# Patient Record
Sex: FEMALE | Race: BLACK OR AFRICAN AMERICAN | NOT HISPANIC OR LATINO | Employment: OTHER | URBAN - METROPOLITAN AREA
[De-identification: names, ages, dates, MRNs, and addresses within clinical notes are randomized per-mention and may not be internally consistent; named-entity substitution may affect disease eponyms.]

---

## 2017-01-09 ENCOUNTER — TELEPHONE (OUTPATIENT)
Dept: PAIN MEDICINE | Facility: CLINIC | Age: 57
End: 2017-01-09

## 2017-01-09 ENCOUNTER — PATIENT MESSAGE (OUTPATIENT)
Dept: PAIN MEDICINE | Facility: CLINIC | Age: 57
End: 2017-01-09

## 2017-01-09 NOTE — TELEPHONE ENCOUNTER
Spoke with patient regarding procedure on 1/12/17. Patient stated that the new insurance company needs codes and more information regarding procedure that is scheduled on 1/12/17. Patient was informed that the staff will contact someone regarding this matter. Patient verbalized understanding

## 2017-01-09 NOTE — TELEPHONE ENCOUNTER
Spoke with patient regarding procedure being approved by insurance. Patient was informed that insurance has approved her procedure and they are just waiting on an estimate  To be ran. Patient verbalized understanding.

## 2017-01-09 NOTE — TELEPHONE ENCOUNTER
----- Message from Leydi Matos sent at 1/9/2017  9:43 AM CST -----  Contact: self, [phone]  Patient requests to speak with you regarding her 1/12 procedure. Please advise.

## 2017-01-10 ENCOUNTER — TELEPHONE (OUTPATIENT)
Dept: PAIN MEDICINE | Facility: CLINIC | Age: 57
End: 2017-01-10

## 2017-01-10 NOTE — TELEPHONE ENCOUNTER
Spoke with patient regarding time of arrival for procedure that is scheduled on 1/12/17. Patient verbalized instructions and confirmed procedure date and time of arrival on 1/12/17 at 8 AM.

## 2017-01-11 ENCOUNTER — DOCUMENTATION ONLY (OUTPATIENT)
Dept: REHABILITATION | Facility: HOSPITAL | Age: 57
End: 2017-01-11

## 2017-01-11 NOTE — PROGRESS NOTES
PHYSICAL THERAPY DISCHARGE:    This patient was originally evaluated at our facility on: 11/7/16         Pt attended PT for a total of 2 Visits receiving: Manual Therapy, Therex, Postural Education, Body Mechanics Training, Home Exercise Instruction     This patient's last visit occurred on: 11/14/16      Short term goals were achieved: no     Long term goals were achieved: no    Pt was DC'd from our care secondary to: pt did not return after 2nd visit       Therapist: Sharon Mitchell, PT  1/11/2017

## 2017-01-12 ENCOUNTER — OFFICE VISIT (OUTPATIENT)
Dept: ORTHOPEDICS | Facility: CLINIC | Age: 57
End: 2017-01-12
Payer: COMMERCIAL

## 2017-01-12 ENCOUNTER — SURGERY (OUTPATIENT)
Age: 57
End: 2017-01-12

## 2017-01-12 ENCOUNTER — TELEPHONE (OUTPATIENT)
Dept: PAIN MEDICINE | Facility: HOSPITAL | Age: 57
End: 2017-01-12

## 2017-01-12 VITALS
WEIGHT: 290 LBS | SYSTOLIC BLOOD PRESSURE: 180 MMHG | DIASTOLIC BLOOD PRESSURE: 93 MMHG | BODY MASS INDEX: 45.52 KG/M2 | HEART RATE: 81 BPM | HEIGHT: 67 IN

## 2017-01-12 DIAGNOSIS — M65.341 TRIGGER FINGER, RIGHT RING FINGER: Primary | ICD-10-CM

## 2017-01-12 DIAGNOSIS — Z98.890 POST-OPERATIVE STATE: ICD-10-CM

## 2017-01-12 PROCEDURE — 99999 PR PBB SHADOW E&M-EST. PATIENT-LVL III: CPT | Mod: PBBFAC,,, | Performed by: NURSE PRACTITIONER

## 2017-01-12 PROCEDURE — 99024 POSTOP FOLLOW-UP VISIT: CPT | Mod: S$GLB,,, | Performed by: NURSE PRACTITIONER

## 2017-01-12 RX ADMIN — FENTANYL CITRATE 50 MCG: 50 INJECTION, SOLUTION INTRAMUSCULAR; INTRAVENOUS at 09:01

## 2017-01-12 RX ADMIN — LIDOCAINE HYDROCHLORIDE 5 ML: 5 INJECTION, SOLUTION INFILTRATION; PERINEURAL at 09:01

## 2017-01-12 RX ADMIN — MIDAZOLAM 2 MG: 1 INJECTION INTRAMUSCULAR; INTRAVENOUS at 09:01

## 2017-01-12 RX ADMIN — LIDOCAINE HYDROCHLORIDE 5 ML: 10 INJECTION, SOLUTION INFILTRATION; PERINEURAL at 09:01

## 2017-01-12 RX ADMIN — TRIAMCINOLONE ACETONIDE 40 MG: 40 INJECTION, SUSPENSION INTRA-ARTICULAR; INTRAMUSCULAR at 09:01

## 2017-01-13 NOTE — PATIENT INSTRUCTIONS
POST OPERATIVE VISIT INSTRUCTIONS    - no soaking the incision for at least 2 days, may get it wet in the shower and use regular soap    To avoid a hard, painful scar, we recommend you use Mederma and/or Silicone Scar patches. You may also use Emi Butter or Vitamin E oil.    You may start this 1 week after stitches are removed.   - Mederma scar cream : scar massage over incision 1-2 times per day. May use topical lotion or Vitamin E oil for massage an additional few times a day to help the scar become soft and less sensitive  - Silicone Scar Patches: cut and place over the incision, then massage over the patch  to help with scarring and sensitivity. Can be reused up to 4 days if you rinse it clean, let it dry out, then reapply    -Brands:  Mepiform Silicone Scar Sheets (Recommended), Scar Away,    Target Brand or Generic Pharmacy Brand ([facility], [facility], [facility])    - May take Tylenol 500 mg and/or Ibuprofen 400mg every 4-6 hours with food for pain as tolerated as long as you do not have an allergy or medical condition that prevents you from taking them    - Therapy recommended: As needed    - Immobilization: None    - Lifting Restrictions: Activity as tolerated     - Follow up: 4 weeks as needed    -contact us immediately at [phone] if you have any concerns about infection. Please let them know that you are a post operative patient.

## 2017-01-13 NOTE — PROGRESS NOTES
"Ms. Schmidt is here today for a post-operative visit.she is 13 days status post right ring finger TFR by Dr. Antonio on 12/30/16. she reports that she is doing very well.  Pain is well controlled, she is not taking pain medication. she denies fever, chills, and sweats since the time of the surgery.     Physical exam:    Vitals:    01/12/17 1416   BP: (!) 180/93   Pulse: 81   Weight: 131.5 kg (290 lb)   Height: 5' 7" (1.702 m)   PainSc:   6   PainLoc: Hand     Post op dressing taken down.  Incision is clean, dry and intact.  There is no erythema or exudate.  There is no sign of any infection. she is NVI. Sutures removed without difficulty. Patient tolerated well.     Assessment:  13 days status post right ring finger TFR by Dr. Antonio on 12/30/16    Plan:  Diagnoses and all orders for this visit:    Trigger finger, right ring finger    Post-operative state    -Pt doing well post operatively and healing as expected  -PO instruction reviewed and provided to patient  -RTC 4 week PRN        "

## 2017-02-20 ENCOUNTER — LAB VISIT (OUTPATIENT)
Dept: LAB | Facility: HOSPITAL | Age: 57
End: 2017-02-20
Attending: INTERNAL MEDICINE
Payer: COMMERCIAL

## 2017-02-20 ENCOUNTER — OFFICE VISIT (OUTPATIENT)
Dept: INTERNAL MEDICINE | Facility: CLINIC | Age: 57
End: 2017-02-20
Payer: COMMERCIAL

## 2017-02-20 DIAGNOSIS — E55.9 VITAMIN D DEFICIENCY: ICD-10-CM

## 2017-02-20 DIAGNOSIS — I10 ESSENTIAL HYPERTENSION: Primary | ICD-10-CM

## 2017-02-20 DIAGNOSIS — Z12.31 ENCOUNTER FOR SCREENING MAMMOGRAM FOR BREAST CANCER: ICD-10-CM

## 2017-02-20 DIAGNOSIS — I10 ESSENTIAL HYPERTENSION: ICD-10-CM

## 2017-02-20 LAB
25(OH)D3+25(OH)D2 SERPL-MCNC: 22 NG/ML
ANION GAP SERPL CALC-SCNC: 7 MMOL/L
BUN SERPL-MCNC: 16 MG/DL
CALCIUM SERPL-MCNC: 9.4 MG/DL
CHLORIDE SERPL-SCNC: 110 MMOL/L
CO2 SERPL-SCNC: 27 MMOL/L
CREAT SERPL-MCNC: 0.9 MG/DL
EST. GFR  (AFRICAN AMERICAN): >60 ML/MIN/1.73 M^2
EST. GFR  (NON AFRICAN AMERICAN): >60 ML/MIN/1.73 M^2
GLUCOSE SERPL-MCNC: 88 MG/DL
POTASSIUM SERPL-SCNC: 4.5 MMOL/L
SODIUM SERPL-SCNC: 144 MMOL/L

## 2017-02-20 PROCEDURE — 1160F RVW MEDS BY RX/DR IN RCRD: CPT | Mod: S$GLB,,, | Performed by: INTERNAL MEDICINE

## 2017-02-20 PROCEDURE — 99214 OFFICE O/P EST MOD 30 MIN: CPT | Mod: S$GLB,,, | Performed by: INTERNAL MEDICINE

## 2017-02-20 PROCEDURE — 3075F SYST BP GE 130 - 139MM HG: CPT | Mod: S$GLB,,, | Performed by: INTERNAL MEDICINE

## 2017-02-20 PROCEDURE — 3079F DIAST BP 80-89 MM HG: CPT | Mod: S$GLB,,, | Performed by: INTERNAL MEDICINE

## 2017-02-20 PROCEDURE — 99999 PR PBB SHADOW E&M-EST. PATIENT-LVL III: CPT | Mod: PBBFAC,,, | Performed by: INTERNAL MEDICINE

## 2017-02-20 PROCEDURE — 80048 BASIC METABOLIC PNL TOTAL CA: CPT

## 2017-02-20 PROCEDURE — 82306 VITAMIN D 25 HYDROXY: CPT

## 2017-02-20 PROCEDURE — 36415 COLL VENOUS BLD VENIPUNCTURE: CPT

## 2017-02-20 RX ORDER — TRIAMTERENE AND HYDROCHLOROTHIAZIDE 37.5; 25 MG/1; MG/1
1 CAPSULE ORAL EVERY MORNING
Qty: 30 CAPSULE | Refills: 3 | Status: SHIPPED | OUTPATIENT
Start: 2017-02-20 | End: 2019-12-26 | Stop reason: SDUPTHER

## 2017-02-20 RX ORDER — LISINOPRIL 40 MG/1
40 TABLET ORAL DAILY
Qty: 30 TABLET | Refills: 3 | Status: SHIPPED | OUTPATIENT
Start: 2017-02-20 | End: 2017-10-05 | Stop reason: SDUPTHER

## 2017-02-20 NOTE — MR AVS SNAPSHOT
[facility] - Internal Medicine  [address]  [place] [address]  Phone: [phone]  Fax: [phone]                  Dejah Schmidt   2017 10:45 AM   Office Visit    Description:  Female : 1960   Provider:  Dasha Bergeron MD   Department:  [facility] - Internal Medicine           Reason for Visit     Follow-up           Diagnoses this Visit        Comments    Essential hypertension    -  Primary     Vitamin D deficiency         Encounter for screening mammogram for breast cancer                To Do List           Future Appointments        Provider Department Dept Phone    2017 1:15 PM WILL Roy - Pain Management [phone]    2017 10:30 AM Dasha Bergeron MD [facility] Internal Medicine [phone]      Goals (5 Years of Data)     None      Follow-Up and Disposition     Return for EP 4 months.       These Medications        Disp Refills Start End    lisinopril (PRINIVIL,ZESTRIL) 40 MG tablet 30 tablet 3 2017     Take 1 tablet (40 mg total) by mouth once daily. - Oral    Pharmacy: [facility] [address] [place], [place] [address] [address] Ph #: [phone]       triamterene-hydrochlorothiazide 37.5-25 mg (DYAZIDE) 37.5-25 mg per capsule 30 capsule 3 2017    Take 1 capsule by mouth every morning. - Oral    Pharmacy: [facility] [address] [place], [place] [address] [address] Ph #: [phone]         [facility] On Call     [facility] On Call Nurse Care Line -  Assistance  Registered nurses in the [facility] provide clinical advisement, health education, appointment booking, and other advisory services.  Call for this free service at [phone].             Medications           Message regarding Medications     Verify the changes and/or additions to your medication regime listed below are the same as discussed with your clinician today.  If any of  "these changes or additions are incorrect, please notify your healthcare provider.             Verify that the below list of medications is an accurate representation of the medications you are currently taking.  If none reported, the list may be blank. If incorrect, please contact your healthcare provider. Carry this list with you in case of emergency.           Current Medications     docusate sodium (COLACE) 100 MG capsule Take 1 capsule (100 mg total) by mouth 2 (two) times daily as needed.    ibuprofen (ADVIL,MOTRIN) 200 MG tablet Take 200 mg by mouth every 6 (six) hours as needed for Pain.    lisinopril (PRINIVIL,ZESTRIL) 40 MG tablet Take 1 tablet (40 mg total) by mouth once daily.    ondansetron (ZOFRAN) 4 MG tablet Take 2 tablets (8 mg total) by mouth every 8 (eight) hours as needed.    oxycodone-acetaminophen (PERCOCET) 5-325 mg per tablet Take 1 tablet by mouth every 4 (four) hours as needed.    tramadol (ULTRAM) 50 mg tablet Take 1 to 2 tablets every 6 hours as needed for pain    triamterene-hydrochlorothiazide 37.5-25 mg (DYAZIDE) 37.5-25 mg per capsule Take 1 capsule by mouth every morning.           Clinical Reference Information           Your Vitals Were     BP Pulse Height Weight SpO2 BMI    132/82 82 5' 7" (1.702 m) 134.9 kg (297 lb 6.4 oz) 98% 46.58 kg/m2      Blood Pressure          Most Recent Value    BP  132/82      Allergies as of 2/20/2017     No Known Allergies      Immunizations Administered on Date of Encounter - 2/20/2017     None      Orders Placed During Today's Visit     Future Labs/Procedures Expected by Expires    Basic metabolic panel  2/20/2017 4/21/2018    Mammo Digital Screening Bilateral With CAD  2/20/2017 4/20/2018    Vitamin D  2/20/2017 4/21/2018      Language Assistance Services     ATTENTION: Language assistance services are available, free of charge. Please call [phone].      ATENCIÓN: Si habla español, tiene a ryan disposición servicios gratuitos de asistencia " lingüística. Seamus al [phone].     PIERRE Ý: N?u b?n nói Ti?ng Vi?t, có các d?ch v? h? tr? ngôn ng? mi?n phí dành cho b?n. G?i s? [phone].         [facility] complies with applicable Federal civil rights laws and does not discriminate on the basis of race, color, national origin, age, disability, or sex.

## 2017-02-21 ENCOUNTER — OFFICE VISIT (OUTPATIENT)
Dept: PAIN MEDICINE | Facility: CLINIC | Age: 57
End: 2017-02-21
Payer: COMMERCIAL

## 2017-02-21 VITALS
SYSTOLIC BLOOD PRESSURE: 145 MMHG | HEART RATE: 61 BPM | DIASTOLIC BLOOD PRESSURE: 82 MMHG | WEIGHT: 293 LBS | BODY MASS INDEX: 46.89 KG/M2

## 2017-02-21 DIAGNOSIS — M47.816 FACET ARTHROPATHY, LUMBAR: ICD-10-CM

## 2017-02-21 DIAGNOSIS — E66.01 MORBID OBESITY, UNSPECIFIED OBESITY TYPE: ICD-10-CM

## 2017-02-21 DIAGNOSIS — G89.29 CHRONIC BILATERAL LOW BACK PAIN WITH BILATERAL SCIATICA: ICD-10-CM

## 2017-02-21 DIAGNOSIS — M54.41 CHRONIC BILATERAL LOW BACK PAIN WITH BILATERAL SCIATICA: ICD-10-CM

## 2017-02-21 DIAGNOSIS — M54.42 CHRONIC BILATERAL LOW BACK PAIN WITH BILATERAL SCIATICA: ICD-10-CM

## 2017-02-21 DIAGNOSIS — M54.16 LUMBAR RADICULOPATHY, CHRONIC: Primary | ICD-10-CM

## 2017-02-21 PROCEDURE — 1160F RVW MEDS BY RX/DR IN RCRD: CPT | Mod: S$GLB,,, | Performed by: NURSE PRACTITIONER

## 2017-02-21 PROCEDURE — 3079F DIAST BP 80-89 MM HG: CPT | Mod: S$GLB,,, | Performed by: NURSE PRACTITIONER

## 2017-02-21 PROCEDURE — 99213 OFFICE O/P EST LOW 20 MIN: CPT | Mod: S$GLB,,, | Performed by: NURSE PRACTITIONER

## 2017-02-21 PROCEDURE — 99999 PR PBB SHADOW E&M-EST. PATIENT-LVL III: CPT | Mod: PBBFAC,,, | Performed by: NURSE PRACTITIONER

## 2017-02-21 PROCEDURE — 3077F SYST BP >= 140 MM HG: CPT | Mod: S$GLB,,, | Performed by: NURSE PRACTITIONER

## 2017-02-21 NOTE — PROGRESS NOTES
Chronic patient Established Note (Follow up visit)      SUBJECTIVE:    Dejah Schmidt presents to the clinic for a 5 wk follow-up appointment for low back pain. She is S/P CAUDAL DB with Racz Catheter on 1/12/17 with 60-70% relief for 3 weeks, however patient states pain has returned. Patient is S/P multiple injections with minimal relief, previous imaging was reviewed and discussed with the patient today, discussed that I will refer to Neurosurgery main [place] to consider some other options.   Since the last visit, Dejah Schmidt states the pain has been worsening. Current pain intensity is 7/10.    Pain Disability Index Review:  Last 3 PDI Scores 2/21/2017 12/22/2016 11/16/2016   Pain Disability Index (PDI) 52 47 53       Pain Medications:      - Opioids: Tylenol #3  - Adjuvant Medications: None  - Anti-Coagulants: None  - Others: see medications list    Opioid Contract: no     report:  Reviewed and consistent with medication use as prescribed.    Pain Procedures: 1/12/17 CAUDAL DB with Racz Catheter  12/7/16 BILATERAL L2,3,4,5 LUMBAR FACET MEDIAL BRANCH NERVE BLOCK; 10/19/16 Right L2 and L4 TRANSFORAMINAL EPIDURAL STEROID INJECTION (L1 level was attempted, but Was aborted due to + aspiration of blood before injection, so decision was make to go to L2 level and have Injectate spread to L1, consent updated to reflect changes and initialed by pt and myself)    Physical Therapy/Home Exercise: no    Imaging: MRI Lumbar Spine Without Contrast 9/4/16  Narrative   MRI OF THE LUMBAR SPINE WITHOUT CONTRAST.     Technique:  Multiplanar multisequence MR images of the lumbar spine obtained without contrast.     Comparison: Radiograph 8/29/16     Findings:    Lumbar spine alignment is within normal limits. The vertebral body heights are well maintained, with no fracture.  There are T1/T2 hyperintense lesions identified in the L2 and L3 vertebral bodies most compatible with hemangiomas. No evidence of an acute fracture.       The conus is normal in appearance, and terminates at the L1-L2 level.  The adjacent soft tissue structures show no significant abnormalities.      L1-L2: Moderate bilateral facet arthrosis, ligamentum flavum hypertrophy, diffuse circumferential disc bulge, and a superimposed central disc extrusion extending caudally, resulting in severe spinal canal stenosis. There is mild bilateral neuroforaminal narrowing.    L2-L3: Moderate right and severe left facet arthrosis, ligamentum flavum hypertrophy, and a superimposed central disc extrusion extending caudally resulting in severe spinal canal stenosis. There is severe left neuroforaminal narrowing.     L3-L4:  Moderate right and severe left facet arthrosis and a diffuse circumferential disc bulge results in mild spinal canal stenosis and mild bilateral neuroforaminal narrowing.     L4-L5:  Severe bilateral facet arthrosis and mild diffuse circumferential disc bulge resulting in mild spinal canal stenosis and mild bilateral neuroforaminal narrowing.     L5-S1:  Moderate bilateral facet arthrosis, diffuse disc bulge, and a small annular tear, resulting in mild bilateral neuroforaminal narrowing and no significant spinal canal stenosis.   Impression        Multilevel lumbar spondylosis, as detailed above, most severe at L1-2 and L2-3 with diffuse disc bulges and superimposed central disc extrusions resulting in severe spinal canal stenosis and variable neuroforaminal narrowing.       ______________________________________     Electronically signed by resident: FAMILIA LEBRON MD  Date: 09/04/16  Time: 16:12     ______________________________________     Electronically signed by: ADRIANO WILSON MD  Date: 09/04/16  Time: 16:24        X-Ray Lumbar Spine Complete 5 View 8/29/16  Narrative   Time of Procedure: 08/29/16 13:55:18  Accession # [id number]    Comparison: 7/2014.    Number of views: 5.    Findings:  There are 5 non-rib bearing lumbar type vertebral bodies with  vertebral body heights maintained.  There is dextroscoliosis between L1 and L4 that I calculated at it least 14 degrees.  Vacuum phenomenon is noted at the L3-4 disc space.  These findings are similar to the earlier study.    I do not detect spondylolysis or spondylolisthesis and no translational instability on flexion and extension views..    Intervertebral disc spaces are maintained.    Sacroiliac joints appear patent in their imaged extent.   Impression     Please see above.  ______________________________________     Electronically signed by: Veronica Solis MD  Date: 08/29/16  Time: 14:35     Encounter   View Encounter      Reviewed By   Dasha Bergeron MD on 9/3/2016  1:25 PM         Allergies: Review of patient's allergies indicates:  No Known Allergies    Current Medications:   Current Outpatient Prescriptions   Medication Sig Dispense Refill    docusate sodium (COLACE) 100 MG capsule Take 1 capsule (100 mg total) by mouth 2 (two) times daily as needed. 60 capsule 0    ibuprofen (ADVIL,MOTRIN) 200 MG tablet Take 200 mg by mouth every 6 (six) hours as needed for Pain.      lisinopril (PRINIVIL,ZESTRIL) 40 MG tablet Take 1 tablet (40 mg total) by mouth once daily. 30 tablet 3    ondansetron (ZOFRAN) 4 MG tablet Take 2 tablets (8 mg total) by mouth every 8 (eight) hours as needed. 20 tablet 0    oxycodone-acetaminophen (PERCOCET) 5-325 mg per tablet Take 1 tablet by mouth every 4 (four) hours as needed. 60 tablet 0    tramadol (ULTRAM) 50 mg tablet Take 1 to 2 tablets every 6 hours as needed for pain 30 tablet 1    triamterene-hydrochlorothiazide 37.5-25 mg (DYAZIDE) 37.5-25 mg per capsule Take 1 capsule by mouth every morning. 30 capsule 3     No current facility-administered medications for this visit.        REVIEW OF SYSTEMS:    GENERAL: No weight loss, malaise or fevers.  Morbid obesity  HEENT:  + headaches.  NECK: + neck pain   RESPIRATORY: + asthma, Negative for cough, wheezing or shortness of  breath.  CARDIOVASCULAR: Negative for chest pain, leg swelling or palpitations.  GI: Negative for abdominal discomfort, blood in stools or black stools or change in bowel habits.  MUSCULOSKELETAL: See HPI.  SKIN: Negative for lesions, rash, and itching.  PSYCH: Negative for sleep disturbance, mood disorder and recent psychosocial stressors.  HEMATOLOGY/LYMPHOLOGY: Negative for prolonged bleeding, bruising easily or swollen nodes.  NEURO: No history of headaches, syncope, paralysis, seizures or tremors.  All other reviewed and negative other than HPI.    Past Medical History:  Past Medical History   Diagnosis Date    Asthma     Hypercholesteremia     Hypertension     Multiple thyroid nodules        Past Surgical History:  Past Surgical History   Procedure Laterality Date    Mass excision       at neck    Carpal tunnel release      Rotator cuff repair       left shoulder    Hysterectomy      Foot surgery         Family History:  Family History   Problem Relation Age of Onset    Cancer Mother     Coronary artery disease Mother     Heart disease Mother     Hypertension Mother     Hyperlipidemia Mother     Hypertension Father     Cancer Father     Heart disease Maternal Grandmother     Hypertension Maternal Grandmother        Social History:  Social History     Social History    Marital status:      Spouse name: N/A    Number of children: N/A    Years of education: N/A     Social History Main Topics    Smoking status: Never Smoker    Smokeless tobacco: Never Used    Alcohol use 0.0 oz/week     0 Standard drinks or equivalent per week      Comment: occasionaly    Drug use: No    Sexual activity: Yes     Other Topics Concern    None     Social History Narrative    [employer] receiving [employer]. No physical activities       OBJECTIVE:    Visit Vitals    BP (!) 145/82    Pulse 61    Wt 135.8 kg (299 lb 6.2 oz)    BMI 46.89 kg/m2       PHYSICAL EXAMINATION:    General appearance: Well  appearing, in no acute distress, alert and oriented x3.Morbidly obese  Psych: Mood and affect appropriate.  Skin: Skin color, texture, turgor normal, no rashes or lesions, in both upper and lower body.  Head/face: Normocephalic, atraumatic. No palpable lymph nodes.  Neck: No pain to palpation over the cervical paraspinous muscles. Spurling Negative. No pain with neck flexion, extension, or lateral flexion.   Back: Straight leg raising in the sitting and supine positions is negative to radicular pain. + pain to palpation over the spine or costovertebral angles. Positive FABERE and Yeoman's test on the right side.  Limited range of motion with pain reproduction.  Extremities: Peripheral joint ROM is full and pain free without obvious instability or laxity in all four extremities. No deformities, edema, or skin discoloration. Good capillary refill.  Musculoskeletal: Shoulder, hip, sacroiliac and knee provocative maneuvers are negative. Bilateral upper and lower extremity strength is normal and symmetric. No atrophy or tone abnormalities are noted.  Neuro: Bilateral upper and lower extremity coordination and muscle stretch reflexes are physiologic and symmetric. Plantar response are downgoing. No loss of sensation is noted.  Gait: Antalgic    ASSESSMENT: 56 y.o. year old female with low back pain with radicular symptoms consistent with      Diagnosis:    1. Lumbar radiculopathy, chronic  Ambulatory Referral to Neurosurgery   2. Chronic bilateral low back pain with bilateral sciatica  Ambulatory Referral to Neurosurgery   3. Facet arthropathy, lumbar  Ambulatory Referral to Neurosurgery   4. Morbid obesity, unspecified obesity type  Ambulatory Referral to Neurosurgery         PLAN:     - I have stressed the importance of physical activity and a home exercise plan to help with pain and improve health.  - Patient can continue with medications for now since they are providing benefits, using them appropriately, and without  side effects.  - Counseled patient regarding the importance of activity modification, constant sleeping habits and physical therapy.  -Referral to Neurosurgery  -RTC as needed for returning or new pain  -The above plan and management options were discussed at length with patient. Patient is in agreement with the above and verbalized understanding. The physician  was consulted on this patient  and agrees with this plan.    RENÉE Nevarez    02/21/2017

## 2017-02-25 ENCOUNTER — TELEPHONE (OUTPATIENT)
Dept: INTERNAL MEDICINE | Facility: CLINIC | Age: 57
End: 2017-02-25

## 2017-02-25 VITALS
BODY MASS INDEX: 45.99 KG/M2 | SYSTOLIC BLOOD PRESSURE: 132 MMHG | HEART RATE: 82 BPM | HEIGHT: 67 IN | OXYGEN SATURATION: 98 % | WEIGHT: 293 LBS | DIASTOLIC BLOOD PRESSURE: 82 MMHG

## 2017-02-25 NOTE — TELEPHONE ENCOUNTER
Please contact patient and let her know all of her labwork is acceptable except her vitamin D level is still low. Really need for her to take a weekly vitamin D supplement. Please ask her what pharmacy she would like that sent to

## 2017-02-26 NOTE — PROGRESS NOTES
Subjective:       Patient ID: Dejah Schmidt is a 56 y.o. female.    Chief Complaint: Hypertension    HPI: She returns for management of hypertension.  She has had hypertension for over a year.  Current treatment has included medications outlined in medication list.  She denies chest pain or shortness of breath.  No palpitations.  Denies left arm or neck pain.    Past medical history: Hypertension, hyperlipidemia, asthma, lumbar spinal stenosis, vitamin D deficiency, thyroid nodule, status post partial thyroidectomy, status post hysterectomy. She had a colonoscopy September 2011       Medications: Lisinopril 40 mg daily, Dyazide 1 by mouth daily      NO KNOWN DRUG ALLERGIES        Review of Systems   Constitutional: Negative for chills, fatigue, fever and unexpected weight change.   Respiratory: Negative for chest tightness and shortness of breath.    Cardiovascular: Negative for chest pain and palpitations.   Gastrointestinal: Negative for abdominal pain and blood in stool.   Neurological: Negative for dizziness, syncope, numbness and headaches.       Objective:      Physical Exam   HENT:   Right Ear: External ear normal.   Left Ear: External ear normal.   Nose: Nose normal.   Mouth/Throat: Oropharynx is clear and moist.   Eyes: Pupils are equal, round, and reactive to light.   Neck: Normal range of motion.   Cardiovascular: Normal rate and regular rhythm.    No murmur heard.  Pulmonary/Chest: Breath sounds normal.   Abdominal: She exhibits no distension. There is no hepatosplenomegaly. There is no tenderness.   Lymphadenopathy:     She has no cervical adenopathy.     She has no axillary adenopathy.   Neurological: She has normal strength and normal reflexes. No cranial nerve deficit or sensory deficit.     breast exam: No mass palpated, no nipple discharge expressed  Assessment:     assessment and plan: Hypertension: Check BMP and vitamin D.  Schedule mammogram.  Discussed Pap smear, pelvic exam.  She refused  all      Plan:       As above

## 2017-02-27 RX ORDER — CHOLECALCIFEROL (VITAMIN D3) 1250 MCG
1 TABLET ORAL WEEKLY
Qty: 12 TABLET | Refills: 3 | Status: SHIPPED | OUTPATIENT
Start: 2017-02-27 | End: 2019-12-26 | Stop reason: SDUPTHER

## 2017-02-27 NOTE — TELEPHONE ENCOUNTER
Pt has been advised about results and stressed great understanding . Pt would like for medication to be sent into [facility] on file         ADorinda Vazquez    Thanks Dr. Bergeron

## 2017-03-09 ENCOUNTER — OFFICE VISIT (OUTPATIENT)
Dept: ORTHOPEDICS | Facility: CLINIC | Age: 57
End: 2017-03-09
Payer: COMMERCIAL

## 2017-03-09 VITALS
RESPIRATION RATE: 16 BRPM | HEART RATE: 75 BPM | BODY MASS INDEX: 45.99 KG/M2 | DIASTOLIC BLOOD PRESSURE: 84 MMHG | SYSTOLIC BLOOD PRESSURE: 176 MMHG | WEIGHT: 293 LBS | HEIGHT: 67 IN

## 2017-03-09 DIAGNOSIS — M17.0 PRIMARY OSTEOARTHRITIS OF BOTH KNEES: Primary | ICD-10-CM

## 2017-03-09 PROCEDURE — 99213 OFFICE O/P EST LOW 20 MIN: CPT | Mod: S$GLB,,, | Performed by: PHYSICIAN ASSISTANT

## 2017-03-09 PROCEDURE — 3077F SYST BP >= 140 MM HG: CPT | Mod: S$GLB,,, | Performed by: PHYSICIAN ASSISTANT

## 2017-03-09 PROCEDURE — 1160F RVW MEDS BY RX/DR IN RCRD: CPT | Mod: S$GLB,,, | Performed by: PHYSICIAN ASSISTANT

## 2017-03-09 PROCEDURE — 3079F DIAST BP 80-89 MM HG: CPT | Mod: S$GLB,,, | Performed by: PHYSICIAN ASSISTANT

## 2017-03-09 PROCEDURE — 99999 PR PBB SHADOW E&M-EST. PATIENT-LVL III: CPT | Mod: PBBFAC,,, | Performed by: PHYSICIAN ASSISTANT

## 2017-03-09 RX ORDER — IBUPROFEN 800 MG/1
800 TABLET ORAL
Qty: 90 TABLET | Refills: 1 | Status: SHIPPED | OUTPATIENT
Start: 2017-03-09 | End: 2017-03-19

## 2017-03-09 RX ORDER — HYALURONATE SODIUM 20 MG/2 ML
40 SYRINGE (ML) INTRAARTICULAR WEEKLY
Status: SHIPPED | OUTPATIENT
Start: 2017-03-09 | End: 2017-03-30

## 2017-03-09 NOTE — PROGRESS NOTES
SUBJECTIVE:     Chief Complaint & History of Present Illness:  Dejah Schmidt is a Established patient 56 y.o. female who is seen here today with a complaint of    Chief Complaint   Patient presents with    Right Knee - Pain    Left Knee - Pain    .  She is patient well known to me was last seen and treated in the clinic for this condition 12/19/2016.  At which time she undergone bilateral insulin injections.  She was ports she received approximately 2 months of good relief from the injections but is having return of the symptoms and she continues to work a job which has been is about time standing and ambulating.  On a scale of 1-10, with 10 being worst pain imaginable, he rates this pain as 5 on good days and 8 on bad days.  she describes the pain as sore and aching.    Review of patient's allergies indicates:  No Known Allergies      Current Outpatient Prescriptions   Medication Sig Dispense Refill    cholecalciferol, vitamin D3, 50,000 unit Tab Take 1 tablet by mouth once a week. 12 tablet 3    docusate sodium (COLACE) 100 MG capsule Take 1 capsule (100 mg total) by mouth 2 (two) times daily as needed. 60 capsule 0    lisinopril (PRINIVIL,ZESTRIL) 40 MG tablet Take 1 tablet (40 mg total) by mouth once daily. 30 tablet 3    ondansetron (ZOFRAN) 4 MG tablet Take 2 tablets (8 mg total) by mouth every 8 (eight) hours as needed. 20 tablet 0    oxycodone-acetaminophen (PERCOCET) 5-325 mg per tablet Take 1 tablet by mouth every 4 (four) hours as needed. 60 tablet 0    tramadol (ULTRAM) 50 mg tablet Take 1 to 2 tablets every 6 hours as needed for pain 30 tablet 1    triamterene-hydrochlorothiazide 37.5-25 mg (DYAZIDE) 37.5-25 mg per capsule Take 1 capsule by mouth every morning. 30 capsule 3    ibuprofen (ADVIL,MOTRIN) 800 MG tablet Take 1 tablet (800 mg total) by mouth 3 (three) times daily with meals. 90 tablet 1     Current Facility-Administered Medications   Medication Dose Route Frequency Provider Last  "Rate Last Dose    EUFLEXXA injection Syrg 40 mg  40 mg Intra-articular Weekly David Johnston PA-C           Past Medical History:   Diagnosis Date    Asthma     Hypercholesteremia     Hypertension     Multiple thyroid nodules        Past Surgical History:   Procedure Laterality Date    CARPAL TUNNEL RELEASE      FOOT SURGERY      HYSTERECTOMY      MASS EXCISION      at neck    ROTATOR CUFF REPAIR      left shoulder       Vital Signs (Most Recent)  Vitals:    03/09/17 1501   BP: (!) 176/84   Pulse: 75   Resp: 16           Review of Systems:  ROS:  Constitutional: no fever or chills  Eyes: no visual changes  ENT: no nasal congestion or sore throat  Respiratory: no cough or shortness of breath, asthma  Cardiovascular: no chest pain or palpitations  Gastrointestinal: no nausea or vomiting, tolerating diet  Genitourinary: no hematuria or dysuria  Integument/Breast: no rash or pruritis  Hematologic/Lymphatic: no easy bruising or lymphadenopathy  Musculoskeletal: positive for arthralgias, back pain, neck pain and stiff joints, negative for bone pain and muscle weakness  Neurological: no seizures or tremors  Behavioral/Psych: no auditory or visual hallucinations  Endocrine: no heat or cold intolerance              OBJECTIVE:     PHYSICAL EXAM:  Height: 5' 7" (170.2 cm) Weight: 135.8 kg (299 lb 6.2 oz), General Appearance: Well nourished, well developed, in no acute distress.  Neurological: Mood & affect are normal.  bilateral Knee Exam:  Knee Range of Motion:0-115 degrees flexion   Effusion:not significant  Condition of skin:intact  Location of tenderness: Lateral joint line on the right, medial joint line on the left   Strength:limited by pain and 5 of 5  Stability:  stable to testing  Varus /Valgus stress:  Windswept knees mild  Archana:   negative/negative  Hip Examination:  normal        ASSESSMENT/PLAN:     Plan: We discussed with the patient at length all the different treatment options available for " arthrosis of the knee including anti-inflammatories, acetaminophen, rest, ice, knee strengthening exercise, occasional cortisone injections for temporary relief, Viscosupplimentation injections, arthroscopic debridement osteotomy, and finally knee arthroplasty.   We'll refill ibuprofen 800 mg 3 times a day  Order Euflexxa bilateral knees begin next week

## 2017-03-13 ENCOUNTER — HOSPITAL ENCOUNTER (OUTPATIENT)
Dept: RADIOLOGY | Facility: HOSPITAL | Age: 57
Discharge: HOME | End: 2017-03-13
Attending: INTERNAL MEDICINE
Payer: COMMERCIAL

## 2017-03-13 DIAGNOSIS — Z12.31 ENCOUNTER FOR SCREENING MAMMOGRAM FOR BREAST CANCER: ICD-10-CM

## 2017-03-13 PROCEDURE — 77067 SCR MAMMO BI INCL CAD: CPT | Mod: TC

## 2017-03-13 PROCEDURE — 77067 SCR MAMMO BI INCL CAD: CPT | Mod: 26,,, | Performed by: RADIOLOGY

## 2017-03-21 ENCOUNTER — OFFICE VISIT (OUTPATIENT)
Dept: ORTHOPEDICS | Facility: CLINIC | Age: 57
End: 2017-03-21
Payer: COMMERCIAL

## 2017-03-21 VITALS
SYSTOLIC BLOOD PRESSURE: 162 MMHG | DIASTOLIC BLOOD PRESSURE: 88 MMHG | WEIGHT: 293 LBS | HEART RATE: 70 BPM | BODY MASS INDEX: 45.99 KG/M2 | HEIGHT: 67 IN | RESPIRATION RATE: 16 BRPM

## 2017-03-21 DIAGNOSIS — M17.0 PRIMARY OSTEOARTHRITIS OF BOTH KNEES: Primary | ICD-10-CM

## 2017-03-21 PROCEDURE — 20610 DRAIN/INJ JOINT/BURSA W/O US: CPT | Mod: 50,S$GLB,, | Performed by: PHYSICIAN ASSISTANT

## 2017-03-21 PROCEDURE — 99999 PR PBB SHADOW E&M-EST. PATIENT-LVL III: CPT | Mod: PBBFAC,,, | Performed by: PHYSICIAN ASSISTANT

## 2017-03-21 PROCEDURE — 99499 UNLISTED E&M SERVICE: CPT | Mod: S$GLB,,, | Performed by: PHYSICIAN ASSISTANT

## 2017-03-21 RX ADMIN — Medication 40 MG: at 03:03

## 2017-03-21 NOTE — LETTER
March 21, 2017      Yousif Anderson MD  [address]  [place] [address]           [facility]  [address]  [place] [address]  Phone: [phone]          Patient: Dejah Schmidt   MR Number: [id number]   YOB: 1960   Date of Visit: 3/21/2017       Dear Dr. Yousif Anderson:    Thank you for referring Dejah Schmidt to me for evaluation. Attached you will find relevant portions of my assessment and plan of care.    If you have questions, please do not hesitate to call me. I look forward to following Dejah Schmidt along with you.    Sincerely,    David Johnston PA-C    Enclosure  CC:  No Recipients    If you would like to receive this communication electronically, please contact [email or web address] or [phone] to request more information on [employer] Link access.    For providers and/or their staff who would like to refer a patient to [facility], please contact us through our one-stop-shop provider referral line, Crystal Pineda, at [phone].    If you feel you have received this communication in error or would no longer like to receive these types of communications, please e-mail [email or web address]

## 2017-03-27 ENCOUNTER — TELEPHONE (OUTPATIENT)
Dept: ORTHOPEDICS | Facility: CLINIC | Age: 57
End: 2017-03-27

## 2017-03-28 ENCOUNTER — OFFICE VISIT (OUTPATIENT)
Dept: ORTHOPEDICS | Facility: CLINIC | Age: 57
End: 2017-03-28
Payer: COMMERCIAL

## 2017-03-28 VITALS — HEIGHT: 67 IN | WEIGHT: 293 LBS | BODY MASS INDEX: 45.99 KG/M2

## 2017-03-28 DIAGNOSIS — M17.0 PRIMARY OSTEOARTHRITIS OF BOTH KNEES: Primary | ICD-10-CM

## 2017-03-28 PROCEDURE — 20610 DRAIN/INJ JOINT/BURSA W/O US: CPT | Mod: 50,S$GLB,, | Performed by: NURSE PRACTITIONER

## 2017-03-28 PROCEDURE — 99499 UNLISTED E&M SERVICE: CPT | Mod: S$GLB,,, | Performed by: NURSE PRACTITIONER

## 2017-03-28 PROCEDURE — 99999 PR PBB SHADOW E&M-EST. PATIENT-LVL III: CPT | Mod: PBBFAC,,, | Performed by: NURSE PRACTITIONER

## 2017-03-28 RX ORDER — HYDROCODONE BITARTRATE AND ACETAMINOPHEN 5; 325 MG/1; MG/1
1 TABLET ORAL EVERY 6 HOURS PRN
Qty: 30 TABLET | Refills: 0 | Status: SHIPPED | OUTPATIENT
Start: 2017-03-28 | End: 2017-04-10 | Stop reason: SDUPTHER

## 2017-03-28 RX ADMIN — Medication 40 MG: at 01:03

## 2017-03-28 NOTE — LETTER
March 28, 2017      Yousif Anderson MD  [address]  [place] [address]           [facility]  [address]  [place] [address]  Phone: [phone]          Patient: Dejah Schmidt   MR Number: [id number]   YOB: 1960   Date of Visit: 3/28/2017       Dear Dr. Yousif Anderson:    Thank you for referring Dejah Schmidt to me for evaluation. Attached you will find relevant portions of my assessment and plan of care.    If you have questions, please do not hesitate to call me. I look forward to following Dejah Schmidt along with you.    Sincerely,    Maritza Alexandre, NP    Enclosure  CC:  No Recipients    If you would like to receive this communication electronically, please contact [email or web address] or [phone] to request more information on [employer] Link access.    For providers and/or their staff who would like to refer a patient to [facility], please contact us through our one-stop-shop provider referral line, Crystal Pineda, at [phone].    If you feel you have received this communication in error or would no longer like to receive these types of communications, please e-mail [email or web address]

## 2017-03-28 NOTE — PROGRESS NOTES
Dejah Schmidt presents to clinic today for the second bilateral knee Euflexxa injection.    Exam demonstrates the no effusion in the  bilateral knee, and the skin is intact.    Diagnosis: osteoarthritis knee    After time out was performed and patient ID, side, and site were verified, the  bilateral  knee was sterilly prepped in the standard fashion.  A 22-gauge needle was introduced into bilateral knee joint from an fox-lateral site without complication and knee was then injected with 2 ml of Euflexxa.  Sterile dressing was applied.  The patient was instructed to resume activities as tolerated and to call with any problems.     We will see Dejah Schmidt back next week for the third injection.

## 2017-04-01 ENCOUNTER — PATIENT MESSAGE (OUTPATIENT)
Dept: ORTHOPEDICS | Facility: CLINIC | Age: 57
End: 2017-04-01

## 2017-04-03 ENCOUNTER — TELEPHONE (OUTPATIENT)
Dept: ORTHOPEDICS | Facility: CLINIC | Age: 57
End: 2017-04-03

## 2017-04-03 NOTE — TELEPHONE ENCOUNTER
"Called patient to inform her that her last injection should be covered under her previous insurance and has already been "paid for" by previous insurance. Received no answer.  "

## 2017-04-04 ENCOUNTER — OFFICE VISIT (OUTPATIENT)
Dept: ORTHOPEDICS | Facility: CLINIC | Age: 57
End: 2017-04-04
Payer: COMMERCIAL

## 2017-04-04 ENCOUNTER — HOSPITAL ENCOUNTER (OUTPATIENT)
Dept: RADIOLOGY | Facility: HOSPITAL | Age: 57
Discharge: HOME | End: 2017-04-04
Attending: ORTHOPAEDIC SURGERY
Payer: COMMERCIAL

## 2017-04-04 DIAGNOSIS — M17.0 PRIMARY OSTEOARTHRITIS OF BOTH KNEES: Primary | ICD-10-CM

## 2017-04-04 DIAGNOSIS — M25.512 ACUTE PAIN OF LEFT SHOULDER: ICD-10-CM

## 2017-04-04 PROCEDURE — 99499 UNLISTED E&M SERVICE: CPT | Mod: S$GLB,,, | Performed by: PHYSICIAN ASSISTANT

## 2017-04-04 PROCEDURE — 99999 PR PBB SHADOW E&M-EST. PATIENT-LVL II: CPT | Mod: PBBFAC,,, | Performed by: PHYSICIAN ASSISTANT

## 2017-04-04 PROCEDURE — 20610 DRAIN/INJ JOINT/BURSA W/O US: CPT | Mod: 50,S$GLB,, | Performed by: PHYSICIAN ASSISTANT

## 2017-04-04 RX ORDER — HYALURONATE SODIUM 20 MG/2 ML
40 SYRINGE (ML) INTRAARTICULAR
Status: COMPLETED | OUTPATIENT
Start: 2017-04-04 | End: 2017-04-04

## 2017-04-04 RX ADMIN — Medication 40 MG: at 02:04

## 2017-04-04 NOTE — LETTER
April 4, 2017      Yousif Anderson MD  [address]  [place] [address]           [facility]  [address]  [place] [address]  Phone: [phone]          Patient: Dejah Schmidt   MR Number: [id number]   YOB: 1960   Date of Visit: 4/4/2017       Dear Dr. Yousif Anderson:    Thank you for referring Dejah Schmidt to me for evaluation. Attached you will find relevant portions of my assessment and plan of care.    If you have questions, please do not hesitate to call me. I look forward to following Dejah Schmidt along with you.    Sincerely,    David Johnston PA-C    Enclosure  CC:  No Recipients    If you would like to receive this communication electronically, please contact [email or web address] or [phone] to request more information on [employer] Link access.    For providers and/or their staff who would like to refer a patient to [facility], please contact us through our one-stop-shop provider referral line, Crystal Pineda, at [phone].    If you feel you have received this communication in error or would no longer like to receive these types of communications, please e-mail [email or web address]

## 2017-04-04 NOTE — PROGRESS NOTES
Dejah Schmidt is a 56 y.o. year old her here today for her 3rd Euflexxa injection for degenerative changes of her bilateral knee . she was last seen and treated in the clinic on 3/21/2017. There has been no significant change in her medical status since her last visit. No Fever, chills, malaise, or unexplained weight change.      Allergies, Medications, past medical and surgical history were reviewed .    Examination of the knee demonstrates  No evidence of edema, erythema , echymosis strength and range of motion are unchanged from previous visit.    The injection site was identified and the skin was prepared with a betadine solution. The joint was injected with 2 ml of Euflexxa solution under sterile technique. Dejah Schmidt tolerated the procedure well, she was advised to rest the knee today, ice and elevation. I may take 3 -6 weeks following the last injection to get the full benefit of the medication.  I will see her back in 6 months. Sooner if he has any problems or concerns.           .

## 2017-04-05 ENCOUNTER — NURSE TRIAGE (OUTPATIENT)
Dept: ADMINISTRATIVE | Facility: CLINIC | Age: 57
End: 2017-04-05

## 2017-04-05 ENCOUNTER — HOSPITAL ENCOUNTER (EMERGENCY)
Facility: HOSPITAL | Age: 57
Discharge: HOME | End: 2017-04-05
Attending: EMERGENCY MEDICINE
Payer: COMMERCIAL

## 2017-04-05 VITALS
WEIGHT: 290 LBS | SYSTOLIC BLOOD PRESSURE: 187 MMHG | TEMPERATURE: 98 F | OXYGEN SATURATION: 96 % | BODY MASS INDEX: 45.52 KG/M2 | HEIGHT: 67 IN | HEART RATE: 82 BPM | DIASTOLIC BLOOD PRESSURE: 76 MMHG | RESPIRATION RATE: 18 BRPM

## 2017-04-05 DIAGNOSIS — I10 HYPERTENSION: ICD-10-CM

## 2017-04-05 DIAGNOSIS — I10 HTN (HYPERTENSION): ICD-10-CM

## 2017-04-05 DIAGNOSIS — M54.12 CERVICAL RADICULOPATHY: Primary | ICD-10-CM

## 2017-04-05 LAB
BUN SERPL-MCNC: 16 MG/DL (ref 6–30)
CHLORIDE SERPL-SCNC: 103 MMOL/L (ref 95–110)
CREAT SERPL-MCNC: 0.9 MG/DL (ref 0.5–1.4)
GLUCOSE SERPL-MCNC: 107 MG/DL (ref 70–110)
HCT VFR BLD CALC: 42 %PCV (ref 36–54)
POC IONIZED CALCIUM: 1.08 MMOL/L (ref 1.06–1.42)
POC TCO2 (MEASURED): 27 MMOL/L (ref 23–29)
POTASSIUM BLD-SCNC: 4.2 MMOL/L (ref 3.5–5.1)
SAMPLE: NORMAL
SODIUM BLD-SCNC: 142 MMOL/L (ref 136–145)

## 2017-04-05 PROCEDURE — 25000003 PHARM REV CODE 250: Performed by: EMERGENCY MEDICINE

## 2017-04-05 PROCEDURE — 99285 EMERGENCY DEPT VISIT HI MDM: CPT | Mod: ,,, | Performed by: EMERGENCY MEDICINE

## 2017-04-05 PROCEDURE — 93005 ELECTROCARDIOGRAM TRACING: CPT

## 2017-04-05 PROCEDURE — 63600175 PHARM REV CODE 636 W HCPCS: Performed by: EMERGENCY MEDICINE

## 2017-04-05 PROCEDURE — 99284 EMERGENCY DEPT VISIT MOD MDM: CPT | Mod: 25

## 2017-04-05 PROCEDURE — 96361 HYDRATE IV INFUSION ADD-ON: CPT

## 2017-04-05 PROCEDURE — 93010 ELECTROCARDIOGRAM REPORT: CPT | Mod: ,,, | Performed by: INTERNAL MEDICINE

## 2017-04-05 PROCEDURE — 96375 TX/PRO/DX INJ NEW DRUG ADDON: CPT

## 2017-04-05 PROCEDURE — 96374 THER/PROPH/DIAG INJ IV PUSH: CPT

## 2017-04-05 RX ORDER — CLONIDINE HYDROCHLORIDE 0.1 MG/1
0.2 TABLET ORAL
Status: COMPLETED | OUTPATIENT
Start: 2017-04-05 | End: 2017-04-05

## 2017-04-05 RX ORDER — AMLODIPINE BESYLATE 10 MG/1
10 TABLET ORAL DAILY
Qty: 30 TABLET | Refills: 0 | Status: SHIPPED | OUTPATIENT
Start: 2017-04-05 | End: 2017-06-15 | Stop reason: SDUPTHER

## 2017-04-05 RX ORDER — CLONIDINE HYDROCHLORIDE 0.1 MG/1
0.1 TABLET ORAL
Status: DISCONTINUED | OUTPATIENT
Start: 2017-04-05 | End: 2017-04-05

## 2017-04-05 RX ORDER — AMITRIPTYLINE HYDROCHLORIDE 25 MG/1
25 TABLET, FILM COATED ORAL
Status: COMPLETED | OUTPATIENT
Start: 2017-04-05 | End: 2017-04-05

## 2017-04-05 RX ORDER — DEXAMETHASONE SODIUM PHOSPHATE 4 MG/ML
8 INJECTION, SOLUTION INTRA-ARTICULAR; INTRALESIONAL; INTRAMUSCULAR; INTRAVENOUS; SOFT TISSUE
Status: COMPLETED | OUTPATIENT
Start: 2017-04-05 | End: 2017-04-05

## 2017-04-05 RX ORDER — METHOCARBAMOL 500 MG/1
1000 TABLET, FILM COATED ORAL
Status: COMPLETED | OUTPATIENT
Start: 2017-04-05 | End: 2017-04-05

## 2017-04-05 RX ORDER — AMITRIPTYLINE HYDROCHLORIDE 25 MG/1
25 TABLET, FILM COATED ORAL NIGHTLY PRN
Qty: 10 TABLET | Refills: 0 | Status: SHIPPED | OUTPATIENT
Start: 2017-04-05 | End: 2017-04-20 | Stop reason: ALTCHOICE

## 2017-04-05 RX ORDER — KETOROLAC TROMETHAMINE 30 MG/ML
10 INJECTION, SOLUTION INTRAMUSCULAR; INTRAVENOUS
Status: COMPLETED | OUTPATIENT
Start: 2017-04-05 | End: 2017-04-05

## 2017-04-05 RX ORDER — MORPHINE SULFATE 2 MG/ML
4 INJECTION, SOLUTION INTRAMUSCULAR; INTRAVENOUS
Status: COMPLETED | OUTPATIENT
Start: 2017-04-05 | End: 2017-04-05

## 2017-04-05 RX ORDER — AMLODIPINE BESYLATE 10 MG/1
10 TABLET ORAL
Status: COMPLETED | OUTPATIENT
Start: 2017-04-05 | End: 2017-04-05

## 2017-04-05 RX ADMIN — AMLODIPINE BESYLATE 10 MG: 10 TABLET ORAL at 04:04

## 2017-04-05 RX ADMIN — MORPHINE SULFATE 4 MG: 2 INJECTION, SOLUTION INTRAMUSCULAR; INTRAVENOUS at 02:04

## 2017-04-05 RX ADMIN — AMITRIPTYLINE HYDROCHLORIDE 25 MG: 25 TABLET, FILM COATED ORAL at 06:04

## 2017-04-05 RX ADMIN — KETOROLAC TROMETHAMINE 10 MG: 30 INJECTION, SOLUTION INTRAMUSCULAR at 04:04

## 2017-04-05 RX ADMIN — DEXAMETHASONE SODIUM PHOSPHATE 8 MG: 4 INJECTION, SOLUTION INTRAMUSCULAR; INTRAVENOUS at 03:04

## 2017-04-05 RX ADMIN — CLONIDINE HYDROCHLORIDE 0.2 MG: 0.1 TABLET ORAL at 05:04

## 2017-04-05 RX ADMIN — SODIUM CHLORIDE 500 ML: 0.9 INJECTION, SOLUTION INTRAVENOUS at 02:04

## 2017-04-05 RX ADMIN — METHOCARBAMOL 1000 MG: 500 TABLET ORAL at 03:04

## 2017-04-05 NOTE — TELEPHONE ENCOUNTER
Reason for Disposition   BP > 160 / 100 and any cardiac or neurologic symptoms (e.g., chest pain, difficulty breathing, unsteady gait, blurred vision)    Protocols used:  HIGH BLOOD PRESSURE-A-OH    Patient reporting she has a blood pressure reading of 200/104 this afternoon. She states it was 190/80 yesterday at appt. She states she also has been having numbness/tingling to her hand since last Tuesday 3/282/17 with pain to her left shoulder radiating down her arm. She denies chest pain, sob, heart palpitations, weakness. She is still going about her activities as usual except the pain is keeping her from doing as much. Advised to be seen in ED and have another adult to drive. She agrees to go. Please contact caller with any further care advice.

## 2017-04-05 NOTE — ED PROVIDER NOTES
Encounter Date: 4/5/2017    SCRIBE #1 NOTE: I, Petrona Nath, am scribing for, and in the presence of, Dr. Prather.       History     Chief Complaint   Patient presents with    Arm Pain     left arm pain since Friday, numbness to left arm since Friday, difficulty making a fist x 5 days/     Hypertension     Review of patient's allergies indicates:  No Known Allergies  HPI Comments: Time seen by provider: 2:11 PM    This is a 56 y.o. female with a PMHx of HTN (compliant with medication), asthma, hypercholesteremia and a PSHx of left shoulder rotator cuff repair and mass excision at neck who presents with complaint of worsening upper left arm pain x 5 days with associated numbness in left hand fingers. Patient denies gait difficulty. Patient's blood pressure on arrival to ED was 253/119. She endorses blood pressure has been running around 170/90's and states she is compliant with all her hypertensive medications.        The history is provided by the patient.     Past Medical History:   Diagnosis Date    Asthma     Hypercholesteremia     Hypertension     Multiple thyroid nodules      Past Surgical History:   Procedure Laterality Date    CARPAL TUNNEL RELEASE      FOOT SURGERY      HYSTERECTOMY      MASS EXCISION      at neck    ROTATOR CUFF REPAIR      left shoulder     Family History   Problem Relation Age of Onset    Cancer Mother     Coronary artery disease Mother     Heart disease Mother     Hypertension Mother     Hyperlipidemia Mother     Hypertension Father     Cancer Father     Heart disease Maternal Grandmother     Hypertension Maternal Grandmother      Social History   Substance Use Topics    Smoking status: Never Smoker    Smokeless tobacco: Never Used    Alcohol use 0.0 oz/week     0 Standard drinks or equivalent per week      Comment: occasionaly     Review of Systems   Constitutional: Negative for fever.   HENT: Negative for nosebleeds.    Eyes: Negative for pain.   Respiratory:  Negative for cough.    Cardiovascular: Negative for chest pain.        Positive for high blood pressure   Gastrointestinal: Negative for vomiting.   Genitourinary: Negative for dysuria.   Musculoskeletal: Negative for gait problem.        Positive for left arm pain   Skin: Negative for rash.   Neurological: Positive for numbness (left hand fingers).       Physical Exam   Initial Vitals   BP Pulse Resp Temp SpO2   04/05/17 1335 04/05/17 1335 04/05/17 1335 04/05/17 1335 04/05/17 1335   253/119 88 17 98 °F (36.7 °C) 99 %     Physical Exam    Nursing note and vitals reviewed.  Constitutional: She appears well-developed and well-nourished.   HENT:   Head: Normocephalic and atraumatic.   Mouth/Throat: Oropharynx is clear and moist.   Eyes: EOM are normal. Pupils are equal, round, and reactive to light.   Cardiovascular: Normal rate, regular rhythm and normal heart sounds.   No murmur heard.  Pulmonary/Chest: Breath sounds normal. She has no wheezes. She has no rhonchi. She has no rales.   Abdominal: Soft. There is no tenderness.   Musculoskeletal: Normal range of motion.   Neurological: She is alert and oriented to person, place, and time. She has normal strength.   Neurologically intact except subjective altered sensation in fingertips. No hand weakness or arm weakness.   Skin: Skin is warm and dry.         ED Course   Procedures  Labs Reviewed   ISTAT PROCEDURE     EKG Readings: (Independently Interpreted)   Normal sinus rhythm. Righward axis. No acute ischemia       X-Rays:   Independently Interpreted Readings:   Chest X-Ray: Lungs are clear     Medical Decision Making:   History:   Old Medical Records: I decided to obtain old medical records.  Initial Assessment:   56 year old female presents with left shoulder and arm pain for several days with tingling and numbness distal to MCP knuckles, mostly on back side. It does not follow a specific dermatome. Her pressure was very high. She states she is compliant with  "medicine. I will give medicine for pain, fluids, check EKG and chemistry for any sign of end organ damage and reassess.   Independently Interpreted Test(s):   I have ordered and independently interpreted X-rays - see prior notes.  I have ordered and independently interpreted EKG Reading(s) - see prior notes  Clinical Tests:   Lab Tests: Ordered and Reviewed  Radiological Study: Ordered and Reviewed  Medical Tests: Ordered and Reviewed    BP has improved with treatment; no evidence of target organ dysfunction.  I asked the patient about the nurses note stating difficulty making a fist -- patient denies this to me, states her fingers are numb and "feel weird", but no  issues or strength problems.  Given the finger tingling with no other paresthesias, no weakness, and upper arm pain without lower arm pain, I strongly suspect cervical radiculopathy as the etiology of her symptoms.  I doubt stroke.  I will add Elavil for nerve pain to her current regimen and follow her closely with her PCP.  As for her BP, I will add Norvasc to her current regimen and follow her with her PCP.            Scribe Attestation:   Scribe #1: I performed the above scribed service and the documentation accurately describes the services I performed. I attest to the accuracy of the note.    Attending Attestation:           Physician Attestation for Scribe:  Physician Attestation Statement for Scribe #1: I, Dr. Prather, reviewed documentation, as scribed by Petrona Nath in my presence, and it is both accurate and complete.                 ED Course     Clinical Impression:   The primary encounter diagnosis was Cervical radiculopathy. Diagnoses of HTN (hypertension) and Hypertension were also pertinent to this visit.          Bro Prather MD  04/06/17 0948    "

## 2017-04-05 NOTE — ED NOTES
Patient identifiers have been checked and are correct.  Patient assisted to ED stretcher and placed in a hospital gown.     LOC: The patient is awake, alert, and aware of environment. The patient is oriented x 3 and speaking appropriately.   APPEARANCE: No acute distress noted. Overweight   PSYCHOSOCIAL: Patient is calm and cooperative.   SKIN: The skin is warm, dry.  RESPIRATORY: Airway is open and patent. Bilateral chest rise and fall. Respirations are spontaneous, even and unlabored. Normal effort and rate noted. No accessory muscle use noted.   CARDIAC: Patient has a normal rate and rhythm..   NEUROLOGIC: Eyes open spontaneously. Speech clear. Tolerating saliva secretions well. Able to follow commands, demonstrating ability to actively and appropriately communicate within context of current conversation. Symmetrical facial muscles. Moving all extremities well. C/o left hand numbness/tingling.   MUSCULOSKELETAL: No obvious deformities noted. C/o left upper arm pain.     Side rails up x2. Call light within reach. Will continue to monitor.

## 2017-04-05 NOTE — ED TRIAGE NOTES
C/o elevated Bp with left upper arm pain with left hand numbness/tingling that started Friday. Denies weakness in arm. Hx of HTN, taking medications as prescribed.

## 2017-04-05 NOTE — ED AVS SNAPSHOT
[facility]  [address]  [place] [address]               Dejah Schmidt   2017  2:05 PM   ED    Description:  Female : 1960   Department:  [facility]           Your Care was Coordinated By:     Provider Role From To    Javier Prather MD Attending Provider [id number] --      Reason for Visit     Arm Pain     Hypertension           Diagnoses this Visit        Comments    Cervical radiculopathy    -  Primary     HTN (hypertension)         Hypertension           ED Disposition     None           To Do List           Follow-up Information     Follow up with Dasha Bergeron MD In 2 days.    Specialty:  Internal Medicine    Contact information:    [address]  [place] [address]  [phone]         These Medications        Disp Refills Start End    amlodipine (NORVASC) 10 MG tablet 30 tablet 0 2017    Take 1 tablet (10 mg total) by mouth once daily. - Oral    Pharmacy: [email or web address] [address] [place], [place] [address] [address] Ph #: [phone]       amitriptyline (ELAVIL) 25 MG tablet 10 tablet 0 2017    Take 1 tablet (25 mg total) by mouth nightly as needed for Insomnia. - Oral    Pharmacy: [email or web address] [address] [place], [place] [address] [address] Ph #: [phone]         [facility]     [facility] Care [facility] -  Assistance  Unless otherwise directed by your provider, please contact [facility], our nurse care line that is available for  assistance.     Registered nurses in the [facility] provide: appointment scheduling, clinical advisement, health education, and other advisory services.  Call: [phone] (toll free)               Medications           Message regarding Medications     Verify the changes and/or additions to your medication regime listed below are the same as discussed with  your clinician today.  If any of these changes or additions are incorrect, please notify your healthcare provider.        START taking these NEW medications        Refills    amlodipine (NORVASC) 10 MG tablet 0    Sig: Take 1 tablet (10 mg total) by mouth once daily.    Class: Print    Route: Oral    amitriptyline (ELAVIL) 25 MG tablet 0    Sig: Take 1 tablet (25 mg total) by mouth nightly as needed for Insomnia.    Class: Print    Route: Oral      These medications were administered today        Dose Freq    morphine injection 4 mg 4 mg ED 1 Time    Sig: Inject 2 mLs (4 mg total) into the vein ED 1 Time.    Class: Normal    Route: Intravenous    sodium chloride 0.9% bolus 500 mL 500 mL ED 1 Time    Sig: Inject 500 mLs into the vein ED 1 Time.    Class: Normal    Route: Intravenous    dexamethasone injection 8 mg 8 mg ED 1 Time    Sig: Inject 2 mLs (8 mg total) into the vein ED 1 Time.    Class: Normal    Route: Intravenous    methocarbamol tablet 1,000 mg 1,000 mg ED 1 Time    Sig: Take 2 tablets (1,000 mg total) by mouth ED 1 Time.    Class: Normal    Route: Oral    ketorolac injection 10 mg 10 mg ED 1 Time    Sig: Inject 10 mg into the vein ED 1 Time.    Class: Normal    Route: Intravenous    amlodipine tablet 10 mg 10 mg ED 1 Time    Sig: Take 1 tablet (10 mg total) by mouth ED 1 Time.    Class: Normal    Route: Oral    cloNIDine tablet 0.2 mg 0.2 mg ED 1 Time    Sig: Take 2 tablets (0.2 mg total) by mouth ED 1 Time.    Class: Normal    Route: Oral    amitriptyline tablet 25 mg 25 mg ED 1 Time    Sig: Take 1 tablet (25 mg total) by mouth ED 1 Time.    Class: Normal    Route: Oral           Verify that the below list of medications is an accurate representation of the medications you are currently taking.  If none reported, the list may be blank. If incorrect, please contact your healthcare provider. Carry this list with you in case of emergency.           Current Medications     amitriptyline (ELAVIL) 25 MG  "tablet Take 1 tablet (25 mg total) by mouth nightly as needed for Insomnia.    amitriptyline tablet 25 mg Take 1 tablet (25 mg total) by mouth ED 1 Time.    amlodipine (NORVASC) 10 MG tablet Take 1 tablet (10 mg total) by mouth once daily.    amlodipine tablet 10 mg Take 1 tablet (10 mg total) by mouth ED 1 Time.    cholecalciferol, vitamin D3, 50,000 unit Tab Take 1 tablet by mouth once a week.    docusate sodium (COLACE) 100 MG capsule Take 1 capsule (100 mg total) by mouth 2 (two) times daily as needed.    hydrocodone-acetaminophen 5-325mg (NORCO) 5-325 mg per tablet Take 1 tablet by mouth every 6 (six) hours as needed.    lisinopril (PRINIVIL,ZESTRIL) 40 MG tablet Take 1 tablet (40 mg total) by mouth once daily.    ondansetron (ZOFRAN) 4 MG tablet Take 2 tablets (8 mg total) by mouth every 8 (eight) hours as needed.    oxycodone-acetaminophen (PERCOCET) 5-325 mg per tablet Take 1 tablet by mouth every 4 (four) hours as needed.    tramadol (ULTRAM) 50 mg tablet Take 1 to 2 tablets every 6 hours as needed for pain    triamterene-hydrochlorothiazide 37.5-25 mg (DYAZIDE) 37.5-25 mg per capsule Take 1 capsule by mouth every morning.           Clinical Reference Information           Your Vitals Were     BP Pulse Temp Resp Height Weight    233/105 73 98 °F (36.7 °C) (Oral) 18 5' 7" (1.702 m) 131.5 kg (290 lb)    SpO2 BMI             98% 45.42 kg/m2         Allergies as of 4/5/2017     No Known Allergies      Immunizations Administered on Date of Encounter - 4/5/2017     None      ED Micro, Lab, POCT     Start Ordered       Status Ordering Provider    04/05/17 1441 04/05/17 1441  ISTAT PROCEDURE  Once      Final result     04/05/17 1417 04/05/17 1416  ISTAT CHEM8  Once      Acknowledged       ED Imaging Orders     Start Ordered       Status Ordering Provider    04/05/17 1417 04/05/17 1416  X-Ray Chest PA And Lateral  1 time imaging      Final result         Discharge Instructions         Out of Control High Blood " Pressure (Established)    Your blood pressure was unusually high today. This can occur if youve missed doses of your blood pressure medicine. Or it can happen if you are taking other medicines. These include some asthma inhalers, decongestants, diet pills, and street drugs like cocaine and amphetamine.  Other causes include:  · Weight gain  · More salt in your diet  · Smoking  · Caffeine  Your blood pressure can also rise if you are emotionally upset or in intense pain. It may go back to normal after a period of rest.  A blood pressure reading is made up of 2 numbers. There is a top number over a bottom number. The top number is the systolic pressure. The bottom number is the diastolic pressure. A normal blood pressure is less than 120 over less than 80. High blood pressure (hypertension) is when the top number is 140 or higher. Or it is when the bottom number is 90 or higher. To be high blood pressure, the numbers must be higher when tested over a period of time. The blood pressures between normal and hypertension are called prehypertension.  Home care  Its important to take steps to lower your blood pressure. If you are taking blood pressure medicine, the guidelines below may help you need less or no medicines in the future.  · Begin a weight-loss program if you are overweight.  · Cut back on the amount of salt in your diet:  ¨ Avoid high-salt foods like olives, pickles, smoked meats, and salted potato chips.  ¨ Dont add salt to your food at the table.  ¨ Use only small amounts of salt when cooking.  · Begin an exercise program. Talk with your health care provider about what exercise program is best for you. It doesnt have to be difficult. Even brisk walking for 20 minutes 3 times a week is a good form of exercise.  · Avoid medicines that have heart stimulants in them. This includes many cold and sinus decongestant pills and sprays, as well as diet pills. Check the warnings about hypertension on the label.  Stimulants such as amphetamine or cocaine could be lethal for someone with hypertension. Never take these.  · Limit how much caffeine you drink. Or switch to noncaffeinated beverages.  · Stop smoking. If you are a long-time smoker, this can be hard. Enroll in a stop-smoking program to make it more likely that you will succeed. Talk with your provider about ways to quit.  · Learn how to handle stress better. This is an important part of any program to lower blood pressure. Learn ways to relax. These include meditation, yoga, and biofeedback.  · If medicines were prescribed, take them exactly as directed. Missing doses may cause your blood pressure to get out of control.  · Consider buying an automatic blood pressure machine. These are available at many drugstores. Use this to monitor your blood pressure. Report the results to your provider.  Follow-up care  Regular visits to your own health care provider for blood pressure and medicine checks are an important part of your care. Make a follow-up appointment as directed.  When to seek medical advice  Call your health care provider right away if any of these occur:  · Chest, arm, shoulder, neck, or upper back pain  · Shortness of breath  · Severe headache  · Throbbing or rushing sound in the ears  · Nosebleed  · Extreme drowsiness, confusion, or fainting  · Dizziness or dizziness with spinning sensation (vertigo)  · Weakness in an arm or leg or on one side of the face  · Difficulty speaking or seeing   Date Last Reviewed: 11/25/2014  © 9355-2273 [employer]. [address] [place] [address]. All rights reserved. This information is not intended as a substitute for professional medical care. Always follow your healthcare professional's instructions.          Discharge References/Attachments     CERVICAL RADICULOPATHY, UNDERSTANDING (ENGLISH)      Your Scheduled Appointments     Apr 25, 2017  1:00 PM CDT   CONSULT - SPINE VISIT with Adonis Aranda MD    [facility]th [facility] ([facility] )    [address]  [facility] [address]   [phone]            Jun 20, 2017 10:30 AM CDT   Established Patient Visit with MD [facility] Internal Medicine ([facility])    [address]  [facility] [id number] [phone]               [facility] complies with applicable Federal civil rights laws and does not discriminate on the basis of race, color, national origin, age, disability, or sex.        Language Assistance Services     ATTENTION: Language assistance services are available, free of charge. Please call [phone].      ATENCIÓN: Si habla debbiañol, tiene a ryan disposición servicios gratuitos de asistencia lingüística. Llame al [phone].     CHÚ Ý: N?u b?n nói Ti?ng Vi?t, có các d?ch v? h? tr? ngôn ng? mi?n phí dành cho b?n. G?i s? [phone].

## 2017-04-05 NOTE — DISCHARGE INSTRUCTIONS
Out of Control High Blood Pressure (Established)    Your blood pressure was unusually high today. This can occur if youve missed doses of your blood pressure medicine. Or it can happen if you are taking other medicines. These include some asthma inhalers, decongestants, diet pills, and street drugs like cocaine and amphetamine.  Other causes include:  · Weight gain  · More salt in your diet  · Smoking  · Caffeine  Your blood pressure can also rise if you are emotionally upset or in intense pain. It may go back to normal after a period of rest.  A blood pressure reading is made up of 2 numbers. There is a top number over a bottom number. The top number is the systolic pressure. The bottom number is the diastolic pressure. A normal blood pressure is less than 120 over less than 80. High blood pressure (hypertension) is when the top number is 140 or higher. Or it is when the bottom number is 90 or higher. To be high blood pressure, the numbers must be higher when tested over a period of time. The blood pressures between normal and hypertension are called prehypertension.  Home care  Its important to take steps to lower your blood pressure. If you are taking blood pressure medicine, the guidelines below may help you need less or no medicines in the future.  · Begin a weight-loss program if you are overweight.  · Cut back on the amount of salt in your diet:  ¨ Avoid high-salt foods like olives, pickles, smoked meats, and salted potato chips.  ¨ Dont add salt to your food at the table.  ¨ Use only small amounts of salt when cooking.  · Begin an exercise program. Talk with your health care provider about what exercise program is best for you. It doesnt have to be difficult. Even brisk walking for 20 minutes 3 times a week is a good form of exercise.  · Avoid medicines that have heart stimulants in them. This includes many cold and sinus decongestant pills and sprays, as well as diet pills. Check the warnings about  hypertension on the label. Stimulants such as amphetamine or cocaine could be lethal for someone with hypertension. Never take these.  · Limit how much caffeine you drink. Or switch to noncaffeinated beverages.  · Stop smoking. If you are a long-time smoker, this can be hard. Enroll in a stop-smoking program to make it more likely that you will succeed. Talk with your provider about ways to quit.  · Learn how to handle stress better. This is an important part of any program to lower blood pressure. Learn ways to relax. These include meditation, yoga, and biofeedback.  · If medicines were prescribed, take them exactly as directed. Missing doses may cause your blood pressure to get out of control.  · Consider buying an automatic blood pressure machine. These are available at many drugstores. Use this to monitor your blood pressure. Report the results to your provider.  Follow-up care  Regular visits to your own health care provider for blood pressure and medicine checks are an important part of your care. Make a follow-up appointment as directed.  When to seek medical advice  Call your health care provider right away if any of these occur:  · Chest, arm, shoulder, neck, or upper back pain  · Shortness of breath  · Severe headache  · Throbbing or rushing sound in the ears  · Nosebleed  · Extreme drowsiness, confusion, or fainting  · Dizziness or dizziness with spinning sensation (vertigo)  · Weakness in an arm or leg or on one side of the face  · Difficulty speaking or seeing   Date Last Reviewed: 11/25/2014  © 7773-0635 [employer]. [address] [place] [address], [place] [address]. All rights reserved. This information is not intended as a substitute for professional medical care. Always follow your healthcare professional's instructions.

## 2017-04-10 ENCOUNTER — OFFICE VISIT (OUTPATIENT)
Dept: INTERNAL MEDICINE | Facility: CLINIC | Age: 57
End: 2017-04-10
Payer: COMMERCIAL

## 2017-04-10 DIAGNOSIS — M54.2 NECK PAIN: Primary | ICD-10-CM

## 2017-04-10 DIAGNOSIS — M17.0 PRIMARY OSTEOARTHRITIS OF BOTH KNEES: ICD-10-CM

## 2017-04-10 PROCEDURE — 99213 OFFICE O/P EST LOW 20 MIN: CPT | Mod: S$GLB,,, | Performed by: INTERNAL MEDICINE

## 2017-04-10 PROCEDURE — 1160F RVW MEDS BY RX/DR IN RCRD: CPT | Mod: S$GLB,,, | Performed by: INTERNAL MEDICINE

## 2017-04-10 PROCEDURE — 3079F DIAST BP 80-89 MM HG: CPT | Mod: S$GLB,,, | Performed by: INTERNAL MEDICINE

## 2017-04-10 PROCEDURE — 3074F SYST BP LT 130 MM HG: CPT | Mod: S$GLB,,, | Performed by: INTERNAL MEDICINE

## 2017-04-10 PROCEDURE — 99999 PR PBB SHADOW E&M-EST. PATIENT-LVL IV: CPT | Mod: PBBFAC,,, | Performed by: INTERNAL MEDICINE

## 2017-04-10 RX ORDER — HYDROCODONE BITARTRATE AND ACETAMINOPHEN 5; 325 MG/1; MG/1
1 TABLET ORAL 3 TIMES DAILY PRN
Qty: 30 TABLET | Refills: 0 | Status: ON HOLD | OUTPATIENT
Start: 2017-04-10 | End: 2017-06-20 | Stop reason: HOSPADM

## 2017-04-10 NOTE — MR AVS SNAPSHOT
[facility]  [address]  [facility] [address]  Phone: [phone]  Fax: [phone]                  Dejah GUZMAN Schmidt   4/10/2017 11:30 AM   Office Visit    Description:  Female : 1960   Provider:  Dasha Bergeron MD   Department:  [facility]           Reason for Visit     Hospital Follow Up           Diagnoses this Visit        Comments    Neck pain    -  Primary     Primary osteoarthritis of both knees                To Do List           Future Appointments        Provider Department Dept Phone    2017 1:00 PM MD [facility] Mendoza [facility] - Neurosurgery 7th Fl [phone]    2017 10:30 AM Dasha Bergeron MD [facility] - Internal Medicine [phone]      Goals (5 Years of Data)     None       These Medications        Disp Refills Start End    hydrocodone-acetaminophen 5-325mg (NORCO) 5-325 mg per tablet 30 tablet 0 4/10/2017     Take 1 tablet by mouth 3 (three) times daily as needed. - Oral    Pharmacy: [facility] [address] [place], [place] [address] [address] [address] AT [facility] [address] Ph #: [phone]         [facility]     [facility] Care [facility] -  Assistance  Unless otherwise directed by your provider, please contact [facility], our nurse care line that is available for  assistance.     Registered nurses in the [facility] provide: appointment scheduling, clinical advisement, health education, and other advisory services.  Call: [phone] (toll free)               Medications           Message regarding Medications     Verify the changes and/or additions to your medication regime listed below are the same as discussed with your clinician today.  If any of these changes or additions are incorrect, please notify your healthcare provider.        CHANGE how you are taking these medications     Start Taking Instead of    hydrocodone-acetaminophen 5-325mg (NORCO) 5-325 mg per tablet  "hydrocodone-acetaminophen 5-325mg (NORCO) 5-325 mg per tablet    Dosage:  Take 1 tablet by mouth 3 (three) times daily as needed. Dosage:  Take 1 tablet by mouth every 6 (six) hours as needed.    Reason for Change:  Reorder       STOP taking these medications     oxycodone-acetaminophen (PERCOCET) 5-325 mg per tablet Take 1 tablet by mouth every 4 (four) hours as needed.    tramadol (ULTRAM) 50 mg tablet Take 1 to 2 tablets every 6 hours as needed for pain           Verify that the below list of medications is an accurate representation of the medications you are currently taking.  If none reported, the list may be blank. If incorrect, please contact your healthcare provider. Carry this list with you in case of emergency.           Current Medications     amitriptyline (ELAVIL) 25 MG tablet Take 1 tablet (25 mg total) by mouth nightly as needed for Insomnia.    amlodipine (NORVASC) 10 MG tablet Take 1 tablet (10 mg total) by mouth once daily.    cholecalciferol, vitamin D3, 50,000 unit Tab Take 1 tablet by mouth once a week.    hydrocodone-acetaminophen 5-325mg (NORCO) 5-325 mg per tablet Take 1 tablet by mouth 3 (three) times daily as needed.    lisinopril (PRINIVIL,ZESTRIL) 40 MG tablet Take 1 tablet (40 mg total) by mouth once daily.    triamterene-hydrochlorothiazide 37.5-25 mg (DYAZIDE) 37.5-25 mg per capsule Take 1 capsule by mouth every morning.    docusate sodium (COLACE) 100 MG capsule Take 1 capsule (100 mg total) by mouth 2 (two) times daily as needed.    ondansetron (ZOFRAN) 4 MG tablet Take 2 tablets (8 mg total) by mouth every 8 (eight) hours as needed.           Clinical Reference Information           Your Vitals Were     BP Pulse Temp Height Weight SpO2    126/84 (BP Location: Right arm, Patient Position: Sitting, BP Method: Manual) 108 98.4 °F (36.9 °C) (Oral) 5' 7" (1.702 m) 132 kg (291 lb) 95%    BMI                45.58 kg/m2          Blood Pressure          Most Recent Value    BP  126/84    "   Allergies as of 4/10/2017     No Known Allergies      Immunizations Administered on Date of Encounter - 4/10/2017     None      Orders Placed During Today's Visit      Normal Orders This Visit    Ambulatory consult to Pain Clinic     Future Labs/Procedures Expected by Expires    MRI Cervical Spine Without Contrast  4/10/2017 4/10/2018      Language Assistance Services     ATTENTION: Language assistance services are available, free of charge. Please call [phone].      ATENCIÓN: Si habla debbiañol, tiene a ryan disposición servicios gratuitos de asistencia lingüística. Llame al [phone].     CHÚ Ý: N?u b?n nói Ti?ng Vi?t, có các d?ch v? h? tr? ngôn ng? mi?n phí dành cho b?n. G?i s? [phone].         [facility] complies with applicable Federal civil rights laws and does not discriminate on the basis of race, color, national origin, age, disability, or sex.

## 2017-04-15 ENCOUNTER — HOSPITAL ENCOUNTER (OUTPATIENT)
Dept: RADIOLOGY | Facility: HOSPITAL | Age: 57
Discharge: HOME | End: 2017-04-15
Attending: INTERNAL MEDICINE
Payer: COMMERCIAL

## 2017-04-15 VITALS
TEMPERATURE: 98 F | WEIGHT: 291 LBS | HEART RATE: 92 BPM | HEIGHT: 67 IN | DIASTOLIC BLOOD PRESSURE: 84 MMHG | OXYGEN SATURATION: 95 % | BODY MASS INDEX: 45.67 KG/M2 | SYSTOLIC BLOOD PRESSURE: 126 MMHG

## 2017-04-15 DIAGNOSIS — M54.2 NECK PAIN: ICD-10-CM

## 2017-04-15 PROCEDURE — 72141 MRI NECK SPINE W/O DYE: CPT | Mod: TC

## 2017-04-15 PROCEDURE — 72141 MRI NECK SPINE W/O DYE: CPT | Mod: 26,,, | Performed by: RADIOLOGY

## 2017-04-15 NOTE — PROGRESS NOTES
Subjective:       Patient ID: Dejah Schmidt is a 56 y.o. female.    Chief Complaint: Hospital Follow Up    HPI: She complains of neck pain radiating to her left arm.  Denies any acute trauma.  No numbness, no weakness.  No fever, chills, night sweats  Review of Systems   Constitutional: Negative for chills, fatigue, fever and unexpected weight change.   Respiratory: Negative for chest tightness and shortness of breath.    Cardiovascular: Negative for chest pain and palpitations.   Gastrointestinal: Negative for abdominal pain and blood in stool.   Neurological: Negative for dizziness, syncope, numbness and headaches.       Objective:      Physical Exam   HENT:   Right Ear: External ear normal.   Left Ear: External ear normal.   Nose: Nose normal.   Mouth/Throat: Oropharynx is clear and moist.   Eyes: Pupils are equal, round, and reactive to light.   Neck: Normal range of motion.   Cardiovascular: Normal rate and regular rhythm.    No murmur heard.  Pulmonary/Chest: Breath sounds normal.   Abdominal: She exhibits no distension. There is no hepatosplenomegaly. There is no tenderness.   Lymphadenopathy:     She has no cervical adenopathy.     She has no axillary adenopathy.   Neurological: She has normal strength and normal reflexes. No cranial nerve deficit or sensory deficit.       Assessment:         assessment and plan: Neck pain: Check cervical spine MRI.  Schedule pain management appointment.  She was here for an urgent care only appointment.  She will return to clinic for a physical  Plan:       As above

## 2017-04-20 ENCOUNTER — OFFICE VISIT (OUTPATIENT)
Dept: PAIN MEDICINE | Facility: CLINIC | Age: 57
End: 2017-04-20
Payer: COMMERCIAL

## 2017-04-20 VITALS
DIASTOLIC BLOOD PRESSURE: 98 MMHG | HEART RATE: 78 BPM | BODY MASS INDEX: 46.2 KG/M2 | SYSTOLIC BLOOD PRESSURE: 114 MMHG | WEIGHT: 293 LBS

## 2017-04-20 DIAGNOSIS — M54.12 CERVICAL RADICULOPATHY: Primary | ICD-10-CM

## 2017-04-20 DIAGNOSIS — M54.41 CHRONIC BILATERAL LOW BACK PAIN WITH BILATERAL SCIATICA: ICD-10-CM

## 2017-04-20 DIAGNOSIS — M54.42 CHRONIC BILATERAL LOW BACK PAIN WITH BILATERAL SCIATICA: ICD-10-CM

## 2017-04-20 DIAGNOSIS — G89.29 CHRONIC BILATERAL LOW BACK PAIN WITH BILATERAL SCIATICA: ICD-10-CM

## 2017-04-20 DIAGNOSIS — M47.816 FACET ARTHROPATHY, LUMBAR: ICD-10-CM

## 2017-04-20 DIAGNOSIS — M54.16 LUMBAR RADICULOPATHY: ICD-10-CM

## 2017-04-20 PROCEDURE — 3074F SYST BP LT 130 MM HG: CPT | Mod: S$GLB,,, | Performed by: NURSE PRACTITIONER

## 2017-04-20 PROCEDURE — 1160F RVW MEDS BY RX/DR IN RCRD: CPT | Mod: S$GLB,,, | Performed by: NURSE PRACTITIONER

## 2017-04-20 PROCEDURE — 99213 OFFICE O/P EST LOW 20 MIN: CPT | Mod: S$GLB,,, | Performed by: NURSE PRACTITIONER

## 2017-04-20 PROCEDURE — 3080F DIAST BP >= 90 MM HG: CPT | Mod: S$GLB,,, | Performed by: NURSE PRACTITIONER

## 2017-04-20 PROCEDURE — 99999 PR PBB SHADOW E&M-EST. PATIENT-LVL III: CPT | Mod: PBBFAC,,, | Performed by: NURSE PRACTITIONER

## 2017-04-20 RX ORDER — GABAPENTIN 300 MG
CAPSULE ORAL
Qty: 90 CAPSULE | Refills: 2 | Status: SHIPPED | OUTPATIENT
Start: 2017-04-20 | End: 2017-04-20 | Stop reason: CLARIF

## 2017-04-20 RX ORDER — GABAPENTIN 300 MG
CAPSULE ORAL
Qty: 90 CAPSULE | Refills: 2 | Status: ON HOLD | OUTPATIENT
Start: 2017-04-20 | End: 2017-06-21

## 2017-04-20 NOTE — LETTER
April 20, 2017      Dasha Bergeron MD  [address]  [place] [address]           [facility]  [address]  [facility] [address]  Phone: [phone]          Patient: Dejah Schmidt   MR Number: [id number]   YOB: 1960   Date of Visit: 4/20/2017       Dear Dr. Dasha Bergeron:    Thank you for referring Dejah Schmidt to me for evaluation. Attached you will find relevant portions of my assessment and plan of care.    If you have questions, please do not hesitate to call me. I look forward to following Dejah Schmidt along with you.    Sincerely,    Ronn Winters, [facility]    Enclosure  CC:  No Recipients    If you would like to receive this communication electronically, please contact [email or web address] or [phone] to request more information on [employer] Link access.    For providers and/or their staff who would like to refer a patient to [facility], please contact us through our one-stop-shop provider referral line, Crystal Pineda, at [phone].    If you feel you have received this communication in error or would no longer like to receive these types of communications, please e-mail [email or web address]

## 2017-04-20 NOTE — MR AVS SNAPSHOT
[facility]  [address]  [place] [address]  Phone: [phone]                  Dejah Schmidt   2017 1:15 PM   Office Visit    Description:  Female : 1960   Provider:  WILL Roy   Department:  [facility]           Reason for Visit     Cervical Spine Pain (C-spine)     Arm Pain     Hand Pain           Diagnoses this Visit        Comments    Lumbar radiculopathy         Chronic bilateral low back pain with bilateral sciatica         Facet arthropathy, lumbar                To Do List           Future Appointments        Provider Department Dept Phone    2017 1:00 PM MD Joaquim Mendoza [facility] - Neurosurgery 7th Fl [phone]    2017 1:30 PM Gibran Hoffman MD [place] [phone]    2017 10:30 AM Dasha Bergeron MD [facility] - Internal Medicine [phone]      Goals (5 Years of Data)     None       These Medications        Disp Refills Start End    NEURONTIN 300 mg capsule 90 capsule 2 2017     Take one caps at bedtime x 3 days, than increase to 2 caps at bedtime x 3 days, than 1 caps in am and 2 caps at bedtime.    Pharmacy: [facility] [address] [place] [address] JAVIER MOULTON AT [facility] [address] [place] Ph #: [phone]         [facility]     [facility] Care Line - 24/ Assistance  Unless otherwise directed by your provider, please contact [facility], our nurse care line that is available for  assistance.     Registered nurses in the [facility] provide: appointment scheduling, clinical advisement, health education, and other advisory services.  Call: [phone] (toll free)               Medications           Message regarding Medications     Verify the changes and/or additions to your medication regime listed below are the same as discussed with your clinician today.  If any of these changes or additions are incorrect, please notify your  healthcare provider.        START taking these NEW medications        Refills    NEURONTIN 300 mg capsule 2    Sig: Take one caps at bedtime x 3 days, than increase to 2 caps at bedtime x 3 days, than 1 caps in am and 2 caps at bedtime.    Class: Normal      STOP taking these medications     amitriptyline (ELAVIL) 25 MG tablet Take 1 tablet (25 mg total) by mouth nightly as needed for Insomnia.    gabapentin (NEURONTIN) 600 MG tablet Take 1 tablet (600 mg total) by mouth 4 (four) times daily.           Verify that the below list of medications is an accurate representation of the medications you are currently taking.  If none reported, the list may be blank. If incorrect, please contact your healthcare provider. Carry this list with you in case of emergency.           Current Medications     amlodipine (NORVASC) 10 MG tablet Take 1 tablet (10 mg total) by mouth once daily.    cholecalciferol, vitamin D3, 50,000 unit Tab Take 1 tablet by mouth once a week.    docusate sodium (COLACE) 100 MG capsule Take 1 capsule (100 mg total) by mouth 2 (two) times daily as needed.    hydrocodone-acetaminophen 5-325mg (NORCO) 5-325 mg per tablet Take 1 tablet by mouth 3 (three) times daily as needed.    lisinopril (PRINIVIL,ZESTRIL) 40 MG tablet Take 1 tablet (40 mg total) by mouth once daily.    ondansetron (ZOFRAN) 4 MG tablet Take 2 tablets (8 mg total) by mouth every 8 (eight) hours as needed.    triamterene-hydrochlorothiazide 37.5-25 mg (DYAZIDE) 37.5-25 mg per capsule Take 1 capsule by mouth every morning.    NEURONTIN 300 mg capsule Take one caps at bedtime x 3 days, than increase to 2 caps at bedtime x 3 days, than 1 caps in am and 2 caps at bedtime.           Clinical Reference Information           Your Vitals Were     BP Pulse Weight BMI       114/98 78 133.8 kg (295 lb) 46.2 kg/m2       Blood Pressure          Most Recent Value    BP  (!)  114/98      Allergies as of 4/20/2017     No Known Allergies      Immunizations  Administered on Date of Encounter - 4/20/2017     None      Language Assistance Services     ATTENTION: Language assistance services are available, free of charge. Please call [phone].      ATENCIÓN: Si eduardo mckeon, tiene a ryan disposición servicios gratuitos de asistencia lingüística. Llame al [phone].     CHÚ Ý: N?u b?n nói Ti?ng Vi?t, có các d?ch v? h? tr? ngôn ng? mi?n phí dành cho b?n. G?i s? [phone].         [facility] complies with applicable Federal civil rights laws and does not discriminate on the basis of race, color, national origin, age, disability, or sex.

## 2017-04-20 NOTE — PROGRESS NOTES
Chronic patient Established Note (Follow up visit)      SUBJECTIVE:    Dejah Schmidt presents to the clinic for a follow-up appointment for cervical pain with left hand numbness x 3 weeks. Since the last visit, Dejah Schmidt states the pain has been worsening. Current pain intensity is 6/10.    Patient present today with neck and left arm pain. Onset was 3 weeks ago, location is neck with radicular symptoms in arm and hand. She states she has numbness and tingling in left hand only. Recent MRI shows at  C4-C5 a broad-based central disc protrusion effacing the ventral thecal sac and minimally effacing the ventral cord.  The central canal at this level measures just under 8 mm.  There is moderate narrowing of either neural foraminal canal right greater than the left secondary to disc material and uncovertebral joint hypertrophy. C6-C 7 shows   diffuse disc bulge resulting in effacement of the ventral thecal sac and minimal effacement of the ventral cord.  The central canal at this level measures approximately 7.5 mm in AP dimension.  The bilateral neural foraminal canals are severely narrowed secondary to disc material and to lesser degree uncovertebral joint hypertrophy. Discussed that we would s/f a C7-T1 DB to help with pain.     Pain Disability Index Review:  Last 3 PDI Scores 4/20/2017 2/21/2017 12/22/2016   Pain Disability Index (PDI) 51 52 47       Pain Medications:  - Opioids: Norco  - Adjuvant Medications: Ibuprofen  - Anti-Coagulants: None  - Others: see medications list     Opioid Contract: no     report:  Reviewed and consistent with medication use as prescribed.    Pain Procedures:   1/12/17 CAUDAL DB with Racz Catheter  12/7/16 BILATERAL L2,3,4,5 LUMBAR FACET MEDIAL BRANCH NERVE BLOCK; 10/19/16 Right L2 and L4 TRANSFORAMINAL EPIDURAL STEROID INJECTION (L1 level was attempted, but Was aborted due to + aspiration of blood before injection, so decision was make to go to L2 level and have Injectate  spread to L1, consent updated to reflect changes and initialed by pt and myself)       Physical Therapy/Home Exercise: no    Imaging: MRI Cervical Spine Without Contrast 4/15/17  Narrative   Technique: Multiplanar multisequence MR imaging of the cervical spine obtained. No IV contrast.     Comparison: None     Results:    There straightening of the normal lordotic curvature. No fracture. No marrow signal abnormality suspicious for an infiltrative process.  There is disc desiccation noted throughout the cervical spine with mild to moderate disc space narrowing noted at the C4-C5 and C6-C7 levels.    The cervical cord is normal in caliber and signal characteristics.  The craniocervical junction and visualized intracranial contents are unremarkable. The thyroid gland is heterogeneous in appearance and demonstrates enlargement of the right lobe and multiple T2 hyperintense foci within either lobe of the thyroid gland.  Please see thyroid ultrasound which was performed on 03/15/2016.      C2-C3:  No significant central canal or neural foraminal narrowing.    C3-C4:  Mild diffuse disc bulge slightly more prominent in the right paracentral region which results and no significant central canal or neural foraminal canal narrowing.    C4-C5:  Broad-based central disc protrusion effacing the ventral thecal sac and minimally effacing the ventral cord.  The central canal at this level measures just under 8 mm.  There is moderate narrowing of either neural foraminal canal right greater than the left secondary to disc material and uncovertebral joint hypertrophy.    C5-C6:  No significant central canal or neural foraminal narrowing.    C6-C7:  Diffuse disc bulge resulting in effacement of the ventral thecal sac and minimal effacement of the ventral cord.  The central canal at this level measures approximately 7.5 mm in AP dimension.  The bilateral neural foraminal canals are severely narrowed secondary to disc material and to  lesser degree uncovertebral joint hypertrophy.    C7-T1:   No significant central canal or neural foraminal narrowing.   Impression        1.  Degenerative changes of the cervical spine as detailed above and most prominent at the C4-C5 and C6-C7 levels.    2.  Heterogeneous appearance of the thyroid as described above.  Please see thyroid ultrasound from 2016.            MRI Lumbar Spine Without Contrast 9/4/16  Narrative   MRI OF THE LUMBAR SPINE WITHOUT CONTRAST.     Technique:  Multiplanar multisequence MR images of the lumbar spine obtained without contrast.     Comparison: Radiograph 8/29/16     Findings:    Lumbar spine alignment is within normal limits. The vertebral body heights are well maintained, with no fracture.  There are T1/T2 hyperintense lesions identified in the L2 and L3 vertebral bodies most compatible with hemangiomas. No evidence of an acute fracture.      The conus is normal in appearance, and terminates at the L1-L2 level.  The adjacent soft tissue structures show no significant abnormalities.      L1-L2: Moderate bilateral facet arthrosis, ligamentum flavum hypertrophy, diffuse circumferential disc bulge, and a superimposed central disc extrusion extending caudally, resulting in severe spinal canal stenosis. There is mild bilateral neuroforaminal narrowing.    L2-L3: Moderate right and severe left facet arthrosis, ligamentum flavum hypertrophy, and a superimposed central disc extrusion extending caudally resulting in severe spinal canal stenosis. There is severe left neuroforaminal narrowing.     L3-L4:  Moderate right and severe left facet arthrosis and a diffuse circumferential disc bulge results in mild spinal canal stenosis and mild bilateral neuroforaminal narrowing.     L4-L5:  Severe bilateral facet arthrosis and mild diffuse circumferential disc bulge resulting in mild spinal canal stenosis and mild bilateral neuroforaminal narrowing.     L5-S1:  Moderate bilateral facet arthrosis,  diffuse disc bulge, and a small annular tear, resulting in mild bilateral neuroforaminal narrowing and no significant spinal canal stenosis.   Impression        Multilevel lumbar spondylosis, as detailed above, most severe at L1-2 and L2-3 with diffuse disc bulges and superimposed central disc extrusions resulting in severe spinal canal stenosis and variable neuroforaminal narrowing.       ______________________________________     Electronically signed by resident: FAMILIA LEBRON MD  Date: 09/04/16  Time: 16:12     ______________________________________     Electronically signed by: ADRIANO WILSON MD  Date: 09/04/16  Time: 16:24        X-Ray Lumbar Spine Complete 5 View 8/29/16  Narrative   Time of Procedure: 08/29/16 13:55:18  Accession # [id number]    Comparison: 7/2014.    Number of views: 5.    Findings:  There are 5 non-rib bearing lumbar type vertebral bodies with vertebral body heights maintained.  There is dextroscoliosis between L1 and L4 that I calculated at it least 14 degrees.  Vacuum phenomenon is noted at the L3-4 disc space.  These findings are similar to the earlier study.    I do not detect spondylolysis or spondylolisthesis and no translational instability on flexion and extension views..    Intervertebral disc spaces are maintained.    Sacroiliac joints appear patent in their imaged extent.   Impression     Please see above.  ______________________________________             Allergies: Review of patient's allergies indicates:  No Known Allergies    Current Medications:   Current Outpatient Prescriptions   Medication Sig Dispense Refill    amlodipine (NORVASC) 10 MG tablet Take 1 tablet (10 mg total) by mouth once daily. 30 tablet 0    cholecalciferol, vitamin D3, 50,000 unit Tab Take 1 tablet by mouth once a week. 12 tablet 3    docusate sodium (COLACE) 100 MG capsule Take 1 capsule (100 mg total) by mouth 2 (two) times daily as needed. 60 capsule 0    hydrocodone-acetaminophen 5-325mg (NORCO)  5-325 mg per tablet Take 1 tablet by mouth 3 (three) times daily as needed. 30 tablet 0    lisinopril (PRINIVIL,ZESTRIL) 40 MG tablet Take 1 tablet (40 mg total) by mouth once daily. 30 tablet 3    ondansetron (ZOFRAN) 4 MG tablet Take 2 tablets (8 mg total) by mouth every 8 (eight) hours as needed. 20 tablet 0    triamterene-hydrochlorothiazide 37.5-25 mg (DYAZIDE) 37.5-25 mg per capsule Take 1 capsule by mouth every morning. 30 capsule 3    NEURONTIN 300 mg capsule Take one caps at bedtime x 3 days, than increase to 2 caps at bedtime x 3 days, than 1 caps in am and 2 caps at bedtime. 90 capsule 2     No current facility-administered medications for this visit.        REVIEW OF SYSTEMS:    GENERAL: No weight loss, malaise or fevers. Morbid obesity  HEENT:  + headaches.  NECK: + neck pain   RESPIRATORY: + asthma, Negative for cough, wheezing or shortness of breath.  CARDIOVASCULAR: Negative for chest pain, leg swelling or palpitations.  GI: Negative for abdominal discomfort, blood in stools or black stools or change in bowel habits.  MUSCULOSKELETAL: See HPI.  SKIN: Negative for lesions, rash, and itching.  PSYCH: + for sleep disturbance, mood disorder and recent psychosocial stressors.  HEMATOLOGY/LYMPHOLOGY: Negative for prolonged bleeding, bruising easily or swollen nodes.  NEURO: No history of headaches, syncope, paralysis, seizures or tremors. Numbness and tingling in left fingers and arm  All other reviewed and negative other than HPI.      Past Medical History:  Past Medical History:   Diagnosis Date    Asthma     Hypercholesteremia     Hypertension     Multiple thyroid nodules        Past Surgical History:  Past Surgical History:   Procedure Laterality Date    CARPAL TUNNEL RELEASE      FOOT SURGERY      HYSTERECTOMY      MASS EXCISION      at neck    ROTATOR CUFF REPAIR      left shoulder       Family History:  Family History   Problem Relation Age of Onset    Cancer Mother     Coronary  artery disease Mother     Heart disease Mother     Hypertension Mother     Hyperlipidemia Mother     Hypertension Father     Cancer Father     Heart disease Maternal Grandmother     Hypertension Maternal Grandmother        Social History:  Social History     Social History    Marital status:      Spouse name: N/A    Number of children: N/A    Years of education: N/A     Social History Main Topics    Smoking status: Never Smoker    Smokeless tobacco: Never Used    Alcohol use 0.0 oz/week     0 Standard drinks or equivalent per week      Comment: occasionaly    Drug use: No    Sexual activity: Yes     Other Topics Concern    None     Social History Narrative    [employer]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       OBJECTIVE:    BP (!) 114/98  Pulse 78  Wt 133.8 kg (295 lb)  BMI 46.2 kg/m2    PHYSICAL EXAMINATION:    General appearance: Well appearing, in no acute distress, alert and oriented x3.  Psych:  Mood and affect appropriate.  Skin: Skin color, texture, turgor normal, no rashes or lesions, in both upper and lower body.  Head/face:  Atraumatic, normocephalic. No palpable lymph nodes  Neck: mild pain to palpation over the cervical paraspinous muscles. Spurling Negative. Mild to moderate  pain with neck flexion, extension, or lateral flexion.   Cor: RRR  Pulm: CTA  GI: Abdomen soft and non-tender.  Back: Straight leg raising in the sitting and supine positions is negative to radicular pain. No pain to palpation over the spine or costovertebral angles. Normal range of motion without pain reproduction.  Extremities: Peripheral joint ROM is full and pain free without obvious instability or laxity in all four extremities. No deformities, edema, or skin discoloration. Good capillary refill.  Musculoskeletal: Shoulder, hip, sacroiliac and knee provocative maneuvers are negative. Bilateral upper and lower extremity strength is normal and symmetric.  No atrophy or tone abnormalities are  noted.  Neuro: Bilateral upper and lower extremity coordination and muscle stretch reflexes are physiologic and symmetric.  Plantar response are downgoing. No loss of sensation is noted.  Gait: Normal.    ASSESSMENT: 56 y.o. year old female with neck pain with radicular symptoms in the left arm,consistent with      1. Cervical radiculopathy  NEURONTIN 300 mg capsule   2. Lumbar radiculopathy  NEURONTIN 300 mg capsule    DISCONTINUED: NEURONTIN 300 mg capsule   3. Chronic bilateral low back pain with bilateral sciatica  DISCONTINUED: NEURONTIN 300 mg capsule   4. Facet arthropathy, lumbar  DISCONTINUED: NEURONTIN 300 mg capsule         PLAN:     - I have stressed the importance of physical activity and a home exercise plan to help with pain and improve health.  - Patient can continue with medications for now since they are providing benefits, using them appropriately, and without side effects.  - Counseled patient regarding the importance of activity modification, constant sleeping habits and physical therapy.  - Schedule for a Cervical Epidural Injection with catheter to C7-T1 to help with pain and progress with a Home exercise program.  - I have explained the risks, benefits, and alternatives of the procedure in detail. The patient voices understanding and all questions have been answered. The patient agrees to proceed as planned. Written Consent obtained.   -RTC 2-3 weeks following procedure.  -The above plan and management options were discussed at length with patient. Patient is in agreement with the above and verbalized understanding. The physician  was consulted on this patient  and agrees with this plan.    RENÉE Nevarez      04/20/2017

## 2017-04-22 ENCOUNTER — TELEPHONE (OUTPATIENT)
Dept: INTERNAL MEDICINE | Facility: CLINIC | Age: 57
End: 2017-04-22

## 2017-04-22 DIAGNOSIS — E04.1 THYROID NODULE: Primary | ICD-10-CM

## 2017-04-22 NOTE — TELEPHONE ENCOUNTER
Please contact patient and let her know she needs follow up with endocrinology in regard to her thyroid. Order in,please schedule

## 2017-04-25 ENCOUNTER — OFFICE VISIT (OUTPATIENT)
Dept: NEUROSURGERY | Facility: CLINIC | Age: 57
End: 2017-04-25
Payer: COMMERCIAL

## 2017-04-25 VITALS
HEIGHT: 67 IN | BODY MASS INDEX: 45.99 KG/M2 | DIASTOLIC BLOOD PRESSURE: 88 MMHG | WEIGHT: 293 LBS | TEMPERATURE: 98 F | HEART RATE: 85 BPM | SYSTOLIC BLOOD PRESSURE: 167 MMHG

## 2017-04-25 DIAGNOSIS — M54.16 LUMBAR RADICULOPATHY: Primary | ICD-10-CM

## 2017-04-25 DIAGNOSIS — M51.26 LUMBAR DISC HERNIATION: ICD-10-CM

## 2017-04-25 PROCEDURE — 99999 PR PBB SHADOW E&M-EST. PATIENT-LVL III: CPT | Mod: PBBFAC,,, | Performed by: NEUROLOGICAL SURGERY

## 2017-04-25 PROCEDURE — 99244 OFF/OP CNSLTJ NEW/EST MOD 40: CPT | Mod: S$GLB,,, | Performed by: NEUROLOGICAL SURGERY

## 2017-04-25 NOTE — PROGRESS NOTES
Subjective:    I, Piyush Key, am scribing for, and in the presence of, Dr. Aranda.     Patient ID: Dejah Schmidt is a 56 y.o. female.    Chief Complaint: Consult and Lumbar Spine Pain (L-Spine)    HPI   Pt is a 56 y.o. female who presents per referral by Ronn Winters for evaluation of her lumbar spine. Has long history of back and leg pain has gotten worse over the last 6 months. She states her burning leg pain and numbness that radiates down to dorsum of her feet bilaterally. Pt also had an episode of significant left arm pain for which she went to the ER. Currently the arm pain is better, but she still has some left hand numbness. She states that physical therapy and injections have not been helpful in the past. She has had some relief with gabapentin.     Review of Systems   Constitutional: Negative for chills, fatigue and fever.   HENT: Negative for sinus pressure and trouble swallowing.    Eyes: Negative.  Negative for visual disturbance.   Respiratory: Negative.    Cardiovascular: Negative.    Gastrointestinal: Negative.  Negative for nausea and vomiting.   Endocrine: Negative.    Genitourinary: Negative.    Musculoskeletal: Positive for back pain (radiates to bilateral lower extremities).        Positive for bilateral lower extremity pain.    Neurological: Positive for numbness (left hand, bilateral lower extremities). Negative for dizziness, seizures, syncope, speech difficulty and weakness.       Objective:      Physical Exam:    Constitutional: She appears well-developed.     Eyes: Pupils are equal, round, and reactive to light. Conjunctivae and EOM are normal.     Cardiovascular: Normal rate, regular rhythm, normal pulses and intact distal pulses.     Abdominal: Soft.     Psych/Behavior: She is alert. She is oriented to person, place, and time. She has a normal mood and affect.     Musculoskeletal: Gait is normal.        Neck: Range of motion is full. There is no tenderness. Muscle strength is 5/5. Tone is  normal.        Back: Range of motion is full. There is no tenderness. Muscle strength is 5/5. Tone is normal.        Right Upper Extremities: Range of motion is full. There is no tenderness. Muscle strength is 5/5. Tone is normal.        Left Upper Extremities: Range of motion is full. There is no tenderness. Muscle strength is 5/5. Tone is normal.       Right Lower Extremities: Range of motion is full. There is no tenderness. Muscle strength is 5/5. Tone is normal.        Left Lower Extremities: Range of motion is full. There is no tenderness. Muscle strength is 5/5. Tone is normal.     Neurological:        Coordination: She has a normal Romberg Test, normal finger to nose coordination, normal heel to shin coordination and normal tandem walking coordination.        Sensory: There is no sensory deficit in the trunk. There is no sensory deficit in the extremities.        DTRs: DTRs are normal. Tricep reflexes are 2+ on the right side and 2+ on the left side. Bicep reflexes are 2+ on the right side and 2+ on the left side. Brachioradialis reflexes are 2+ on the right side and 2+ on the left side. Patellar reflexes are 2+ on the right side and 2+ on the left side. Achilles reflexes are 2+ on the right side and 2+ on the left side. She displays no Babinski's sign on the right side. She displays no Babinski's sign on the left side.        Cranial nerves: Cranial nerve(s) II, III, IV, V, VI, VII, VIII, IX, X, XI and XII are intact.       Pt is full strength.   She has a negative SLR.   No Nieves's, no clonus.   Good sensation.     Imaging:   MRI C-spine, dated 4/19/2017, reveals multiple level disc bulge at C4-5, C6-7, no significant cord compression. There is some foraminal compression     MRI L-spine, dated 9/4/2016, shows disc herniation at L1-L2 and L2-L3 causing moderate to severe central and foraminal narrowing.     Assessment/Plan:   Pt with both cervical and lumbar disc disease, I think the cervical disc disease  is not her primary issue, and I agree with her Pain Management doctor's plan for injections. I think she is much more symptomatic from her lumbar disc, but since she has gotten much more symptomatic and her last MRI was done over 6 months ago, I think it would be prudent to obtain an updated MRI scan before deciding on any intervention. We will get these things done and see her back in 2-3 weeks.     I, Dr. Aranda, personally performed the services described in this documentation as scribed by Piyush Key in my presence, and it is both accurate and complete.

## 2017-04-25 NOTE — LETTER
April 26, 2017      Ronn Winters, FNP  [address]  [address]  [facility] [address]           [facility] - Neurosurgery 7th Fl  [address]  [place] [address]  Phone: [phone]          Patient: Dejah Schmidt   MR Number: [id number]   YOB: 1960   Date of Visit: 4/25/2017       Dear Ronn Winters:    Thank you for referring Dejah Schmidt to me for evaluation. Attached you will find relevant portions of my assessment and plan of care.    If you have questions, please do not hesitate to call me. I look forward to following Dejah Schmidt along with you.    Sincerely,    Adonis Aranda MD    Enclosure  CC:  No Recipients    If you would like to receive this communication electronically, please contact [email or web address] or [phone] to request more information on [facility] access.    For providers and/or their staff who would like to refer a patient to [facility], please contact us through our one-stop-shop provider referral line, Crystal Pineda, at [phone].    If you feel you have received this communication in error or would no longer like to receive these types of communications, please e-mail [email or web address]

## 2017-04-27 NOTE — TELEPHONE ENCOUNTER
Spoke with pt notified her of her appt scheduled for may 11th in endocr. Pt stated verbalized understanding

## 2017-04-28 ENCOUNTER — TELEPHONE (OUTPATIENT)
Dept: PAIN MEDICINE | Facility: CLINIC | Age: 57
End: 2017-04-28

## 2017-04-28 NOTE — TELEPHONE ENCOUNTER
----- Message from Sarah Beth Sutton sent at 4/28/2017  2:06 PM CDT -----  Regarding: No approval yet for Op procedure Epidural Injection 5/3/2017   I wanting to inform you that as of today 4/28/2017 I still don't have an approval for upcoming this members Op procedure Epidural Injection scheduled for 5/3/2017. Per this members Insurance company [employer] the review processl can take up to 15 business days btwn the nurse and medical reviewer reviewing this case for approval. Please get with me on if this case can be rescheduled to a later date to allow time for approval.

## 2017-04-28 NOTE — TELEPHONE ENCOUNTER
Spoke with patient regarding rescheduling procedure to a later date due to insurance company not approving the procedure in time. Patient verbalized and confirmed procedure date on 5/10/17 at 8 AM.

## 2017-05-04 ENCOUNTER — TELEPHONE (OUTPATIENT)
Dept: PAIN MEDICINE | Facility: CLINIC | Age: 57
End: 2017-05-04

## 2017-05-04 NOTE — TELEPHONE ENCOUNTER
Spoke with patient regarding rescheduling procedure. Patient was informed that her procedure was under review by the the physicians at the insurance company and that this process may take up to 14 days and that she may need to reschedule. Patient was also informed that she will be contacted if she has to reschedule the procedure. Patient verbalized understanding.

## 2017-05-04 NOTE — TELEPHONE ENCOUNTER
----- Message from Sarah Beth Sutton sent at 5/3/2017 11:12 AM CDT -----  Regarding: RE: No approval yet for Op procedure Epidural Injection 5/10/2017   I know we rescheduled this member to 5/10/2017, but i was just notified that this case is now in Physician review and can take up to 14 business days on this review. i just wanted to let you and Dr Hoffman know that we may not have an approval by 5/10/2017 or this case may need a peer to peer. As this date approaches we may need to rescheduled again.  ----- Message -----     From: Christina Hankins LPN     Sent: 4/28/2017   2:54 PM       To: Sarah Beth Sutton  Subject: RE: No approval yet for Op procedure Epidura#    Hey I reschedule the patient to 5/10/17 if this helps     Please advise thank you   ----- Message -----     From: Sarah Beth Sutton     Sent: 4/28/2017   2:06 PM       To: Gibran Hoffman MD, Yasmin Leary Staff  Subject: No approval yet for Op procedure Epidural In#    I wanting to inform you that as of today 4/28/2017 I still don't have an approval for upcoming this members Op procedure Epidural Injection scheduled for 5/3/2017. Per this members Insurance company [employer] the review processl can take up to 15 business days btwn the nurse and medical reviewer reviewing this case for approval. Please get with me on if this case can be rescheduled to a later date to allow time for approval.

## 2017-05-08 ENCOUNTER — TELEPHONE (OUTPATIENT)
Dept: PAIN MEDICINE | Facility: CLINIC | Age: 57
End: 2017-05-08

## 2017-05-08 ENCOUNTER — PATIENT MESSAGE (OUTPATIENT)
Dept: INTERNAL MEDICINE | Facility: CLINIC | Age: 57
End: 2017-05-08

## 2017-05-08 NOTE — TELEPHONE ENCOUNTER
Spoke with patient regarding denial for procedure. Patient was informed that the insurance denied the procedure. Patient stated that she received a call this morning from someone regarding this matter. Patient verbalized understanding.

## 2017-05-08 NOTE — TELEPHONE ENCOUNTER
----- Message from Sarah Beth Sutton sent at 5/5/2017  2:48 PM CDT -----  Regarding: Denied procedure 5/10/2017   I'm sorry to inform that this case has been Denied. If the the Doctor wants to dispute this Denial, or will Cancel until this case can be appealed for approval please follow the Denial or Appeal process. They are available 24 jours a day, 7 days a week to accept precertification request via telephone or by fax for medical services. You may fax the request to [phone] or call [phone]  Please call the patient and let her know of this Denial.    Thanks for your Time ...  Sarah Beth Sutton  Pre Service   [phone]         ----- Message -----     From: Christina Hankins LPN     Sent: 5/4/2017  11:01 AM       To: Sarah Beth Sutton  Subject: RE: No approval yet for Op procedure Epidura#    I spoke with the patient to inform her of the update. Patient is aware that her procedure date will need to be moved if it is not approved by the procedure date.   ----- Message -----     From: Sarah Beth Sutton     Sent: 5/3/2017  11:12 AM       To: Christina Hankins LPN, Gibran Hoffman MD  Subject: RE: No approval yet for Op procedure Epidura#    I know we rescheduled this member to 5/10/2017, but i was just notified that this case is now in Physician review and can take up to 14 business days on this review. i just wanted to let you and Dr Hoffman know that we may not have an approval by 5/10/2017 or this case may need a peer to peer. As this date approaches we may need to rescheduled again.  ----- Message -----     From: Christina Hankins LPN     Sent: 4/28/2017   2:54 PM       To: Sarah Beth Sutton  Subject: RE: No approval yet for Op procedure Epidura#    Hey I reschedule the patient to 5/10/17 if this helps     Please advise thank you   ----- Message -----     From: Sarah Beth Sutton     Sent: 4/28/2017   2:06 PM       To: Gibran Hoffman MD, Yasmin Leary Staff  Subject: No approval yet for Op procedure Epidural  In#    I wanting to inform you that as of today 4/28/2017 I still don't have an approval for upcoming this members Op procedure Epidural Injection scheduled for 5/3/2017. Per this members Insurance company [employer] the review processl can take up to 15 business days btwn the nurse and medical reviewer reviewing this case for approval. Please get with me on if this case can be rescheduled to a later date to allow time for approval.

## 2017-05-09 ENCOUNTER — PATIENT MESSAGE (OUTPATIENT)
Dept: PAIN MEDICINE | Facility: CLINIC | Age: 57
End: 2017-05-09

## 2017-05-16 ENCOUNTER — PATIENT MESSAGE (OUTPATIENT)
Dept: INTERNAL MEDICINE | Facility: CLINIC | Age: 57
End: 2017-05-16

## 2017-05-17 ENCOUNTER — HOSPITAL ENCOUNTER (OUTPATIENT)
Dept: RADIOLOGY | Facility: OTHER | Age: 57
Discharge: HOME | End: 2017-05-17
Attending: NEUROLOGICAL SURGERY
Payer: MEDICAID

## 2017-05-17 ENCOUNTER — OFFICE VISIT (OUTPATIENT)
Dept: SPINE | Facility: CLINIC | Age: 57
End: 2017-05-17
Attending: NEUROLOGICAL SURGERY
Payer: MEDICAID

## 2017-05-17 VITALS
SYSTOLIC BLOOD PRESSURE: 178 MMHG | HEART RATE: 64 BPM | WEIGHT: 293 LBS | DIASTOLIC BLOOD PRESSURE: 88 MMHG | HEIGHT: 67 IN | BODY MASS INDEX: 45.99 KG/M2

## 2017-05-17 DIAGNOSIS — M47.816 FACET ARTHROPATHY, LUMBAR: ICD-10-CM

## 2017-05-17 DIAGNOSIS — M47.816 FACET HYPERTROPHY OF LUMBAR REGION: Primary | ICD-10-CM

## 2017-05-17 DIAGNOSIS — M54.16 LUMBAR RADICULOPATHY: ICD-10-CM

## 2017-05-17 DIAGNOSIS — M51.26 LUMBAR DISC HERNIATION: ICD-10-CM

## 2017-05-17 PROCEDURE — 99999 PR PBB SHADOW E&M-EST. PATIENT-LVL II: CPT | Mod: PBBFAC,,, | Performed by: NEUROLOGICAL SURGERY

## 2017-05-17 PROCEDURE — 99214 OFFICE O/P EST MOD 30 MIN: CPT | Mod: S$PBB,,, | Performed by: NEUROLOGICAL SURGERY

## 2017-05-17 PROCEDURE — 72148 MRI LUMBAR SPINE W/O DYE: CPT | Mod: 26,,, | Performed by: RADIOLOGY

## 2017-05-17 PROCEDURE — 99212 OFFICE O/P EST SF 10 MIN: CPT | Mod: PBBFAC,25 | Performed by: NEUROLOGICAL SURGERY

## 2017-05-17 NOTE — PROGRESS NOTES
Subjective:    I, Megan Alonzo, attest that this documentation has been prepared under the direction and in the presence of CHERYL Aranda MD.     Patient ID: Dejah Schmidt is a 56 y.o. female.    Chief Complaint: Follow-up    HPI     Pt is a 56 y.o. female who presents for follow up after last evaluation on 4/25/2017. Pt who has history of neck and back pain with both cervical and lumbar disc disease. Last time we saw her, she had improvement of her neck pain with the last round of injections but she had worsening lower back pain. We wanted to get updated MRI scans. She presents with lower back pain that radiates down her right leg posteriorly, down just past the right knee.  She also reports radiation down her left leg that is less severe. She has had several rounds of injections for her back, but states she had worse back pain after the last round. She denies using any blood thinners.     Review of Systems   Constitutional: Negative for chills, fatigue and fever.   HENT: Negative for sinus pressure and trouble swallowing.    Eyes: Negative.  Negative for visual disturbance.   Respiratory: Negative.    Cardiovascular: Negative.    Gastrointestinal: Negative.  Negative for nausea and vomiting.   Endocrine: Negative.    Genitourinary: Negative.    Musculoskeletal: Positive for back pain (lower back, radiates to BLE) and neck pain.        Positive for bilateral lower extremity pain, right worse than left.    Neurological: Negative for dizziness, seizures, syncope, speech difficulty, weakness and numbness.       Objective:      Physical Exam:  Nursing note and vitals reviewed.    Constitutional: She appears well-developed.     Eyes: Pupils are equal, round, and reactive to light. Conjunctivae and EOM are normal.     Cardiovascular: Normal rate, regular rhythm, normal pulses and intact distal pulses.     Abdominal: Soft.     Psych/Behavior: She is alert. She is oriented to person, place, and time. She has a normal mood and  affect.     Musculoskeletal: Gait is normal.        Neck: Range of motion is full. There is no tenderness. Muscle strength is 5/5. Tone is normal.        Back: Range of motion is full. There is no tenderness. Muscle strength is 5/5. Tone is normal.        Right Upper Extremities: Range of motion is full. There is no tenderness. Muscle strength is 5/5. Tone is normal.        Left Upper Extremities: Range of motion is full. There is no tenderness. Muscle strength is 5/5. Tone is normal.       Right Lower Extremities: Range of motion is full. There is no tenderness. Muscle strength is 5/5. Tone is normal.        Left Lower Extremities: Range of motion is full. There is no tenderness. Muscle strength is 5/5. Tone is normal.     Neurological:        Coordination: She has a normal Romberg Test, normal finger to nose coordination, normal heel to shin coordination and normal tandem walking coordination.        DTRs: DTRs are normal. Tricep reflexes are 2+ on the right side and 2+ on the left side. Bicep reflexes are 2+ on the right side and 2+ on the left side. Brachioradialis reflexes are 2+ on the right side and 2+ on the left side. Patellar reflexes are 2+ on the right side and 2+ on the left side. Achilles reflexes are 2+ on the right side and 2+ on the left side.        Cranial nerves: Cranial nerve(s) II, III, IV, V, VI, VII, VIII, IX, X, XI and XII are intact.       Pt has mainly right-sided radiculopathy that goes down the right leg.   Positive SLR that was negative before.   She remains full strength.   No Nieves's, no clonus.  Normal sensation.     Imaging:   MRI L-spine, date 05/17/2017, shows L2/L3, L1/L2 disc herniation with central foraminal stenosis also shows degenerative changes at L3/L4 that are not severe, likely not the main cause of her symptoms. When compared to her lumbar MRI scan in 2016, it looks relatively stable, but definitely not significantly improved.     Assessment/Plan:   Pt with  intractable back pain, with 2 level right sided disc herniations. Clinically it seems to fit a lower level radiculopathy, but given the degree of severity there may be some overlap. She has failed conservative management which has so fair included pt as well as multiple injections. I think patient would benefit from MIS decompression and discectomy but I would like to get an EMG beforehand just to make sure we are not missing anything unusual. I have discussed the risks/benefits, indications, and alternatives for the proposed procedure in detail. I have answered all of their questions and patient wishes to proceed with surgery. We will schedule patient.     I, CHERYL Aranda MD, personally performed the services described in this documentation. All medical record entries made by the scribe, Megan Alonzo, were at my direction and in my presence.  I have reviewed the chart and agree that the record reflects my personal performance and is accurate and complete.

## 2017-05-23 ENCOUNTER — TELEPHONE (OUTPATIENT)
Dept: INTERNAL MEDICINE | Facility: CLINIC | Age: 57
End: 2017-05-23

## 2017-05-23 DIAGNOSIS — E04.1 THYROID NODULE: Primary | ICD-10-CM

## 2017-05-23 DIAGNOSIS — M25.569 KNEE PAIN, UNSPECIFIED CHRONICITY, UNSPECIFIED LATERALITY: ICD-10-CM

## 2017-05-29 ENCOUNTER — TELEPHONE (OUTPATIENT)
Dept: NEUROLOGY | Facility: CLINIC | Age: 57
End: 2017-05-29

## 2017-05-29 NOTE — TELEPHONE ENCOUNTER
----- Message from Shwetha Esteban MA sent at 5/29/2017 11:02 AM CDT -----  Hi Dr. Aranda wanted to know if Dr. Duke would be able to do a EMG on this patient before her surgery 6/15/2017?    Thanks

## 2017-05-30 ENCOUNTER — TELEPHONE (OUTPATIENT)
Dept: NEUROSURGERY | Facility: CLINIC | Age: 57
End: 2017-05-30

## 2017-05-30 DIAGNOSIS — M54.16 LUMBAR RADICULOPATHY: ICD-10-CM

## 2017-05-30 DIAGNOSIS — M51.06 LUMBAR DISC HERNIATION WITH MYELOPATHY: Primary | ICD-10-CM

## 2017-06-12 ENCOUNTER — CLINICAL SUPPORT (OUTPATIENT)
Dept: NEUROSURGERY | Facility: CLINIC | Age: 57
End: 2017-06-12
Payer: MEDICAID

## 2017-06-12 ENCOUNTER — TELEPHONE (OUTPATIENT)
Dept: INTERNAL MEDICINE | Facility: CLINIC | Age: 57
End: 2017-06-12

## 2017-06-12 RX ORDER — IBUPROFEN 800 MG/1
800 TABLET ORAL 3 TIMES DAILY
Status: ON HOLD | COMMUNITY
End: 2017-06-20 | Stop reason: HOSPADM

## 2017-06-12 NOTE — PRE ADMISSION SCREENING
Anesthesia Assessment: Preoperative EQUATION    Planned Procedure: Procedure(s) (LRB):  MICRODISKECTOMY-MINIMALLY INVASIVE (Right)  Requested Anesthesia Type:General  Surgeon: Adonis Aranda MD  Service: Neurosurgery  Known or anticipated Date of Surgery:6/19/2017    Surgeon notes: reviewed    Electronic QUestionnaire Assessment completed via nurse interview with patient.        Dejah Schmidt [[id number]] - 56 y.o. Female     Providers Outside of [facility]     No data to display   Surgical Risk Level     Surgical Risk Level:  3       caRDScore (Clinical Anesthesia Rapid Decision Score)     Low   Total Score: 11    11 Sum of Clinical Scores   caRDScores (Grouped)     caRDScore - Ane:  4            caRDScore - CVD:  3            caRDScore - Pul:  2            caRDScore - Met:  1            caRDScore - Phy:  1       caRDScore Items     Flowsheet Row Pre-admit from 6/19/2017 in [facility]   Anesthesia   Has degenerative arthritis causing severely limited neck movement Yes   Has large thyroid (goiter) Yes   CVD   Activity similar to best ability for maximal activity or exercise METS 3   Diagnosed with high blood pressure Yes   Pulmonary   Has history of Asthma Yes   Metabolic   Has hyperlipoproteinemia Yes   Physiologic   Obesity Status Morbid Obesity (BMI 40-50)   Flags     Red Flag Score:  1            Yellow Flag Score:  7       Red Flags     Flowsheet Row Pre-admit from 6/19/2017 in [facility]   Obesity Status Morbid Obesity (BMI 40-50)   Has large thyroid (goiter) Yes   Yellow Flags     Flowsheet Row Pre-admit from 6/19/2017 in [facility]   Has had steroids in the last year Yes   Takes herbal medications or vitamin supplements Yes [vit D ]   Obesity Status Morbid Obesity (BMI 40-50)   Is or has been a Smoker No   NSAID Yes   Already stopped anti-platelet agent for surgery No   Has degenerative arthritis causing severely limited neck movement Yes   Has history  of Asthma Yes   Has pain Yes   PONV Risk Score (assumes periop narcotic use = +1, Max=4)     PONV Risk Score:  3       PONV Risk Factors   Total Score: 2            Sleep Apnea   Total Score: 0    CALIXTO STOP-Bang Risk Factors (Max=8)   Total Score: 3    1 Takes medication for high blood pressure   1 Obesity Status: Obesity (BMI >35)   1 Age >50   CALIXTO Risk Level - 1 (Low), 2 (Moderate), 3 (High)     CALIXTO Risk Level:  2       RCRI (Revised Cardiac Risk Indices of ACC/AHA guidelines, Max=6)   Total Score: 0    CAD Risk Factors   Total Score: 3                CHADS Score if applicable (history of atrial fib/flutter, Max=6)   Total Score: 1        Maximal Exercise Capacity     Flowsheet Row Pre-admit from 6/19/2017 in [facility]   Maximal Exercise Capacity METS 3   Summary of Dependence   Total Score: 1    1 Is totally independent of others for activities of daily living   Phone Fraility Score (Max = 17)   Total Score: 1    1 Describes health as Fair   Pain Factors     Flowsheet Row Pre-admit from 6/19/2017 in [facility]   Has pain Yes   Location and description of pain lower back    Associated pain symptoms bilat arm and leg numbness    Duration of pain Pain is chronic, has been present greater than 12 mo   Typical Pain Scores 7 to 10   Depends on strong Rx (opioids, adjunctive meds) Yes   On opioid medication for greater than 90 days No   Dosing frequency of opioid does not take daily    Takes other adjunctive medications: gabapentin    Risk Triggers (Evidence-Based Risk Triggers)     Pulmonary Risk Triggers   Total Score: 1        Renal Risk Triggers   Total Score: 1        Delirium Risk Triggers   Total Score: 0    Urologic Risk Triggers   Total Score: 0    Logistics     Pre-op Clinic Logistics   Total Score: 7                        [facility] Logistics   Total Score: 3    1 NSAID   2 Has large thyroid (goiter)   Discharge Logistics   Total Score: 2            Discharge Planning  "    Flowsheet Row Pre-admit from 6/19/2017 in [facility]   Discharge Planning   Discharge plans Home with assistance   Will assist patient 24/7, if needed daughter    Who will transport you to therapy, if need DAUGHTER    Anes & Int Med <For office use only>     Total Score: 13      Surgical Risk Level Assessment       Triage considerations:     The patient has no apparent active cardiac condition (No unstable coronary Syndrome such as severe unstable angina or recent [<1 month] myocardial infarction, decompensated CHF, severe valvular   disease or significant arrhythmia)    Previous anesthesia records:[facility]12/30/16 RIGHT Trigger finger release     Last PCP note: within 3 months , within [facility]   Subspecialty notes: tbd    Other important co-morbidities: non toxic multinodular goiter, HTN, HLD, asthma      Tests already available:  Results have been reviewed. EKG 4/5/17   Preop labs done 6/15/17 PT, PTT, CBC, CMP, TSH  By PCP at [facility]             Instructions given. (See in Nurse's note)    Optimization:  Anesthesia Preop Clinic Assessment not  Indicated    Medical Opinion Indicated done              Plan:    Testing:  T&S on arrival if needed    Pre-anesthesia  visit       Visit focus: n/a     Consultation:Patient's PCP for a statement of optimization  seen 6/12/17 by Dr Bergeron        Navigation: Tests Scheduled.              Consults scheduled. Dr Bergeron              Results will be tracked by Preop Clinic.            Dori Wilburn (Nel), MSN,BA,RN-BC   06/12/2017    "

## 2017-06-12 NOTE — TELEPHONE ENCOUNTER
"----- Message from Droi Wilburn RN sent at 6/12/2017  2:38 PM CDT -----  Anesthesia review is in progress for planned minimally invasive back surgery with Dr Aranda on 6/19/17. Will you be able to see patient before this date?    Dori Wilburn (Nel), MSN,BA,RN-BC   06/12/2017    "

## 2017-06-13 ENCOUNTER — PROCEDURE VISIT (OUTPATIENT)
Dept: NEUROLOGY | Facility: CLINIC | Age: 57
End: 2017-06-13
Attending: NEUROLOGICAL SURGERY
Payer: MEDICAID

## 2017-06-13 VITALS
DIASTOLIC BLOOD PRESSURE: 92 MMHG | BODY MASS INDEX: 45.52 KG/M2 | HEIGHT: 67 IN | SYSTOLIC BLOOD PRESSURE: 124 MMHG | WEIGHT: 290 LBS | HEART RATE: 72 BPM

## 2017-06-13 DIAGNOSIS — M47.816 FACET HYPERTROPHY OF LUMBAR REGION: ICD-10-CM

## 2017-06-13 DIAGNOSIS — M47.816 FACET ARTHROPATHY, LUMBAR: ICD-10-CM

## 2017-06-13 PROCEDURE — 95886 MUSC TEST DONE W/N TEST COMP: CPT | Mod: PBBFAC | Performed by: PSYCHIATRY & NEUROLOGY

## 2017-06-13 PROCEDURE — 95908 NRV CNDJ TST 3-4 STUDIES: CPT | Mod: 26,S$PBB,, | Performed by: PSYCHIATRY & NEUROLOGY

## 2017-06-13 PROCEDURE — 95908 NRV CNDJ TST 3-4 STUDIES: CPT | Mod: PBBFAC | Performed by: PSYCHIATRY & NEUROLOGY

## 2017-06-13 PROCEDURE — 95886 MUSC TEST DONE W/N TEST COMP: CPT | Mod: 26,S$PBB,, | Performed by: PSYCHIATRY & NEUROLOGY

## 2017-06-13 NOTE — PROGRESS NOTES
Patient arrived for spine education class. Pre op and post op instructions given, medication refill policy explained in detail, copy provided; 2 week and 6 week post op appointments given. Surgery guide and hibiclens given with instructions for use. Patient made aware someone will call the day before surgery with arrival time for surgery. Patient given folder with appts and surgical procedure information. [facility] information provided with instructions to obtain brace prior to surgical procedure. RN provided an at length discussion regarding infection control and handwashing, environmental safety and preparing home pre-operatively for post-op needs, pet hazards, pain management and therapeutic regimens, stool softener with narcotics and having available support post-op. Discussed discharge instructions with an emphasis on body mechanics, no heavy lifting greater than 10 pounds, incision care with bacitracin twice daily if applicable, no submerging in water, pat incision dry if gets wet, s/s infection that require clinic notification, no bending and twisting at area of incision. Verbalized understanding and agreed to comply. Encouraged to call the clinic with any further questions or concerns, number provided.

## 2017-06-13 NOTE — PROCEDURES
EMG w/ Ultrasound  Date/Time: 6/13/2017 11:07 AM  Performed by: PINEDA HASSAN III  Authorized by: JOCELYN WELLS   Sedation:  Patient sedated: no    Patient tolerance: Patient tolerated the procedure well with no immediate complications  Comments: Formal EMG/nerve conduction study will be scanned into the electronic medical record.  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  There is evidence for mild right L5 radiculopathy.  Clinical correlation is advised.

## 2017-06-14 ENCOUNTER — PATIENT MESSAGE (OUTPATIENT)
Dept: SURGERY | Facility: HOSPITAL | Age: 57
End: 2017-06-14

## 2017-06-15 ENCOUNTER — OFFICE VISIT (OUTPATIENT)
Dept: INTERNAL MEDICINE | Facility: CLINIC | Age: 57
End: 2017-06-15
Payer: MEDICAID

## 2017-06-15 ENCOUNTER — HOSPITAL ENCOUNTER (OUTPATIENT)
Dept: RADIOLOGY | Facility: HOSPITAL | Age: 57
Discharge: HOME | End: 2017-06-15
Attending: INTERNAL MEDICINE
Payer: MEDICAID

## 2017-06-15 ENCOUNTER — HOSPITAL ENCOUNTER (OUTPATIENT)
Dept: CARDIOLOGY | Facility: CLINIC | Age: 57
Discharge: HOME | End: 2017-06-15
Payer: MEDICAID

## 2017-06-15 DIAGNOSIS — I10 BENIGN ESSENTIAL HTN: ICD-10-CM

## 2017-06-15 DIAGNOSIS — E04.1 THYROID NODULE: Primary | ICD-10-CM

## 2017-06-15 PROCEDURE — 99214 OFFICE O/P EST MOD 30 MIN: CPT | Mod: S$PBB,,, | Performed by: INTERNAL MEDICINE

## 2017-06-15 PROCEDURE — 93005 ELECTROCARDIOGRAM TRACING: CPT | Mod: PBBFAC | Performed by: INTERNAL MEDICINE

## 2017-06-15 PROCEDURE — 93010 ELECTROCARDIOGRAM REPORT: CPT | Mod: S$PBB,,, | Performed by: INTERNAL MEDICINE

## 2017-06-15 PROCEDURE — 99999 PR PBB SHADOW E&M-EST. PATIENT-LVL IV: CPT | Mod: PBBFAC,,, | Performed by: INTERNAL MEDICINE

## 2017-06-15 PROCEDURE — 71020 XR CHEST PA AND LATERAL: CPT | Mod: 26,,, | Performed by: RADIOLOGY

## 2017-06-15 RX ORDER — AMLODIPINE BESYLATE 10 MG/1
10 TABLET ORAL DAILY
Qty: 30 TABLET | Refills: 3 | Status: SHIPPED | OUTPATIENT
Start: 2017-06-15 | End: 2019-12-26 | Stop reason: SDUPTHER

## 2017-06-16 ENCOUNTER — ANESTHESIA EVENT (OUTPATIENT)
Dept: SURGERY | Facility: HOSPITAL | Age: 57
End: 2017-06-16
Payer: MEDICAID

## 2017-06-16 NOTE — ANESTHESIA PREPROCEDURE EVALUATION
Anesthesia Assessment: Preoperative EQUATION    Planned Procedure: Procedure(s) (LRB):  MICRODISKECTOMY-MINIMALLY INVASIVE (Right)  Requested Anesthesia Type:General  Surgeon: Adonis Aranda MD  Service: Neurosurgery  Known or anticipated Date of Surgery:6/19/2017    Surgeon notes: reviewed    Electronic QUestionnaire Assessment completed via nurse interview with patient.        Dejah Schmidt [[id number]] - 56 y.o. Female     Providers Outside of [facility]     No data to display   Surgical Risk Level     Surgical Risk Level:  3       caRDScore (Clinical Anesthesia Rapid Decision Score)     Low   Total Score: 11    11 Sum of Clinical Scores   caRDScores (Grouped)     caRDScore - Ane:  4            caRDScore - CVD:  3            caRDScore - Pul:  2            caRDScore - Met:  1            caRDScore - Phy:  1       caRDScore Items     Flowsheet Row Pre-admit from 6/19/2017 in [facility]   Anesthesia   Has degenerative arthritis causing severely limited neck movement Yes   Has large thyroid (goiter) Yes   CVD   Activity similar to best ability for maximal activity or exercise METS 3   Diagnosed with high blood pressure Yes   Pulmonary   Has history of Asthma Yes   Metabolic   Has hyperlipoproteinemia Yes   Physiologic   Obesity Status Morbid Obesity (BMI 40-50)   Flags     Red Flag Score:  1            Yellow Flag Score:  7       Red Flags     Flowsheet Row Pre-admit from 6/19/2017 in [facility]   Obesity Status Morbid Obesity (BMI 40-50)   Has large thyroid (goiter) Yes   Yellow Flags     Flowsheet Row Pre-admit from 6/19/2017 in [facility]   Has had steroids in the last year Yes   Takes herbal medications or vitamin supplements Yes [vit D ]   Obesity Status Morbid Obesity (BMI 40-50)   Is or has been a Smoker No   NSAID Yes   Already stopped anti-platelet agent for surgery No   Has degenerative arthritis causing severely limited neck movement Yes   Has history  of Asthma Yes   Has pain Yes   PONV Risk Score (assumes periop narcotic use = +1, Max=4)     PONV Risk Score:  3       PONV Risk Factors   Total Score: 2            Sleep Apnea   Total Score: 0    CALIXTO STOP-Bang Risk Factors (Max=8)   Total Score: 3    1 Takes medication for high blood pressure   1 Obesity Status: Obesity (BMI >35)   1 Age >50   CALIXTO Risk Level - 1 (Low), 2 (Moderate), 3 (High)     CALIXTO Risk Level:  2       RCRI (Revised Cardiac Risk Indices of ACC/AHA guidelines, Max=6)   Total Score: 0    CAD Risk Factors   Total Score: 3    CHADS Score if applicable (history of atrial fib/flutter, Max=6)   Total Score: 1        Maximal Exercise Capacity     Flowsheet Row Pre-admit from 6/19/2017 in [facility]   Maximal Exercise Capacity METS 3   Summary of Dependence   Total Score: 1    1 Is totally independent of others for activities of daily living   Phone Fraility Score (Max = 17)   Total Score: 1    1 Describes health as Fair   Pain Factors     Flowsheet Row Pre-admit from 6/19/2017 in [facility]   Has pain Yes   Location and description of pain lower back    Associated pain symptoms bilat arm and leg numbness    Duration of pain Pain is chronic, has been present greater than 12 mo   Typical Pain Scores 7 to 10   Depends on strong Rx (opioids, adjunctive meds) Yes   On opioid medication for greater than 90 days No   Dosing frequency of opioid does not take daily    Takes other adjunctive medications: gabapentin    Risk Triggers (Evidence-Based Risk Triggers)     Pulmonary Risk Triggers   Total Score: 1        Renal Risk Triggers   Total Score: 1        Delirium Risk Triggers   Total Score: 0    Urologic Risk Triggers   Total Score: 0    Logistics     Pre-op Clinic Logistics   Total Score: 7    DOSC Logistics   Total Score: 3    1 NSAID   2 Has large thyroid (goiter)   Discharge Logistics   Total Score: 2            Discharge Planning     Flowsheet Row Pre-admit from  "6/19/2017 in [facility]   Discharge Planning   Discharge plans Home with assistance   Will assist patient 24/7, if needed daughter    Who will transport you to therapy, if need DAUGHTER    Anes & Int Med <For office use only>     Total Score: 13      Surgical Risk Level Assessment       Triage considerations:     The patient has no apparent active cardiac condition (No unstable coronary Syndrome such as severe unstable angina or recent [<1 month] myocardial infarction, decompensated CHF, severe valvular   disease or significant arrhythmia)    Previous anesthesia records:MAC12/30/16 RIGHT Trigger finger release     Last PCP note: within 3 months , within [facility]   Subspecialty notes: tbd    Other important co-morbidities: non toxic multinodular goiter, HTN, HLD, asthma      Tests already available:  Results have been reviewed. EKG 4/5/17   Preop labs done 6/15/17 PT, PTT, CBC, CMP, TSH  [place] [facility]             Instructions given. (See in Nurse's note)    Optimization:  Anesthesia Preop Clinic Assessment not  Indicated    Medical Opinion Indicated done              Plan:    Testing:  T&S on arrival if needed    Pre-anesthesia  visit       Visit focus: n/a     Consultation:Patient's PCP for a statement of optimization  seen 6/12/17 by Dr Bergeron        Navigation: Tests Scheduled.              Consults scheduled. Dr Bergeron              Results will be tracked by Preop Clinic.            Dori Wilburn (Nel), MSN,BA,RN-BC   06/12/2017 06/19/2017  Dejah Schmidt is a 56 y.o., female nonsmoker with morbid obesity, HTN, HLD, risk factors for CALIXTO, asthma, and chronic back pain with radiculopathy here for microdiskectomy with EMG/SSEPs.    Anesthesia Evaluation         Review of Systems  Anesthesia Hx:  No problems with previous Anesthesia History of prior surgery of interest to airway " management or planning: Denies Family Hx of Anesthesia complications.   Denies Personal Hx of Anesthesia complications.   Social:  Non-Smoker    Hematology/Oncology:         -- Denies Anemia:   EENT/Dental:   Throat Disease: Non toxic thyroid goiter   Cardiovascular:   Hypertension (well-controlled today, recently started additional BP med.), well controlled  Denies Angina. hyperlipidemia ECG has been reviewed.    Activity limited by back/leg pain.  Able to do ADLs, walk 1 city block.  Stress TTE 2015:  CONCLUSIONS     1 - Normal left ventricular systolic function (EF 60-65%).     2 - Normal left ventricular diastolic function.     3 - Normal right ventricular systolic function .     No evidence of stress induced myocardial ischemia.    Pulmonary:   Asthma mild and asymptomatic  Asthma:    Renal/:   Denies Chronic Renal Disease.     Hepatic/GI:   Denies GERD.    Musculoskeletal:   Arthritis     + radiculopathy, mostly on right. Spine Disorders: lumbar Chronic Pain    Neurological:   Denies Seizures.   Chronic Pain Syndrome   Endocrine:   Denies Diabetes.   Morbid Obesity BMI 46 Thyroid Disease (non toxic thyroid goiter )        Physical Exam  General:  Morbid Obesity    Airway/Jaw/Neck:  Airway Findings: Mouth Opening: Normal Tongue: Normal  General Airway Assessment: Average, Adult  Mallampati: II  Improves to I with phonation.  TM Distance: Normal, at least 6 cm  Jaw/Neck Findings:     Neck ROM: Normal ROM      Dental:  Dental Findings: (worn lower teeth) Upper Dentures   Chest/Lungs:  Chest/Lungs Findings: Clear to auscultation, Normal Respiratory Rate     Heart/Vascular:  Heart Findings: Rate: Normal  Rhythm: Regular Rhythm        Mental Status:  Mental Status Findings:  Cooperative, Alert and Oriented         Anesthesia Plan  Type of Anesthesia, risks & benefits discussed:  Anesthesia Type:  general  Patient's Preference:   Intra-op Monitoring Plan: standard ASA monitors  Intra-op Monitoring Plan Comments:    Post Op Pain Control Plan: multimodal analgesia  Post Op Pain Control Plan Comments:   Induction:   IV  Beta Blocker:  Patient is not currently on a Beta-Blocker (No further documentation required).       Informed Consent: Patient understands risks and agrees with Anesthesia plan.  Questions answered. Anesthesia consent signed with patient.  ASA Score: 2     Day of Surgery Review of History & Physical:    H&P update referred to the surgeon.     Anesthesia Plan Notes: Possible art line.  EMG & SSEPs.        Ready For Surgery From Anesthesia Perspective.

## 2017-06-18 ENCOUNTER — TELEPHONE (OUTPATIENT)
Dept: INTERNAL MEDICINE | Facility: CLINIC | Age: 57
End: 2017-06-18

## 2017-06-18 VITALS
WEIGHT: 293 LBS | HEART RATE: 68 BPM | SYSTOLIC BLOOD PRESSURE: 138 MMHG | BODY MASS INDEX: 45.99 KG/M2 | DIASTOLIC BLOOD PRESSURE: 84 MMHG | OXYGEN SATURATION: 97 % | TEMPERATURE: 98 F | HEIGHT: 67 IN

## 2017-06-19 ENCOUNTER — HOSPITAL ENCOUNTER (OUTPATIENT)
Facility: HOSPITAL | Age: 57
Discharge: HOME | End: 2017-06-21
Attending: NEUROLOGICAL SURGERY | Admitting: NEUROLOGICAL SURGERY
Payer: MEDICAID

## 2017-06-19 ENCOUNTER — ANESTHESIA (OUTPATIENT)
Dept: SURGERY | Facility: HOSPITAL | Age: 57
End: 2017-06-19
Payer: MEDICAID

## 2017-06-19 DIAGNOSIS — M54.12 CERVICAL RADICULOPATHY: ICD-10-CM

## 2017-06-19 DIAGNOSIS — M51.16 HERNIATION OF LUMBAR INTERVERTEBRAL DISC WITH RADICULOPATHY: Primary | ICD-10-CM

## 2017-06-19 DIAGNOSIS — M54.16 LUMBAR RADICULOPATHY: ICD-10-CM

## 2017-06-19 PROCEDURE — 25000003 PHARM REV CODE 250

## 2017-06-19 PROCEDURE — 27000221 HC OXYGEN, UP TO 24 HOURS

## 2017-06-19 PROCEDURE — 51798 US URINE CAPACITY MEASURE: CPT | Performed by: NEUROLOGICAL SURGERY

## 2017-06-19 PROCEDURE — 63600175 PHARM REV CODE 636 W HCPCS: Performed by: NEUROLOGICAL SURGERY

## 2017-06-19 PROCEDURE — 36000711: Performed by: NEUROLOGICAL SURGERY

## 2017-06-19 PROCEDURE — 37000009 HC ANESTHESIA EA ADD 15 MINS: Performed by: NEUROLOGICAL SURGERY

## 2017-06-19 PROCEDURE — 25000003 PHARM REV CODE 250: Performed by: ANESTHESIOLOGY

## 2017-06-19 PROCEDURE — 63600175 PHARM REV CODE 636 W HCPCS

## 2017-06-19 PROCEDURE — 63600175 PHARM REV CODE 636 W HCPCS: Performed by: ANESTHESIOLOGY

## 2017-06-19 PROCEDURE — D9220A PRA ANESTHESIA: Mod: CRNA,,, | Performed by: NURSE ANESTHETIST, CERTIFIED REGISTERED

## 2017-06-19 PROCEDURE — 71000039 HC RECOVERY, EACH ADD'L HOUR: Performed by: NEUROLOGICAL SURGERY

## 2017-06-19 PROCEDURE — 25000003 PHARM REV CODE 250: Performed by: PHYSICIAN ASSISTANT

## 2017-06-19 PROCEDURE — 25000003 PHARM REV CODE 250: Performed by: NEUROLOGICAL SURGERY

## 2017-06-19 PROCEDURE — 25000003 PHARM REV CODE 250: Performed by: NURSE ANESTHETIST, CERTIFIED REGISTERED

## 2017-06-19 PROCEDURE — 27201423 OPTIME MED/SURG SUP & DEVICES STERILE SUPPLY: Performed by: NEUROLOGICAL SURGERY

## 2017-06-19 PROCEDURE — 71000033 HC RECOVERY, INTIAL HOUR: Performed by: NEUROLOGICAL SURGERY

## 2017-06-19 PROCEDURE — 94760 N-INVAS EAR/PLS OXIMETRY 1: CPT

## 2017-06-19 PROCEDURE — 37000008 HC ANESTHESIA 1ST 15 MINUTES: Performed by: NEUROLOGICAL SURGERY

## 2017-06-19 PROCEDURE — D9220A PRA ANESTHESIA: Mod: ANES,,, | Performed by: ANESTHESIOLOGY

## 2017-06-19 PROCEDURE — 94799 UNLISTED PULMONARY SVC/PX: CPT

## 2017-06-19 PROCEDURE — 36000710: Performed by: NEUROLOGICAL SURGERY

## 2017-06-19 PROCEDURE — 20600001 HC STEP DOWN PRIVATE ROOM

## 2017-06-19 PROCEDURE — 63600175 PHARM REV CODE 636 W HCPCS: Performed by: NURSE ANESTHETIST, CERTIFIED REGISTERED

## 2017-06-19 PROCEDURE — 27201037 HC PRESSURE MONITORING SET UP

## 2017-06-19 RX ORDER — TRIAMTERENE AND HYDROCHLOROTHIAZIDE 37.5; 25 MG/1; MG/1
1 CAPSULE ORAL EVERY MORNING
Status: DISCONTINUED | OUTPATIENT
Start: 2017-06-20 | End: 2017-06-21 | Stop reason: HOSPADM

## 2017-06-19 RX ORDER — OXYCODONE HYDROCHLORIDE 5 MG/1
5 TABLET ORAL EVERY 6 HOURS PRN
Status: DISCONTINUED | OUTPATIENT
Start: 2017-06-19 | End: 2017-06-21

## 2017-06-19 RX ORDER — DIAZEPAM 10 MG/2ML
5 INJECTION INTRAMUSCULAR EVERY 6 HOURS PRN
Status: DISCONTINUED | OUTPATIENT
Start: 2017-06-19 | End: 2017-06-21 | Stop reason: HOSPADM

## 2017-06-19 RX ORDER — AMOXICILLIN 250 MG
2 CAPSULE ORAL NIGHTLY PRN
Status: DISCONTINUED | OUTPATIENT
Start: 2017-06-19 | End: 2017-06-21 | Stop reason: HOSPADM

## 2017-06-19 RX ORDER — OXYCODONE HYDROCHLORIDE 5 MG/1
TABLET ORAL
Status: COMPLETED
Start: 2017-06-19 | End: 2017-06-19

## 2017-06-19 RX ORDER — FENTANYL CITRATE 50 UG/ML
INJECTION, SOLUTION INTRAMUSCULAR; INTRAVENOUS
Status: DISCONTINUED | OUTPATIENT
Start: 2017-06-19 | End: 2017-06-19

## 2017-06-19 RX ORDER — LIDOCAINE HYDROCHLORIDE AND EPINEPHRINE 10; 10 MG/ML; UG/ML
INJECTION, SOLUTION INFILTRATION; PERINEURAL
Status: DISCONTINUED | OUTPATIENT
Start: 2017-06-19 | End: 2017-06-19 | Stop reason: HOSPADM

## 2017-06-19 RX ORDER — HYDROMORPHONE HYDROCHLORIDE 1 MG/ML
INJECTION, SOLUTION INTRAMUSCULAR; INTRAVENOUS; SUBCUTANEOUS
Status: DISCONTINUED
Start: 2017-06-19 | End: 2017-06-19 | Stop reason: WASHOUT

## 2017-06-19 RX ORDER — ONDANSETRON HYDROCHLORIDE 2 MG/ML
INJECTION, SOLUTION INTRAMUSCULAR; INTRAVENOUS
Status: DISCONTINUED | OUTPATIENT
Start: 2017-06-19 | End: 2017-06-19

## 2017-06-19 RX ORDER — SODIUM CHLORIDE AND POTASSIUM CHLORIDE 150; 900 MG/100ML; MG/100ML
INJECTION, SOLUTION INTRAVENOUS CONTINUOUS
Status: DISCONTINUED | OUTPATIENT
Start: 2017-06-19 | End: 2017-06-21 | Stop reason: HOSPADM

## 2017-06-19 RX ORDER — METHYLPREDNISOLONE ACETATE 40 MG/ML
INJECTION, SUSPENSION INTRA-ARTICULAR; INTRALESIONAL; INTRAMUSCULAR; SOFT TISSUE
Status: DISCONTINUED | OUTPATIENT
Start: 2017-06-19 | End: 2017-06-19 | Stop reason: HOSPADM

## 2017-06-19 RX ORDER — FENTANYL CITRATE 50 UG/ML
INJECTION, SOLUTION INTRAMUSCULAR; INTRAVENOUS
Status: COMPLETED
Start: 2017-06-19 | End: 2017-06-19

## 2017-06-19 RX ORDER — AMLODIPINE BESYLATE 10 MG/1
10 TABLET ORAL DAILY
Status: DISCONTINUED | OUTPATIENT
Start: 2017-06-20 | End: 2017-06-21 | Stop reason: HOSPADM

## 2017-06-19 RX ORDER — OXYCODONE HYDROCHLORIDE 5 MG/1
5 TABLET ORAL
Status: DISCONTINUED | OUTPATIENT
Start: 2017-06-19 | End: 2017-06-19 | Stop reason: HOSPADM

## 2017-06-19 RX ORDER — BACITRACIN 50000 [IU]/1
INJECTION, POWDER, FOR SOLUTION INTRAMUSCULAR
Status: DISCONTINUED | OUTPATIENT
Start: 2017-06-19 | End: 2017-06-19 | Stop reason: HOSPADM

## 2017-06-19 RX ORDER — ACETAMINOPHEN 10 MG/ML
INJECTION, SOLUTION INTRAVENOUS
Status: DISCONTINUED | OUTPATIENT
Start: 2017-06-19 | End: 2017-06-19

## 2017-06-19 RX ORDER — LIDOCAINE HCL/PF 100 MG/5ML
SYRINGE (ML) INTRAVENOUS
Status: DISCONTINUED | OUTPATIENT
Start: 2017-06-19 | End: 2017-06-19

## 2017-06-19 RX ORDER — SODIUM CHLORIDE AND POTASSIUM CHLORIDE 150; 900 MG/100ML; MG/100ML
INJECTION, SOLUTION INTRAVENOUS
Status: COMPLETED
Start: 2017-06-19 | End: 2017-06-19

## 2017-06-19 RX ORDER — MUPIROCIN 20 MG/G
1 OINTMENT TOPICAL
Status: COMPLETED | OUTPATIENT
Start: 2017-06-19 | End: 2017-06-19

## 2017-06-19 RX ORDER — KETAMINE HYDROCHLORIDE 100 MG/ML
INJECTION, SOLUTION INTRAMUSCULAR; INTRAVENOUS
Status: DISCONTINUED | OUTPATIENT
Start: 2017-06-19 | End: 2017-06-19

## 2017-06-19 RX ORDER — ONDANSETRON 8 MG/1
8 TABLET, ORALLY DISINTEGRATING ORAL EVERY 6 HOURS PRN
Status: DISCONTINUED | OUTPATIENT
Start: 2017-06-19 | End: 2017-06-21 | Stop reason: HOSPADM

## 2017-06-19 RX ORDER — ACETAMINOPHEN 325 MG/1
650 TABLET ORAL EVERY 4 HOURS PRN
Status: DISCONTINUED | OUTPATIENT
Start: 2017-06-19 | End: 2017-06-21 | Stop reason: HOSPADM

## 2017-06-19 RX ORDER — MIDAZOLAM HYDROCHLORIDE 5 MG/ML
INJECTION INTRAMUSCULAR; INTRAVENOUS
Status: DISCONTINUED | OUTPATIENT
Start: 2017-06-19 | End: 2017-06-19

## 2017-06-19 RX ORDER — ONDANSETRON 2 MG/ML
4 INJECTION INTRAMUSCULAR; INTRAVENOUS DAILY PRN
Status: DISCONTINUED | OUTPATIENT
Start: 2017-06-19 | End: 2017-06-19 | Stop reason: HOSPADM

## 2017-06-19 RX ORDER — MORPHINE SULFATE 2 MG/ML
4 INJECTION, SOLUTION INTRAMUSCULAR; INTRAVENOUS
Status: DISCONTINUED | OUTPATIENT
Start: 2017-06-19 | End: 2017-06-21

## 2017-06-19 RX ORDER — BACITRACIN ZINC 500 UNIT/G
OINTMENT (GRAM) TOPICAL
Status: DISCONTINUED | OUTPATIENT
Start: 2017-06-19 | End: 2017-06-19 | Stop reason: HOSPADM

## 2017-06-19 RX ORDER — HYDROCODONE BITARTRATE AND ACETAMINOPHEN 5; 325 MG/1; MG/1
1 TABLET ORAL EVERY 4 HOURS PRN
Status: DISCONTINUED | OUTPATIENT
Start: 2017-06-19 | End: 2017-06-21

## 2017-06-19 RX ORDER — LIDOCAINE HYDROCHLORIDE 10 MG/ML
1 INJECTION, SOLUTION EPIDURAL; INFILTRATION; INTRACAUDAL; PERINEURAL ONCE
Status: COMPLETED | OUTPATIENT
Start: 2017-06-19 | End: 2017-06-19

## 2017-06-19 RX ORDER — SODIUM CHLORIDE 0.9 % (FLUSH) 0.9 %
3 SYRINGE (ML) INJECTION
Status: DISCONTINUED | OUTPATIENT
Start: 2017-06-19 | End: 2017-06-19 | Stop reason: HOSPADM

## 2017-06-19 RX ORDER — LISINOPRIL 20 MG/1
40 TABLET ORAL DAILY
Status: DISCONTINUED | OUTPATIENT
Start: 2017-06-20 | End: 2017-06-21 | Stop reason: HOSPADM

## 2017-06-19 RX ORDER — SUCCINYLCHOLINE CHLORIDE 20 MG/ML
INJECTION INTRAMUSCULAR; INTRAVENOUS
Status: DISCONTINUED | OUTPATIENT
Start: 2017-06-19 | End: 2017-06-19

## 2017-06-19 RX ORDER — PROPOFOL 10 MG/ML
VIAL (ML) INTRAVENOUS
Status: DISCONTINUED | OUTPATIENT
Start: 2017-06-19 | End: 2017-06-19

## 2017-06-19 RX ORDER — GABAPENTIN 300 MG/1
300 CAPSULE ORAL 3 TIMES DAILY
Status: DISCONTINUED | OUTPATIENT
Start: 2017-06-19 | End: 2017-06-21 | Stop reason: HOSPADM

## 2017-06-19 RX ORDER — DEXAMETHASONE SODIUM PHOSPHATE 4 MG/ML
INJECTION, SOLUTION INTRA-ARTICULAR; INTRALESIONAL; INTRAMUSCULAR; INTRAVENOUS; SOFT TISSUE
Status: DISCONTINUED | OUTPATIENT
Start: 2017-06-19 | End: 2017-06-19

## 2017-06-19 RX ORDER — MAG HYDROX/ALUMINUM HYD/SIMETH 200-200-20
30 SUSPENSION, ORAL (FINAL DOSE FORM) ORAL EVERY 4 HOURS PRN
Status: DISCONTINUED | OUTPATIENT
Start: 2017-06-19 | End: 2017-06-21 | Stop reason: HOSPADM

## 2017-06-19 RX ORDER — SODIUM CHLORIDE 9 MG/ML
INJECTION, SOLUTION INTRAVENOUS CONTINUOUS
Status: DISCONTINUED | OUTPATIENT
Start: 2017-06-19 | End: 2017-06-19

## 2017-06-19 RX ORDER — CEFAZOLIN SODIUM 2 G/50ML
2 SOLUTION INTRAVENOUS
Status: COMPLETED | OUTPATIENT
Start: 2017-06-19 | End: 2017-06-20

## 2017-06-19 RX ORDER — NEOSTIGMINE METHYLSULFATE 1 MG/ML
INJECTION, SOLUTION INTRAVENOUS
Status: DISCONTINUED | OUTPATIENT
Start: 2017-06-19 | End: 2017-06-19

## 2017-06-19 RX ORDER — PHENYLEPHRINE HYDROCHLORIDE 10 MG/ML
INJECTION INTRAVENOUS
Status: DISCONTINUED | OUTPATIENT
Start: 2017-06-19 | End: 2017-06-19

## 2017-06-19 RX ORDER — GLYCOPYRROLATE 0.2 MG/ML
INJECTION INTRAMUSCULAR; INTRAVENOUS
Status: DISCONTINUED | OUTPATIENT
Start: 2017-06-19 | End: 2017-06-19

## 2017-06-19 RX ORDER — SODIUM CHLORIDE 0.9 % (FLUSH) 0.9 %
3 SYRINGE (ML) INJECTION EVERY 8 HOURS
Status: DISCONTINUED | OUTPATIENT
Start: 2017-06-19 | End: 2017-06-19 | Stop reason: HOSPADM

## 2017-06-19 RX ORDER — ROCURONIUM BROMIDE 10 MG/ML
INJECTION, SOLUTION INTRAVENOUS
Status: DISCONTINUED | OUTPATIENT
Start: 2017-06-19 | End: 2017-06-19

## 2017-06-19 RX ORDER — BISACODYL 10 MG
10 SUPPOSITORY, RECTAL RECTAL DAILY
Status: DISCONTINUED | OUTPATIENT
Start: 2017-06-19 | End: 2017-06-21 | Stop reason: HOSPADM

## 2017-06-19 RX ORDER — DEXAMETHASONE SODIUM PHOSPHATE 4 MG/ML
4 INJECTION, SOLUTION INTRA-ARTICULAR; INTRALESIONAL; INTRAMUSCULAR; INTRAVENOUS; SOFT TISSUE EVERY 8 HOURS
Status: DISPENSED | OUTPATIENT
Start: 2017-06-19 | End: 2017-06-20

## 2017-06-19 RX ORDER — FENTANYL CITRATE 50 UG/ML
25 INJECTION, SOLUTION INTRAMUSCULAR; INTRAVENOUS EVERY 5 MIN PRN
Status: COMPLETED | OUTPATIENT
Start: 2017-06-19 | End: 2017-06-19

## 2017-06-19 RX ADMIN — OXYCODONE HYDROCHLORIDE 5 MG: 5 TABLET ORAL at 02:06

## 2017-06-19 RX ADMIN — FENTANYL CITRATE 25 MCG: 50 INJECTION, SOLUTION INTRAMUSCULAR; INTRAVENOUS at 01:06

## 2017-06-19 RX ADMIN — PROPOFOL 200 MG: 10 INJECTION, EMULSION INTRAVENOUS at 10:06

## 2017-06-19 RX ADMIN — FENTANYL CITRATE 100 MCG: 50 INJECTION, SOLUTION INTRAMUSCULAR; INTRAVENOUS at 10:06

## 2017-06-19 RX ADMIN — SODIUM CHLORIDE, SODIUM GLUCONATE, SODIUM ACETATE, POTASSIUM CHLORIDE, MAGNESIUM CHLORIDE, SODIUM PHOSPHATE, DIBASIC, AND POTASSIUM PHOSPHATE: .53; .5; .37; .037; .03; .012; .00082 INJECTION, SOLUTION INTRAVENOUS at 10:06

## 2017-06-19 RX ADMIN — GABAPENTIN 300 MG: 300 CAPSULE ORAL at 09:06

## 2017-06-19 RX ADMIN — Medication 3 G: at 11:06

## 2017-06-19 RX ADMIN — SUCCINYLCHOLINE CHLORIDE 160 MG: 20 INJECTION, SOLUTION INTRAMUSCULAR; INTRAVENOUS at 10:06

## 2017-06-19 RX ADMIN — GLYCOPYRROLATE 0.6 MG: 0.2 INJECTION, SOLUTION INTRAMUSCULAR; INTRAVENOUS at 01:06

## 2017-06-19 RX ADMIN — OXYCODONE HYDROCHLORIDE 5 MG: 5 TABLET ORAL at 08:06

## 2017-06-19 RX ADMIN — LIDOCAINE HYDROCHLORIDE 0.1 MG: 10 INJECTION, SOLUTION EPIDURAL; INFILTRATION; INTRACAUDAL; PERINEURAL at 08:06

## 2017-06-19 RX ADMIN — LIDOCAINE HYDROCHLORIDE 100 MG: 20 INJECTION, SOLUTION INTRAVENOUS at 10:06

## 2017-06-19 RX ADMIN — SODIUM CHLORIDE AND POTASSIUM CHLORIDE: 9; 1.49 INJECTION, SOLUTION INTRAVENOUS at 01:06

## 2017-06-19 RX ADMIN — ACETAMINOPHEN 1000 MG: 10 INJECTION, SOLUTION INTRAVENOUS at 12:06

## 2017-06-19 RX ADMIN — FENTANYL CITRATE 25 MCG: 50 INJECTION, SOLUTION INTRAMUSCULAR; INTRAVENOUS at 02:06

## 2017-06-19 RX ADMIN — PHENYLEPHRINE HYDROCHLORIDE 100 MCG: 10 INJECTION INTRAVENOUS at 11:06

## 2017-06-19 RX ADMIN — FENTANYL CITRATE 50 MCG: 50 INJECTION, SOLUTION INTRAMUSCULAR; INTRAVENOUS at 11:06

## 2017-06-19 RX ADMIN — ROCURONIUM BROMIDE 25 MG: 10 INJECTION, SOLUTION INTRAVENOUS at 11:06

## 2017-06-19 RX ADMIN — ONDANSETRON 4 MG: 2 INJECTION, SOLUTION INTRAMUSCULAR; INTRAVENOUS at 12:06

## 2017-06-19 RX ADMIN — PROPOFOL 40 MG: 10 INJECTION, EMULSION INTRAVENOUS at 10:06

## 2017-06-19 RX ADMIN — SODIUM CHLORIDE: 0.9 INJECTION, SOLUTION INTRAVENOUS at 08:06

## 2017-06-19 RX ADMIN — FENTANYL CITRATE 25 MCG: 50 INJECTION, SOLUTION INTRAMUSCULAR; INTRAVENOUS at 05:06

## 2017-06-19 RX ADMIN — KETAMINE HYDROCHLORIDE 40 MG: 100 INJECTION, SOLUTION, CONCENTRATE INTRAMUSCULAR; INTRAVENOUS at 10:06

## 2017-06-19 RX ADMIN — DEXAMETHASONE SODIUM PHOSPHATE 4 MG: 4 INJECTION, SOLUTION INTRAMUSCULAR; INTRAVENOUS at 09:06

## 2017-06-19 RX ADMIN — SODIUM CHLORIDE, PRESERVATIVE FREE 3 ML: 5 INJECTION INTRAVENOUS at 02:06

## 2017-06-19 RX ADMIN — SODIUM CHLORIDE, SODIUM GLUCONATE, SODIUM ACETATE, POTASSIUM CHLORIDE, MAGNESIUM CHLORIDE, SODIUM PHOSPHATE, DIBASIC, AND POTASSIUM PHOSPHATE: .53; .5; .37; .037; .03; .012; .00082 INJECTION, SOLUTION INTRAVENOUS at 11:06

## 2017-06-19 RX ADMIN — MUPIROCIN 1 G: 20 OINTMENT TOPICAL at 08:06

## 2017-06-19 RX ADMIN — ROCURONIUM BROMIDE 5 MG: 10 INJECTION, SOLUTION INTRAVENOUS at 10:06

## 2017-06-19 RX ADMIN — PHENYLEPHRINE HYDROCHLORIDE 0.25 MCG/KG/MIN: 10 INJECTION INTRAVENOUS at 11:06

## 2017-06-19 RX ADMIN — DEXTROSE 2 G: 50 INJECTION, SOLUTION INTRAVENOUS at 10:06

## 2017-06-19 RX ADMIN — NEOSTIGMINE METHYLSULFATE 5 MG: 1 INJECTION INTRAVENOUS at 01:06

## 2017-06-19 RX ADMIN — MIDAZOLAM 2 MG: 5 INJECTION INTRAMUSCULAR; INTRAVENOUS at 10:06

## 2017-06-19 RX ADMIN — DEXAMETHASONE SODIUM PHOSPHATE 8 MG: 4 INJECTION, SOLUTION INTRAMUSCULAR; INTRAVENOUS at 10:06

## 2017-06-19 NOTE — PROGRESS NOTES
Patient still extremely uncomfortable with trying to void. Pt straining with pure wick in place, but unable to void. Straight cath pt per order. Urine output was 1200cc. Pt reports immediate relief. Called PRADEEP Byers and notified of patient's urinary retention, discomfort, and order for complete bedrest/flat HOB. She states okay to insert nevarez catheter and follow protocol for discontinuation of catheter..    Nevarez catheter inserted w/out complication. Pt tolerated well. No needs voiced. Pt to be transferred to room.

## 2017-06-19 NOTE — PLAN OF CARE
Pt's VSS. Pt c/o urinary retention throughout shift. Paige inserted after pt unable to void for several hours. MD aware. Pt's surgical pain tolerable with PRN meds. Dressing CDI with scant drainage. Pt denies any headaches. Pt moves all extremities without difficulty. Pt verbalizes understanding of needing to stay flat in bed. Family instructed of plan of care. They VU as well. Family visited every 2 hours in PACU. SCDS/TEDs on. Pt tolerated sips of water w/out difficulty. No nausea reported

## 2017-06-19 NOTE — NURSING TRANSFER
Nursing Transfer Note      6/19/2017     Transfer To: Room 749    Transfer via bed    Transfer with Pt's home back brace., nevarez to gravity, iv pump    Transported by pct    Medicines sent: NONE    Chart send with patient: Yes    Notified: family

## 2017-06-19 NOTE — TRANSFER OF CARE
"Anesthesia Transfer of Care Note    Patient: Dejah Schmidt    Procedure(s) Performed: Procedure(s) (LRB):  MICRODISKECTOMY-MINIMALLY INVASIVE L1-2, L2-3 (Right)    Patient location: PACU    Anesthesia Type: general    Transport from OR: Transported from OR on 6-10 L/min O2 by face mask with adequate spontaneous ventilation    Post pain: adequate analgesia    Post assessment: no apparent anesthetic complications and tolerated procedure well    Post vital signs: stable    Level of consciousness: awake and alert    Nausea/Vomiting: no nausea/vomiting    Complications: none    Transfer of care protocol was followed      Last vitals:   Visit Vitals  /80 (BP Location: Right arm, Patient Position: Lying, BP Method: Automatic)   Pulse 91   Temp 35.8 °C (96.4 °F) (Axillary)   Resp 20   Ht 5' 6.5" (1.689 m)   Wt 136.1 kg (300 lb)   SpO2 100%   Breastfeeding? No   BMI 47.70 kg/m²     "

## 2017-06-19 NOTE — BRIEF OP NOTE
[facility]  Neurosurgery  Operative Note    SUMMARY      Date of Procedure: 6/19/2017     Procedure: Procedure(s) (LRB):  MICRODISKECTOMY-MINIMALLY INVASIVE L1-2, L2-3 (Right)     Surgeon(s) and Role:     * Adonis Aranda MD - Primary    Assisting Surgeon: Yohannes Joaquin MD    Pre-Operative Diagnosis: Lumbar disc herniation with myelopathy [M51.06]    Post-Operative Diagnosis: Post-Op Diagnosis Codes:     * Lumbar disc herniation with myelopathy [M51.06]    Anesthesia: General    Technical Procedures Used: L1-2 R MIS hemilami    Description of the Findings of the Procedure: see full op note    Complications: No    Estimated Blood Loss (EBL): 25 mL           Specimens:   Specimen (12h ago through future)    None           Implants: * No implants in log *           Condition: Good    Disposition: PACU - hemodynamically stable.

## 2017-06-19 NOTE — PROGRESS NOTES
Patient able to void using Purewick. Pt reports having some relief of bladder pressure. Pt's family at bs visiting. Family updated on plan of care. They sourav.

## 2017-06-19 NOTE — ANESTHESIA POSTPROCEDURE EVALUATION
"Anesthesia Post Evaluation    Patient: Dejah Schmidt    Procedure(s) Performed: Procedure(s) (LRB):  MICRODISKECTOMY-MINIMALLY INVASIVE L1-2, L2-3 (Right)    Final Anesthesia Type: general  Patient location during evaluation: PACU  Patient participation: Yes- Able to Participate  Level of consciousness: awake and alert, awake and oriented  Post-procedure vital signs: reviewed and stable  Pain management: adequate  Airway patency: patent  PONV status at discharge: No PONV  Anesthetic complications: no      Cardiovascular status: blood pressure returned to baseline and hemodynamically stable  Respiratory status: unassisted, spontaneous ventilation and room air  Hydration status: euvolemic  Follow-up not needed.      Patient doing well.  Supine until cleared by surgery.    Visit Vitals  /66   Pulse (!) 57   Temp 35.8 °C (96.4 °F) (Axillary)   Resp 16   Ht 5' 6.5" (1.689 m)   Wt 136.1 kg (300 lb)   SpO2 96%   Breastfeeding? No   BMI 47.70 kg/m²       Pain/Dirk Score: Pain Assessment Performed: Yes (6/19/2017  2:20 PM)  Presence of Pain: complains of pain/discomfort (6/19/2017  2:20 PM)  Pain Rating Prior to Med Admin: 8 (6/19/2017  2:01 PM)  Dirk Score: 9 (6/19/2017  2:20 PM)      "

## 2017-06-19 NOTE — PROGRESS NOTES
Pt c/o of needing to void and a lot pain with palpation of bladder. Pure wick was applied. Pt was still unable to void. Pt's bladder scanner showed only 18 ml. Paged resident.

## 2017-06-19 NOTE — PROGRESS NOTES
Notified PA on call about pt's bladder scanner results and needing to void. She states to continue to monitor. No catheter at this time.

## 2017-06-19 NOTE — H&P (VIEW-ONLY)
Subjective:       Patient ID: Dejah Schmidt is a 56 y.o. female.    Chief Complaint: Hypertension    HPI:  She returns for management of hypertension.  She has had hypertension for over a year.  Current treatment has included medications outlined in medication list.  She denies chest pain or shortness of breath.  No palpitations.  Denies left arm or neck pain.  She is scheduled for surgery June 19, 2017, and is in need of a preop clearance       Past medical history: Hypertension, hyperlipidemia, asthma, lumbar spinal stenosis, vitamin D deficiency, thyroid nodule, status post partial thyroidectomy, status post hysterectomy. She had a colonoscopy September 2011      Medications: Lisinopril 40 mg daily, Dyazide 1 by mouth daily, Norvasc 10 mg daily     NO KNOWN DRUG ALLERGIES       Review of Systems   Constitutional: Negative for chills, fatigue, fever and unexpected weight change.   Respiratory: Negative for chest tightness and shortness of breath.    Cardiovascular: Negative for chest pain and palpitations.   Gastrointestinal: Negative for abdominal pain and blood in stool.   Neurological: Negative for dizziness, syncope, numbness and headaches.       Objective:      Physical Exam   HENT:   Right Ear: External ear normal.   Left Ear: External ear normal.   Nose: Nose normal.   Mouth/Throat: Oropharynx is clear and moist.   Eyes: Pupils are equal, round, and reactive to light.   Neck: Normal range of motion.   Cardiovascular: Normal rate and regular rhythm.    No murmur heard.  Pulmonary/Chest: Breath sounds normal.   Abdominal: She exhibits no distension. There is no hepatosplenomegaly. There is no tenderness.   Lymphadenopathy:     She has no cervical adenopathy.     She has no axillary adenopathy.   Neurological: She has normal strength and normal reflexes. No cranial nerve deficit or sensory deficit.       Assessment:         assessment and plan: 1.  Hypertension: Check CMP  2.  Preop clearance: Check CBC, PT,  PTT, EKG, chest x-ray  Plan:       As above

## 2017-06-19 NOTE — INTERVAL H&P NOTE
The patient has been examined and the H&P has been reviewed:    I concur with the findings and no changes have occurred since H&P was written.    Anesthesia/Surgery risks, benefits and alternative options discussed and understood by patient/family.          Active Hospital Problems    Diagnosis  POA    Herniation of lumbar intervertebral disc with radiculopathy [M51.16]  Yes      Resolved Hospital Problems    Diagnosis Date Resolved POA   No resolved problems to display.

## 2017-06-19 NOTE — PROGRESS NOTES
Pt still c/o being uncomfortable with needing to void.  Offerred to straight cath patient and explained risks of UTI to patient. Pt states she will wait and go to room instead.  Will transfer pt to room.

## 2017-06-19 NOTE — ANESTHESIA RELEASE NOTE
"Anesthesia Release from PACU Note    Patient: Dejah Schmidt    Procedure(s) Performed: Procedure(s) (LRB):  MICRODISKECTOMY-MINIMALLY INVASIVE L1-2, L2-3 (Right)    Anesthesia type: general    Post pain: Adequate analgesia    Post assessment: no apparent anesthetic complications, tolerated procedure well and no evidence of recall    Last Vitals:   Visit Vitals  /66   Pulse (!) 57   Temp 35.8 °C (96.4 °F) (Axillary)   Resp 16   Ht 5' 6.5" (1.689 m)   Wt 136.1 kg (300 lb)   SpO2 96%   Breastfeeding? No   BMI 47.70 kg/m²       Post vital signs: stable    Level of consciousness: awake, alert  and oriented    Nausea/Vomiting: no nausea/no vomiting    Complications: none    Airway Patency: patent    Respiratory: unassisted, spontaneous ventilation, room air    Cardiovascular: stable and blood pressure at baseline    Hydration: euvolemic  "

## 2017-06-20 PROCEDURE — 25000003 PHARM REV CODE 250: Performed by: NEUROLOGICAL SURGERY

## 2017-06-20 PROCEDURE — 63600175 PHARM REV CODE 636 W HCPCS: Performed by: NEUROLOGICAL SURGERY

## 2017-06-20 PROCEDURE — 99024 POSTOP FOLLOW-UP VISIT: CPT | Mod: ,,, | Performed by: PHYSICIAN ASSISTANT

## 2017-06-20 RX ORDER — OXYCODONE AND ACETAMINOPHEN 5; 325 MG/1; MG/1
1 TABLET ORAL EVERY 6 HOURS PRN
Qty: 90 TABLET | Refills: 0 | Status: SHIPPED | OUTPATIENT
Start: 2017-06-20 | End: 2020-01-30

## 2017-06-20 RX ADMIN — GABAPENTIN 300 MG: 300 CAPSULE ORAL at 02:06

## 2017-06-20 RX ADMIN — GABAPENTIN 300 MG: 300 CAPSULE ORAL at 09:06

## 2017-06-20 RX ADMIN — TRIAMTERENE AND HYDROCHLOROTHIAZIDE 1 CAPSULE: 25; 37.5 CAPSULE ORAL at 09:06

## 2017-06-20 RX ADMIN — GABAPENTIN 300 MG: 300 CAPSULE ORAL at 06:06

## 2017-06-20 RX ADMIN — DEXAMETHASONE SODIUM PHOSPHATE 4 MG: 4 INJECTION, SOLUTION INTRAMUSCULAR; INTRAVENOUS at 06:06

## 2017-06-20 RX ADMIN — HYDROCODONE BITARTRATE AND ACETAMINOPHEN 1 TABLET: 5; 325 TABLET ORAL at 09:06

## 2017-06-20 RX ADMIN — LISINOPRIL 40 MG: 20 TABLET ORAL at 09:06

## 2017-06-20 RX ADMIN — OXYCODONE HYDROCHLORIDE 5 MG: 5 TABLET ORAL at 04:06

## 2017-06-20 RX ADMIN — DEXTROSE 2 G: 50 INJECTION, SOLUTION INTRAVENOUS at 06:06

## 2017-06-20 RX ADMIN — AMLODIPINE BESYLATE 10 MG: 10 TABLET ORAL at 09:06

## 2017-06-20 NOTE — HOSPITAL COURSE
6/19: Patient admitted over night due to intra op CSF leak. Patient flat over night.   6/20: HOB to be elevated at lunch. If no evidence of positional headaches, will DC home.   6/21: Was flat overnight, 2/2 HAs.  Increase HOB today, Up with PT/OT.  Tolerated without HAs.

## 2017-06-20 NOTE — PLAN OF CARE
Problem: Patient Care Overview  Goal: Individualization & Mutuality  Outcome: Ongoing (interventions implemented as appropriate)  POC reviewed with patient tonight. AAOx4. Afebrile. Responds appropriately and follows simple commands. No worsening neuro changes overnight. Required assist with pain management-PRN's administered & effective.  No new skin impairments noted- lower midline back incision with tefla dressing CDI. Safety precautions maintained. Tolerated p.o intake, no complaints verbalized, diet advanced. Paige to gravity with clear yellow urine.

## 2017-06-20 NOTE — PROGRESS NOTES
[facility]  Neurosurgery  Progress Note    Subjective:     History of Present Illness: Ms. Schmidt is a 56 year old female with intractable back pain due to 2 right sided disc herniations. She failed conservative management and presented to [facility] for elective MIS decompression and discectomy.         Post-Op Info:  Procedure(s) (LRB):  MICRODISKECTOMY-MINIMALLY INVASIVE L1-2, L2-3 (Right)   1 Day Post-Op     Interval History:   NAEON. Patient was ordered to be kept flat overnight, but has been upright since surgery. Placed flat at 08:00 today. Patient reports headache from 10pm-3am, but states headache resolved after 3am. Patient denies that headache was positional. Has been flat since 08:00 am today. States she began experiencing a mild headache around 10am while lying down. Headache has improved but not resolved. Denies any dizziness or N/V. Pre op BLE pain and paresthesias have mostly resolved. Denies any new pain. Tolerating diet. Paige in pace 2/2 bed rest.       Medications:  Continuous Infusions:   0/9% NACL & POTASSIUM CHLORIDE 20 MEQ/L 100 mL/hr at 06/19/17 1334     Scheduled Meds:   amlodipine  10 mg Oral Daily    bisacodyl  10 mg Rectal Daily    dexamethasone  4 mg Intravenous Q8H    gabapentin  300 mg Oral TID    lisinopril  40 mg Oral Daily    triamterene-hydrochlorothiazide 37.5-25 mg  1 capsule Oral QAM     PRN Meds:acetaminophen, aluminum-magnesium hydroxide-simethicone, diazePAM, hydrocodone-acetaminophen 5-325mg, morphine, ondansetron, oxycodone, senna-docusate 8.6-50 mg     Review of Systems  Objective:     Weight: 136.1 kg (300 lb)  Body mass index is 47.7 kg/m².  Vital Signs (Most Recent):  Temp: 97 °F (36.1 °C) (06/20/17 0811)  Pulse: 87 (06/20/17 0811)  Resp: 18 (06/20/17 0811)  BP: 135/63 (06/20/17 0811)  SpO2: 95 % (06/20/17 0811) Vital Signs (24h Range):  Temp:  [96.4 °F (35.8 °C)-98.3 °F (36.8 °C)] 97 °F (36.1 °C)  Pulse:  [65-91] 87  Resp:  [12-20] 18  SpO2:  [95  "%-100 %] 95 %  BP: (116-168)/(57-80) 135/63     Temp (24hrs), Av.5 °F (36.4 °C), Min:96.4 °F (35.8 °C), Max:98.3 °F (36.8 °C)    Date 17 0700 - 17 0659   Shift 8547-2873 6214-2670 2436-5067 24 Hour Total   I  N  T  A  K  E   Shift Total  (mL/kg)       O  U  T  P  U  T   Urine  (mL/kg/hr) 450   450    Shift Total  (mL/kg) 450  (3.3)   450  (3.3)   Weight (kg) 136.1 136.1 136.1 136.1          Urethral Catheter 17 1800 Latex 16 Fr. (Active)   Site Assessment Clean;Intact 2017  8:11 AM   Collection Container Standard drainage bag 2017  8:11 AM   Securement Method secured to top of thigh w/ adhesive device 2017  8:11 AM   Catheter Care Performed yes 2017  8:11 AM   Reason for Continuing Urinary Catheterization Urinary retention 2017  8:11 AM   CAUTI Prevention Bundle StatLock in place w 1" slack 2017  8:11 AM   Output (mL) 450 mL 2017  9:40 AM       Neurosurgery Physical Exam     General: well developed, obese, no distress.   Head: normocephalic, atraumatic  Neurologic: Alert and oriented. Thought content appropriate.  GCS: Motor: 6/Verbal: 5/Eyes: 4 GCS Total: 15  Mental Status: Awake, Alert, Oriented x 4  Language: No aphasia  Speech: No dysarthria  Cranial nerves: face symmetric, tongue midline, CN II-XII grossly intact.   Eyes: pupils equal, round, reactive to light with accomodation, EOMI.   Pulmonary: normal respirations, no signs of respiratory distress  Abdomen: soft, non-distended, not tender to palpation  Sensory: intact to light touch throughout except decreased in right foot.   Motor Strength:Moves all extremities spontaneously with good tone.  Full strength upper and lower extremities. No abnormal movements seen.   Nieves: absent  Clonus: absent  No LE edema  Skin: Skin is warm, dry and intact.    Incision:  Clean, dry, intact. No surrounding erythema or edema. No drainage or TTP.       Significant Labs:  No results for input(s): GLU, NA, K, CL, CO2, " BUN, CREATININE, CALCIUM, MG in the last 48 hours.  No results for input(s): WBC, HGB, HCT, PLT in the last 48 hours.  No results for input(s): LABPT, INR, APTT in the last 48 hours.  Microbiology Results (last 7 days)     ** No results found for the last 168 hours. **            Assessment/Plan:     * Herniation of lumbar intervertebral disc with radiculopathy    -s/p L1-L3 MIS discectomy with intra op CSF leak, POD #1  -Patient neurologically stable on exam  -Continue bed rest at this time. Will attempt to elevate HOB this afternoon if headache has resolved.   -Continue nevarez until bed rest order discontinued.   -LSO brace for comfort only  -Follow up with NSGY RN in 2 weeks for wound check  -Follow up with Dr. Aranda in 6 weeks. No imaging.   -Discharge instructions given verbally to patient. All of her questions were answered. She was encouraged to call the clinic with any questions or concerns prior to follow up appt.       DISPO: DC home once able to get OOB without a headache. Today vs tomorrow.           Discussed with Dr. Aranda  Please call with any questions    PRADEEP Lamas  Neurosurgery  [facility]

## 2017-06-20 NOTE — ASSESSMENT & PLAN NOTE
-s/p L1-L3 MIS discectomy with intra op CSF leak, POD #1  -Patient neurologically stable on exam  -Continue bed rest at this time. Will attempt to elevate HOB this afternoon if headache has resolved.   -Continue nevarez until bed rest order discontinued.   -LSO brace for comfort only  -Follow up with NSGY RN in 2 weeks for wound check  -Follow up with Dr. Aranda in 6 weeks. No imaging.   -Discharge instructions given verbally to patient. All of her questions were answered. She was encouraged to call the clinic with any questions or concerns prior to follow up appt.       DISPO: DC home once able to get OOB without a headache. Today vs tomorrow.

## 2017-06-20 NOTE — DISCHARGE INSTRUCTIONS
Please follow ONLY the instructions that are checked below.    Activity Restrictions:  [x]  Return to work will be determined on an individual basis.  [x]  No lifting greater than 10 pounds.  [x]  Avoid bending and twisting the area of your surgery more than 45 degrees from neutral position in any direction.  [x]  No driving or operating machinery:  [x]  until cleared by your surgeon.  [x]  while taking narcotic pain medications or muscle relaxants.  [x]  Lumbar brace to be worn for comfort only. If you do not feel that the brace improves your pain, you do not need to wear it.   [x]  Increase ambulation over the next 2 weeks so that you are walking 2 miles per day at 2 weeks post-operatively.  [x]  Walk on paved surfaces only. It is okay to walk up and down stairs while holding onto a side rail.  [x]  No sexual activity for 2-3 weeks.    Discharge Medication/Follow-up:  [x]  Please refer to discharge medication reconciliation form.  [x]  Do not take ANY Aspirin, Aspirin containing products, non-steroidal anti-inflammatory drugs (NSAIDS), including the following: ibuprofen, naprosyn, Aleve, Advil, Indocin, Mobic, or Celebrex for:  [x]  2 weeks  [x]  Prescriptions for appropriate medication will be given upon discharge.  [x]  Take docusate (Colace 100 mg): take one capsule a day as needed for constipation. You can get this over the counter.  [x]  Follow-up appointment:  [x]  10-14 days post-op for wound check by physician assistant/nurse  [x]  4-6 weeks with MD:  [x]  without x-rays  [x]  An appointment will be mailed to you.    Wound Care:  [x]  No bandage required. Keep your incision open to the air.  [x]  You may shower on the 2nd day after your surgery. Have the force of water hit you opposite from the incision. Pat the incision dry after your shower; do not scrub the incision.  [x]  You cannot take a bath until 8 weeks after surgery.  [x]  Apply bacitracin to incision twice a day for 2 weeks.    Call your doctor  or go to the Emergency Room for any signs of infection, including: increased redness, drainage, pain, or fever (temperature ?101.5 for 24 hours). Call your doctor or go to the Emergency Room if there are any localized neurological changes; problems with speech, vision, numbness, tingling, weakness, or severe headache; or for other concerns.    Special Instructions:  [x]  No use of tobacco products.  [x]  Diet: Please eat a regular diet as tolerated.      Physicians need 3 days' notice for pain medicine to be refilled. Pain medicine will only be refilled between 8 AM and 5 PM, Monday through Friday, due to Food and Drug Administration regulation of documentation.    If you have any questions about this form, please call [phone].    Form No. 49111 (Revised 10/31/2013)

## 2017-06-20 NOTE — PLAN OF CARE
06/20/17 0931   Discharge Assessment   Assessment Type Discharge Planning Assessment   Confirmed/corrected address and phone number on facesheet? Yes   Assessment information obtained from? Medical Record   Expected Length of Stay (days) 3   Prior to hospitilization cognitive status: Alert/Oriented   Prior to hospitalization functional status: Independent   Current cognitive status: Alert/Oriented   Current Functional Status: Independent   Arrived From home or self-care   Able to Return to Prior Arrangements yes   Is patient able to care for self after discharge? Yes   Patient's perception of discharge disposition home or selfcare   Readmission Within The Last 30 Days no previous admission in last 30 days   Patient currently being followed by outpatient case management? No   Patient currently receives home health services? No   Does the patient currently use HME? No   Equipment Currently Used at Home none   Do you have any problems affording any of your prescribed medications? No   Is the patient taking medications as prescribed? yes   Do you have any financial concerns preventing you from receiving the healthcare you need? No   Does the patient have transportation to healthcare appointments? No   On Dialysis? No   Discharge Plan A Home   Discharge Plan B Home with family   Patient/Family In Agreement With Plan yes

## 2017-06-20 NOTE — SUBJECTIVE & OBJECTIVE
Interval History:   NAEON. Patient was ordered to be kept flat overnight, but has been upright since surgery. Placed flat at 08:00 today. Patient reports headache from 10pm-3am, but states headache resolved after 3am. Patient denies that headache was positional. Has been flat since 08:00 am today. States she began experiencing a mild headache around 10am while lying down. Headache has improved but not resolved. Denies any dizziness or N/V. Pre op BLE pain and paresthesias have mostly resolved. Denies any new pain. Tolerating diet. Paige in pace 2/2 bed rest.       Medications:  Continuous Infusions:   0/9% NACL & POTASSIUM CHLORIDE 20 MEQ/L 100 mL/hr at 17 1334     Scheduled Meds:   amlodipine  10 mg Oral Daily    bisacodyl  10 mg Rectal Daily    dexamethasone  4 mg Intravenous Q8H    gabapentin  300 mg Oral TID    lisinopril  40 mg Oral Daily    triamterene-hydrochlorothiazide 37.5-25 mg  1 capsule Oral QAM     PRN Meds:acetaminophen, aluminum-magnesium hydroxide-simethicone, diazePAM, hydrocodone-acetaminophen 5-325mg, morphine, ondansetron, oxycodone, senna-docusate 8.6-50 mg     Review of Systems  Objective:     Weight: 136.1 kg (300 lb)  Body mass index is 47.7 kg/m².  Vital Signs (Most Recent):  Temp: 97 °F (36.1 °C) (17)  Pulse: 87 (17 08)  Resp: 18 (17)  BP: 135/63 (17)  SpO2: 95 % (17) Vital Signs (24h Range):  Temp:  [96.4 °F (35.8 °C)-98.3 °F (36.8 °C)] 97 °F (36.1 °C)  Pulse:  [65-91] 87  Resp:  [12-20] 18  SpO2:  [95 %-100 %] 95 %  BP: (116-168)/(57-80) 135/63     Temp (24hrs), Av.5 °F (36.4 °C), Min:96.4 °F (35.8 °C), Max:98.3 °F (36.8 °C)    Date 17 0700 - 17 0659   Shift 6830-6766 0074-8637 2131-5008 24 Hour Total   I  N  T  A  K  E   Shift Total  (mL/kg)       O  U  T  P  U  T   Urine  (mL/kg/hr) 450   450    Shift Total  (mL/kg) 450  (3.3)   450  (3.3)   Weight (kg) 136.1 136.1 136.1 136.1          Urethral Catheter  "06/19/17 1800 Latex 16 Fr. (Active)   Site Assessment Clean;Intact 6/20/2017  8:11 AM   Collection Container Standard drainage bag 6/20/2017  8:11 AM   Securement Method secured to top of thigh w/ adhesive device 6/20/2017  8:11 AM   Catheter Care Performed yes 6/20/2017  8:11 AM   Reason for Continuing Urinary Catheterization Urinary retention 6/20/2017  8:11 AM   CAUTI Prevention Bundle StatLock in place w 1" slack 6/20/2017  8:11 AM   Output (mL) 450 mL 6/20/2017  9:40 AM       Neurosurgery Physical Exam     General: well developed, obese, no distress.   Head: normocephalic, atraumatic  Neurologic: Alert and oriented. Thought content appropriate.  GCS: Motor: 6/Verbal: 5/Eyes: 4 GCS Total: 15  Mental Status: Awake, Alert, Oriented x 4  Language: No aphasia  Speech: No dysarthria  Cranial nerves: face symmetric, tongue midline, CN II-XII grossly intact.   Eyes: pupils equal, round, reactive to light with accomodation, EOMI.   Pulmonary: normal respirations, no signs of respiratory distress  Abdomen: soft, non-distended, not tender to palpation  Sensory: intact to light touch throughout except decreased in right foot.   Motor Strength:Moves all extremities spontaneously with good tone.  Full strength upper and lower extremities. No abnormal movements seen.   Nieves: absent  Clonus: absent  No LE edema  Skin: Skin is warm, dry and intact.    Incision:  Clean, dry, intact. No surrounding erythema or edema. No drainage or TTP.       Significant Labs:  No results for input(s): GLU, NA, K, CL, CO2, BUN, CREATININE, CALCIUM, MG in the last 48 hours.  No results for input(s): WBC, HGB, HCT, PLT in the last 48 hours.  No results for input(s): LABPT, INR, APTT in the last 48 hours.  Microbiology Results (last 7 days)     ** No results found for the last 168 hours. **          "

## 2017-06-20 NOTE — HPI
Ms. Schmidt is a 56 year old female with intractable back pain due to 2 right sided disc herniations. She failed conservative management and [place] [facility] and [place]ce[place].

## 2017-06-21 VITALS
HEART RATE: 89 BPM | BODY MASS INDEX: 45.99 KG/M2 | RESPIRATION RATE: 18 BRPM | TEMPERATURE: 99 F | DIASTOLIC BLOOD PRESSURE: 79 MMHG | SYSTOLIC BLOOD PRESSURE: 129 MMHG | WEIGHT: 293 LBS | OXYGEN SATURATION: 97 % | HEIGHT: 67 IN

## 2017-06-21 PROCEDURE — 25000003 PHARM REV CODE 250: Performed by: NEUROLOGICAL SURGERY

## 2017-06-21 PROCEDURE — 97161 PT EVAL LOW COMPLEX 20 MIN: CPT

## 2017-06-21 PROCEDURE — 99024 POSTOP FOLLOW-UP VISIT: CPT | Mod: ,,, | Performed by: PHYSICIAN ASSISTANT

## 2017-06-21 PROCEDURE — 25000003 PHARM REV CODE 250: Performed by: PHYSICIAN ASSISTANT

## 2017-06-21 PROCEDURE — 97530 THERAPEUTIC ACTIVITIES: CPT

## 2017-06-21 RX ORDER — GABAPENTIN 300 MG
CAPSULE ORAL
Qty: 90 CAPSULE | Refills: 2 | Status: SHIPPED | OUTPATIENT
Start: 2017-06-21 | End: 2021-02-15 | Stop reason: ALTCHOICE

## 2017-06-21 RX ORDER — OXYCODONE HYDROCHLORIDE 5 MG/1
5 TABLET ORAL EVERY 4 HOURS PRN
Status: DISCONTINUED | OUTPATIENT
Start: 2017-06-21 | End: 2017-06-21 | Stop reason: HOSPADM

## 2017-06-21 RX ORDER — MORPHINE SULFATE 2 MG/ML
2 INJECTION, SOLUTION INTRAMUSCULAR; INTRAVENOUS EVERY 4 HOURS PRN
Status: DISCONTINUED | OUTPATIENT
Start: 2017-06-21 | End: 2017-06-21 | Stop reason: HOSPADM

## 2017-06-21 RX ORDER — GABAPENTIN 300 MG
CAPSULE ORAL
Qty: 90 CAPSULE | Refills: 2 | Status: SHIPPED | OUTPATIENT
Start: 2017-06-21 | End: 2017-06-21

## 2017-06-21 RX ORDER — GABAPENTIN 300 MG/1
300 CAPSULE ORAL 3 TIMES DAILY
Qty: 90 CAPSULE | Refills: 1 | Status: SHIPPED | OUTPATIENT
Start: 2017-06-21 | End: 2018-06-21

## 2017-06-21 RX ADMIN — GABAPENTIN 300 MG: 300 CAPSULE ORAL at 06:06

## 2017-06-21 RX ADMIN — SODIUM CHLORIDE AND POTASSIUM CHLORIDE: 9; 1.49 INJECTION, SOLUTION INTRAVENOUS at 11:06

## 2017-06-21 RX ADMIN — SODIUM CHLORIDE AND POTASSIUM CHLORIDE: 9; 1.49 INJECTION, SOLUTION INTRAVENOUS at 01:06

## 2017-06-21 RX ADMIN — TRIAMTERENE AND HYDROCHLOROTHIAZIDE 1 CAPSULE: 25; 37.5 CAPSULE ORAL at 06:06

## 2017-06-21 RX ADMIN — GABAPENTIN 300 MG: 300 CAPSULE ORAL at 02:06

## 2017-06-21 RX ADMIN — BISACODYL 10 MG: 10 SUPPOSITORY RECTAL at 09:06

## 2017-06-21 RX ADMIN — HYDROCODONE BITARTRATE AND ACETAMINOPHEN 1 TABLET: 5; 325 TABLET ORAL at 12:06

## 2017-06-21 RX ADMIN — OXYCODONE HYDROCHLORIDE 5 MG: 5 TABLET ORAL at 03:06

## 2017-06-21 RX ADMIN — AMLODIPINE BESYLATE 10 MG: 10 TABLET ORAL at 09:06

## 2017-06-21 RX ADMIN — LISINOPRIL 40 MG: 20 TABLET ORAL at 09:06

## 2017-06-21 RX ADMIN — OXYCODONE HYDROCHLORIDE 5 MG: 5 TABLET ORAL at 11:06

## 2017-06-21 NOTE — PROGRESS NOTES
[facility]  Neurosurgery  Progress Note    Subjective:     History of Present Illness: Ms. Schmidt is a 56 year old female with intractable back pain due to 2 right sided disc herniations. She failed conservative management and [place] [facility] [place] and [place].         Post-Op Info:  Procedure(s) (LRB):  MICRODISKECTOMY-MINIMALLY INVASIVE L1-2, L2-3 (Right)   2 Days Post-Op     Interval History: NAEON.  She denies HAs or dizziness this morning.   Pt reports back pain, but controlled.    Tolerating diet.  Paige in place.   Denies pain, paraesthesias, weakness in extremities.  Denies bowel/bladder dysfunction.         Medications:  Continuous Infusions:   0/9% NACL & POTASSIUM CHLORIDE 20 MEQ/L 100 mL/hr at 17 1131     Scheduled Meds:   amlodipine  10 mg Oral Daily    bisacodyl  10 mg Rectal Daily    gabapentin  300 mg Oral TID    lisinopril  40 mg Oral Daily    triamterene-hydrochlorothiazide 37.5-25 mg  1 capsule Oral QAM     PRN Meds:acetaminophen, aluminum-magnesium hydroxide-simethicone, diazePAM, morphine, ondansetron, oxycodone, senna-docusate 8.6-50 mg     Review of Systems  Objective:     Weight: 136.1 kg (300 lb)  Body mass index is 47.7 kg/m².  Vital Signs (Most Recent):  Temp: 97.7 °F (36.5 °C) (17 0800)  Pulse: (!) 57 (17 0800)  Resp: 18 (17 0800)  BP: 127/61 (17 0800)  SpO2: 99 % (17 0800) Vital Signs (24h Range):  Temp:  [97.7 °F (36.5 °C)-98.6 °F (37 °C)] 97.7 °F (36.5 °C)  Pulse:  [57-83] 57  Resp:  [18] 18  SpO2:  [94 %-99 %] 99 %  BP: (103-140)/(52-69) 127/61              Temp (24hrs), Av.1 °F (36.7 °C), Min:97.7 °F (36.5 °C), Max:98.6 °F (37 °C)                 Urethral Catheter 17 1800 Latex 16 Fr. (Active)   Site Assessment Clean;Intact 2017  8:00 PM   Collection Container Urimeter 2017  8:00 PM   Securement Method secured to top of thigh w/ adhesive device 2017  8:00 PM  "  Catheter Care Performed yes 6/20/2017  8:00 PM   Reason for Continuing Urinary Catheterization Urinary retention 6/20/2017  8:00 PM   CAUTI Prevention Bundle StatLock in place w 1" slack;Intact seal between catheter & drainage tubing;Drainage bag off the floor;Green sheeting clip in use;No dependent loops or kinks;Drainage bag not overfilled (<2/3 full);Drainage bag below bladder 6/20/2017  8:00 PM   Output (mL) 300 mL 6/21/2017  6:20 AM       Neurosurgery Physical Exam  General: no distress  Neurologic: Alert and oriented. Thought content appropriate.  Head: normocephalic  GCS: Motor: 6/Verbal: 5/Eyes: 4 GCS Total: 15  Cranial nerves: face symmetric, tongue midline, pupils equal, round, reactive to light with accomodation, extraocular muscles intact  Sensory: response to light touch throughout  Motor Strength:full strength upper and lower extremities  Pronator Drift: no drift noted  Finger to nose normal  No focal numbness or weakness  Lungs:  normal respiratory effort  Abdomen: soft, non-tender   Extremities: no cyanosis or edema, or clubbing      Significant Labs:  No results for input(s): GLU, NA, K, CL, CO2, BUN, CREATININE, CALCIUM, MG in the last 48 hours.  No results for input(s): WBC, HGB, HCT, PLT in the last 48 hours.  No results for input(s): LABPT, INR, APTT in the last 48 hours.  Microbiology Results (last 7 days)     ** No results found for the last 168 hours. **            Assessment/Plan:     * Herniation of lumbar intervertebral disc with radiculopathy    57 yo F s/p L1-L3 MIS discectomy with intra op CSF leak, POD 2  - Patient neurologically stable, No further HAs this morning   - Increase HOB today  - DC nevarez   - OOB with PT/OT  - LSO brace for comfort only  - Follow up with NSGY RN in 2 weeks for wound check  - Follow up with Dr. Aranda in 6 weeks. No imaging.   - If tolerates being upright without further concern for csf leak, will DC this afternoon             Ede Montez, " PA  Neurosurgery  [facility]

## 2017-06-21 NOTE — PT/OT/SLP EVAL
Physical Therapy  Evaluation & Treatment    Dejah Schmidt   MRN: [id number]   Admitting Diagnosis: Herniation of lumbar intervertebral disc with radiculopathy    PT Received On: 06/21/17  PT Start Time: 1306     PT Stop Time: 1330    PT Total Time (min): 24 min       Billable Minutes:  Evaluation 14 min and Therapeutic Activity 10 min    Diagnosis: Herniation of lumbar intervertebral disc with radiculopathy  S/p MICRODISKECTOMY-MINIMALLY INVASIVE L1-2, L2-3 (Right)    Past Medical History:   Diagnosis Date    Asthma     Hypercholesteremia     Hypertension     Multiple thyroid nodules       Past Surgical History:   Procedure Laterality Date    CARPAL TUNNEL RELEASE      FOOT SURGERY      HYSTERECTOMY      MASS EXCISION      at neck    ROTATOR CUFF REPAIR      left shoulder    TRIGGER FINGER RELEASE  12/2017    right hand       Referring physician: PRADEEP Bailey  Date referred to PT: 6/21/17 at 11:14 AM     General Precautions: Standard, fall  Orthopedic Precautions: N/A   Braces:  (LSO brace for comfort )       Do you have any cultural, spiritual, Buddhism conflicts, given your current situation?: no conflicts    Patient History:  Lives With: child(opal), adult (Adult son (daughter also able to stay with pt as needed))  Living Arrangements: house  Living Environment Comment: Pt reported living with son in [facility] with no CARMEN; bathroom has tub shower. Prior to admit, pt was (I) with all mobility and ADLs. Pt will have (A) from son and daughter upon discharge   Equipment Currently Used at Home: none  DME owned (not currently used): shower chair    Previous Level of Function:  Ambulation Skills: independent  Transfer Skills: independent  ADL Skills: independent  Work/Leisure Activity: independent    Subjective:  Communicated with RN prior to session. Pt agreeable to participate in therapy evaluation.     Chief Complaint: decreased mobility   Patient goals: go home    Pain/Comfort  Pain Rating 1:  0/10  Pain Rating Post-Intervention 1: 0/10      Objective:   Patient found with: peripheral IV, nevarez catheter     Cognitive Exam:  Oriented to: Person, Place, Time and Situation    Follows Commands/attention: Follows multistep  commands  Communication: clear/fluent  Safety awareness/insight to disability: intact    Physical Exam:  Postural examination/scapula alignment: Rounded shoulder    Skin integrity: Visible skin intact  Edema: None noted BLEs    Sensation:   Intact  light/touch BLEs and BUEs     Lower Extremity Range of Motion:  Right Lower Extremity: WFL  Left Lower Extremity: WFL    Lower Extremity Strength:  Right Lower Extremity: WFL  Left Lower Extremity: WFL    Gross motor coordination: WFL    Functional Mobility:  Bed Mobility:  Rolling/Turning to Left: Supervision  Supine to Sit: Supervision    Transfers:  Sit <> Stand Assistance: Stand By Assistance  Sit <> Stand Assistive Device: No Assistive Device    Gait:   Gait Distance: ~60' with no AD, CGA-SBA for safety; no LOB; pt with increased lateral postural sway.   Assistance 1: Stand by Assistance, Contact Guard Assistance  Gait Assistive Device: No device  Gait Pattern: reciprocal  Gait Deviation(s): decreased santosh, decreased step length    Balance:   Static Sit: FAIR+: Able to take MINIMAL challenges from all directions  Dynamic Sit: FAIR+: Maintains balance through MINIMAL excursions of active trunk motion  Static Stand: FAIR: Maintains without assist but unable to take challenges  Dynamic stand: FAIR: Needs CONTACT GUARD during gait    Therapeutic Activities and Exercises:  Pt educated on:  *role of PT and POC/goals for therapy  *safety with mobility; recommendations for having supervision during mobility within home environment to reduce fall risk   *donning/doffing LSO brace-- pt required max A   *increasing step length during gait trial     Pt verbalized understanding. Pt expressed no further concerns/questions.     AM-PAC 6 CLICK  MOBILITY  How much help from another person does this patient currently need?   1 = Unable, Total/Dependent Assistance  2 = A lot, Maximum/Moderate Assistance  3 = A little, Minimum/Contact Guard/Supervision  4 = None, Modified [place]/Independent    Turning over in bed (including adjusting bedclothes, sheets and blankets)?: 3  Sitting down on and standing up from a chair with arms (e.g., wheelchair, bedside commode, etc.): 3  Moving from lying on back to sitting on the side of the bed?: 3  Moving to and from a bed to a chair (including a wheelchair)?: 3  Need to walk in hospital room?: 3  Climbing 3-5 steps with a railing?: 3  Total Score: 18     AM-PAC Raw Score CMS G-Code Modifier Level of Impairment Assistance   6 % Total / Unable   7 - 9 CM 80 - 100% Maximal Assist   10 - 14 CL 60 - 80% Moderate Assist   15 - 19 CK 40 - 60% Moderate Assist   20 - 22 CJ 20 - 40% Minimal Assist   23 CI 1-20% SBA / CGA   24 CH 0% Independent/ Mod I     Patient left up in chair with all lines intact, call button in reach and RN notified.    Assessment:   Dejah Schmidt is a 56 y.o. female with a medical diagnosis of Herniation of lumbar intervertebral disc with radiculopathy. Pt presents with fair functional mobility, mild gait instability and deconditioning. Pt would benefit from skilled PT intervention to address below listed deficits, reduce fall risk, and maximize (I) and safety with functional mobility. Recommending pt discharge home with OP PT referral for continued strengthening and endurance training. No DME needs anticipated.     Rehab identified problem list/impairments: Rehab identified problem list/impairments: impaired endurance, gait instability    Rehab potential is good.    Activity tolerance: Good    Discharge recommendations: Discharge Facility/Level Of Care Needs: outpatient PT     Barriers to discharge: Barriers to Discharge: None    Equipment recommendations: Equipment Needed After Discharge: none      GOALS:    Physical Therapy Goals        Problem: Physical Therapy Goal    Goal Priority Disciplines Outcome Goal Variances Interventions   Physical Therapy Goal     PT/OT, PT Ongoing (interventions implemented as appropriate)     Description:  Goals to be met by: 17     Patient will increase functional independence with mobility by performin. Supine <> sit with Modified [place]  2. Sit to stand transfer with Modified [place]  3. Gait  x 150 feet with Supervision without AD.   4. Lower extremity exercise program x30 reps per handout, with independence                      PLAN:    Patient to be seen 3 x/week to address the above listed problems via gait training, therapeutic activities, therapeutic exercises, neuromuscular re-education  Plan of Care expires: 17  Plan of Care reviewed with: patient        Radha Newman PT, DPT   2017  Pager: [phone]

## 2017-06-21 NOTE — NURSING
IV discontinued intact. Discharge instructions reviewed with patient and she gave verbal feedback. Prescriptions given to patient. She called for her ride.

## 2017-06-21 NOTE — SUBJECTIVE & OBJECTIVE
"Interval History: NAEON.  She denies HAs or dizziness this morning.   Pt reports back pain, but controlled.    Tolerating diet.  Paige in place.   Denies pain, paraesthesias, weakness in extremities.  Denies bowel/bladder dysfunction.         Medications:  Continuous Infusions:   0/9% NACL & POTASSIUM CHLORIDE 20 MEQ/L 100 mL/hr at 17 1131     Scheduled Meds:   amlodipine  10 mg Oral Daily    bisacodyl  10 mg Rectal Daily    gabapentin  300 mg Oral TID    lisinopril  40 mg Oral Daily    triamterene-hydrochlorothiazide 37.5-25 mg  1 capsule Oral QAM     PRN Meds:acetaminophen, aluminum-magnesium hydroxide-simethicone, diazePAM, morphine, ondansetron, oxycodone, senna-docusate 8.6-50 mg     Review of Systems  Objective:     Weight: 136.1 kg (300 lb)  Body mass index is 47.7 kg/m².  Vital Signs (Most Recent):  Temp: 97.7 °F (36.5 °C) (17 0800)  Pulse: (!) 57 (17 0800)  Resp: 18 (17 0800)  BP: 127/61 (17 0800)  SpO2: 99 % (17 0800) Vital Signs (24h Range):  Temp:  [97.7 °F (36.5 °C)-98.6 °F (37 °C)] 97.7 °F (36.5 °C)  Pulse:  [57-83] 57  Resp:  [18] 18  SpO2:  [94 %-99 %] 99 %  BP: (103-140)/(52-69) 127/61              Temp (24hrs), Av.1 °F (36.7 °C), Min:97.7 °F (36.5 °C), Max:98.6 °F (37 °C)                 Urethral Catheter 17 1800 Latex 16 Fr. (Active)   Site Assessment Clean;Intact 2017  8:00 PM   Collection Container Urimeter 2017  8:00 PM   Securement Method secured to top of thigh w/ adhesive device 2017  8:00 PM   Catheter Care Performed yes 2017  8:00 PM   Reason for Continuing Urinary Catheterization Urinary retention 2017  8:00 PM   CAUTI Prevention Bundle StatLock in place w 1" slack;Intact seal between catheter & drainage tubing;Drainage bag off the floor;Green sheeting clip in use;No dependent loops or kinks;Drainage bag not overfilled (<2/3 full);Drainage bag below bladder 2017  8:00 PM   Output (mL) 300 mL 2017  6:20 " AM       Neurosurgery Physical Exam  General: no distress  Neurologic: Alert and oriented. Thought content appropriate.  Head: normocephalic  GCS: Motor: 6/Verbal: 5/Eyes: 4 GCS Total: 15  Cranial nerves: face symmetric, tongue midline, pupils equal, round, reactive to light with accomodation, extraocular muscles intact  Sensory: response to light touch throughout  Motor Strength:full strength upper and lower extremities  Pronator Drift: no drift noted  Finger to nose normal  No focal numbness or weakness  Lungs:  normal respiratory effort  Abdomen: soft, non-tender   Extremities: no cyanosis or edema, or clubbing      Significant Labs:  No results for input(s): GLU, NA, K, CL, CO2, BUN, CREATININE, CALCIUM, MG in the last 48 hours.  No results for input(s): WBC, HGB, HCT, PLT in the last 48 hours.  No results for input(s): LABPT, INR, APTT in the last 48 hours.  Microbiology Results (last 7 days)     ** No results found for the last 168 hours. **

## 2017-06-21 NOTE — NURSING
Paige discontinued as ordered. Patient told to try to urinate within 6 hours. If she has problems after 12 hours to call her M.D. Teaching regarding bladder infection and cleaning technique to avoid it.

## 2017-06-21 NOTE — ASSESSMENT & PLAN NOTE
55 yo F s/p L1-L3 MIS discectomy with intra op CSF leak, POD 2  - Patient neurologically stable, No further HAs this morning   - Increase HOB today  - DC nevarez   - OOB with PT/OT  - LSO brace for comfort only  - Follow up with NSGY RN in 2 weeks for wound check  - Follow up with Dr. Aranda in 6 weeks. No imaging.   - If tolerates being upright without further concern for csf leak, will DC this afternoon

## 2017-06-21 NOTE — PLAN OF CARE
Problem: Physical Therapy Goal  Goal: Physical Therapy Goal  Goals to be met by: 17     Patient will increase functional independence with mobility by performin. Supine <> sit with Modified [place]  2. Sit to stand transfer with Modified [place]  3. Gait  x 150 feet with Supervision without AD.   4. Lower extremity exercise program x30 reps per handout, with independence    Outcome: Ongoing (interventions implemented as appropriate)  Pt chart reviewed and PT evaluation completed- see note for details. POC initiated. Recommending pt discharge home with outpatient PT referral; no DME needs anticipated.     Radha Newman, PT, DPT   2017  Pager: [phone]

## 2017-06-21 NOTE — DISCHARGE SUMMARY
[facility]  Neurosurgery  Discharge Summary      Patient Name: Dejah Schmidt  MRN: [id number]  Admission Date: 6/19/2017  Hospital Length of Stay: 0 days  Discharge Date: 6/21/17  Attending Physician: Adonis Aranda MD   Discharging Provider: PRADEEP Witt  Primary Care Provider: Dasha Bergeron MD    HPI:   Ms. Schmidt is a 56 year old female with intractable back pain due to 2 right sided disc herniations. She failed conservative management and presented to [facility] for elective MIS decompression and discectomy.         Procedure(s) (LRB):  MICRODISKECTOMY-MINIMALLY INVASIVE L1-2, L2-3 (Right)     Hospital Course: 6/19: Patient admitted over night due to intra op CSF leak. Patient flat over night.   6/20: HOB to be elevated at lunch. If no evidence of positional headaches, will DC home.   6/21: Was flat overnight, 2/2 HAs.  Increase HOB today, Up with PT/OT.  Tolerated without HAs.      Pending Diagnostic Studies:     None        Final Active Diagnoses:    Diagnosis Date Noted POA    PRINCIPAL PROBLEM:  Herniation of lumbar intervertebral disc with radiculopathy [M51.16] 06/19/2017 Yes      Problems Resolved During this Admission:    Diagnosis Date Noted Date Resolved POA      Discharged Condition: good    Disposition: Home or Self Care    Follow Up:  Follow-up Information     Iqra Arriola RN In 2 weeks.    Why:  For wound re-check               Patient Instructions:     Diet general     Call MD for:  temperature >100.4     Call MD for:  persistent nausea and vomiting or diarrhea     Call MD for:  severe uncontrolled pain     Call MD for:  redness, tenderness, or signs of infection (pain, swelling, redness, odor or green/yellow discharge around incision site)     Call MD for:  difficulty breathing or increased cough     Call MD for:  severe persistent headache     Call MD for:  worsening rash     Call MD for:  persistent dizziness, light-headedness, or visual disturbances     Call MD  for:  increased confusion or weakness       Medications:  Reconciled Home Medications:   Current Discharge Medication List      START taking these medications    Details   oxycodone-acetaminophen (PERCOCET) 5-325 mg per tablet Take 1 tablet by mouth every 6 (six) hours as needed for Pain.  Qty: 90 tablet, Refills: 0         CONTINUE these medications which have CHANGED    Details   !! gabapentin (NEURONTIN) 300 MG capsule Take 1 capsule (300 mg total) by mouth 3 (three) times daily.  Qty: 90 capsule, Refills: 1      !! NEURONTIN 300 mg capsule Take one caps at bedtime x 3 days, than increase to 2 caps at bedtime x 3 days, than 1 caps in am and 2 caps at bedtime.  Qty: 90 capsule, Refills: 2    Associated Diagnoses: Lumbar radiculopathy; Cervical radiculopathy       !! - Potential duplicate medications found. Please discuss with provider.      CONTINUE these medications which have NOT CHANGED    Details   amlodipine (NORVASC) 10 MG tablet Take 1 tablet (10 mg total) by mouth once daily.  Qty: 30 tablet, Refills: 3      cholecalciferol, vitamin D3, 50,000 unit Tab Take 1 tablet by mouth once a week.  Qty: 12 tablet, Refills: 3      lisinopril (PRINIVIL,ZESTRIL) 40 MG tablet Take 1 tablet (40 mg total) by mouth once daily.  Qty: 30 tablet, Refills: 3      triamterene-hydrochlorothiazide 37.5-25 mg (DYAZIDE) 37.5-25 mg per capsule Take 1 capsule by mouth every morning.  Qty: 30 capsule, Refills: 3         STOP taking these medications       hydrocodone-acetaminophen 5-325mg (NORCO) 5-325 mg per tablet Comments:   Reason for Stopping:         ibuprofen (ADVIL,MOTRIN) 800 MG tablet Comments:   Reason for Stopping:               PRADEEP Witt  Neurosurgery  [facility]

## 2017-06-21 NOTE — PLAN OF CARE
Problem: Patient Care Overview  Goal: Plan of Care Review  POC reviewed with patient and verbalized understanding.  Patient remains free from falls, skin breakdown, and injury.  Call light within reach and side rails up X2.  Neuro checks and vital signs done every 4 hours.  Pt. Remained with HOB flat.  At 12:40 patient complained of pain located in her back at a pain level of 6.  Given Norco and an hour later the pain was still at a 6.  The pain level at 2:00 a.m. And 3:00 a.m. Was still at a 6.  At 3:11 pain med Oxy IR was given.  At 3:40 a.m. Patient's pain was relieved with pain level at a 1.  Ongoing interventions implemented as appropriate.  Will continue to monitor.

## 2017-06-23 NOTE — OP NOTE
DATE OF PROCEDURE:  06/19/2017    PREOPERATIVE DIAGNOSIS:  Lumbar stenosis, L1-L2, calcified herniated disk.    POSTOPERATIVE DIAGNOSIS:  Lumbar stenosis, L1-L2, calcified herniated disk.    OPERATIVE PROCEDURES UNDERGONE:  Posterior approach for a minimally invasive   L1-L2 decompressive laminectomy, medial facetectomy, and bilateral foraminotomy   or microsurgical technique.    SURGEON:  Adonis Aranda M.D.    ASSISTANT:  Yohannes Joaquin M.D. (RES)    ANESTHESIA:  General.    INDICATIONS FOR PROCEDURE:  This is a 56-year-old female with a significant   L1-L2 calcified disk and lumbar stenosis, we considered doing something at   L2-L3, but did not feel that it was that stenotic.    OPERATIVE NOTE:  The patient was taken to the Operating Room, anesthetized, and   intubated by Anesthesia.  Preop antibiotics were administered.  The patient was   placed in prone position.  Positioning of the patient was quite difficult due to   the patient's BMI being close to 48.  The back was prepped and draped in a   sterile fashion.  Fluoroscopy was used to localize the appropriate level, again   counting and use of fluoroscopy was difficult due to her BMI but once we were   able to get L1-L2 space, we made a paramedian skin incision, cut through the   fascia, got up to a 22 mm #6 tube and parked it over the inferior aspect of the   L1 lamina.  Microscope was brought in the field.  Soft tissue was removed.  The   septum was preserved.  A hemilaminotomy was initially performed, the bone was   fully overgrown and thickened.  After going down to the dura, there was a small   dural defect noted at the inferior aspect of L1.  We placed some subsequent   Gelfoam in this and then slowly went around and performed a contralateral   laminectomy, ipsilateral medial facetectomy, bilateral foraminotomy; this was   quite difficult due to both the CSF leak and the patient's body habitus and the   length of the tube.  Once we were happy with the  decompression, we placed a   another layer of Duragen on top of it, used some DuraSeal to help seal and kill   the dead space.  Muscle and fascia were closed in layers.  A sterile dressing   was put in place.  The patient was extubated and brought to Recovery Room   without any problems or complications.  EBL was minimal.  The case did warrant a   22-modifier due to the patient's BMI of 48 but got much more difficult for   positioning, localization, and the operation.      LILO  dd: 06/22/2017 16:42:07 (CDT)  td: 06/22/2017 20:16:54 (CDT)  Doc ID   #[id number]  Job ID #[id number]    CC:

## 2017-06-23 NOTE — PT/OT/SLP DISCHARGE
Physical Therapy Discharge Summary    Dejah Schmidt  MRN: [id number]   Herniation of lumbar intervertebral disc with radiculopathy   Patient Discharged from acute Physical Therapy on 17.  Please refer to prior PT noted date on 17 for functional status.     Assessment:   Patient has not met goals.  GOALS:    Physical Therapy Goals     Not on file          Multidisciplinary Problems (Resolved)        Problem: Physical Therapy Goal    Goal Priority Disciplines Outcome Goal Variances Interventions   Physical Therapy Goal   (Resolved)     PT/OT, PT Outcome(s) achieved     Description:  Goals to be met by: 17     Patient will increase functional independence with mobility by performin. Supine <> sit with Modified [place]  2. Sit to stand transfer with Modified [place]  3. Gait  x 150 feet with Supervision without AD.   4. Lower extremity exercise program x30 reps per handout, with independence                    Reasons for Discontinuation of Therapy Services  Transfer to alternate level of care.      Plan:  Patient Discharged to: home with OP PT recommended.     Radha Newman PT, DPT   2017  Pager: [phone]

## 2017-06-28 ENCOUNTER — TELEPHONE (OUTPATIENT)
Dept: NEUROSURGERY | Facility: CLINIC | Age: 57
End: 2017-06-28

## 2017-06-28 NOTE — TELEPHONE ENCOUNTER
Advised patient, due to change in schedule, appointment with RN on Thursday 7/6/17 has been rescheduled to Wednesday 7/5/17 at 9:40am. Understanding verbalized.

## 2017-07-05 ENCOUNTER — CLINICAL SUPPORT (OUTPATIENT)
Dept: NEUROSURGERY | Facility: CLINIC | Age: 57
End: 2017-07-05
Payer: MEDICAID

## 2017-07-05 VITALS — HEART RATE: 78 BPM | SYSTOLIC BLOOD PRESSURE: 132 MMHG | DIASTOLIC BLOOD PRESSURE: 75 MMHG | TEMPERATURE: 98 F

## 2017-07-05 PROCEDURE — 99213 OFFICE O/P EST LOW 20 MIN: CPT | Mod: PBBFAC

## 2017-07-05 PROCEDURE — 99999 PR PBB SHADOW E&M-EST. PATIENT-LVL III: CPT | Mod: PBBFAC,,,

## 2017-07-05 NOTE — PROGRESS NOTES
Patient seen 2 weeks post op L1-3 MIS discectomy on 6/19/17 by Dr. Aranda. Patient ambulating without difficulty. Incision to lower back assessed. Dissolvable sutures intact. Edges well approximated. No erythema, swelling, or drainage noted. Instructed patient to keep incision open to air, discontinue Bacitracin, no scrubbing incision, no submerging incision in tub bath/pool for 6 more weeks, and pat to dry. Patient denies new numbness or weakness. Reports preoperative right leg numbness is improved. Discharge instructions reviewed including no lifting greater than 10 pounds, avoid bending or twisting of the back, continue to increase ambulation daily as tolerated, no driving while taking narcotic pain medication/muscle relaxant, and okay to resume NSAID's prn. Patient verbalized understanding of all instructions given. 6 week post op appointment reviewed and provided in print. Encouraged to call clinic with any questions or concerns.

## 2017-07-31 ENCOUNTER — TELEPHONE (OUTPATIENT)
Dept: INTERNAL MEDICINE | Facility: CLINIC | Age: 57
End: 2017-07-31

## 2017-07-31 ENCOUNTER — PATIENT MESSAGE (OUTPATIENT)
Dept: INTERNAL MEDICINE | Facility: CLINIC | Age: 57
End: 2017-07-31

## 2017-07-31 DIAGNOSIS — E04.1 THYROID NODULE: Primary | ICD-10-CM

## 2017-07-31 NOTE — TELEPHONE ENCOUNTER
Pt has medicaid so it wont allow me to schedule appt.   Should I refer pt to [facility]?

## 2017-07-31 NOTE — TELEPHONE ENCOUNTER
Yes,please.    Or ask her to check with her insurance to see any other providers who is on her plan

## 2017-08-01 ENCOUNTER — OFFICE VISIT (OUTPATIENT)
Dept: NEUROSURGERY | Facility: CLINIC | Age: 57
End: 2017-08-01
Payer: MEDICAID

## 2017-08-01 VITALS
TEMPERATURE: 98 F | WEIGHT: 293 LBS | HEART RATE: 68 BPM | SYSTOLIC BLOOD PRESSURE: 153 MMHG | BODY MASS INDEX: 45.99 KG/M2 | HEIGHT: 67 IN | DIASTOLIC BLOOD PRESSURE: 83 MMHG

## 2017-08-01 DIAGNOSIS — M51.16 HERNIATION OF LUMBAR INTERVERTEBRAL DISC WITH RADICULOPATHY: Primary | ICD-10-CM

## 2017-08-01 PROCEDURE — 99213 OFFICE O/P EST LOW 20 MIN: CPT | Mod: PBBFAC | Performed by: NEUROLOGICAL SURGERY

## 2017-08-01 PROCEDURE — 99999 PR PBB SHADOW E&M-EST. PATIENT-LVL III: CPT | Mod: PBBFAC,,, | Performed by: NEUROLOGICAL SURGERY

## 2017-08-01 PROCEDURE — 99024 POSTOP FOLLOW-UP VISIT: CPT | Mod: ,,, | Performed by: NEUROLOGICAL SURGERY

## 2017-08-01 NOTE — LETTER
August 2, 2017      Dasha Bergeron MD  [address]  [place] [address]           [facility] - Neurosurgery 7th Fl  [address]  [place] [address]  Phone: [phone]          Patient: Dejah Schmidt   MR Number: [id number]   YOB: 1960   Date of Visit: 8/1/2017       Dear Dr. Dasha Bergeron:    Thank you for referring Dejah Schmidt to me for evaluation. Attached you will find relevant portions of my assessment and plan of care.    If you have questions, please do not hesitate to call me. I look forward to following Dejah Schmidt along with you.    Sincerely,    Adonis Aranda MD    Enclosure  CC:  No Recipients    If you would like to receive this communication electronically, please contact [email or web address] or [phone] to request more information on [facility] Link access.    For providers and/or their staff who would like to refer a patient to [facility], please contact us through our one-stop-shop provider referral line, [facility] , at [phone].    If you feel you have received this communication in error or would no longer like to receive these types of communications, please e-mail [email or web address]

## 2017-08-01 NOTE — PROGRESS NOTES
Subjective:       Patient ID: Dejah Schmidt is a 56 y.o. female.    Chief Complaint: Post-op Evaluation    HPI   Pt is a 56 y.o. female who presents s/p on 6/19/2017. Pt states that she overall doing well since surgery, but is enduring enduring some RLE numbness.     Review of Systems   Constitutional: Negative for chills, fatigue and fever.   HENT: Negative for sinus pressure and trouble swallowing.    Eyes: Negative.  Negative for visual disturbance.   Respiratory: Negative.    Cardiovascular: Negative.    Gastrointestinal: Negative.  Negative for nausea and vomiting.   Endocrine: Negative.    Genitourinary: Negative.    Musculoskeletal: Negative.    Neurological: Positive for numbness. Negative for dizziness, seizures, syncope, speech difficulty and weakness.   Psychiatric/Behavioral: Negative for confusion.       Objective:      Neurosurgery Physical Exam    Pt doing well since operation. She still has some numbness in the right foot, but its improving.   Back wound is healing well.  Full strength         Imaging:       Assessment/Plan:   Pt is doing well since operation. She still has some numbness in the right foot, but it is improving . Advance her level of activity and have her f/u with primary care.   I, CHERYL Aranda MD, personally performed the services described in this documentation. All medical record entries made by the scribe, Megan Alonzo, were at my direction and in my presence.  I have reviewed the chart and agree that the record reflects my personal performance and is accurate and complete.

## 2017-10-05 RX ORDER — LISINOPRIL 40 MG/1
TABLET ORAL
Qty: 30 TABLET | Refills: 0 | Status: SHIPPED | OUTPATIENT
Start: 2017-10-05 | End: 2019-12-26 | Stop reason: SDUPTHER

## 2019-11-04 NOTE — PROGRESS NOTES
Dejah Schmidt is a 56 y.o. year old her here today for her 1st Euflexxa injection for degenerative changes of her bilateral knee . she was last seen and treated in the clinic on 3/9/2017. There has been no significant change in her medical status since her last visit. No Fever, chills, malaise, or unexplained weight change.      Allergies, Medications, past medical and surgical history were reviewed .    Examination of the knee demonstrates  No evidence of edema, erythema , echymosis strength and range of motion are unchanged from previous visit.    The injection site was identified and the skin was prepared with a betadine solution. The joint was injected with 2 ml of Euflexxa solution under sterile technique. Dejah Schmidt tolerated the procedure well, she was advised to rest the knee today, ice and elevation. I may take 3 -6 weeks following the last injection to get the full benefit of the medication.  I will see her back in 1 week. Sooner if he has any problems or concerns.           .      From: Pattie Cintron  To: Danisha Bowles MD  Sent: 11/4/2019 9:50 AM CST  Subject: Lab Test or Test Related Question    I don't remember if there was bruising at the time of that test, but I have a lot of bruising now and I'm wondering if it's something I should redo now?      Pattie Hemphill did have a cbc in 2016. Dr. Boo is asking if you had bruising at that time? The results were normal.     Dr Boo's Office       ----- Message -----   From: Pattie Cintron   Sent: 11/3/2019 1:36 PM CST   To: Danisha Bowles MD   Subject: Lab Test or Test Related Question     We were talking about my bruising at my appointment on Thursday and you asked if I'd ever gotten blood tests done for it, were those some of the tests you ordered when I went to over to the lab? If not, I think I would like to have those done sometime soon.

## 2019-12-06 ENCOUNTER — TELEPHONE (OUTPATIENT)
Dept: ADMINISTRATIVE | Facility: HOSPITAL | Age: 59
End: 2019-12-06

## 2019-12-06 DIAGNOSIS — Z12.39 ENCOUNTER FOR SCREENING FOR MALIGNANT NEOPLASM OF BREAST: Primary | ICD-10-CM

## 2019-12-06 DIAGNOSIS — Z12.11 COLON CANCER SCREENING: Primary | ICD-10-CM

## 2019-12-06 DIAGNOSIS — Z12.31 ENCOUNTER FOR SCREENING MAMMOGRAM FOR BREAST CANCER: ICD-10-CM

## 2019-12-06 NOTE — TELEPHONE ENCOUNTER
Dr Bergeron, I noticed she was coming on 12/26/19 ( audit) and didn't realize she hadn't been seen since 2017.       Thanks Much,  Dasha

## 2019-12-26 ENCOUNTER — OFFICE VISIT (OUTPATIENT)
Dept: INTERNAL MEDICINE | Facility: CLINIC | Age: 59
End: 2019-12-26
Payer: MEDICARE

## 2019-12-26 ENCOUNTER — HOSPITAL ENCOUNTER (OUTPATIENT)
Dept: RADIOLOGY | Facility: HOSPITAL | Age: 59
Discharge: HOME | End: 2019-12-26
Attending: INTERNAL MEDICINE
Payer: MEDICARE

## 2019-12-26 DIAGNOSIS — E04.1 THYROID NODULE: ICD-10-CM

## 2019-12-26 DIAGNOSIS — M25.569 KNEE PAIN, UNSPECIFIED CHRONICITY, UNSPECIFIED LATERALITY: ICD-10-CM

## 2019-12-26 DIAGNOSIS — E55.9 VITAMIN D DEFICIENCY: ICD-10-CM

## 2019-12-26 DIAGNOSIS — M54.9 BACK PAIN, UNSPECIFIED BACK LOCATION, UNSPECIFIED BACK PAIN LATERALITY, UNSPECIFIED CHRONICITY: Primary | ICD-10-CM

## 2019-12-26 DIAGNOSIS — I10 HYPERTENSION, UNSPECIFIED TYPE: ICD-10-CM

## 2019-12-26 PROCEDURE — 3078F DIAST BP <80 MM HG: CPT | Mod: CPTII,S$GLB,, | Performed by: INTERNAL MEDICINE

## 2019-12-26 PROCEDURE — 3078F PR MOST RECENT DIASTOLIC BLOOD PRESSURE < 80 MM HG: ICD-10-PCS | Mod: CPTII,S$GLB,, | Performed by: INTERNAL MEDICINE

## 2019-12-26 PROCEDURE — 99999 PR PBB SHADOW E&M-EST. PATIENT-LVL V: ICD-10-PCS | Mod: PBBFAC,,, | Performed by: INTERNAL MEDICINE

## 2019-12-26 PROCEDURE — 3008F PR BODY MASS INDEX (BMI) DOCUMENTED: ICD-10-PCS | Mod: CPTII,S$GLB,, | Performed by: INTERNAL MEDICINE

## 2019-12-26 PROCEDURE — 99999 PR PBB SHADOW E&M-EST. PATIENT-LVL V: CPT | Mod: PBBFAC,,, | Performed by: INTERNAL MEDICINE

## 2019-12-26 PROCEDURE — 99499 UNLISTED E&M SERVICE: CPT | Mod: S$GLB,,, | Performed by: INTERNAL MEDICINE

## 2019-12-26 PROCEDURE — 73562 X-RAY EXAM OF KNEE 3: CPT | Mod: TC,50

## 2019-12-26 PROCEDURE — 3008F BODY MASS INDEX DOCD: CPT | Mod: CPTII,S$GLB,, | Performed by: INTERNAL MEDICINE

## 2019-12-26 PROCEDURE — 3075F PR MOST RECENT SYSTOLIC BLOOD PRESS GE 130-139MM HG: ICD-10-PCS | Mod: CPTII,S$GLB,, | Performed by: INTERNAL MEDICINE

## 2019-12-26 PROCEDURE — 99214 OFFICE O/P EST MOD 30 MIN: CPT | Mod: S$GLB,,, | Performed by: INTERNAL MEDICINE

## 2019-12-26 PROCEDURE — 99499 RISK ADDL DX/OHS AUDIT: ICD-10-PCS | Mod: S$GLB,,, | Performed by: INTERNAL MEDICINE

## 2019-12-26 PROCEDURE — 73562 XR KNEE ORTHO BILAT: ICD-10-PCS | Mod: 26,50,, | Performed by: RADIOLOGY

## 2019-12-26 PROCEDURE — 3075F SYST BP GE 130 - 139MM HG: CPT | Mod: CPTII,S$GLB,, | Performed by: INTERNAL MEDICINE

## 2019-12-26 PROCEDURE — 73562 X-RAY EXAM OF KNEE 3: CPT | Mod: 26,50,, | Performed by: RADIOLOGY

## 2019-12-26 PROCEDURE — 99214 PR OFFICE/OUTPT VISIT, EST, LEVL IV, 30-39 MIN: ICD-10-PCS | Mod: S$GLB,,, | Performed by: INTERNAL MEDICINE

## 2019-12-26 RX ORDER — ATENOLOL 25 MG/1
25 TABLET ORAL DAILY
Qty: 90 TABLET | Refills: 1 | Status: SHIPPED | OUTPATIENT
Start: 2019-12-26 | End: 2020-07-02

## 2019-12-26 RX ORDER — AMLODIPINE BESYLATE 10 MG/1
10 TABLET ORAL DAILY
Qty: 90 TABLET | Refills: 1 | Status: SHIPPED | OUTPATIENT
Start: 2019-12-26 | End: 2020-07-02 | Stop reason: SDUPTHER

## 2019-12-26 RX ORDER — CHOLECALCIFEROL (VITAMIN D3) 1250 MCG
1 TABLET ORAL WEEKLY
Qty: 12 TABLET | Refills: 3 | Status: SHIPPED | OUTPATIENT
Start: 2019-12-26 | End: 2020-01-03 | Stop reason: SDUPTHER

## 2019-12-26 RX ORDER — ATORVASTATIN CALCIUM 40 MG/1
40 TABLET, FILM COATED ORAL DAILY
Qty: 90 TABLET | Refills: 1 | Status: SHIPPED | OUTPATIENT
Start: 2019-12-26 | End: 2020-07-02 | Stop reason: SDUPTHER

## 2019-12-26 RX ORDER — CYCLOBENZAPRINE HCL 10 MG
10 TABLET ORAL DAILY PRN
Qty: 30 TABLET | Refills: 1 | Status: SHIPPED | OUTPATIENT
Start: 2019-12-26 | End: 2020-01-05

## 2019-12-26 RX ORDER — LISINOPRIL 40 MG/1
TABLET ORAL
Qty: 90 TABLET | Refills: 0 | Status: ON HOLD | OUTPATIENT
Start: 2019-12-26 | End: 2020-03-01 | Stop reason: SDUPTHER

## 2019-12-26 RX ORDER — POTASSIUM CHLORIDE 750 MG/1
10 TABLET, EXTENDED RELEASE ORAL DAILY
Qty: 90 TABLET | Refills: 1 | Status: ON HOLD | OUTPATIENT
Start: 2019-12-26 | End: 2020-03-01 | Stop reason: HOSPADM

## 2019-12-26 RX ORDER — TRIAMTERENE AND HYDROCHLOROTHIAZIDE 37.5; 25 MG/1; MG/1
1 CAPSULE ORAL EVERY MORNING
Qty: 90 CAPSULE | Refills: 1 | Status: ON HOLD | OUTPATIENT
Start: 2019-12-26 | End: 2020-03-01 | Stop reason: SDUPTHER

## 2019-12-28 NOTE — PROGRESS NOTES
SUBJECTIVE:     Chief Complaint & History of Present Illness:  Dejah Schmidt is a Established 59 y.o. year old female patient here with a history of constant right knee pain which started 2 weeks ago.  There is not a history of trauma.  The pain is located in the lateral aspect of the knee.  The pain is described as achy, 5/10.  There is not radiation.  There is catching or locking.  Aggravating factors include going up and down stairs, lateral movements, standing and walking.  Associated symptoms include knee locking, popping sensation.  There is numbness or tingling of the lower extremity but reports this is chronic due to previous back surgery.  There is back pain. Previous treatments include Euflexxa injections which have provided good relief.  There is not a history of previous injury or surgery to the knee.  The patient does not use an assistive device.    Review of patient's allergies indicates:  No Known Allergies      Current Outpatient Medications   Medication Sig Dispense Refill    amLODIPine (NORVASC) 10 MG tablet Take 1 tablet (10 mg total) by mouth once daily. 90 tablet 1    atenolol (TENORMIN) 25 MG tablet Take 1 tablet (25 mg total) by mouth once daily. 90 tablet 1    atorvastatin (LIPITOR) 40 MG tablet Take 1 tablet (40 mg total) by mouth once daily. 90 tablet 1    cholecalciferol, vitamin D3, 50,000 unit Tab Take 1 tablet by mouth once a week. 12 tablet 3    cyclobenzaprine (FLEXERIL) 10 MG tablet Take 1 tablet (10 mg total) by mouth daily as needed for Muscle spasms. 30 tablet 1    lisinopril (PRINIVIL,ZESTRIL) 40 MG tablet TAKE 1 TABLET(40 MG) BY MOUTH EVERY DAY 90 tablet 0    NEURONTIN 300 mg capsule Take one caps at bedtime x 3 days, than increase to 2 caps at bedtime x 3 days, than 1 caps in am and 2 caps at bedtime. 90 capsule 2    oxycodone-acetaminophen (PERCOCET) 5-325 mg per tablet Take 1 tablet by mouth every 6 (six) hours as needed for Pain. (Patient not taking: Reported on  12/26/2019) 90 tablet 0    potassium chloride SA (K-DUR,KLOR-CON) 10 MEQ tablet Take 1 tablet (10 mEq total) by mouth once daily. 90 tablet 1    triamterene-hydrochlorothiazide 37.5-25 mg (DYAZIDE) 37.5-25 mg per capsule Take 1 capsule by mouth every morning. 90 capsule 1     No current facility-administered medications for this visit.        Past Medical History:   Diagnosis Date    Asthma     Hypercholesteremia     Hypertension     Multiple thyroid nodules        Past Surgical History:   Procedure Laterality Date    CARPAL TUNNEL RELEASE      FOOT SURGERY      HYSTERECTOMY      MASS EXCISION      at neck    ROTATOR CUFF REPAIR      left shoulder    TRIGGER FINGER RELEASE  12/2017    right hand       Family History   Problem Relation Age of Onset    Cancer Mother     Coronary artery disease Mother     Heart disease Mother     Hypertension Mother     Hyperlipidemia Mother     Hypertension Father     Cancer Father     Heart disease Maternal Grandmother     Hypertension Maternal Grandmother          Review of Systems:  ROS:  Constitutional: no fever or chills  Eyes: no visual changes  ENT: no nasal congestion or sore throat  Respiratory: no cough or shortness of breath  Cardiovascular: no chest pain or palpitations  Gastrointestinal: no nausea or vomiting, tolerating diet  Genitourinary: no hematuria or dysuria  Integument/Breast: no rash or pruritis  Hematologic/Lymphatic: no easy bruising or lymphadenopathy  Musculoskeletal: positive for arthralgias  Neurological: no seizures or tremors  Behavioral/Psych: no auditory or visual hallucinations  Endocrine: no heat or cold intolerance      OBJECTIVE:     PHYSICAL EXAM:  Vital Signs (Most Recent)  Vitals:    12/30/19 1509   BP: 113/72   Pulse: 80   Resp: 18   Temp: 98.1 °F (36.7 °C)        ,   General Appearance: Well nourished, well developed, in no acute distress.  HENT: Normal cephalic, oropharynx pink and moist  Eyes: PERRLA bilaterally and EOM x  4  Respiratory: Even and unlabored  Skin: Warm and Dry.   Psychiatric: AAO x 4, Mood & affect are normal.    left  Knee Exam:  Knee Range of Motion:normal   Effusion:not significant  Condition of skin:intact  Location of tenderness:Lateral joint line   Strength:normal  Stability:  stable to testing, Lachman: stable, LCL: stable, MCL: stable and PCL: stable  Varus /Valgus stress:   Moderate valgus  Archana:   negative    right  Knee Exam:  Knee Range of Motion:normal   Effusion:none  Condition of skin:intact  Location of tenderness:None   Strength:normal  Stability:  stable to testing, Lachman: stable, LCL: stable, MCL: stable and PCL: stable  Varus /Valgus stress:  normal  Archana:   negative      Hip Examination:  full painless range of motion, without tenderness    RADIOGRAPHS:  X-ray of right knee obtained and personally reviewed by me.  No fractures or dislocations seen.  She does have lateral tibiofemoral joint space narrowing consistent with DJD.    ASSESSMENT/PLAN:       ICD-10-CM ICD-9-CM   1. Osteoarthritis of right knee, unspecified osteoarthritis type M17.11 715.96   2. Morbid obesity E66.01 278.01       Plan: We discussed with the patient at length all the different treatment options available for  the knee including anti-inflammatories, acetaminophen, rest, ice, knee strengthening exercise, occasional cortisone injections for temporary relief, Viscosupplimentation injections, arthroscopic debridement osteotomy, and finally knee arthroplasty.     -Patient presents with right knee pain x 2 weeks, no trauma or falls.  -Reports pain similar to past and was treated with Euflexxa injection back in April 2017 with good results.  She would like to proceed with another series of injections.  Advised will need insurance approval prior to giving.  -Recommend to continue Motrin PRN and may augment with Tylenol PRN.  -Patient to follow up in 1 week to start Euflexxa injections if approved.

## 2019-12-29 VITALS
BODY MASS INDEX: 45.99 KG/M2 | SYSTOLIC BLOOD PRESSURE: 130 MMHG | HEART RATE: 80 BPM | TEMPERATURE: 98 F | HEIGHT: 67 IN | WEIGHT: 293 LBS | OXYGEN SATURATION: 98 % | DIASTOLIC BLOOD PRESSURE: 78 MMHG

## 2019-12-29 NOTE — PROGRESS NOTES
Subjective:       Patient ID: Dejah Schmidt is a 59 y.o. female.    Chief Complaint: Hypertension    HPI  She returns for management of hypertension.  She has had hypertension for over a year.  Current treatment has included medications outlined in medication list.  She denies chest pain or shortness of breath.  No palpitations.  Denies left arm or neck pain.    Past medical history: Hypertension, hyperlipidemia, asthma, lumbar spinal stenosis, vitamin D deficiency, thyroid nodule, status post partial thyroidectomy, status post hysterectomy. She had a colonoscopy September 2011      Medications: Lisinopril 40 mg daily, Dyazide 1 by mouth daily, Norvasc 10 mg daily ,Neurontin, ergocalciferol, Lipitor 40 mg daily, atenolol 25 mg daily, K-Dur     NO KNOWN DRUG ALLERGIES    Review of Systems   Constitutional: Negative for chills, fatigue, fever and unexpected weight change.   Respiratory: Negative for chest tightness and shortness of breath.    Cardiovascular: Negative for chest pain and palpitations.   Gastrointestinal: Negative for abdominal pain and blood in stool.   Neurological: Negative for dizziness, syncope, numbness and headaches.       Objective:      Physical Exam   HENT:   Right Ear: External ear normal.   Left Ear: External ear normal.   Nose: Nose normal.   Mouth/Throat: Oropharynx is clear and moist.   Eyes: Pupils are equal, round, and reactive to light.   Neck: Normal range of motion.   Cardiovascular: Normal rate and regular rhythm.   No murmur heard.  Pulmonary/Chest: Breath sounds normal.   Abdominal: She exhibits no distension. There is no hepatosplenomegaly. There is no tenderness.   Lymphadenopathy:     She has no cervical adenopathy.     She has no axillary adenopathy.   Neurological: She has normal strength and normal reflexes. No cranial nerve deficit or sensory deficit.       Assessment/Plan       Assessment and plan:  Hypertension:  Check CMP, lipid panel, CBC, TSH, vitamin-D.  She reports  having her mammogram within the past year.  Discussed Pap smear, pelvic exam.  She declined all

## 2019-12-30 ENCOUNTER — TELEPHONE (OUTPATIENT)
Dept: INTERNAL MEDICINE | Facility: CLINIC | Age: 59
End: 2019-12-30

## 2019-12-30 ENCOUNTER — OFFICE VISIT (OUTPATIENT)
Dept: ORTHOPEDICS | Facility: CLINIC | Age: 59
End: 2019-12-30
Payer: MEDICARE

## 2019-12-30 VITALS
HEART RATE: 80 BPM | TEMPERATURE: 98 F | HEIGHT: 67 IN | BODY MASS INDEX: 45.99 KG/M2 | DIASTOLIC BLOOD PRESSURE: 72 MMHG | RESPIRATION RATE: 18 BRPM | WEIGHT: 293 LBS | SYSTOLIC BLOOD PRESSURE: 113 MMHG

## 2019-12-30 DIAGNOSIS — M17.11 OSTEOARTHRITIS OF RIGHT KNEE, UNSPECIFIED OSTEOARTHRITIS TYPE: Primary | ICD-10-CM

## 2019-12-30 DIAGNOSIS — E66.01 MORBID OBESITY: ICD-10-CM

## 2019-12-30 PROCEDURE — 3078F DIAST BP <80 MM HG: CPT | Mod: CPTII,S$GLB,, | Performed by: NURSE PRACTITIONER

## 2019-12-30 PROCEDURE — 3078F PR MOST RECENT DIASTOLIC BLOOD PRESSURE < 80 MM HG: ICD-10-PCS | Mod: CPTII,S$GLB,, | Performed by: NURSE PRACTITIONER

## 2019-12-30 PROCEDURE — 99213 PR OFFICE/OUTPT VISIT, EST, LEVL III, 20-29 MIN: ICD-10-PCS | Mod: S$GLB,,, | Performed by: NURSE PRACTITIONER

## 2019-12-30 PROCEDURE — 99999 PR PBB SHADOW E&M-EST. PATIENT-LVL V: CPT | Mod: PBBFAC,,, | Performed by: NURSE PRACTITIONER

## 2019-12-30 PROCEDURE — 3074F PR MOST RECENT SYSTOLIC BLOOD PRESSURE < 130 MM HG: ICD-10-PCS | Mod: CPTII,S$GLB,, | Performed by: NURSE PRACTITIONER

## 2019-12-30 PROCEDURE — 3074F SYST BP LT 130 MM HG: CPT | Mod: CPTII,S$GLB,, | Performed by: NURSE PRACTITIONER

## 2019-12-30 PROCEDURE — 99999 PR PBB SHADOW E&M-EST. PATIENT-LVL V: ICD-10-PCS | Mod: PBBFAC,,, | Performed by: NURSE PRACTITIONER

## 2019-12-30 PROCEDURE — 99213 OFFICE O/P EST LOW 20 MIN: CPT | Mod: S$GLB,,, | Performed by: NURSE PRACTITIONER

## 2019-12-30 PROCEDURE — 3008F PR BODY MASS INDEX (BMI) DOCUMENTED: ICD-10-PCS | Mod: CPTII,S$GLB,, | Performed by: NURSE PRACTITIONER

## 2019-12-30 PROCEDURE — 3008F BODY MASS INDEX DOCD: CPT | Mod: CPTII,S$GLB,, | Performed by: NURSE PRACTITIONER

## 2019-12-30 NOTE — LETTER
December 30, 2019      Dasha Bergeron MD  [address]  [facility] [address]           [facility] - Orthopedics  [address]  [facility] [address]  Phone: [phone]          Patient: Dejah Schmidt   MR Number: [id number]   YOB: 1960   Date of Visit: 12/30/2019       Dear Dr. Dasha Bergeron:    Thank you for referring Dejah Schmidt to me for evaluation. Attached you will find relevant portions of my assessment and plan of care.    If you have questions, please do not hesitate to call me. I look forward to following Dejah Schmidt along with you.    Sincerely,    Chadd Vaz, ROSAURA    Enclosure  CC:  No Recipients    If you would like to receive this communication electronically, please contact [email or web address] or [phone] to request more information on [facility] Link access.    For providers and/or their staff who would like to refer a patient to [facility], please contact us through our one-stop-shop provider referral line, [facility], at [phone].    If you feel you have received this communication in error or would no longer like to receive these types of communications, please e-mail [email or web address]

## 2019-12-30 NOTE — TELEPHONE ENCOUNTER
Please contact patient and inform her that her vitamin-D level is low.  I will need to give her a supplement for this.  Please ask her what pharmacy she would like that sent to

## 2019-12-31 NOTE — PROGRESS NOTES
Patient, Dejah Schmidt (MRN #[id number]), presented with a recorded BMI of 47.65 kg/m^2 consistent with the definition of morbid obesity (ICD-10 E66.01). The patient's morbid obesity was monitored, evaluated, addressed and/or treated. This addendum to the medical record is made on 12/31/2019.

## 2020-01-02 NOTE — TELEPHONE ENCOUNTER
Pt informed vitamin d level is low, understanding verbalized. Please send rx to pharmacy on file, [facility] [address].

## 2020-01-03 RX ORDER — CHOLECALCIFEROL (VITAMIN D3) 1250 MCG
1 TABLET ORAL WEEKLY
Qty: 12 TABLET | Refills: 3 | Status: SHIPPED | OUTPATIENT
Start: 2020-01-03 | End: 2022-04-01

## 2020-01-13 ENCOUNTER — PATIENT MESSAGE (OUTPATIENT)
Dept: ORTHOPEDICS | Facility: CLINIC | Age: 60
End: 2020-01-13

## 2020-01-21 ENCOUNTER — OFFICE VISIT (OUTPATIENT)
Dept: ORTHOPEDICS | Facility: CLINIC | Age: 60
End: 2020-01-21
Payer: MEDICARE

## 2020-01-21 VITALS
WEIGHT: 293 LBS | HEIGHT: 67 IN | SYSTOLIC BLOOD PRESSURE: 133 MMHG | DIASTOLIC BLOOD PRESSURE: 80 MMHG | BODY MASS INDEX: 45.99 KG/M2

## 2020-01-21 DIAGNOSIS — M17.11 OSTEOARTHRITIS OF RIGHT KNEE, UNSPECIFIED OSTEOARTHRITIS TYPE: Primary | ICD-10-CM

## 2020-01-21 PROCEDURE — 20610 DRAIN/INJ JOINT/BURSA W/O US: CPT | Mod: RT,S$GLB,, | Performed by: NURSE PRACTITIONER

## 2020-01-21 PROCEDURE — 99499 UNLISTED E&M SERVICE: CPT | Mod: S$GLB,,, | Performed by: NURSE PRACTITIONER

## 2020-01-21 PROCEDURE — 20610 PR DRAIN/INJECT LARGE JOINT/BURSA: ICD-10-PCS | Mod: RT,S$GLB,, | Performed by: NURSE PRACTITIONER

## 2020-01-21 PROCEDURE — 99999 PR PBB SHADOW E&M-EST. PATIENT-LVL III: CPT | Mod: PBBFAC,,, | Performed by: NURSE PRACTITIONER

## 2020-01-21 PROCEDURE — 99499 NO LOS: ICD-10-PCS | Mod: S$GLB,,, | Performed by: NURSE PRACTITIONER

## 2020-01-21 PROCEDURE — 99999 PR PBB SHADOW E&M-EST. PATIENT-LVL III: ICD-10-PCS | Mod: PBBFAC,,, | Performed by: NURSE PRACTITIONER

## 2020-01-21 NOTE — PROGRESS NOTES
ICD-10-CM ICD-9-CM   1. Osteoarthritis of right knee, unspecified osteoarthritis type M17.11 715.96       Dejah Schmidt presents to clinic today for the first right knee Euflexxa injection.    Exam demonstrates the no effusion in the  right knee, and the skin is intact.    Diagnosis: osteoarthritis knee    After time out was performed and patient ID, side, and site were verified, the  right  knee was sterilly prepped in the standard fashion.  A 22-gauge needle was introduced into right knee joint from an fox-lateral site without complication and knee was then injected with 2 ml of Euflexxa.  Sterile dressing was applied.  The patient was instructed to resume activities as tolerated and to call with any problems.     We will see Dejah Schmidt back next week for the second injection.

## 2020-01-28 ENCOUNTER — OFFICE VISIT (OUTPATIENT)
Dept: ORTHOPEDICS | Facility: CLINIC | Age: 60
End: 2020-01-28
Payer: MEDICARE

## 2020-01-28 VITALS
DIASTOLIC BLOOD PRESSURE: 76 MMHG | HEIGHT: 67 IN | BODY MASS INDEX: 45.99 KG/M2 | SYSTOLIC BLOOD PRESSURE: 127 MMHG | HEART RATE: 65 BPM | WEIGHT: 293 LBS

## 2020-01-28 DIAGNOSIS — M17.11 OSTEOARTHRITIS OF RIGHT KNEE, UNSPECIFIED OSTEOARTHRITIS TYPE: Primary | ICD-10-CM

## 2020-01-28 PROCEDURE — 20610 DRAIN/INJ JOINT/BURSA W/O US: CPT | Mod: RT,S$GLB,, | Performed by: NURSE PRACTITIONER

## 2020-01-28 PROCEDURE — 99499 UNLISTED E&M SERVICE: CPT | Mod: S$GLB,,, | Performed by: NURSE PRACTITIONER

## 2020-01-28 PROCEDURE — 99999 PR PBB SHADOW E&M-EST. PATIENT-LVL III: ICD-10-PCS | Mod: PBBFAC,,, | Performed by: NURSE PRACTITIONER

## 2020-01-28 PROCEDURE — 99499 NO LOS: ICD-10-PCS | Mod: S$GLB,,, | Performed by: NURSE PRACTITIONER

## 2020-01-28 PROCEDURE — 20610 PR DRAIN/INJECT LARGE JOINT/BURSA: ICD-10-PCS | Mod: RT,S$GLB,, | Performed by: NURSE PRACTITIONER

## 2020-01-28 PROCEDURE — 99999 PR PBB SHADOW E&M-EST. PATIENT-LVL III: CPT | Mod: PBBFAC,,, | Performed by: NURSE PRACTITIONER

## 2020-01-28 NOTE — PROGRESS NOTES
ICD-10-CM ICD-9-CM   1. Osteoarthritis of right knee, unspecified osteoarthritis type M17.11 715.96       Dejah Schmidt presents to clinic today for the second right knee Euflexxa injection.    Exam demonstrates the no effusion in the  right knee, and the skin is intact.    Diagnosis: osteoarthritis knee    After time out was performed and patient ID, side, and site were verified, the  right  knee was sterilly prepped in the standard fashion.  A 22-gauge needle was introduced into right knee joint from an fox-medial site, unable to advance needle.  Site was aborted, then anterior lateral knee was prepped and site was injected using a 22-gauge needle without complication and knee was then injected with 2 ml of Euflexxa.  Sterile dressing was applied.  The patient was instructed to resume activities as tolerated and to call with any problems.     We will see Dejah Schmidt back next week for the third injection.

## 2020-01-30 ENCOUNTER — OFFICE VISIT (OUTPATIENT)
Dept: ENDOCRINOLOGY | Facility: CLINIC | Age: 60
End: 2020-01-30
Payer: MEDICARE

## 2020-01-30 VITALS
RESPIRATION RATE: 18 BRPM | DIASTOLIC BLOOD PRESSURE: 80 MMHG | HEIGHT: 67 IN | WEIGHT: 293 LBS | SYSTOLIC BLOOD PRESSURE: 158 MMHG | BODY MASS INDEX: 45.99 KG/M2 | HEART RATE: 56 BPM

## 2020-01-30 DIAGNOSIS — E55.9 VITAMIN D DEFICIENCY DISEASE: Chronic | ICD-10-CM

## 2020-01-30 DIAGNOSIS — I10 ESSENTIAL HYPERTENSION: ICD-10-CM

## 2020-01-30 DIAGNOSIS — E04.2 MULTINODULAR GOITER: Primary | Chronic | ICD-10-CM

## 2020-01-30 DIAGNOSIS — E66.01 MORBID OBESITY: ICD-10-CM

## 2020-01-30 DIAGNOSIS — Z86.39 PERSONAL HISTORY OF OTHER ENDOCRINE, NUTRITIONAL AND METABOLIC DISEASE: ICD-10-CM

## 2020-01-30 DIAGNOSIS — L83 ACANTHOSIS NIGRICANS: ICD-10-CM

## 2020-01-30 PROCEDURE — 99999 PR PBB SHADOW E&M-EST. PATIENT-LVL IV: CPT | Mod: PBBFAC,,, | Performed by: INTERNAL MEDICINE

## 2020-01-30 PROCEDURE — 3008F BODY MASS INDEX DOCD: CPT | Mod: CPTII,S$GLB,, | Performed by: INTERNAL MEDICINE

## 2020-01-30 PROCEDURE — 3079F DIAST BP 80-89 MM HG: CPT | Mod: CPTII,S$GLB,, | Performed by: INTERNAL MEDICINE

## 2020-01-30 PROCEDURE — 99999 PR PBB SHADOW E&M-EST. PATIENT-LVL IV: ICD-10-PCS | Mod: PBBFAC,,, | Performed by: INTERNAL MEDICINE

## 2020-01-30 PROCEDURE — 3079F PR MOST RECENT DIASTOLIC BLOOD PRESSURE 80-89 MM HG: ICD-10-PCS | Mod: CPTII,S$GLB,, | Performed by: INTERNAL MEDICINE

## 2020-01-30 PROCEDURE — 3008F PR BODY MASS INDEX (BMI) DOCUMENTED: ICD-10-PCS | Mod: CPTII,S$GLB,, | Performed by: INTERNAL MEDICINE

## 2020-01-30 PROCEDURE — 99204 PR OFFICE/OUTPT VISIT, NEW, LEVL IV, 45-59 MIN: ICD-10-PCS | Mod: S$GLB,,, | Performed by: INTERNAL MEDICINE

## 2020-01-30 PROCEDURE — 3077F SYST BP >= 140 MM HG: CPT | Mod: CPTII,S$GLB,, | Performed by: INTERNAL MEDICINE

## 2020-01-30 PROCEDURE — 3077F PR MOST RECENT SYSTOLIC BLOOD PRESSURE >= 140 MM HG: ICD-10-PCS | Mod: CPTII,S$GLB,, | Performed by: INTERNAL MEDICINE

## 2020-01-30 PROCEDURE — 99499 RISK ADDL DX/OHS AUDIT: ICD-10-PCS | Mod: S$GLB,,, | Performed by: INTERNAL MEDICINE

## 2020-01-30 PROCEDURE — 99499 UNLISTED E&M SERVICE: CPT | Mod: S$GLB,,, | Performed by: INTERNAL MEDICINE

## 2020-01-30 PROCEDURE — 99204 OFFICE O/P NEW MOD 45 MIN: CPT | Mod: S$GLB,,, | Performed by: INTERNAL MEDICINE

## 2020-01-30 NOTE — PROGRESS NOTES
ENDOCRINOLOGY INITIAL VISIT  01/30/2020    Reason for Visit:  Dejah Schmidt is a 59 y.o. female referred by Dasha Bergeron MD for evaluation of nonfunctional MNG causing compressive symptoms.    HPI:  She was previously seen by Dr. Laws in 4/2016 for MNG.  She feels neck is larger and feels chocking sensation at night + pressure in neck.       She initially had a right dominant nodule of 2.8 x1.9 cm and underwent FNA of this nodule x2 on 1/5/12  and 2014 both showed benign pathology.     Had repeat US March 2016 - showed multinodular thyroid with  two nodules on the right and left that meet FNA criteria. FNA 4/2016 of both R and L nodules were benign.    She has not had repeat imaging since 2016.     Risk Factors for Thyroid Cancer:  Hx of External Beam Radiation: denies  Family Hx of Thyroid Cancer:denies    There is no known disorder of thyroid function.    Recent TSH:    Lab Results   Component Value Date    TSH 0.962 12/26/2019     Thyroid Symptoms    Weight change:  [x]  Gain []  Loss  []  Denies   Up 30 lbs in the past year    Temperature intolerance:  []  Cold [x]  Hot (hot flashes)  []  Denies     GI:  []  Diarrhea [x]  Constipation []  Denies    Integument:  []  Hair loss [x]  Dry skin  []  Denies    Other:  [x]  Palpitation []  tremor     []  Increased anxiety    []  Denies     Hypertension:  On 5 BP meds, was missing some doses until 1 mo ago started using pill box.  BP still high.    Home BP 140s/70s   Denies CP.  +IVAN  Denies vision changes.  + HA (even when BP normal)  presbed flexeril for HA but hasn't taken in several wks  Denies easy bruising    With regards to the vitamin d deficiency:  Ergocalciferol 50 k weekly for several year, missing doses, just started using pill box  No recent fractures.  Hx of foot fracture after jumping >10 year ago, no fragility fractures    Lab Results   Component Value Date    [id number] 20 (L) 12/26/2019        Past Medical History:   Diagnosis Date     Asthma     Hypercholesteremia     Hypertension     Multiple thyroid nodules        Past Surgical History:   Procedure Laterality Date    CARPAL TUNNEL RELEASE      FOOT SURGERY      HYSTERECTOMY      MASS EXCISION      at neck    ROTATOR CUFF REPAIR      left shoulder    TRIGGER FINGER RELEASE  12/2017    right hand       Review of patient's allergies indicates:  No Known Allergies      Current Outpatient Medications:     amLODIPine (NORVASC) 10 MG tablet, Take 1 tablet (10 mg total) by mouth once daily., Disp: 90 tablet, Rfl: 1    atenolol (TENORMIN) 25 MG tablet, Take 1 tablet (25 mg total) by mouth once daily., Disp: 90 tablet, Rfl: 1    atorvastatin (LIPITOR) 40 MG tablet, Take 1 tablet (40 mg total) by mouth once daily., Disp: 90 tablet, Rfl: 1    cholecalciferol, vitamin D3, 50,000 unit Tab, Take 1 tablet by mouth once a week., Disp: 12 tablet, Rfl: 3    lisinopril (PRINIVIL,ZESTRIL) 40 MG tablet, TAKE 1 TABLET(40 MG) BY MOUTH EVERY DAY, Disp: 90 tablet, Rfl: 0    NEURONTIN 300 mg capsule, Take one caps at bedtime x 3 days, than increase to 2 caps at bedtime x 3 days, than 1 caps in am and 2 caps at bedtime., Disp: 90 capsule, Rfl: 2    oxycodone-acetaminophen (PERCOCET) 5-325 mg per tablet, Take 1 tablet by mouth every 6 (six) hours as needed for Pain., Disp: 90 tablet, Rfl: 0    potassium chloride SA (K-DUR,KLOR-CON) 10 MEQ tablet, Take 1 tablet (10 mEq total) by mouth once daily., Disp: 90 tablet, Rfl: 1    triamterene-hydrochlorothiazide 37.5-25 mg (DYAZIDE) 37.5-25 mg per capsule, Take 1 capsule by mouth every morning., Disp: 90 capsule, Rfl: 1    Current Facility-Administered Medications:     sodium hyaluronate (EUFLEXXA) 10 mg/mL(mw 2.4 -3.6 million) Syrg 20 mg, 20 mg, Intra-articular, Weekly, Chadd Vaz NP, 20 mg at 01/28/20 1603    Social History     Socioeconomic History    Marital status:      Spouse name: Not on file    Number of children: Not on file    Years  "of education: Not on file    Highest education level: Not on file   Occupational History    Not on file   Social Needs    Financial resource strain: Not very hard    Food insecurity:     Worry: Never true     Inability: Never true    Transportation needs:     Medical: Yes     Non-medical: Yes   Tobacco Use    Smoking status: Never Smoker    Smokeless tobacco: Never Used   Substance and Sexual Activity    Alcohol use: Yes     Alcohol/week: 0.0 standard drinks     Frequency: Monthly or less     Drinks per session: 3 or 4     Binge frequency: Never     Comment: once a month    Drug use: No    Sexual activity: Yes   Lifestyle    Physical activity:     Days per week: 0 days     Minutes per session: 10 min    Stress: To some extent   Relationships    Social connections:     Talks on phone: More than three times a week     Gets together: Once a week     Attends Confucianism service: Not on file     Active member of club or organization: Yes     Attends meetings of clubs or organizations: 1 to 4 times per year     Relationship status:    Other Topics Concern    Not on file   Social History Narrative    LIVIA ashli receiving [employer]. No physical activities       Family History   Problem Relation Age of Onset    Cancer Mother     Coronary artery disease Mother     Heart disease Mother     Hypertension Mother     Hyperlipidemia Mother     Hypertension Father     Cancer Father     Heart disease Maternal Grandmother     Hypertension Maternal Grandmother        REVIEW OF SYSTEMS  A 14 point ROS was obtained with pertinent positives and negatives per HPI, all others negative.       PHYSICAL EXAM  BP (!) 158/80   Pulse (!) 56   Resp 18   Ht 5' 7" (1.702 m)   Wt (!) 139.5 kg (307 lb 10.4 oz)   BMI 48.19 kg/m²   Wt Readings from Last 3 Encounters:   01/30/20 (!) 139.5 kg (307 lb 10.4 oz)   01/28/20 (!) 141.1 kg (311 lb 1.5 oz)   01/21/20 (!) 139.8 kg (308 lb 3.3 oz)   ]    Constitutional:  Pleasant,  " in no acute distress.   HENT:   Head:    Normocephalic and atraumatic.   Mouth/Throat:   Oropharynx is clear and moist. No oropharyngeal exudate.   Eyes:    EOMI. No scleral icterus.   Neck:    Trachea midline.  Large right sided thyroid nodule (~3 cm), no cervical LAD  Cardiovascular:  Normal rate, regular rhythm, no murmurs.  No carotid bruits.  Respiratory:   Effort normal and breath sounds normal.   Gastrointestinal: Soft, nontender  Neurological:  No tremor, normal speech  Skin:    Skin is warm, dry, + acanthosis, normal light stretch marks on abdomen.    Psych:   Normal mood and affect.   Extremity:  No edema or wounds    LABORATORY REVIEW:  Thyroid Labs Latest Ref Rng & Units 4/5/2017 6/15/2017 12/26/2019   TSH 0.400 - 4.000 uIU/mL - 0.561 0.962   Free T4 0.71 - 1.51 ng/dl - - -   Sodium 136 - 145 mmol/L - 140 141   Potassium 3.5 - 5.1 mmol/L - 4.4 4.6   Chloride 95 - 110 mmol/L - 103 107   Carbon Dioxide 23 - 29 mmol/L - 25 29   Glucose 70 - 110 mg/dL - 98 111(H)   Blood Urea Nitrogen 6 - 20 mg/dL - 16 18   Creatinine 0.5 - 1.4 mg/dL - 1.0 1.0   Calcium 8.7 - 10.5 mg/dL - 9.7 9.3   Total Protein 6.0 - 8.4 g/dL - 8.4 7.6   Albumin 3.5 - 5.2 g/dL - 3.9 3.9   Total Bilirubin 0.1 - 1.0 mg/dL - 0.7 0.8   AST 10 - 40 U/L - 21 20   ALT 10 - 44 U/L - 23 20   Anion Gap 8 - 16 mmol/L - 12 5(L)   eGFR (African American) >60 mL/min/1.73 m:2 - >60.0 >60   eGFR (Non-African American) >60 mL/min/1.73 m:2 - >60.0 >60   WBC 3.90 - 12.70 K/uL - 6.51 5.86   RBC 4.00 - 5.40 M/uL - 5.37 4.52   Hemoglobin 12.0 - 16.0 g/dL - 15.3 13.0   Hematocrit 37.0 - 48.5 % 42 46.6 39.5   MCV 82 - 98 fL - 87 87   MCH 27.0 - 31.0 pg - 28.5 28.8   MCHC 32.0 - 36.0 g/dL - 32.8 32.9   RDW 11.5 - 14.5 % - 13.6 14.2   Platelets 150 - 350 K/uL - 262 235   MPV 9.2 - 12.9 fL - 10.9 10.6   Gran # 1.8 - 7.7 K/uL - 3.5 3.5   Lymph # 1.0 - 4.8 K/uL - 2.4 2.0   Mono # 0.3 - 1.0 K/uL - 0.4 0.4   Eos # 0.0 - 0.5 K/uL - 0.3 0.1   Baso # 0.00 - 0.20 K/uL - 0.02  0.01   Gran % 38.0 - 73.0 % - 53.0 59.0   Lymph % 18.0 - 48.0 % - 37.2 33.3   Mono% 4.0 - 15.0 % - 5.5 6.0   Eos % 0.0 - 8.0 % - 3.8 1.5   Baso % 0.0 - 1.9 % - 0.3 0.2   Prothrombin Time 9.0 - 12.5 sec - 10.0 -   INR 0.8 - 1.2 - 0.9 -   aPTT 21.0 - 32.0 sec - 26.9 -        IMAGING STUDIES  See above     ASSESSMENT/PLAN    Problem List Items Addressed This Visit        1 - High    Multinodular goiter - Primary (Chronic)     Will check neck US and if R and L nodules with growth or concerning changes will need to repeat biopsy prior to referral for total thyroidectomy for compressive symptoms.  Patient would like to have thyroidectomy and is aware of need for lifelong LT4 replacement after surgery.         Relevant Orders    US Soft Tissue Head Neck Thyroid       2     Unspecified essential hypertension    Relevant Orders    Aldosterone    Basic metabolic panel    Renin    Metanephrines, Plasma Free       Unprioritized    Morbid obesity    Relevant Orders    Hemoglobin A1c      Other Visit Diagnoses     Acanthosis nigricans        Relevant Orders    Hemoglobin A1c    Personal history of other endocrine, nutritional and metabolic disease         Relevant Orders    Hemoglobin A1c          RTC 2 mo    Yajaira Yanez MD

## 2020-01-30 NOTE — LETTER
February 2, 2020      Dasha Bergeron MD  [address]  [place] [address]           [facility] - Endocrinology [address]th [place]  [address]  [place] [address]  Phone: [phone]  Fax: [phone]          Patient: Dejah Schmidt   MR Number: [id number]   YOB: 1960   Date of Visit: 1/30/2020       Dear Dr. Dasha Bergeron:    Thank you for referring Dejah Schmidt to me for evaluation. Attached you will find relevant portions of my assessment and plan of care.    If you have questions, please do not hesitate to call me. I look forward to following Dejah Schmidt along with you.    Sincerely,    Yajaira Yanez MD    Enclosure  CC:  No Recipients    If you would like to receive this communication electronically, please contact [email or web address] or [phone] to request more information on [facility] access.    For providers and/or their staff who would like to refer a patient to [facility], please contact us through our one-stop-shop provider referral line, [facility], at [phone].    If you feel you have received this communication in error or would no longer like to receive these types of communications, please e-mail [email or web address]

## 2020-02-03 NOTE — ASSESSMENT & PLAN NOTE
Persistently low on 50K weekly but missing many doses.  Now compliant 2/2 pill box.  Will recheck in 6 mo ~7/2020

## 2020-02-03 NOTE — ASSESSMENT & PLAN NOTE
Will check neck US and if R and L nodules with growth or concerning changes will need to repeat biopsy prior to referral for total thyroidectomy for compressive symptoms.  Patient would like to have thyroidectomy and is aware of need for lifelong LT4 replacement after surgery.

## 2020-02-04 ENCOUNTER — OFFICE VISIT (OUTPATIENT)
Dept: ORTHOPEDICS | Facility: CLINIC | Age: 60
End: 2020-02-04
Payer: MEDICARE

## 2020-02-04 VITALS — BODY MASS INDEX: 45.99 KG/M2 | WEIGHT: 293 LBS | HEIGHT: 67 IN

## 2020-02-04 DIAGNOSIS — M17.11 OSTEOARTHRITIS OF RIGHT KNEE, UNSPECIFIED OSTEOARTHRITIS TYPE: Primary | ICD-10-CM

## 2020-02-04 PROCEDURE — 99999 PR PBB SHADOW E&M-EST. PATIENT-LVL III: ICD-10-PCS | Mod: PBBFAC,,, | Performed by: NURSE PRACTITIONER

## 2020-02-04 PROCEDURE — 99499 NO LOS: ICD-10-PCS | Mod: S$GLB,,, | Performed by: NURSE PRACTITIONER

## 2020-02-04 PROCEDURE — 20610 PR DRAIN/INJECT LARGE JOINT/BURSA: ICD-10-PCS | Mod: RT,S$GLB,, | Performed by: NURSE PRACTITIONER

## 2020-02-04 PROCEDURE — 99499 UNLISTED E&M SERVICE: CPT | Mod: S$GLB,,, | Performed by: NURSE PRACTITIONER

## 2020-02-04 PROCEDURE — 20610 DRAIN/INJ JOINT/BURSA W/O US: CPT | Mod: RT,S$GLB,, | Performed by: NURSE PRACTITIONER

## 2020-02-04 PROCEDURE — 99999 PR PBB SHADOW E&M-EST. PATIENT-LVL III: CPT | Mod: PBBFAC,,, | Performed by: NURSE PRACTITIONER

## 2020-02-04 NOTE — PROGRESS NOTES
ICD-10-CM ICD-9-CM   1. Osteoarthritis of right knee, unspecified osteoarthritis type M17.11 715.96       Dejah Schmidt presents to clinic today for the third right knee Euflexxa injection.    Exam demonstrates the no effusion in the  right knee, and the skin is intact.    Diagnosis: osteoarthritis knee    After time out was performed and patient ID, side, and site were verified, the  right  knee was sterilly prepped in the standard fashion.  A 22-gauge needle was introduced into right knee joint from an fox-lateral site without complication and knee was then injected with 2 ml of Euflexxa.  Sterile dressing was applied.  The patient was instructed to resume activities as tolerated and to call with any problems.     We will see Dejah Schmidt back in 6 months or sooner if pain persist.

## 2020-02-09 NOTE — PROGRESS NOTES
Subjective:       Patient ID: Dejah Schmidt is a 59 y.o. female.    Chief Complaint: Consult    CC: weight     Current attempts at weight loss: New pt to me, referred by Chadd Vaz, NP  [address]  [place] [address] , with Patient Active Problem List:     Unspecified essential hypertension     Multinodular goiter     Morbid obesity     Vitamin D deficiency disease     Chronic back pain     Lumbar radiculopathy     Facet arthropathy, lumbar     Facet hypertrophy of lumbar region     Hand pain     Herniation of lumbar intervertebral disc with radiculopathy     Lab Results       Component                Value               Date                       TSH                      0.962               12/26/2019                 FREET4                   0.94                03/04/2009              Has been getting knee inj. No relief as of yet, but just finished them. Does drink a lot of soda.      Previous diet attempts:  denies    History of medication for loss: Went to weight loss clinic with pills and shots years ago.   checked today     Heaviest weight: 305#    Lightest weight: 135#    Goal weight: 175#      Last eye exam:    Last year. No glaucoma per pt.                                    Provider:    Typical eating patterns: Disabled. Lives with sone and nephew. Pt does cooking.   Breakfast:  Skips. Cereal. Grits and egg, oatmeal.     Lunch: skips. Same as dinner.     Dinner: beans and rice. Smothered or fried chicken or pork chops. Broccoli, peas, mixed veg. If out- Japanese- noodles, steak and shrimp w broccoli.     Snacks: cheetos. fruit    Beverages: Soda- up to 12/day. Very little water. Whiskey and coke max few times a month.     Willingness to change:      EKG:  Normal sinus rhythm  Normal ECG  When compared with ECG of 05-APR-2017 13:40,  Right axis deviation No longer present    BMR: 1791 (inbody)    Review of Systems   Constitutional: Negative for chills and fever.   Respiratory:  "Negative for shortness of breath.         + snores. + PSG in past, never got machine.    Cardiovascular: Positive for leg swelling. Negative for chest pain.   Gastrointestinal: Positive for constipation. Negative for diarrhea.        Denies GERD   Genitourinary: Negative for difficulty urinating and dysuria.   Musculoskeletal: Positive for arthralgias and back pain.   Neurological: Positive for light-headedness. Negative for dizziness.   Psychiatric/Behavioral: Negative for dysphoric mood. The patient is not nervous/anxious.        Objective:     /74   Pulse 78   Ht 5' 7" (1.702 m)   Wt (!) 138.3 kg (305 lb)   BMI 47.77 kg/m²    Physical Exam   Constitutional: She is oriented to person, place, and time. She appears well-developed. No distress.   Morbidly obese    HENT:   Head: Normocephalic and atraumatic.   Eyes: Pupils are equal, round, and reactive to light. EOM are normal. No scleral icterus.   Neck: Normal range of motion. Neck supple. No thyromegaly present.   Cardiovascular: Normal rate and normal heart sounds. Exam reveals no gallop and no friction rub.   No murmur heard.  Pulmonary/Chest: Effort normal and breath sounds normal. No respiratory distress. She has no wheezes.   Abdominal: Soft. Bowel sounds are normal. She exhibits no distension. There is no tenderness.   Musculoskeletal: Normal range of motion. She exhibits no edema.   Neurological: She is alert and oriented to person, place, and time. No cranial nerve deficit.   Skin: Skin is warm and dry. No erythema.   Psychiatric: She has a normal mood and affect. Her behavior is normal. Judgment normal.   Vitals reviewed.      Assessment:       1. Class 3 severe obesity due to excess calories with serious comorbidity and body mass index (BMI) of 45.0 to 49.9 in adult    2. Excessive consumption of soda pop    3. Herniation of lumbar intervertebral disc with radiculopathy        Plan:         1. Class 3 severe obesity due to excess calories with " serious comorbidity and body mass index (BMI) of 45.0 to 49.9 in adult    Increase low impact activity as tolerated.  Avoid high impact activity, very heavy lifting or other exercise regimens that may cause discomfort.         No soda, sweet tea, juices or lemonade. All drinks should be 5 calories or less.       3 meals a day made up of the following:  Unlimited green vegetables, tomatoes, mushrooms, spaghetti squash, cauliflower, meat, poultry, seafood, eggs and hard cheeses.   Milk and plain yogurt  Dressings, seasonings, condiments, etc should have less than 2 g sugars.   Beans (1-1.5 cups) or nuts (1/4 cup) can have 1 x a day.   1-2 servings of citrus fruits, berries, pineapple or melon a day (1/2 cup)  Avoid fried foods    No sweets, grains, rice, pasta, potatoes, bread, corn, peas, oatmeal, grits, tortillas, crackers, chips    No soda, sweet tea, juices or lemonade. All drinks should be 5 calories or less.     [email or web address] for recipes. Moderate carb intake      2. Excessive consumption of soda pop  Patient was informed that topiramate is used for migraine prevention and seizures. Weight loss is a common side effect that is well documented. S/he understands this. S/he was informed of the potential side effects such as serious and possibly fatal rash in which case the medication should be discontinued immediately. Paresthesias, forgetfulness, fatigue, kidney stones, GI symptoms, and changes in lab values such as electrolytes, blood counts and kidney function.    Start topiramate  in the evening for 1 week, then morning and evening.       - topiramate (TOPAMAX) 25 MG tablet; Take 1 tablet (25 mg total) by mouth 2 (two) times daily.  Dispense: 60 tablet; Refill: 3    3. Herniation of lumbar intervertebral disc with radiculopathy  Increase low impact activity as tolerated.  Avoid high impact activity, very heavy lifting or other exercise regimens that may cause discomfort.

## 2020-02-10 ENCOUNTER — INITIAL CONSULT (OUTPATIENT)
Dept: BARIATRICS | Facility: CLINIC | Age: 60
End: 2020-02-10
Payer: MEDICARE

## 2020-02-10 VITALS
SYSTOLIC BLOOD PRESSURE: 122 MMHG | HEIGHT: 67 IN | WEIGHT: 293 LBS | BODY MASS INDEX: 45.99 KG/M2 | HEART RATE: 78 BPM | DIASTOLIC BLOOD PRESSURE: 74 MMHG

## 2020-02-10 DIAGNOSIS — E66.01 CLASS 3 SEVERE OBESITY DUE TO EXCESS CALORIES WITH SERIOUS COMORBIDITY AND BODY MASS INDEX (BMI) OF 45.0 TO 49.9 IN ADULT: ICD-10-CM

## 2020-02-10 DIAGNOSIS — Z91.89 EXCESSIVE CONSUMPTION OF SODA POP: ICD-10-CM

## 2020-02-10 DIAGNOSIS — M51.16 HERNIATION OF LUMBAR INTERVERTEBRAL DISC WITH RADICULOPATHY: ICD-10-CM

## 2020-02-10 PROCEDURE — 3078F DIAST BP <80 MM HG: CPT | Mod: CPTII,S$GLB,, | Performed by: INTERNAL MEDICINE

## 2020-02-10 PROCEDURE — 99215 OFFICE O/P EST HI 40 MIN: CPT | Mod: S$GLB,,, | Performed by: INTERNAL MEDICINE

## 2020-02-10 PROCEDURE — 3074F PR MOST RECENT SYSTOLIC BLOOD PRESSURE < 130 MM HG: ICD-10-PCS | Mod: CPTII,S$GLB,, | Performed by: INTERNAL MEDICINE

## 2020-02-10 PROCEDURE — 3008F BODY MASS INDEX DOCD: CPT | Mod: CPTII,S$GLB,, | Performed by: INTERNAL MEDICINE

## 2020-02-10 PROCEDURE — 3078F PR MOST RECENT DIASTOLIC BLOOD PRESSURE < 80 MM HG: ICD-10-PCS | Mod: CPTII,S$GLB,, | Performed by: INTERNAL MEDICINE

## 2020-02-10 PROCEDURE — 99999 PR PBB SHADOW E&M-EST. PATIENT-LVL III: ICD-10-PCS | Mod: PBBFAC,,, | Performed by: INTERNAL MEDICINE

## 2020-02-10 PROCEDURE — 99215 PR OFFICE/OUTPT VISIT, EST, LEVL V, 40-54 MIN: ICD-10-PCS | Mod: S$GLB,,, | Performed by: INTERNAL MEDICINE

## 2020-02-10 PROCEDURE — 3008F PR BODY MASS INDEX (BMI) DOCUMENTED: ICD-10-PCS | Mod: CPTII,S$GLB,, | Performed by: INTERNAL MEDICINE

## 2020-02-10 PROCEDURE — 99999 PR PBB SHADOW E&M-EST. PATIENT-LVL III: CPT | Mod: PBBFAC,,, | Performed by: INTERNAL MEDICINE

## 2020-02-10 PROCEDURE — 3074F SYST BP LT 130 MM HG: CPT | Mod: CPTII,S$GLB,, | Performed by: INTERNAL MEDICINE

## 2020-02-10 RX ORDER — TOPIRAMATE 25 MG/1
25 TABLET ORAL 2 TIMES DAILY
Qty: 60 TABLET | Refills: 3 | Status: ON HOLD | OUTPATIENT
Start: 2020-02-10 | End: 2020-03-01 | Stop reason: HOSPADM

## 2020-02-10 NOTE — PATIENT INSTRUCTIONS
Patient was informed that topiramate is used for migraine prevention and seizures. Weight loss is a common side effect that is well documented. S/he understands this. S/he was informed of the potential side effects such as serious and possibly fatal rash in which case the medication should be discontinued immediately. Paresthesias, forgetfulness, fatigue, kidney stones, GI symptoms, and changes in lab values such as electrolytes, blood counts and kidney function.    Start topiramate  in the evening for 1 week, then morning and evening.       Increase low impact activity as tolerated.  Avoid high impact activity, very heavy lifting or other exercise regimens that may cause discomfort.         No soda, sweet tea, juices or lemonade. All drinks should be 5 calories or less.       3 meals a day made up of the following:  Unlimited green vegetables, tomatoes, mushrooms, spaghetti squash, cauliflower, meat, poultry, seafood, eggs and hard cheeses.   Milk and plain yogurt  Dressings, seasonings, condiments, etc should have less than 2 g sugars.   Beans (1-1.5 cups) or nuts (1/4 cup) can have 1 x a day.   1-2 servings of citrus fruits, berries, pineapple or melon a day (1/2 cup)  Avoid fried foods    No sweets, grains, rice, pasta, potatoes, bread, corn, peas, oatmeal, grits, tortillas, crackers, chips    No soda, sweet tea, juices or lemonade. All drinks should be 5 calories or less.     Www.dietdoctor.com for recipes. Moderate carb intake      Meal Ideas for Regular Bariatric Diet  *Recipes and products available at www.bariatriceating.com      Breakfast: (15-20g protein)    - Egg white omelet: 2 egg whites or ½ cup Egg Beaters. (Optional proteins: cheese, shrimp, black beans, chicken, sliced turkey) (Optional veggies: tomatoes, salsa, spinach, mushrooms, onions, green peppers, or small slice avocado)     - Egg and sausage: 1 egg or ¼ cup Egg Beaters (any variety), with 1 elisabeth or 2 links of Turkey sausage or Veggie  breakfast sausage (MightyMeeting or Bharat Matrimony)    - Crust-less breakfast quiche: To make a glass pie dish, mix 4oz part skim Ricotta, 1 cup skim milk, and 2 eggs as your base. Add protein: shredded cheese, sliced lean ham or turkey, turkey bolton/sausage. Add veggies: tomato, onion, green onion, mushroom, green pepper, spinach, etc.    - Yogurt parfait: Mix 1 - 6oz container Dannon Light N Fit vanilla yogurt, with ¼ cup Kashi Go Lean cereal    - Cottage cheese and fruit: ½ cup part-skim cottage cheese or ricotta cheese topped with fresh fruit or sugar free preserves     - Rosanna Yeh's Vanilla Egg custard* (add 2 Tbsp instant coffee granules to make Cappuccino Custard*)    - Hi-Protein café latte (skim milk, decaf coffee, 1 scoop protein powder). Optional to add Sugar free syrup or extract flavoring.    Lunch: (20-30g protein)    - ½ cup Black bean soup (Homemade or Progresso), with ¼ cup shredded low-fat cheese. Top with chopped tomato or fresh salsa.     - Lean deli turkey breast and low-fat sliced cheese, mustard or light dumas to moisten, rolled up together, or wrapped in a Miquel lettuce leaf    - Chicken salad made from dinner leftovers, moisten with low-fat salad dressing or light dumas. Also try leftover salmon, shrimp, tuna or boiled eggs. Serve ½ cup over dark green salad    - Fat-free canned refried beans, topped with ¼ cup shredded low-fat cheese. Top with chopped tomato or fresh salsa.     - Greek salad: Top mixed greens with 1-2oz grilled chicken, tomatoes, red onions, 2-3 kalamata olives, and sprinkle lightly with feta cheese. Spritz with Balsamic vinegar to taste.     - Crust-less lunch quiche: To make a glass pie dish, mix 4oz part skim Ricotta, 1 cup skim milk, and 2 eggs as your base. Add protein: shredded cheese, sliced lean ham or turkey, shrimp, chicken. Add veggies: tomato, onion, green onion, mushroom, green pepper, spinach, artichoke, broccoli, etc.    - Pizza bake: tomato sauce, low-fat  shredded mozzarella and turkey pepperoni or Mosotho bolton. Add any veggies.    - Cucumber crab bites: Spread ¼ cup crab dip (lump crabmeat + light cream cheese and green onions) over sliced cucumber.     - Chicken with light spinach and artichoke dip*: Puree in : 6oz cooked and drained spinach, 2 cloves garlic, 1 can cannelloni beans, ½ cup chopped green onions, 1 can drained artichoke hearts (not marinated in oil), lemon juice and basil. Mix in 2oz chopped up chicken.    Supper: (20-30g protein)    - Serve grilled fish over dark green salad tossed with low-fat dressing, served with grilled asparagus haley     - Rotisserie chicken salad: served with sliced strawberries, walnuts, fat-free feta cheese crumbles and 1 tbsp Adlers Own Light Raspberry [place] Vinaigrette    - Shrimp cocktail: Dip cold boiled shrimp in homemade low-sugar cocktail sauce (1/2 cup Marylin One Carb ketchup, 2 tbsp horseradish, 1/4 tsp hot sauce, 1 tsp Worcestershire sauce, 1 tbsp freshly-squeezed lemon juice). Serve with dark green salad, walnuts, and crumbled blue cheese drizzled with olive oil and Balsamic vinegar    - Tuna Melt: Spread tuna salad onto 2 thick slices of tomato. Top with low-fat cheese and broil until cheese is melted. May also be made with chicken salad of shrimp salad. Licking with different types of cheeses.    - Homemade low-fat Chili using extra lean ground beef or ground turkey. Top with shredded cheese and salsa as desired. May add dollop fat-free sour cream if desired    - Dinner Omelet with shrimp or chicken and onion, green peppers and chives.    - No noodle lasagna: Use sliced zucchini or eggplant in place of noodles.  Layer with part skim ricotta cheese and low sugar meat sauce (use very lean ground beef or ground turkey).    - Mexican chicken bake: Bake chunks of chicken breast or thigh with taco seasoning, Pace brand enchilada sauce, green onions and low-fat cheese. Serve with ¼ cup black  beans or fat free refried beans topped with chopped tomatoes or salsa.    - Yulissa frozen meatballs, simmered in Classico Marinara sauce. Different flavors of salsa or spaghetti sauce create different dishes! Sprinkle with parmesan cheese. Serve with grilled or steamed veggies, or a dark green salad.    - Simmer boneless skinless chicken thigh chunks in Classico Marinara sauce or roasted salsa until tender with chopped onion, bell pepper, garlic, mushrooms, spinach, etc.     - Hamburger, without the bun, dressed the way you like. Served with grilled or steamed veggies.    - Eggplant parmesan: Bake slices of eggplant at 350 degrees for 15 minutes. Layer tomato sauce, sliced eggplant and low-fat mozzarella cheese in a baking dish and cover with foil. Bake 30-40 more minutes or until bubbly. Uncover and bake at 400 degrees for about 15 more minutes, or until top is slightly crisp.    - Fish tacos: grilled/baked white fish, wrapped in Miquel lettuce leaf, topped with salsa, shredded low-fat cheese, and light coleslaw.    Snacks: (100-200 calories; >5g protein)    - 1 low-fat cheese stick with 8 cherry tomatoes or 1 serving fresh fruit  - 4 thin slices fat-free turkey breast and 1 slice low-fat cheese  - 4 thin slices fat-free honey ham with wedge of melon  - 1/4 cup unsalted nuts with ½ cup fruit  - 6-oz container Dannon Light n Fit vanilla yogurt, topped with 1oz unsalted nuts         - apple, celery or baby carrots spread with 2 Tbsp natural peanut butter or almond butter   - apple slices with 1 oz slice low-fat cheese  - celery, cucumber, bell pepper or baby carrots dipped in ¼ cup hummus bean spread or light spinach and artichoke dip (*recipe in lunch section)  - 100 calorie bag microwave light popcorn with 3 tbsp grated parmesan cheese  - Kasi Links Beef Steak - 14g protein! (similar to beef jerky)  - 2 wedges Laughing Cow - Light Herb & Garlic Cheese with sliced cucumber or green bell pepper  - 1/2 cup  low-fat cottage cheese with ¼ cup fruit or ¼ cup salsa  - RTD Protein drinks: Atkins, Low Carb Slim Fast, EAS light, Muscle Milk Light, etc.  - Homemade Protein drinks: GNC Soy95, Isopure, Nectar, UNJURY, Whey Gourmet, etc. Mix 1 scoop powder with 8oz skim/1% milk or light soymilk.  - Protein bars: Atkins, EAS, Pure Protein, Think Thin, Detour, etc. Must have 0-4 grams sugar - Read the label.    Takeout Options: No more than twice/week  Deli - Salads (no pasta or rice), meats, cheeses. Roasted chicken. Lox (salmon)    Mexican - Platters which don't include tortillas, chips, or rice. Go easy on the beans. Example: Fajitas without the tortillas. Ask the  not to bring chips to the table if they are too tempting.    Greek - Meat or fish and vegetable, but no bread or rice. Including hummus, baba ganoush, etc, is OK. Most sit-down Greek restaurants can provide you with cucumber slices for dipping instead of vanessa bread.    Fast Food (Avoid as much as possible) - Salads (no croutons and limit salad dressing to 2 tbsp), grilled chicken sandwich without the bun and ask for no dumas. Shilpas low fat chili or Taco Bell pintos and cheese.    BBQ - The meats are fine if you ask for sauces on the side, but most of the traditional side dishes are loaded with carbs. Italo slaw, baked beans and BBQ sauce are typically made with sugar.    Chinese - Nothing deep-fried, no rice or noodles. Many Chinese sauces have starch and sugar in them, so you'll have to use your judgement. If you find that these sauces trigger cravings, or cause Dumping, you can ask for the sauce to be made without sugar or just use soy sauce.

## 2020-02-10 NOTE — LETTER
February 11, 2020      Chadd Vaz, ROSAURA  [address]  [facility] [address]           [facility]  [address]  [facility] [address]  Phone: [phone]  Fax: [phone]          Patient: Dejah Schmidt   MR Number: [id number]   YOB: 1960   Date of Visit: 2/10/2020       Dear Chadd Vaz:    Thank you for referring Dejah Schmidt to me for evaluation. Attached you will find relevant portions of my assessment and plan of care.    If you have questions, please do not hesitate to call me. I look forward to following Dejah Schmidt along with you.    Sincerely,    Anel Rader MD    Enclosure  CC:  No Recipients    If you would like to receive this communication electronically, please contact [email or web address] or [phone] to request more information on [facility] [employer] access.    For providers and/or their staff who would like to refer a patient to [facility], please contact us through our one-stop-shop provider referral line, [facility], at [phone].    If you feel you have received this communication in error or would no longer like to receive these types of communications, please e-mail [email or web address]

## 2020-02-28 ENCOUNTER — TELEPHONE (OUTPATIENT)
Dept: ENDOCRINOLOGY | Facility: CLINIC | Age: 60
End: 2020-02-28

## 2020-02-28 ENCOUNTER — HOSPITAL ENCOUNTER (OUTPATIENT)
Dept: RADIOLOGY | Facility: HOSPITAL | Age: 60
Discharge: HOME | End: 2020-02-28
Attending: INTERNAL MEDICINE
Payer: MEDICARE

## 2020-02-28 ENCOUNTER — HOSPITAL ENCOUNTER (OUTPATIENT)
Dept: ENDOCRINOLOGY | Facility: CLINIC | Age: 60
Discharge: HOME | End: 2020-02-28
Attending: INTERNAL MEDICINE
Payer: MEDICARE

## 2020-02-28 ENCOUNTER — HOSPITAL ENCOUNTER (OUTPATIENT)
Facility: HOSPITAL | Age: 60
Discharge: HOME | End: 2020-03-01
Attending: EMERGENCY MEDICINE | Admitting: HOSPITALIST
Payer: MEDICARE

## 2020-02-28 DIAGNOSIS — N17.9 AKI (ACUTE KIDNEY INJURY): Primary | ICD-10-CM

## 2020-02-28 DIAGNOSIS — E04.2 MULTINODULAR GOITER: Chronic | ICD-10-CM

## 2020-02-28 DIAGNOSIS — Z12.31 ENCOUNTER FOR SCREENING MAMMOGRAM FOR BREAST CANCER: ICD-10-CM

## 2020-02-28 DIAGNOSIS — E87.5 HYPERKALEMIA: ICD-10-CM

## 2020-02-28 LAB
ALBUMIN SERPL BCP-MCNC: 4.2 G/DL (ref 3.5–5.2)
ALP SERPL-CCNC: 79 U/L (ref 55–135)
ALT SERPL W/O P-5'-P-CCNC: 45 U/L (ref 10–44)
ANION GAP SERPL CALC-SCNC: 10 MMOL/L (ref 8–16)
AST SERPL-CCNC: 32 U/L (ref 10–40)
BASOPHILS # BLD AUTO: 0.03 K/UL (ref 0–0.2)
BASOPHILS NFR BLD: 0.5 % (ref 0–1.9)
BILIRUB SERPL-MCNC: 0.6 MG/DL (ref 0.1–1)
BILIRUB UR QL STRIP: NEGATIVE
BUN SERPL-MCNC: 38 MG/DL (ref 6–30)
BUN SERPL-MCNC: 43 MG/DL (ref 6–20)
CALCIUM SERPL-MCNC: 9.6 MG/DL (ref 8.7–10.5)
CHLORIDE SERPL-SCNC: 111 MMOL/L (ref 95–110)
CHLORIDE SERPL-SCNC: 111 MMOL/L (ref 95–110)
CLARITY UR REFRACT.AUTO: ABNORMAL
CO2 SERPL-SCNC: 19 MMOL/L (ref 23–29)
COLOR UR AUTO: YELLOW
CREAT SERPL-MCNC: 1.6 MG/DL (ref 0.5–1.4)
CREAT SERPL-MCNC: 1.7 MG/DL (ref 0.5–1.4)
DIFFERENTIAL METHOD: ABNORMAL
EOSINOPHIL # BLD AUTO: 0.1 K/UL (ref 0–0.5)
EOSINOPHIL NFR BLD: 1.6 % (ref 0–8)
ERYTHROCYTE [DISTWIDTH] IN BLOOD BY AUTOMATED COUNT: 14.4 % (ref 11.5–14.5)
EST. GFR  (AFRICAN AMERICAN): 37.8 ML/MIN/1.73 M^2
EST. GFR  (NON AFRICAN AMERICAN): 32.8 ML/MIN/1.73 M^2
GLUCOSE SERPL-MCNC: 85 MG/DL (ref 70–110)
GLUCOSE SERPL-MCNC: 88 MG/DL (ref 70–110)
GLUCOSE UR QL STRIP: NEGATIVE
HCT VFR BLD AUTO: 40.8 % (ref 37–48.5)
HCT VFR BLD CALC: 32 %PCV (ref 36–54)
HGB BLD-MCNC: 12.7 G/DL (ref 12–16)
HGB UR QL STRIP: NEGATIVE
IMM GRANULOCYTES # BLD AUTO: 0.01 K/UL (ref 0–0.04)
IMM GRANULOCYTES NFR BLD AUTO: 0.2 % (ref 0–0.5)
KETONES UR QL STRIP: NEGATIVE
LEUKOCYTE ESTERASE UR QL STRIP: NEGATIVE
LIPASE SERPL-CCNC: 153 U/L (ref 4–60)
LYMPHOCYTES # BLD AUTO: 2.6 K/UL (ref 1–4.8)
LYMPHOCYTES NFR BLD: 44.6 % (ref 18–48)
MCH RBC QN AUTO: 28.7 PG (ref 27–31)
MCHC RBC AUTO-ENTMCNC: 31.1 G/DL (ref 32–36)
MCV RBC AUTO: 92 FL (ref 82–98)
MONOCYTES # BLD AUTO: 0.4 K/UL (ref 0.3–1)
MONOCYTES NFR BLD: 6.8 % (ref 4–15)
NEUTROPHILS # BLD AUTO: 2.7 K/UL (ref 1.8–7.7)
NEUTROPHILS NFR BLD: 46.3 % (ref 38–73)
NITRITE UR QL STRIP: NEGATIVE
NRBC BLD-RTO: 0 /100 WBC
PH UR STRIP: 5 [PH] (ref 5–8)
PLATELET # BLD AUTO: 208 K/UL (ref 150–350)
PMV BLD AUTO: 11.6 FL (ref 9.2–12.9)
POC IONIZED CALCIUM: 1.23 MMOL/L (ref 1.06–1.42)
POC TCO2 (MEASURED): 20 MMOL/L (ref 23–29)
POTASSIUM BLD-SCNC: 5 MMOL/L (ref 3.5–5.1)
POTASSIUM SERPL-SCNC: 5 MMOL/L (ref 3.5–5.1)
PROT SERPL-MCNC: 8.3 G/DL (ref 6–8.4)
PROT UR QL STRIP: NEGATIVE
RBC # BLD AUTO: 4.43 M/UL (ref 4–5.4)
SAMPLE: ABNORMAL
SODIUM BLD-SCNC: 140 MMOL/L (ref 136–145)
SODIUM SERPL-SCNC: 140 MMOL/L (ref 136–145)
SP GR UR STRIP: 1.02 (ref 1–1.03)
TSH SERPL DL<=0.005 MIU/L-ACNC: 0.63 UIU/ML (ref 0.4–4)
URN SPEC COLLECT METH UR: ABNORMAL
WBC # BLD AUTO: 5.76 K/UL (ref 3.9–12.7)

## 2020-02-28 PROCEDURE — 77067 SCR MAMMO BI INCL CAD: CPT | Mod: 26,,, | Performed by: RADIOLOGY

## 2020-02-28 PROCEDURE — G0378 HOSPITAL OBSERVATION PER HR: HCPCS

## 2020-02-28 PROCEDURE — 83690 ASSAY OF LIPASE: CPT

## 2020-02-28 PROCEDURE — 81003 URINALYSIS AUTO W/O SCOPE: CPT

## 2020-02-28 PROCEDURE — 99220 PR INITIAL OBSERVATION CARE,LEVL III: CPT | Mod: ,,, | Performed by: PHYSICIAN ASSISTANT

## 2020-02-28 PROCEDURE — 77067 SCR MAMMO BI INCL CAD: CPT | Mod: TC

## 2020-02-28 PROCEDURE — 80053 COMPREHEN METABOLIC PANEL: CPT

## 2020-02-28 PROCEDURE — 76536 US EXAM OF HEAD AND NECK: CPT | Mod: S$GLB,,, | Performed by: INTERNAL MEDICINE

## 2020-02-28 PROCEDURE — 76536 US SOFT TISSUE HEAD NECK THYROID: ICD-10-PCS | Mod: S$GLB,,, | Performed by: INTERNAL MEDICINE

## 2020-02-28 PROCEDURE — 63600175 PHARM REV CODE 636 W HCPCS: Performed by: STUDENT IN AN ORGANIZED HEALTH CARE EDUCATION/TRAINING PROGRAM

## 2020-02-28 PROCEDURE — 99285 PR EMERGENCY DEPT VISIT,LEVEL V: ICD-10-PCS | Mod: ,,, | Performed by: EMERGENCY MEDICINE

## 2020-02-28 PROCEDURE — 77063 MAMMO DIGITAL SCREENING BILAT WITH TOMOSYNTHESIS_CAD: ICD-10-PCS | Mod: 26,,, | Performed by: RADIOLOGY

## 2020-02-28 PROCEDURE — 84443 ASSAY THYROID STIM HORMONE: CPT

## 2020-02-28 PROCEDURE — 99220 PR INITIAL OBSERVATION CARE,LEVL III: ICD-10-PCS | Mod: ,,, | Performed by: PHYSICIAN ASSISTANT

## 2020-02-28 PROCEDURE — 93010 ELECTROCARDIOGRAM REPORT: CPT | Mod: ,,, | Performed by: INTERNAL MEDICINE

## 2020-02-28 PROCEDURE — 77067 MAMMO DIGITAL SCREENING BILAT WITH TOMOSYNTHESIS_CAD: ICD-10-PCS | Mod: 26,,, | Performed by: RADIOLOGY

## 2020-02-28 PROCEDURE — 25000003 PHARM REV CODE 250: Performed by: EMERGENCY MEDICINE

## 2020-02-28 PROCEDURE — 80047 BASIC METABLC PNL IONIZED CA: CPT | Mod: 59

## 2020-02-28 PROCEDURE — 93010 EKG 12-LEAD: ICD-10-PCS | Mod: ,,, | Performed by: INTERNAL MEDICINE

## 2020-02-28 PROCEDURE — 77063 BREAST TOMOSYNTHESIS BI: CPT | Mod: 26,,, | Performed by: RADIOLOGY

## 2020-02-28 PROCEDURE — 85025 COMPLETE CBC W/AUTO DIFF WBC: CPT

## 2020-02-28 PROCEDURE — 99285 EMERGENCY DEPT VISIT HI MDM: CPT | Mod: ,,, | Performed by: EMERGENCY MEDICINE

## 2020-02-28 PROCEDURE — 93005 ELECTROCARDIOGRAM TRACING: CPT

## 2020-02-28 PROCEDURE — 99285 EMERGENCY DEPT VISIT HI MDM: CPT | Mod: 25

## 2020-02-28 RX ORDER — SODIUM CHLORIDE 9 MG/ML
INJECTION, SOLUTION INTRAVENOUS CONTINUOUS
Status: DISCONTINUED | OUTPATIENT
Start: 2020-02-29 | End: 2020-03-01 | Stop reason: HOSPADM

## 2020-02-28 RX ORDER — SODIUM CHLORIDE 0.9 % (FLUSH) 0.9 %
10 SYRINGE (ML) INJECTION
Status: DISCONTINUED | OUTPATIENT
Start: 2020-02-28 | End: 2020-03-01 | Stop reason: HOSPADM

## 2020-02-28 RX ORDER — BISACODYL 10 MG
10 SUPPOSITORY, RECTAL RECTAL DAILY PRN
Status: DISCONTINUED | OUTPATIENT
Start: 2020-02-29 | End: 2020-03-01 | Stop reason: HOSPADM

## 2020-02-28 RX ORDER — ACETAMINOPHEN 325 MG/1
650 TABLET ORAL EVERY 4 HOURS PRN
Status: DISCONTINUED | OUTPATIENT
Start: 2020-02-29 | End: 2020-03-01 | Stop reason: HOSPADM

## 2020-02-28 RX ORDER — ONDANSETRON 8 MG/1
8 TABLET, ORALLY DISINTEGRATING ORAL EVERY 8 HOURS PRN
Status: DISCONTINUED | OUTPATIENT
Start: 2020-02-29 | End: 2020-03-01 | Stop reason: HOSPADM

## 2020-02-28 RX ORDER — IPRATROPIUM BROMIDE AND ALBUTEROL SULFATE 2.5; .5 MG/3ML; MG/3ML
3 SOLUTION RESPIRATORY (INHALATION) EVERY 4 HOURS PRN
Status: DISCONTINUED | OUTPATIENT
Start: 2020-02-29 | End: 2020-03-01 | Stop reason: HOSPADM

## 2020-02-28 RX ORDER — ACETAMINOPHEN 500 MG
1000 TABLET ORAL
Status: COMPLETED | OUTPATIENT
Start: 2020-02-28 | End: 2020-02-28

## 2020-02-28 RX ORDER — TALC
9 POWDER (GRAM) TOPICAL NIGHTLY PRN
Status: DISCONTINUED | OUTPATIENT
Start: 2020-02-29 | End: 2020-03-01 | Stop reason: HOSPADM

## 2020-02-28 RX ORDER — POLYETHYLENE GLYCOL 3350 17 G/17G
17 POWDER, FOR SOLUTION ORAL DAILY
Status: DISCONTINUED | OUTPATIENT
Start: 2020-02-29 | End: 2020-02-28

## 2020-02-28 RX ADMIN — ACETAMINOPHEN 1000 MG: 500 TABLET ORAL at 09:02

## 2020-02-28 RX ADMIN — SODIUM CHLORIDE, SODIUM LACTATE, POTASSIUM CHLORIDE, AND CALCIUM CHLORIDE 1000 ML: .6; .31; .03; .02 INJECTION, SOLUTION INTRAVENOUS at 08:02

## 2020-02-28 NOTE — TELEPHONE ENCOUNTER
Spoke to patient to discuss new SATNAM and hyperkalemia.  She has not been feeling well for the past week or so with extreme fatigue, lightheadedness, pain in her side.  She agreed to go to the ER immediately for further evaluation.    Lab Visit on 02/28/2020   Component Date Value Ref Range Status    Sodium 02/28/2020 144  136 - 145 mmol/L Final    Potassium 02/28/2020 5.9* 3.5 - 5.1 mmol/L Final    *No Visible Hemolysis    Chloride 02/28/2020 112* 95 - 110 mmol/L Final    CO2 02/28/2020 23  23 - 29 mmol/L Final    Glucose 02/28/2020 94  70 - 110 mg/dL Final    BUN, Bld 02/28/2020 42* 6 - 20 mg/dL Final    Creatinine 02/28/2020 1.8* 0.5 - 1.4 mg/dL Final    Calcium 02/28/2020 9.9  8.7 - 10.5 mg/dL Final    Anion Gap 02/28/2020 9  8 - 16 mmol/L Final    eGFR if  02/28/2020 35.3* >60 mL/min/1.73 m^2 Final    eGFR if non African American 02/28/2020 30.6* >60 mL/min/1.73 m^2 Final    Comment: Calculation used to obtain the estimated glomerular filtration  rate (eGFR) is the CKD-EPI equation.       Hemoglobin A1C 02/28/2020 5.7* 4.0 - 5.6 % Final    Comment: ADA Screening Guidelines:  5.7-6.4%  Consistent with prediabetes  >or=6.5%  Consistent with diabetes  High levels of fetal hemoglobin interfere with the HbA1C  assay. Heterozygous hemoglobin variants (HbS, HgC, etc)do  not significantly interfere with this assay.   However, presence of multiple variants may affect accuracy.      Estimated Avg Glucose 02/28/2020 117  68 - 131 mg/dL Final      Yajaira Yanez MD

## 2020-02-29 LAB
ANION GAP SERPL CALC-SCNC: 10 MMOL/L (ref 8–16)
ANION GAP SERPL CALC-SCNC: 6 MMOL/L (ref 8–16)
BASOPHILS # BLD AUTO: 0.03 K/UL (ref 0–0.2)
BASOPHILS NFR BLD: 0.5 % (ref 0–1.9)
BUN SERPL-MCNC: 37 MG/DL (ref 6–20)
BUN SERPL-MCNC: 42 MG/DL (ref 6–20)
CALCIUM SERPL-MCNC: 8.5 MG/DL (ref 8.7–10.5)
CALCIUM SERPL-MCNC: 9.1 MG/DL (ref 8.7–10.5)
CHLORIDE SERPL-SCNC: 113 MMOL/L (ref 95–110)
CHLORIDE SERPL-SCNC: 116 MMOL/L (ref 95–110)
CO2 SERPL-SCNC: 19 MMOL/L (ref 23–29)
CO2 SERPL-SCNC: 20 MMOL/L (ref 23–29)
CREAT SERPL-MCNC: 1.7 MG/DL (ref 0.5–1.4)
CREAT SERPL-MCNC: 1.7 MG/DL (ref 0.5–1.4)
CREAT UR-MCNC: 84 MG/DL (ref 15–325)
DIFFERENTIAL METHOD: ABNORMAL
EOSINOPHIL # BLD AUTO: 0.1 K/UL (ref 0–0.5)
EOSINOPHIL NFR BLD: 1.6 % (ref 0–8)
EOSINOPHIL URNS QL WRIGHT STN: NORMAL
ERYTHROCYTE [DISTWIDTH] IN BLOOD BY AUTOMATED COUNT: 14.4 % (ref 11.5–14.5)
EST. GFR  (AFRICAN AMERICAN): 37.8 ML/MIN/1.73 M^2
EST. GFR  (AFRICAN AMERICAN): 37.8 ML/MIN/1.73 M^2
EST. GFR  (NON AFRICAN AMERICAN): 32.8 ML/MIN/1.73 M^2
EST. GFR  (NON AFRICAN AMERICAN): 32.8 ML/MIN/1.73 M^2
GLUCOSE SERPL-MCNC: 107 MG/DL (ref 70–110)
GLUCOSE SERPL-MCNC: 83 MG/DL (ref 70–110)
HCT VFR BLD AUTO: 38.1 % (ref 37–48.5)
HGB BLD-MCNC: 11.8 G/DL (ref 12–16)
IMM GRANULOCYTES # BLD AUTO: 0.01 K/UL (ref 0–0.04)
IMM GRANULOCYTES NFR BLD AUTO: 0.2 % (ref 0–0.5)
LYMPHOCYTES # BLD AUTO: 2.7 K/UL (ref 1–4.8)
LYMPHOCYTES NFR BLD: 47.7 % (ref 18–48)
MCH RBC QN AUTO: 28.6 PG (ref 27–31)
MCHC RBC AUTO-ENTMCNC: 31 G/DL (ref 32–36)
MCV RBC AUTO: 93 FL (ref 82–98)
MONOCYTES # BLD AUTO: 0.3 K/UL (ref 0.3–1)
MONOCYTES NFR BLD: 6 % (ref 4–15)
NEUTROPHILS # BLD AUTO: 2.5 K/UL (ref 1.8–7.7)
NEUTROPHILS NFR BLD: 44 % (ref 38–73)
NRBC BLD-RTO: 0 /100 WBC
PLATELET # BLD AUTO: 204 K/UL (ref 150–350)
PMV BLD AUTO: 11.7 FL (ref 9.2–12.9)
POTASSIUM SERPL-SCNC: 4.4 MMOL/L (ref 3.5–5.1)
POTASSIUM SERPL-SCNC: 5 MMOL/L (ref 3.5–5.1)
RBC # BLD AUTO: 4.12 M/UL (ref 4–5.4)
SODIUM SERPL-SCNC: 142 MMOL/L (ref 136–145)
SODIUM SERPL-SCNC: 142 MMOL/L (ref 136–145)
SODIUM UR-SCNC: 124 MMOL/L (ref 20–250)
WBC # BLD AUTO: 5.62 K/UL (ref 3.9–12.7)

## 2020-02-29 PROCEDURE — 84300 ASSAY OF URINE SODIUM: CPT

## 2020-02-29 PROCEDURE — 36415 COLL VENOUS BLD VENIPUNCTURE: CPT

## 2020-02-29 PROCEDURE — 82570 ASSAY OF URINE CREATININE: CPT

## 2020-02-29 PROCEDURE — 63600175 PHARM REV CODE 636 W HCPCS: Performed by: PHYSICIAN ASSISTANT

## 2020-02-29 PROCEDURE — 87205 SMEAR GRAM STAIN: CPT

## 2020-02-29 PROCEDURE — G0378 HOSPITAL OBSERVATION PER HR: HCPCS

## 2020-02-29 PROCEDURE — 94761 N-INVAS EAR/PLS OXIMETRY MLT: CPT

## 2020-02-29 PROCEDURE — 25000003 PHARM REV CODE 250: Performed by: PHYSICIAN ASSISTANT

## 2020-02-29 PROCEDURE — 96360 HYDRATION IV INFUSION INIT: CPT

## 2020-02-29 PROCEDURE — 99226 PR SUBSEQUENT OBSERVATION CARE,LEVEL III: ICD-10-PCS | Mod: ,,, | Performed by: PHYSICIAN ASSISTANT

## 2020-02-29 PROCEDURE — 85025 COMPLETE CBC W/AUTO DIFF WBC: CPT

## 2020-02-29 PROCEDURE — 80048 BASIC METABOLIC PNL TOTAL CA: CPT | Mod: 91

## 2020-02-29 PROCEDURE — 80048 BASIC METABOLIC PNL TOTAL CA: CPT

## 2020-02-29 PROCEDURE — 96361 HYDRATE IV INFUSION ADD-ON: CPT

## 2020-02-29 PROCEDURE — 99226 PR SUBSEQUENT OBSERVATION CARE,LEVEL III: CPT | Mod: ,,, | Performed by: PHYSICIAN ASSISTANT

## 2020-02-29 RX ORDER — ATENOLOL 25 MG/1
25 TABLET ORAL DAILY
Status: DISCONTINUED | OUTPATIENT
Start: 2020-02-29 | End: 2020-03-01 | Stop reason: HOSPADM

## 2020-02-29 RX ORDER — ACETAMINOPHEN 500 MG
50000 TABLET ORAL WEEKLY
Status: DISCONTINUED | OUTPATIENT
Start: 2020-02-29 | End: 2020-03-01 | Stop reason: HOSPADM

## 2020-02-29 RX ORDER — ATORVASTATIN CALCIUM 20 MG/1
40 TABLET, FILM COATED ORAL DAILY
Status: DISCONTINUED | OUTPATIENT
Start: 2020-02-29 | End: 2020-03-01 | Stop reason: HOSPADM

## 2020-02-29 RX ORDER — AMLODIPINE BESYLATE 10 MG/1
10 TABLET ORAL DAILY
Status: DISCONTINUED | OUTPATIENT
Start: 2020-02-29 | End: 2020-03-01 | Stop reason: HOSPADM

## 2020-02-29 RX ADMIN — ATENOLOL 25 MG: 25 TABLET ORAL at 08:02

## 2020-02-29 RX ADMIN — SODIUM CHLORIDE: 0.9 INJECTION, SOLUTION INTRAVENOUS at 11:02

## 2020-02-29 RX ADMIN — SODIUM CHLORIDE: 0.9 INJECTION, SOLUTION INTRAVENOUS at 07:02

## 2020-02-29 RX ADMIN — ATORVASTATIN CALCIUM 40 MG: 20 TABLET, FILM COATED ORAL at 08:02

## 2020-02-29 RX ADMIN — ACETAMINOPHEN 650 MG: 325 TABLET ORAL at 08:02

## 2020-02-29 RX ADMIN — AMLODIPINE BESYLATE 10 MG: 10 TABLET ORAL at 08:02

## 2020-02-29 RX ADMIN — SODIUM CHLORIDE: 0.9 INJECTION, SOLUTION INTRAVENOUS at 02:02

## 2020-02-29 RX ADMIN — SODIUM CHLORIDE 1000 ML: 0.9 INJECTION, SOLUTION INTRAVENOUS at 01:02

## 2020-02-29 NOTE — H&P
[facility] Hospital Medicine  History & Physical    Patient Name: Dejah Schmidt  MRN: [id number]  Admission Date: 2/28/2020  Attending Physician: NATALIA Rocha MD   Primary Care Provider: Dasha Bergeron MD    [facility] Medicine Team: [facility] MED Y Stephanie Oreilly PA-C     Patient information was obtained from patient, past medical records and ER records.     Subjective:     Principal Problem:SATNAM (acute kidney injury)    Chief Complaint:   Chief Complaint   Patient presents with    Abnormal Lab     kidney function high and potassium        HPI: Patient is a 60yo female with a PMHx of asthma, HLD, HTN, and thyroid nodules being admitted to observation for SATNAM. Patient reports general malaise and fatigue over last 2 weeks. She was seen for outpatient labs today and noted abnormalities of potassium at 5.9 and elevated creatinine at 1.8 with decreased GFR. Patient states that approximately 2 weeks ago she was started on Topamax from the weight loss clinic. She states that over that time frame she has been feeling weak and lightheaded. She denies syncope, confusion, chest pain. She has chronic intermittent shortness of breath secondary to her asthma, she denies any shortness of breath at present. She denies any abdominal pain, nausea, diarrhea. She endorses constipation with occasional bright red blood on her stool or on toilet paper.  She was called by her clinic physician today due to the abnormal labs and told to present to the emergency department for further evaluation.    In the ED, vitals stable. Intake labs remarkable for K 5.0, Cr 1.7. She was given 1L bolus with improvement to 1.6 on iSTAT recheck.     Past Medical History:   Diagnosis Date    Asthma     Hypercholesteremia     Hypertension     Multiple thyroid nodules        Past Surgical History:   Procedure Laterality Date    CARPAL TUNNEL RELEASE      FOOT SURGERY      HYSTERECTOMY      MASS EXCISION      at neck     ROTATOR CUFF REPAIR      left shoulder    TRIGGER FINGER RELEASE  12/2017    right hand       Review of patient's allergies indicates:  No Known Allergies    No current facility-administered medications on file prior to encounter.      Current Outpatient Medications on File Prior to Encounter   Medication Sig    ALBUTEROL INHL     amLODIPine (NORVASC) 10 MG tablet Take 1 tablet (10 mg total) by mouth once daily.    atenolol (TENORMIN) 25 MG tablet Take 1 tablet (25 mg total) by mouth once daily.    atorvastatin (LIPITOR) 40 MG tablet Take 1 tablet (40 mg total) by mouth once daily.    cholecalciferol, vitamin D3, 50,000 unit Tab Take 1 tablet by mouth once a week.    lisinopril (PRINIVIL,ZESTRIL) 40 MG tablet TAKE 1 TABLET(40 MG) BY MOUTH EVERY DAY    NEURONTIN 300 mg capsule Take one caps at bedtime x 3 days, than increase to 2 caps at bedtime x 3 days, than 1 caps in am and 2 caps at bedtime.    potassium chloride SA (K-DUR,KLOR-CON) 10 MEQ tablet Take 1 tablet (10 mEq total) by mouth once daily.    topiramate (TOPAMAX) 25 MG tablet Take 1 tablet (25 mg total) by mouth 2 (two) times daily.    triamterene-hydrochlorothiazide 37.5-25 mg (DYAZIDE) 37.5-25 mg per capsule Take 1 capsule by mouth every morning.     Family History     Problem Relation (Age of Onset)    Breast cancer Maternal Grandmother    Cancer Mother, Father    Coronary artery disease Mother    Heart disease Mother, Maternal Grandmother    Hyperlipidemia Mother    Hypertension Mother, Father, Maternal Grandmother        Tobacco Use    Smoking status: Never Smoker    Smokeless tobacco: Never Used   Substance and Sexual Activity    Alcohol use: Yes     Alcohol/week: 0.0 standard drinks     Frequency: Monthly or less     Drinks per session: 3 or 4     Binge frequency: Never     Comment: once a month    Drug use: No    Sexual activity: Yes     Review of Systems   Constitutional: Negative for chills and fever.   HENT: Negative for trouble  swallowing.    Eyes: Negative for photophobia and visual disturbance.   Respiratory: Negative for chest tightness, shortness of breath and wheezing.    Cardiovascular: Negative for chest pain, palpitations and leg swelling.   Gastrointestinal: Positive for constipation. Negative for abdominal pain, diarrhea, nausea and vomiting.   Genitourinary: Negative for dysuria, frequency and urgency.   Musculoskeletal: Negative for arthralgias and joint swelling.   Skin: Negative for rash and wound.   Neurological: Positive for dizziness, weakness and light-headedness. Negative for syncope, facial asymmetry and numbness.   Psychiatric/Behavioral: Negative for agitation and confusion. The patient is not nervous/anxious.      Objective:     Vital Signs (Most Recent):  Temp: 96.9 °F (36.1 °C) (02/29/20 0040)  Pulse: 79 (02/29/20 0040)  Resp: 18 (02/29/20 0040)  BP: (!) 121/59 (02/29/20 0040)  SpO2: 97 % (02/29/20 0040) Vital Signs (24h Range):  Temp:  [96.9 °F (36.1 °C)-98 °F (36.7 °C)] 96.9 °F (36.1 °C)  Pulse:  [59-79] 79  Resp:  [15-18] 18  SpO2:  [97 %-100 %] 97 %  BP: (119-183)/(59-75) 121/59     Weight: 134.3 kg (296 lb 1.2 oz)  Body mass index is 46.37 kg/m².    Physical Exam   Constitutional: She is oriented to person, place, and time. She appears well-developed and well-nourished. No distress.   Obese   HENT:   Head: Normocephalic and atraumatic.   Mouth/Throat: No oropharyngeal exudate.   Eyes: Pupils are equal, round, and reactive to light. Conjunctivae and EOM are normal.   Neck: Normal range of motion. Neck supple.   Cardiovascular: Normal rate, regular rhythm, normal heart sounds and intact distal pulses.   Pulmonary/Chest: Effort normal and breath sounds normal. No respiratory distress. She has no wheezes.   Abdominal: Soft. Bowel sounds are normal. She exhibits no distension. There is no tenderness.   Musculoskeletal: Normal range of motion. She exhibits no edema or tenderness.   Lymphadenopathy:     She has no  cervical adenopathy.   Neurological: She is alert and oriented to person, place, and time. No cranial nerve deficit or sensory deficit. Coordination normal.   Skin: Skin is warm and dry. Capillary refill takes less than 2 seconds. No rash noted.   Psychiatric: She has a normal mood and affect. Her behavior is normal. Judgment and thought content normal.   Nursing note and vitals reviewed.        CRANIAL NERVES     CN III, IV, VI   Pupils are equal, round, and reactive to light.  Extraocular motions are normal.        Significant Labs:   BMP:   Recent Labs   Lab 02/28/20 2011   GLU 88      K 5.0   *   CO2 19*   BUN 43*   CREATININE 1.7*   CALCIUM 9.6     CBC:   Recent Labs   Lab 02/28/20 2011 02/28/20  2221   WBC 5.76  --    HGB 12.7  --    HCT 40.8 32*     --      All pertinent labs within the past 24 hours have been reviewed.    Significant Imaging: I have reviewed all pertinent imaging results/findings within the past 24 hours.    Assessment/Plan:     * SATNAM (acute kidney injury)  Hyperkalemia    Patient presents with 2 weeks of weakness reportedly related to starting Topamax for weight loss around the same time. Suspect secondary to dehydration on diuretics and lethargy.  - Baseline Cr 1, currently 1.7  - K elevated 5.9, improved to 5 with fluids  - hold potassium supplements,HCTZ, and lisinopril  - given 2L bolus  - start mIVF @ 100cc/hr  - recheck in AM    Unspecified essential hypertension  - continue amlodipine 10mg daily  - continue atenolol 25mg daily  - hold HCTZ and ACE      VTE Risk Mitigation (From admission, onward)         Ordered     IP VTE LOW RISK PATIENT  Once      02/28/20 2315     Place MACI hose  Until discontinued      02/28/20 2313     Place sequential compression device  Until discontinued      02/28/20 2313                   Stephanie Oreilly PA-C  Department of Hospital Medicine   [facility]

## 2020-02-29 NOTE — SUBJECTIVE & OBJECTIVE
Interval History:  no acute events overnight, no new complaints.  Reviewed labs with patient.  Discussed with patient will increase fluids and send urine studies.    Review of Systems   Constitutional: Negative for chills and fever.   HENT: Negative for trouble swallowing.    Eyes: Negative for photophobia and visual disturbance.   Respiratory: Negative for chest tightness, shortness of breath and wheezing.    Cardiovascular: Negative for chest pain, palpitations and leg swelling.   Gastrointestinal: Positive for constipation. Negative for abdominal pain, diarrhea, nausea and vomiting.   Genitourinary: Negative for dysuria, frequency and urgency.   Musculoskeletal: Negative for arthralgias and joint swelling.   Skin: Negative for rash and wound.   Neurological: Negative for dizziness, syncope, facial asymmetry, weakness, light-headedness and numbness.   Psychiatric/Behavioral: Negative for agitation and confusion. The patient is not nervous/anxious.      Objective:     Vital Signs (Most Recent):  Temp: 96.2 °F (35.7 °C) (02/29/20 0808)  Pulse: 62 (02/29/20 0808)  Resp: 16 (02/29/20 0808)  BP: (!) 122/56 (02/29/20 0808)  SpO2: 100 % (02/29/20 0808) Vital Signs (24h Range):  Temp:  [96.2 °F (35.7 °C)-98 °F (36.7 °C)] 96.2 °F (35.7 °C)  Pulse:  [59-79] 62  Resp:  [15-18] 16  SpO2:  [97 %-100 %] 100 %  BP: (119-183)/(56-75) 122/56     Weight: 134.3 kg (296 lb 1.2 oz)  Body mass index is 46.37 kg/m².  No intake or output data in the 24 hours ending 02/29/20 1034   Physical Exam   Constitutional: She is oriented to person, place, and time. She appears well-developed and well-nourished. No distress.   Obese   HENT:   Head: Normocephalic and atraumatic.   Mouth/Throat: No oropharyngeal exudate.   Eyes: Pupils are equal, round, and reactive to light. Conjunctivae and EOM are normal.   Neck: Normal range of motion. Neck supple.   Cardiovascular: Normal rate, regular rhythm, normal heart sounds and intact distal pulses.    Pulmonary/Chest: Effort normal and breath sounds normal.   Abdominal: Soft. Bowel sounds are normal.   Musculoskeletal: Normal range of motion. She exhibits no edema or tenderness.   Neurological: She is alert and oriented to person, place, and time. No cranial nerve deficit or sensory deficit. Coordination normal.   Skin: Skin is warm and dry. Capillary refill takes less than 2 seconds. No rash noted.   Psychiatric: She has a normal mood and affect. Her behavior is normal. Judgment and thought content normal.   Nursing note and vitals reviewed.       Significant Labs: All pertinent labs within the past 24 hours have been reviewed.    Significant Imaging: I have reviewed all pertinent imaging results/findings within the past 24 hours.

## 2020-02-29 NOTE — HOSPITAL COURSE
Pt placed in observation for SATNAM.  Cr 1.7 with baseline 1.0.  Nephrotoxic medications held and continuous IVF initiated.  No improvement day 2.  Urine studies ordered, intrinsic, wrights stain no eosinophils.  Day of discharge Cr 1.4.  Pt advised to hold hctz and ACE for 2 additional days.  Handouts on hyperkalemia and SATNAM given to patient.  Follow up PCP for lab check in 3-7 days.  Do not resume topamax.

## 2020-02-29 NOTE — SUBJECTIVE & OBJECTIVE
Past Medical History:   Diagnosis Date    Asthma     Hypercholesteremia     Hypertension     Multiple thyroid nodules        Past Surgical History:   Procedure Laterality Date    CARPAL TUNNEL RELEASE      FOOT SURGERY      HYSTERECTOMY      MASS EXCISION      at neck    ROTATOR CUFF REPAIR      left shoulder    TRIGGER FINGER RELEASE  12/2017    right hand       Review of patient's allergies indicates:  No Known Allergies    No current facility-administered medications on file prior to encounter.      Current Outpatient Medications on File Prior to Encounter   Medication Sig    ALBUTEROL INHL     amLODIPine (NORVASC) 10 MG tablet Take 1 tablet (10 mg total) by mouth once daily.    atenolol (TENORMIN) 25 MG tablet Take 1 tablet (25 mg total) by mouth once daily.    atorvastatin (LIPITOR) 40 MG tablet Take 1 tablet (40 mg total) by mouth once daily.    cholecalciferol, vitamin D3, 50,000 unit Tab Take 1 tablet by mouth once a week.    lisinopril (PRINIVIL,ZESTRIL) 40 MG tablet TAKE 1 TABLET(40 MG) BY MOUTH EVERY DAY    NEURONTIN 300 mg capsule Take one caps at bedtime x 3 days, than increase to 2 caps at bedtime x 3 days, than 1 caps in am and 2 caps at bedtime.    potassium chloride SA (K-DUR,KLOR-CON) 10 MEQ tablet Take 1 tablet (10 mEq total) by mouth once daily.    topiramate (TOPAMAX) 25 MG tablet Take 1 tablet (25 mg total) by mouth 2 (two) times daily.    triamterene-hydrochlorothiazide 37.5-25 mg (DYAZIDE) 37.5-25 mg per capsule Take 1 capsule by mouth every morning.     Family History     Problem Relation (Age of Onset)    Breast cancer Maternal Grandmother    Cancer Mother, Father    Coronary artery disease Mother    Heart disease Mother, Maternal Grandmother    Hyperlipidemia Mother    Hypertension Mother, Father, Maternal Grandmother        Tobacco Use    Smoking status: Never Smoker    Smokeless tobacco: Never Used   Substance and Sexual Activity    Alcohol use: Yes      Alcohol/week: 0.0 standard drinks     Frequency: Monthly or less     Drinks per session: 3 or 4     Binge frequency: Never     Comment: once a month    Drug use: No    Sexual activity: Yes     Review of Systems   Constitutional: Negative for chills and fever.   HENT: Negative for trouble swallowing.    Eyes: Negative for photophobia and visual disturbance.   Respiratory: Negative for chest tightness, shortness of breath and wheezing.    Cardiovascular: Negative for chest pain, palpitations and leg swelling.   Gastrointestinal: Positive for constipation. Negative for abdominal pain, diarrhea, nausea and vomiting.   Genitourinary: Negative for dysuria, frequency and urgency.   Musculoskeletal: Negative for arthralgias and joint swelling.   Skin: Negative for rash and wound.   Neurological: Positive for dizziness, weakness and light-headedness. Negative for syncope, facial asymmetry and numbness.   Psychiatric/Behavioral: Negative for agitation and confusion. The patient is not nervous/anxious.      Objective:     Vital Signs (Most Recent):  Temp: 96.9 °F (36.1 °C) (02/29/20 0040)  Pulse: 79 (02/29/20 0040)  Resp: 18 (02/29/20 0040)  BP: (!) 121/59 (02/29/20 0040)  SpO2: 97 % (02/29/20 0040) Vital Signs (24h Range):  Temp:  [96.9 °F (36.1 °C)-98 °F (36.7 °C)] 96.9 °F (36.1 °C)  Pulse:  [59-79] 79  Resp:  [15-18] 18  SpO2:  [97 %-100 %] 97 %  BP: (119-183)/(59-75) 121/59     Weight: 134.3 kg (296 lb 1.2 oz)  Body mass index is 46.37 kg/m².    Physical Exam   Constitutional: She is oriented to person, place, and time. She appears well-developed and well-nourished. No distress.   Obese   HENT:   Head: Normocephalic and atraumatic.   Mouth/Throat: No oropharyngeal exudate.   Eyes: Pupils are equal, round, and reactive to light. Conjunctivae and EOM are normal.   Neck: Normal range of motion. Neck supple.   Cardiovascular: Normal rate, regular rhythm, normal heart sounds and intact distal pulses.   Pulmonary/Chest: Effort  normal and breath sounds normal. No respiratory distress. She has no wheezes.   Abdominal: Soft. Bowel sounds are normal. She exhibits no distension. There is no tenderness.   Musculoskeletal: Normal range of motion. She exhibits no edema or tenderness.   Lymphadenopathy:     She has no cervical adenopathy.   Neurological: She is alert and oriented to person, place, and time. No cranial nerve deficit or sensory deficit. Coordination normal.   Skin: Skin is warm and dry. Capillary refill takes less than 2 seconds. No rash noted.   Psychiatric: She has a normal mood and affect. Her behavior is normal. Judgment and thought content normal.   Nursing note and vitals reviewed.        CRANIAL NERVES     CN III, IV, VI   Pupils are equal, round, and reactive to light.  Extraocular motions are normal.        Significant Labs:   BMP:   Recent Labs   Lab 02/28/20 2011   GLU 88      K 5.0   *   CO2 19*   BUN 43*   CREATININE 1.7*   CALCIUM 9.6     CBC:   Recent Labs   Lab 02/28/20 2011 02/28/20  2221   WBC 5.76  --    HGB 12.7  --    HCT 40.8 32*     --      All pertinent labs within the past 24 hours have been reviewed.    Significant Imaging: I have reviewed all pertinent imaging results/findings within the past 24 hours.

## 2020-02-29 NOTE — ED TRIAGE NOTES
Dejah Schmidt, a 59 y.o. female presents to the ED via PMV with CC Abnormal Lab (kidney function high and potassium 5.9) pr stated she has been feeling weak and had right flank pain       Patient identifiers verified verbally with armband and correct for Dejah Schmidt.    LOC/ APPEARANCE: The patient is AAOx4. Pt is speaking appropriately, no slurred speech. Pt changed into hospital gown. Continuous cardiac monitor, cont pulse ox, and auto BP cuff applied to patient. Pt is clean and well groomed. No JVD visible. Pt reports pain level of 0. Pt updated on POC. Bed low and locked with side rails up x2, call bell in pt reach.  SKIN: Skin is warm dry and intact, and color is consistent with ethnicity. Capillary refill <3 seconds. No breakdown or brusing visible. Mucus membranes moist, acyanotic.  RESPIRATORY: Airway is open and patent. Respirations-spontaneous, unlabored, regular rate, equal bilaterally on inspiration and expiration. No accessory muscle use noted. Lungs clear to auscultation in all fields bilaterally anterior and posterior.   CARDIAC: Patient has regular heart rate.  No peripheral edema noted, and patient has no c/o chest pain. Peripheral pulses present equal and strong throughout.  ABDOMEN: Soft and non-tender to palpation with no distention noted. Normoactive bowel sounds x4 quadrants. Pt has no complaints of abnormal bowel movements, denies blood in stool. Pt reports normal appetite.   NEUROLOGIC: Eyes open spontaneously and facial expression symmetrical. Pt behavior appropriate to situation, and pt follows commands. Pt reports sensation present in all extremities when touched with a finger, denies any numbness or tingling. PERRLA  MUSCULOSKELETAL: Spontaneous movement noted to all extremities. Hand  equal and leg strength strong +5 bilaterally.   : No complaints of frequency, burning, urgency or blood in the urine. No complaints of incontinence.

## 2020-02-29 NOTE — ASSESSMENT & PLAN NOTE
- continue amlodipine 10mg daily  - continue atenolol 25mg daily  - hold HCTZ and ACE  - controlled

## 2020-02-29 NOTE — NURSING
REPORT RECEIVED PER PRAMOD MOBLEY. PT ARRIVED TO UNIT VIA STRETCHER WITH HOSPITAL TRANSPORT. PT AAOX 4. PT AMBULATORY AT THE BEDSIDE. PT NAD; RESPIRATIONS 18, EVEN AND UNLABORED. PT iv ACCESS FLUSHED WITH 20 ML NS. PT TOLERATED WELL. DRESSING CLEAN, DRY, AND INTACT. PT INFORMED OF PLAN OF CARE. PT TOLERATED WELL. PT DENIES ANY NEEDS AT THE MOMENT. PT VERBALIZED UNDERSTANDING OF TREATMENT PLAN. BED IN LOWEST POSITION. WHEEL LOCKED. CALL LIGHT WITHIN REACH AND ROOM REMAIN FREE FOR CLUTTER. WILL CONTINUE TO MONITOR.

## 2020-02-29 NOTE — PLAN OF CARE
Pt lying in bed comfortable and relaxed. Fluids infusing without difficulty. Pt informed of POC. Pt admits understanding of treatment plan and importance of fluid replacement. Pt IV access patent and free of any swelling, pain, drainage. Pt tolerating fluids well. Pt has 1 cup of ice water. Pt call light within reach and belonging within reach. Pt compliant with vitals and medical care. Will continue to monitor.

## 2020-02-29 NOTE — ASSESSMENT & PLAN NOTE
Hyperkalemia    Patient presents with 2 weeks of weakness reportedly related to starting Topamax for weight loss around the same time. Suspect secondary to dehydration on diuretics and lethargy.  - Baseline Cr 1, currently 1.7  - K elevated 5.9, improved to 5 with fluids  - hold potassium supplements,HCTZ, and lisinopril  - given 2L bolus  - start mIVF @ 100cc/hr, increased to 125 cc/hr.  Urine sodium and creatinine.  Repeat labs this afternoon.

## 2020-02-29 NOTE — ED PROVIDER NOTES
Encounter Date: 2/28/2020       History     Chief Complaint   Patient presents with    Abnormal Lab     kidney function high and potassium     Dejah Schmidt is a 59 y.o. female who  has a past medical history of Asthma, Hypercholesteremia, Hypertension, and Multiple thyroid nodules here today for evaluation of malaise and fatigue over last 2 weeks and lab abnormalities noted today on outpatient lab draw of elevated potassium at 5.9 and elevated creatinine at 1.8 with decreased GFR.  Patient states that approximately 2 weeks ago she was started on Topamax from the weight loss clinic.  She states that over that time frame she has been feeling weak and lightheaded.  No report of syncope.  No reported chest pain. She has chronic intermittent shortness of breath secondary to her asthma, she denies any shortness of breath at present.  She denies any abdominal pain, nausea,, diarrhea.  She endorses constipation with occasional bright red blood on her stool or on toilet paper.  She was called by her clinic physician today due to the abnormal labs and told to present to the emergency department for further evaluation        Review of patient's allergies indicates:  No Known Allergies  Past Medical History:   Diagnosis Date    Asthma     Hypercholesteremia     Hypertension     Multiple thyroid nodules      Past Surgical History:   Procedure Laterality Date    CARPAL TUNNEL RELEASE      FOOT SURGERY      HYSTERECTOMY      MASS EXCISION      at neck    ROTATOR CUFF REPAIR      left shoulder    TRIGGER FINGER RELEASE  12/2017    right hand     Family History   Problem Relation Age of Onset    Cancer Mother     Coronary artery disease Mother     Heart disease Mother     Hypertension Mother     Hyperlipidemia Mother     Hypertension Father     Cancer Father     Heart disease Maternal Grandmother     Hypertension Maternal Grandmother     Breast cancer Maternal Grandmother      Social History     Tobacco Use     Smoking status: Never Smoker    Smokeless tobacco: Never Used   Substance Use Topics    Alcohol use: Yes     Alcohol/week: 0.0 standard drinks     Frequency: Monthly or less     Drinks per session: 3 or 4     Binge frequency: Never     Comment: once a month    Drug use: No     Review of Systems   Constitutional: Positive for chills and fatigue. Negative for fever.   HENT: Negative for congestion, rhinorrhea and sore throat.    Eyes: Negative for photophobia, pain and visual disturbance.   Respiratory: Positive for shortness of breath (intermittent). Negative for apnea and wheezing.    Cardiovascular: Positive for leg swelling. Negative for chest pain and palpitations.   Gastrointestinal: Positive for constipation. Negative for abdominal distention, abdominal pain, diarrhea and nausea.   Endocrine: Positive for cold intolerance.   Genitourinary: Positive for flank pain (chronic right flank). Negative for decreased urine volume, difficulty urinating, dysuria, hematuria and urgency.   Musculoskeletal: Negative for arthralgias, back pain, joint swelling, myalgias, neck pain and neck stiffness.   Skin: Negative for rash.   Neurological: Positive for light-headedness. Negative for syncope, speech difficulty and weakness.   Hematological: Does not bruise/bleed easily.       Physical Exam     Initial Vitals [02/28/20 1846]   BP Pulse Resp Temp SpO2   (!) 172/72 79 18 98 °F (36.7 °C) 99 %      MAP       --         Physical Exam    Nursing note and vitals reviewed.  Constitutional: She appears well-developed and well-nourished. She is not diaphoretic. No distress.   HENT:   Head: Normocephalic and atraumatic.   Right Ear: External ear normal.   Left Ear: External ear normal.   Mouth/Throat: No oropharyngeal exudate.   Eyes: Conjunctivae and EOM are normal. Pupils are equal, round, and reactive to light. Right eye exhibits no discharge. Left eye exhibits no discharge.   Neck: Neck supple. No JVD present.    Cardiovascular: Normal rate, regular rhythm, normal heart sounds and intact distal pulses.   No murmur heard.  Pulmonary/Chest: Breath sounds normal. No stridor. No respiratory distress. She has no wheezes. She has no rhonchi. She has no rales.   Abdominal: Soft. She exhibits no distension. There is no tenderness. There is no rebound and no guarding.   Musculoskeletal: Normal range of motion. She exhibits edema (small amount of indentation at sockline).   Lymphadenopathy:     She has no cervical adenopathy.   Neurological: She is alert and oriented to person, place, and time. She has normal strength. GCS score is 15. GCS eye subscore is 4. GCS verbal subscore is 5. GCS motor subscore is 6.   Skin: Skin is warm and dry. Capillary refill takes less than 2 seconds. No rash noted.   Psychiatric: She has a normal mood and affect.         ED Course   Procedures  Labs Reviewed   CBC W/ AUTO DIFFERENTIAL - Abnormal; Notable for the following components:       Result Value    Mean Corpuscular Hemoglobin Conc 31.1 (*)     All other components within normal limits   COMPREHENSIVE METABOLIC PANEL - Abnormal; Notable for the following components:    Chloride 111 (*)     CO2 19 (*)     BUN, Bld 43 (*)     Creatinine 1.7 (*)     ALT 45 (*)     eGFR if  37.8 (*)     eGFR if non  32.8 (*)     All other components within normal limits   LIPASE - Abnormal; Notable for the following components:    Lipase 153 (*)     All other components within normal limits   URINALYSIS, REFLEX TO URINE CULTURE - Abnormal; Notable for the following components:    Appearance, UA Hazy (*)     All other components within normal limits    Narrative:     Preferred Collection Type->Urine, Clean Catch   ISTAT PROCEDURE - Abnormal; Notable for the following components:    POC BUN 38 (*)     POC Creatinine 1.6 (*)     POC Chloride 111 (*)     POC TCO2 (MEASURED) 20 (*)     POC Hematocrit 32 (*)     All other components within  normal limits   TSH     EKG Readings: (Independently Interpreted)   NSR. No peaked T waves. Normal QRS.     ECG Results          EKG 12-lead (Final result)  Result time 02/29/20 19:10:07    Final result by Interface, Lab In [facility] (02/29/20 19:10:07)                 Narrative:    Test Reason : E87.5,    Vent. Rate : 071 BPM     Atrial Rate : 071 BPM     P-R Int : 148 ms          QRS Dur : 104 ms      QT Int : 394 ms       P-R-T Axes : 050 -24 017 degrees     QTc Int : 428 ms    Normal sinus rhythm  Normal ECG  When compared with ECG of 15-NILO-2017 14:53,  No significant change was found  Confirmed by Aayush Galeas MD ([id number]) on 2/29/2020 7:09:53 PM    Referred By: MIKAL   SELF           Confirmed By:Aayush Galeas MD                            Imaging Results    None          Medical Decision Making:   History:   I obtained history from: someone other than patient.       <> Summary of History: Patient was started about 2 weeks ago on Topamax from weight loss clinic.  Over the time frame taking this medication she states that she has felt more weak  Old Medical Records: I decided to obtain old medical records.  Initial Assessment:   Dejah Schmidt is a 59 y.o. female here for evaluation of lab abnormalities of elevated potassium (5.9) and decreased creatinine (1.8) and decreased GFR..  Overall she does not appear to be acutely ill, vital signs are stable except for hypertension with systolic greater than 180 at presentation.  Differential Diagnosis:   Acute kidney injury, medication side effect, dehydration, prerenal kidney injury, acute tubular necrosis  Clinical Tests:   Lab Tests: Ordered and Reviewed  Medical Tests: Ordered and Reviewed  ED Management:  Patient will have basic labs re-evaluated.  Urinalysis.  Will give a fluid bolus for the SATNAM and patient's report of feeling dehydrated today    Lincoln Littlejohn MD  8:28 PM 2/28/2020  PGY-5 [facility] Emergency Medicine/Pediatrics     EKG reviewed at the bedside which  shows no signs of ischemia and no ST segment changes.  No evidence of tall peaked T-waves, given her lab abnormality today with elevated potassium.  Patient had a repeat lab draw following fluid administration, limited improvement in her creatinine, continued decreased GFR, mildly decreased bicarbonates.  Will admit to observation for continued fluid resuscitation and further evaluation  Other:   I have discussed this case with another health care provider.       <> Summary of the Discussion: Hospital medicine              Attending Attestation:   Physician Attestation Statement for Resident:  As the supervising MD   Physician Attestation Statement: I have personally seen and examined this patient.   I agree with the above history. -:   As the supervising MD I agree with the above PE.    As the supervising MD I agree with the above treatment, course, plan, and disposition.   -:     Vitals normal. Afebrile. Well appearing. Here w/ SATNAM and hyperkalemia. Repeat labs with mild hyperkalemia, not requiring treatment in ED. SATNAM present; given 1 L NS without much improvement of SATNAM. Discussed with hospital medicine who placed in observation.      I have reviewed and agree with the residents interpretation of the following: lab data and EKG.  I have reviewed the following: old records at this facility.                    ED Course as of Mar 01 1446   Fri Feb 28, 2020   2056 Normal hemoglobin   Hemoglobin: 12.7 [NS]   2056 No leukocytosis   WBC: 5.76 [NS]   2100 Potassium now at 5.0 compared to earlier when it was 5.9.   Potassium: 5.0 [NS]   2101 Bicarb decreased.  Patient receiving a fluid bolus at this time.   CO2(!): 19 [NS]   2101 Remains elevated although better compared to earlier   Creatinine(!): 1.7 [NS]   2101 Lipase(!): 153 [NS]   2243 Patient to be placed in observation, discussed with Dr. Perdomo    [NS]      ED Course User Index  [NS] Lincoln Littlejohn MD                Clinical Impression:       ICD-10-CM  ICD-9-CM   1. SATNAM (acute kidney injury) N17.9 584.9   2. Hyperkalemia E87.5 276.7             ED Disposition Condition    Observation                           Lincoln Littlejohn MD  Resident  02/29/20 0752       Brian Aguila MD  03/01/20 0208

## 2020-02-29 NOTE — ASSESSMENT & PLAN NOTE
Hyperkalemia    Patient presents with 2 weeks of weakness reportedly related to starting Topamax for weight loss around the same time. Suspect secondary to dehydration on diuretics and lethargy.  - Baseline Cr 1, currently 1.7  - K elevated 5.9, improved to 5 with fluids  - hold potassium supplements,HCTZ, and lisinopril  - given 2L bolus  - start mIVF @ 100cc/hr  - recheck in AM

## 2020-02-29 NOTE — HPI
Patient is a 58yo female with a PMHx of asthma, HLD, HTN, and thyroid nodules being admitted to observation for SATNAM. Patient reports general malaise and fatigue over last 2 weeks. She was seen for outpatient labs today and noted abnormalities of potassium at 5.9 and elevated creatinine at 1.8 with decreased GFR. Patient states that approximately 2 weeks ago she was started on Topamax from the weight loss clinic. She states that over that time frame she has been feeling weak and lightheaded. She denies syncope, confusion, chest pain. She has chronic intermittent shortness of breath secondary to her asthma, she denies any shortness of breath at present. She denies any abdominal pain, nausea, diarrhea. She endorses constipation with occasional bright red blood on her stool or on toilet paper.  She was called by her clinic physician today due to the abnormal labs and told to present to the emergency department for further evaluation.    In the ED, vitals stable. Intake labs remarkable for K 5.0, Cr 1.7. She was given 1L bolus with improvement to 1.6 on iSTAT recheck.

## 2020-02-29 NOTE — PROGRESS NOTES
[facility] Hospital Medicine  Progress Note    Patient Name: Dejah Schmidt  MRN: [id number]  Patient Class: OP- Observation   Admission Date: 2/28/2020  Length of Stay: 0 days  Attending Physician: NATALIA Rocha MD  Primary Care Provider: Dasha Bergeron MD    [facility] Medicine Team: [facility] MED Y Jessica Ga PA-C    Subjective:     Principal Problem:SATNAM (acute kidney injury)        HPI:  Patient is a 58yo female with a PMHx of asthma, HLD, HTN, and thyroid nodules being admitted to observation for SATNAM. Patient reports general malaise and fatigue over last 2 weeks. She was seen for outpatient labs today and noted abnormalities of potassium at 5.9 and elevated creatinine at 1.8 with decreased GFR. Patient states that approximately 2 weeks ago she was started on Topamax from the weight loss clinic. She states that over that time frame she has been feeling weak and lightheaded. She denies syncope, confusion, chest pain. She has chronic intermittent shortness of breath secondary to her asthma, she denies any shortness of breath at present. She denies any abdominal pain, nausea, diarrhea. She endorses constipation with occasional bright red blood on her stool or on toilet paper.  She was called by her clinic physician today due to the abnormal labs and told to present to the emergency department for further evaluation.    In the ED, vitals stable. Intake labs remarkable for K 5.0, Cr 1.7. She was given 1L bolus with improvement to 1.6 on iSTAT recheck.     Overview/Hospital Course:  Pt placed in observation for SATNAM.  Cr 1.7 with baseline 1.0.  Nephrotoxic medications held and continuous IVF initiated.  No improvement day 2.  Urine studies ordered.    Interval History:  no acute events overnight, no new complaints.  Reviewed labs with patient.  Discussed with patient will increase fluids and send urine studies.    Review of Systems   Constitutional: Negative for chills and fever.   HENT:  Negative for trouble swallowing.    Eyes: Negative for photophobia and visual disturbance.   Respiratory: Negative for chest tightness, shortness of breath and wheezing.    Cardiovascular: Negative for chest pain, palpitations and leg swelling.   Gastrointestinal: Positive for constipation. Negative for abdominal pain, diarrhea, nausea and vomiting.   Genitourinary: Negative for dysuria, frequency and urgency.   Musculoskeletal: Negative for arthralgias and joint swelling.   Skin: Negative for rash and wound.   Neurological: Negative for dizziness, syncope, facial asymmetry, weakness, light-headedness and numbness.   Psychiatric/Behavioral: Negative for agitation and confusion. The patient is not nervous/anxious.      Objective:     Vital Signs (Most Recent):  Temp: 96.2 °F (35.7 °C) (02/29/20 0808)  Pulse: 62 (02/29/20 0808)  Resp: 16 (02/29/20 0808)  BP: (!) 122/56 (02/29/20 0808)  SpO2: 100 % (02/29/20 0808) Vital Signs (24h Range):  Temp:  [96.2 °F (35.7 °C)-98 °F (36.7 °C)] 96.2 °F (35.7 °C)  Pulse:  [59-79] 62  Resp:  [15-18] 16  SpO2:  [97 %-100 %] 100 %  BP: (119-183)/(56-75) 122/56     Weight: 134.3 kg (296 lb 1.2 oz)  Body mass index is 46.37 kg/m².  No intake or output data in the 24 hours ending 02/29/20 1034   Physical Exam   Constitutional: She is oriented to person, place, and time. She appears well-developed and well-nourished. No distress.   Obese   HENT:   Head: Normocephalic and atraumatic.   Mouth/Throat: No oropharyngeal exudate.   Eyes: Pupils are equal, round, and reactive to light. Conjunctivae and EOM are normal.   Neck: Normal range of motion. Neck supple.   Cardiovascular: Normal rate, regular rhythm, normal heart sounds and intact distal pulses.   Pulmonary/Chest: Effort normal and breath sounds normal.   Abdominal: Soft. Bowel sounds are normal.   Musculoskeletal: Normal range of motion. She exhibits no edema or tenderness.   Neurological: She is alert and oriented to person, place, and  time. No cranial nerve deficit or sensory deficit. Coordination normal.   Skin: Skin is warm and dry. Capillary refill takes less than 2 seconds. No rash noted.   Psychiatric: She has a normal mood and affect. Her behavior is normal. Judgment and thought content normal.   Nursing note and vitals reviewed.       Significant Labs: All pertinent labs within the past 24 hours have been reviewed.    Significant Imaging: I have reviewed all pertinent imaging results/findings within the past 24 hours.      Assessment/Plan:      * SATNAM (acute kidney injury)  Hyperkalemia    Patient presents with 2 weeks of weakness reportedly related to starting Topamax for weight loss around the same time. Suspect secondary to dehydration on diuretics and lethargy.  - Baseline Cr 1, currently 1.7  - K elevated 5.9, improved to 5 with fluids  - hold potassium supplements,HCTZ, and lisinopril  - given 2L bolus  - start mIVF @ 100cc/hr, increased to 125 cc/hr.  Urine sodium and creatinine.  Repeat labs this afternoon.    Unspecified essential hypertension  - continue amlodipine 10mg daily  - continue atenolol 25mg daily  - hold HCTZ and ACE  - controlled      VTE Risk Mitigation (From admission, onward)         Ordered     IP VTE LOW RISK PATIENT  Once      02/28/20 2315     Place MACI hose  Until discontinued      02/28/20 2313     Place sequential compression device  Until discontinued      02/28/20 2313                      Jessica Ga PA-C  Department of Hospital Medicine   [facility]

## 2020-03-01 VITALS
SYSTOLIC BLOOD PRESSURE: 136 MMHG | TEMPERATURE: 97 F | HEART RATE: 60 BPM | OXYGEN SATURATION: 99 % | WEIGHT: 293 LBS | RESPIRATION RATE: 17 BRPM | BODY MASS INDEX: 45.99 KG/M2 | HEIGHT: 67 IN | DIASTOLIC BLOOD PRESSURE: 63 MMHG

## 2020-03-01 PROBLEM — E87.5 HYPERKALEMIA: Status: RESOLVED | Noted: 2020-02-28 | Resolved: 2020-03-01

## 2020-03-01 PROBLEM — N17.9 AKI (ACUTE KIDNEY INJURY): Status: RESOLVED | Noted: 2020-02-28 | Resolved: 2020-03-01

## 2020-03-01 LAB
ANION GAP SERPL CALC-SCNC: 7 MMOL/L (ref 8–16)
BASOPHILS # BLD AUTO: 0.01 K/UL (ref 0–0.2)
BASOPHILS NFR BLD: 0.2 % (ref 0–1.9)
BUN SERPL-MCNC: 27 MG/DL (ref 6–20)
CALCIUM SERPL-MCNC: 8.5 MG/DL (ref 8.7–10.5)
CHLORIDE SERPL-SCNC: 114 MMOL/L (ref 95–110)
CO2 SERPL-SCNC: 19 MMOL/L (ref 23–29)
CREAT SERPL-MCNC: 1.4 MG/DL (ref 0.5–1.4)
DIFFERENTIAL METHOD: ABNORMAL
EOSINOPHIL # BLD AUTO: 0.1 K/UL (ref 0–0.5)
EOSINOPHIL NFR BLD: 2.3 % (ref 0–8)
ERYTHROCYTE [DISTWIDTH] IN BLOOD BY AUTOMATED COUNT: 14.2 % (ref 11.5–14.5)
EST. GFR  (AFRICAN AMERICAN): 47.8 ML/MIN/1.73 M^2
EST. GFR  (NON AFRICAN AMERICAN): 41.4 ML/MIN/1.73 M^2
GLUCOSE SERPL-MCNC: 82 MG/DL (ref 70–110)
HCT VFR BLD AUTO: 35.8 % (ref 37–48.5)
HGB BLD-MCNC: 11 G/DL (ref 12–16)
IMM GRANULOCYTES # BLD AUTO: 0 K/UL (ref 0–0.04)
IMM GRANULOCYTES NFR BLD AUTO: 0 % (ref 0–0.5)
LYMPHOCYTES # BLD AUTO: 2.3 K/UL (ref 1–4.8)
LYMPHOCYTES NFR BLD: 54.4 % (ref 18–48)
MCH RBC QN AUTO: 28.8 PG (ref 27–31)
MCHC RBC AUTO-ENTMCNC: 30.7 G/DL (ref 32–36)
MCV RBC AUTO: 94 FL (ref 82–98)
MONOCYTES # BLD AUTO: 0.3 K/UL (ref 0.3–1)
MONOCYTES NFR BLD: 6.5 % (ref 4–15)
NEUTROPHILS # BLD AUTO: 1.6 K/UL (ref 1.8–7.7)
NEUTROPHILS NFR BLD: 36.6 % (ref 38–73)
NRBC BLD-RTO: 0 /100 WBC
PLATELET # BLD AUTO: 169 K/UL (ref 150–350)
PMV BLD AUTO: 11.6 FL (ref 9.2–12.9)
POTASSIUM SERPL-SCNC: 4.9 MMOL/L (ref 3.5–5.1)
RBC # BLD AUTO: 3.82 M/UL (ref 4–5.4)
SODIUM SERPL-SCNC: 140 MMOL/L (ref 136–145)
WBC # BLD AUTO: 4.28 K/UL (ref 3.9–12.7)

## 2020-03-01 PROCEDURE — 99217 PR OBSERVATION CARE DISCHARGE: CPT | Mod: ,,, | Performed by: PHYSICIAN ASSISTANT

## 2020-03-01 PROCEDURE — 96361 HYDRATE IV INFUSION ADD-ON: CPT

## 2020-03-01 PROCEDURE — G0378 HOSPITAL OBSERVATION PER HR: HCPCS

## 2020-03-01 PROCEDURE — 85025 COMPLETE CBC W/AUTO DIFF WBC: CPT

## 2020-03-01 PROCEDURE — 36415 COLL VENOUS BLD VENIPUNCTURE: CPT

## 2020-03-01 PROCEDURE — 80048 BASIC METABOLIC PNL TOTAL CA: CPT

## 2020-03-01 PROCEDURE — 63600175 PHARM REV CODE 636 W HCPCS: Performed by: PHYSICIAN ASSISTANT

## 2020-03-01 PROCEDURE — 25000003 PHARM REV CODE 250: Performed by: PHYSICIAN ASSISTANT

## 2020-03-01 PROCEDURE — 99217 PR OBSERVATION CARE DISCHARGE: ICD-10-PCS | Mod: ,,, | Performed by: PHYSICIAN ASSISTANT

## 2020-03-01 RX ORDER — LISINOPRIL 40 MG/1
TABLET ORAL
Qty: 90 TABLET | Refills: 0
Start: 2020-03-01 | End: 2020-04-06

## 2020-03-01 RX ORDER — TRIAMTERENE AND HYDROCHLOROTHIAZIDE 37.5; 25 MG/1; MG/1
1 CAPSULE ORAL EVERY MORNING
Qty: 90 CAPSULE | Refills: 1 | Status: SHIPPED | OUTPATIENT
Start: 2020-03-01 | End: 2020-07-02 | Stop reason: SDUPTHER

## 2020-03-01 RX ADMIN — AMLODIPINE BESYLATE 10 MG: 10 TABLET ORAL at 08:03

## 2020-03-01 RX ADMIN — ATENOLOL 25 MG: 25 TABLET ORAL at 08:03

## 2020-03-01 RX ADMIN — Medication 9 MG: at 02:03

## 2020-03-01 RX ADMIN — ATORVASTATIN CALCIUM 40 MG: 20 TABLET, FILM COATED ORAL at 08:03

## 2020-03-01 RX ADMIN — SODIUM CHLORIDE: 0.9 INJECTION, SOLUTION INTRAVENOUS at 02:03

## 2020-03-01 NOTE — NURSING
Pt had urine sent this am for random Creatine and random sodium. Both within normal.  Physician added on a  Wrights Stain this afternoon.  Nursing called lab and they stated that they would be able to add it on but if they need more urine, they will notified nursing.  At this time, results pending and no need for more urine.  Pt has IV fluids maintained at 125.  Pt is AOx4 and has no complaints at this time.  Pt independent and ambulating safely.

## 2020-03-01 NOTE — NURSING
Patient given discharge instructions. IV removed and bandage applied. Patient awaiting transportation out of facility.

## 2020-03-01 NOTE — PLAN OF CARE
Pt AAOx 4.Pt informed of POC. Pt skin warm, dry, and intact.Pt admits insomnia prior to hospital admission. Pt given PRN medication. Not effective.Pt fluids infusing without difficulty. Pt tolerating well. Pt denies any complaints. Pt medcated for a mild headache. Relief post Prn medication. Pt call light within reach and bed in lowest position. Pt remained free of falls. Pt Will continue to monitor.

## 2020-03-01 NOTE — ASSESSMENT & PLAN NOTE
Hyperkalemia    Patient presents with 2 weeks of weakness reportedly related to starting Topamax for weight loss around the same time. Suspect secondary to dehydration on diuretics and lethargy.  - Baseline Cr 1, currently 1.7  - K elevated 5.9, improved to 5 with fluids  - hold potassium supplements,HCTZ, and lisinopril  - given 2L bolus  - start mIVF @ 100cc/hr, increased to 125 cc/hr.  Urine sodium and creatinine.  Repeat labs this afternoon.  - day of discharge Cr 1.4, Dr. Rocha agreeable to discharge.  See hospital course for discharge instructions.

## 2020-03-01 NOTE — DISCHARGE SUMMARY
[facility] Hospital Medicine  Discharge Summary      Patient Name: Dejah Schmidt  MRN: [id number]  Admission Date: 2/28/2020  Hospital Length of Stay: 0 days  Discharge Date and Time:  03/01/2020 9:37 AM  Attending Physician: NATALIA Rocha MD   Discharging Provider: Jessica Ga PA-C  Primary Care Provider: Dasha Bergeron MD  [facility] Medicine Team: [facility] MED Y Jessica Ga PA-C    HPI:   Patient is a 60yo female with a PMHx of asthma, HLD, HTN, and thyroid nodules being admitted to observation for SATNAM. Patient reports general malaise and fatigue over last 2 weeks. She was seen for outpatient labs today and noted abnormalities of potassium at 5.9 and elevated creatinine at 1.8 with decreased GFR. Patient states that approximately 2 weeks ago she was started on Topamax from the weight loss clinic. She states that over that time frame she has been feeling weak and lightheaded. She denies syncope, confusion, chest pain. She has chronic intermittent shortness of breath secondary to her asthma, she denies any shortness of breath at present. She denies any abdominal pain, nausea, diarrhea. She endorses constipation with occasional bright red blood on her stool or on toilet paper.  She was called by her clinic physician today due to the abnormal labs and told to present to the emergency department for further evaluation.    In the ED, vitals stable. Intake labs remarkable for K 5.0, Cr 1.7. She was given 1L bolus with improvement to 1.6 on iSTAT recheck.     * No surgery found *      Hospital Course:   Pt placed in observation for SATNAM.  Cr 1.7 with baseline 1.0.  Nephrotoxic medications held and continuous IVF initiated.  No improvement day 2.  Urine studies ordered, intrinsic, wrights stain no eosinophils.  Day of discharge Cr 1.4.  Pt advised to hold hctz and ACE for 2 additional days.  Handouts on hyperkalemia and SATNAM given to patient.  Follow up PCP for lab check in 3-7 days.  Do not  resume topamax.     Consults:     * SATNAM (acute kidney injury)-resolved as of 3/1/2020  Hyperkalemia    Patient presents with 2 weeks of weakness reportedly related to starting Topamax for weight loss around the same time. Suspect secondary to dehydration on diuretics and lethargy.  - Baseline Cr 1, currently 1.7  - K elevated 5.9, improved to 5 with fluids  - hold potassium supplements,HCTZ, and lisinopril  - given 2L bolus  - start mIVF @ 100cc/hr, increased to 125 cc/hr.  Urine sodium and creatinine.  Repeat labs this afternoon.  - day of discharge Cr 1.4, Dr. Rocha agreeable to discharge.  See hospital course for discharge instructions.    Unspecified essential hypertension  - continue amlodipine 10mg daily  - continue atenolol 25mg daily  - hold HCTZ and ACE until 3/3  - controlled      Final Active Diagnoses:    Diagnosis Date Noted POA    Unspecified essential hypertension [I10] 10/14/2013 Yes      Problems Resolved During this Admission:    Diagnosis Date Noted Date Resolved POA    PRINCIPAL PROBLEM:  SATNAM (acute kidney injury) [N17.9] 02/28/2020 03/01/2020 Yes    Hyperkalemia [E87.5] 02/28/2020 03/01/2020 Yes       Discharged Condition: good    Disposition: Home or Self Care    Follow Up:  Follow-up Information     Dasha Bergeron MD In 1 week.    Specialty:  Internal Medicine  Contact information:  [facility] [id number]  [phone]                 Patient Instructions:      Notify your health care provider if you experience any of the following:  temperature >100.4     Notify your health care provider if you experience any of the following:  persistent nausea and vomiting or diarrhea     Notify your health care provider if you experience any of the following:  difficulty breathing or increased cough     Notify your health care provider if you experience any of the following:  severe persistent headache     Notify your health care provider if you experience any of the following:   persistent dizziness, light-headedness, or visual disturbances     Notify your health care provider if you experience any of the following:  increased confusion or weakness     Notify your health care provider if you experience any of the following:   Order Comments: Dark urine or significantly decreased urine       Significant Diagnostic Studies: Labs: All labs within the past 24 hours have been reviewed    Pending Diagnostic Studies:     None         Medications:  Reconciled Home Medications:      Medication List      CHANGE how you take these medications    lisinopril 40 MG tablet  Commonly known as:  PRINIVIL,ZESTRIL  Hold for 2 more days.  TAKE 1 TABLET(40 MG) BY MOUTH EVERY DAY  What changed:  additional instructions     triamterene-hydrochlorothiazide 37.5-25 mg 37.5-25 mg per capsule  Commonly known as:  DYAZIDE  Take 1 capsule by mouth every morning. Hold for 2 more days.  What changed:  additional instructions        CONTINUE taking these medications    ALBUTEROL INHL     amLODIPine 10 MG tablet  Commonly known as:  NORVASC  Take 1 tablet (10 mg total) by mouth once daily.     atenoloL 25 MG tablet  Commonly known as:  TENORMIN  Take 1 tablet (25 mg total) by mouth once daily.     atorvastatin 40 MG tablet  Commonly known as:  LIPITOR  Take 1 tablet (40 mg total) by mouth once daily.     cholecalciferol (vitamin D3) 1,250 mcg (50,000 unit) Tab  Take 1 tablet by mouth once a week.     Neurontin 300 MG capsule  Generic drug:  gabapentin  Take one caps at bedtime x 3 days, than increase to 2 caps at bedtime x 3 days, than 1 caps in am and 2 caps at bedtime.        STOP taking these medications    potassium chloride SA 10 MEQ tablet  Commonly known as:  K-DUR,KLOR-CON     topiramate 25 MG tablet  Commonly known as:  TOPAMAX            Indwelling Lines/Drains at time of discharge:   Lines/Drains/Airways     None                 Time spent on the discharge of patient: >30 minutes  Patient was seen and examined  on the date of discharge and determined to be suitable for discharge.         Jessica Ga PA-C  Department of Hospital Medicine  [facility]

## 2020-03-01 NOTE — ASSESSMENT & PLAN NOTE
- continue amlodipine 10mg daily  - continue atenolol 25mg daily  - hold HCTZ and ACE until 3/3  - controlled

## 2020-03-02 DIAGNOSIS — E04.2 MULTINODULAR GOITER: Primary | ICD-10-CM

## 2020-03-04 ENCOUNTER — TELEPHONE (OUTPATIENT)
Dept: ENDOCRINOLOGY | Facility: CLINIC | Age: 60
End: 2020-03-04

## 2020-03-04 NOTE — TELEPHONE ENCOUNTER
Spoke with patient Thyroid biopsy is scheduled for 03/10 at 2:30 per Dr Yanez    ----- Message from Sajan Fox MA sent at 3/4/2020  6:24 AM CST -----      ----- Message -----  From: Ann Zaldivar MA  Sent: 3/2/2020   4:54 PM CST  To: Sajan Fox MA        ----- Message -----  From: Yajaira Yanez MD  Sent: 3/2/2020   4:35 PM CST  To: [facility] Staff    Please schedule thyroid biopsy

## 2020-03-05 ENCOUNTER — NURSE TRIAGE (OUTPATIENT)
Dept: ADMINISTRATIVE | Facility: CLINIC | Age: 60
End: 2020-03-05

## 2020-03-05 NOTE — TELEPHONE ENCOUNTER
Was hospitalized over the weekend and was told to stop taking medications. Was advised how and when to start taking medications again. Called [facility] office today and [facility]. Pt has pain in the morning to right side only. No pain at present time. Wanting to know if she can stop taking the medications and if she can have her lab appointment moved up.  Please call pt and advise on medication regime and request fo change lab appointment.    Reason for Disposition   Caller has NON-URGENT medication question about med that PCP prescribed and triager unable to answer question    Protocols used: MEDICATION QUESTION CALL-A-

## 2020-03-06 ENCOUNTER — TELEPHONE (OUTPATIENT)
Dept: INTERNAL MEDICINE | Facility: CLINIC | Age: 60
End: 2020-03-06

## 2020-03-06 ENCOUNTER — TELEPHONE (OUTPATIENT)
Dept: ADMINISTRATIVE | Facility: HOSPITAL | Age: 60
End: 2020-03-06

## 2020-03-06 NOTE — TELEPHONE ENCOUNTER
Dejah Schmidt MRN [id number]     Called patient for hospital discharge call , she has restarted the Lisinopril and Triamterene/HCTZ as advised on 3/3/2020. She is reporting intermittent R side flank pain since restarting the meds. I [facility]. She is scheduled for 3/12/2020 for hospital fu with Dr Bergeron       Thanks ,  Osei Millard, RN   [facility] RN Navigator /   [facility]  Direct Dial Phone : [phone]

## 2020-03-07 ENCOUNTER — OFFICE VISIT (OUTPATIENT)
Dept: INTERNAL MEDICINE | Facility: CLINIC | Age: 60
End: 2020-03-07
Payer: MEDICARE

## 2020-03-07 ENCOUNTER — HOSPITAL ENCOUNTER (OUTPATIENT)
Dept: RADIOLOGY | Facility: HOSPITAL | Age: 60
Discharge: HOME | End: 2020-03-07
Attending: INTERNAL MEDICINE
Payer: MEDICARE

## 2020-03-07 DIAGNOSIS — I10 HYPERTENSION, UNSPECIFIED TYPE: Primary | ICD-10-CM

## 2020-03-07 DIAGNOSIS — N39.0 URINARY TRACT INFECTION WITHOUT HEMATURIA, SITE UNSPECIFIED: ICD-10-CM

## 2020-03-07 DIAGNOSIS — I10 HYPERTENSION, UNSPECIFIED TYPE: ICD-10-CM

## 2020-03-07 PROCEDURE — 3078F PR MOST RECENT DIASTOLIC BLOOD PRESSURE < 80 MM HG: ICD-10-PCS | Mod: CPTII,S$GLB,, | Performed by: INTERNAL MEDICINE

## 2020-03-07 PROCEDURE — 74018 RADEX ABDOMEN 1 VIEW: CPT | Mod: 26,,, | Performed by: RADIOLOGY

## 2020-03-07 PROCEDURE — 99214 PR OFFICE/OUTPT VISIT, EST, LEVL IV, 30-39 MIN: ICD-10-PCS | Mod: S$GLB,,, | Performed by: INTERNAL MEDICINE

## 2020-03-07 PROCEDURE — 3008F PR BODY MASS INDEX (BMI) DOCUMENTED: ICD-10-PCS | Mod: CPTII,S$GLB,, | Performed by: INTERNAL MEDICINE

## 2020-03-07 PROCEDURE — 99214 OFFICE O/P EST MOD 30 MIN: CPT | Mod: S$GLB,,, | Performed by: INTERNAL MEDICINE

## 2020-03-07 PROCEDURE — 3074F SYST BP LT 130 MM HG: CPT | Mod: CPTII,S$GLB,, | Performed by: INTERNAL MEDICINE

## 2020-03-07 PROCEDURE — 3008F BODY MASS INDEX DOCD: CPT | Mod: CPTII,S$GLB,, | Performed by: INTERNAL MEDICINE

## 2020-03-07 PROCEDURE — 74018 RADEX ABDOMEN 1 VIEW: CPT | Mod: TC

## 2020-03-07 PROCEDURE — 3074F PR MOST RECENT SYSTOLIC BLOOD PRESSURE < 130 MM HG: ICD-10-PCS | Mod: CPTII,S$GLB,, | Performed by: INTERNAL MEDICINE

## 2020-03-07 PROCEDURE — 99999 PR PBB SHADOW E&M-EST. PATIENT-LVL IV: CPT | Mod: PBBFAC,,, | Performed by: INTERNAL MEDICINE

## 2020-03-07 PROCEDURE — 99999 PR PBB SHADOW E&M-EST. PATIENT-LVL IV: ICD-10-PCS | Mod: PBBFAC,,, | Performed by: INTERNAL MEDICINE

## 2020-03-07 PROCEDURE — 74018 XR ABDOMEN AP 1 VIEW: ICD-10-PCS | Mod: 26,,, | Performed by: RADIOLOGY

## 2020-03-07 PROCEDURE — 3078F DIAST BP <80 MM HG: CPT | Mod: CPTII,S$GLB,, | Performed by: INTERNAL MEDICINE

## 2020-03-10 ENCOUNTER — HOSPITAL ENCOUNTER (OUTPATIENT)
Dept: ENDOCRINOLOGY | Facility: CLINIC | Age: 60
Discharge: HOME | End: 2020-03-10
Attending: INTERNAL MEDICINE
Payer: MEDICARE

## 2020-03-10 DIAGNOSIS — E04.2 MULTINODULAR GOITER: ICD-10-CM

## 2020-03-10 PROCEDURE — 88173 CYTOPATH EVAL FNA REPORT: CPT | Performed by: PATHOLOGY

## 2020-03-10 PROCEDURE — 10005 US FINE NEEDLE ASPIRATION THYROID, FIRST LESION: ICD-10-PCS | Mod: S$GLB,,, | Performed by: INTERNAL MEDICINE

## 2020-03-10 PROCEDURE — 88173 PR  INTERPRETATION OF FNA SMEAR: ICD-10-PCS | Mod: 26,,, | Performed by: PATHOLOGY

## 2020-03-10 PROCEDURE — 88173 CYTOPATH EVAL FNA REPORT: CPT | Mod: 26,,, | Performed by: PATHOLOGY

## 2020-03-10 PROCEDURE — 10005 FNA BX W/US GDN 1ST LES: CPT | Mod: S$GLB,,, | Performed by: INTERNAL MEDICINE

## 2020-03-13 LAB — FINAL PATHOLOGIC DIAGNOSIS: ABNORMAL

## 2020-03-14 VITALS
DIASTOLIC BLOOD PRESSURE: 70 MMHG | SYSTOLIC BLOOD PRESSURE: 126 MMHG | WEIGHT: 292.31 LBS | BODY MASS INDEX: 45.88 KG/M2 | HEART RATE: 70 BPM | TEMPERATURE: 99 F | HEIGHT: 67 IN | OXYGEN SATURATION: 99 %

## 2020-03-15 NOTE — PROGRESS NOTES
Subjective:       Patient ID: Dejah Schmidt is a 59 y.o. female.    Chief Complaint: Hypertension    HPI  She returns for management of hypertension.  She has had hypertension for over a year.  Current treatment has included medications outlined in medication list.  She denies chest pain or shortness of breath.  No palpitations.  Denies left arm or neck pain.  She complains of dysuria and urinary frequency    Medications:  See med list    Social history:  Does not smoke, does not drink alcohol      Review of Systems   Constitutional: Negative for chills, fatigue, fever and unexpected weight change.   Respiratory: Negative for chest tightness and shortness of breath.    Cardiovascular: Negative for chest pain and palpitations.   Gastrointestinal: Negative for abdominal pain and blood in stool.   Neurological: Negative for dizziness, syncope, numbness and headaches.       Objective:      Physical Exam   HENT:   Right Ear: External ear normal.   Left Ear: External ear normal.   Nose: Nose normal.   Mouth/Throat: Oropharynx is clear and moist.   Eyes: Pupils are equal, round, and reactive to light.   Neck: Normal range of motion.   Cardiovascular: Normal rate and regular rhythm.   No murmur heard.  Pulmonary/Chest: Breath sounds normal.   Abdominal: She exhibits no distension. There is no hepatosplenomegaly. There is no tenderness.   Lymphadenopathy:     She has no cervical adenopathy.     She has no axillary adenopathy.   Neurological: She has normal strength and normal reflexes. No cranial nerve deficit or sensory deficit.       Assessment/Plan       Assessment and plan:  1.  Hypertension:  Check BMP  2.  Urinary tract infection:  Urine sent for urinalysis and culture  3.  She was here for urgent care only appointment.  She will return to clinic for a physical

## 2020-03-16 ENCOUNTER — TELEPHONE (OUTPATIENT)
Dept: INTERNAL MEDICINE | Facility: CLINIC | Age: 60
End: 2020-03-16

## 2020-03-16 DIAGNOSIS — I10 HYPERTENSION, UNSPECIFIED TYPE: Primary | ICD-10-CM

## 2020-03-16 NOTE — TELEPHONE ENCOUNTER
Please contact patient and inform her that I would like to get repeat bloodwork in 1 week. Lab order in, please schedule

## 2020-03-19 ENCOUNTER — PATIENT MESSAGE (OUTPATIENT)
Dept: BARIATRICS | Facility: CLINIC | Age: 60
End: 2020-03-19

## 2020-03-19 ENCOUNTER — PATIENT MESSAGE (OUTPATIENT)
Dept: ENDOCRINOLOGY | Facility: CLINIC | Age: 60
End: 2020-03-19

## 2020-03-23 ENCOUNTER — LAB VISIT (OUTPATIENT)
Dept: LAB | Facility: HOSPITAL | Age: 60
End: 2020-03-23
Attending: INTERNAL MEDICINE
Payer: MEDICARE

## 2020-03-23 DIAGNOSIS — I10 HYPERTENSION, UNSPECIFIED TYPE: ICD-10-CM

## 2020-03-23 LAB
ANION GAP SERPL CALC-SCNC: 10 MMOL/L (ref 8–16)
BUN SERPL-MCNC: 28 MG/DL (ref 6–20)
CALCIUM SERPL-MCNC: 8.8 MG/DL (ref 8.7–10.5)
CHLORIDE SERPL-SCNC: 106 MMOL/L (ref 95–110)
CO2 SERPL-SCNC: 23 MMOL/L (ref 23–29)
CREAT SERPL-MCNC: 1.7 MG/DL (ref 0.5–1.4)
EST. GFR  (AFRICAN AMERICAN): 37.8 ML/MIN/1.73 M^2
EST. GFR  (NON AFRICAN AMERICAN): 32.8 ML/MIN/1.73 M^2
GLUCOSE SERPL-MCNC: 107 MG/DL (ref 70–110)
POTASSIUM SERPL-SCNC: 4.4 MMOL/L (ref 3.5–5.1)
SODIUM SERPL-SCNC: 139 MMOL/L (ref 136–145)

## 2020-03-23 PROCEDURE — 36415 COLL VENOUS BLD VENIPUNCTURE: CPT

## 2020-03-23 PROCEDURE — 80048 BASIC METABOLIC PNL TOTAL CA: CPT

## 2020-03-24 ENCOUNTER — PATIENT MESSAGE (OUTPATIENT)
Dept: INTERNAL MEDICINE | Facility: CLINIC | Age: 60
End: 2020-03-24

## 2020-04-01 ENCOUNTER — PATIENT OUTREACH (OUTPATIENT)
Dept: ADMINISTRATIVE | Facility: OTHER | Age: 60
End: 2020-04-01

## 2020-04-01 ENCOUNTER — LAB VISIT (OUTPATIENT)
Dept: LAB | Facility: HOSPITAL | Age: 60
End: 2020-04-01
Attending: INTERNAL MEDICINE
Payer: MEDICARE

## 2020-04-01 ENCOUNTER — OFFICE VISIT (OUTPATIENT)
Dept: INTERNAL MEDICINE | Facility: CLINIC | Age: 60
End: 2020-04-01
Payer: MEDICARE

## 2020-04-01 ENCOUNTER — PATIENT MESSAGE (OUTPATIENT)
Dept: INTERNAL MEDICINE | Facility: CLINIC | Age: 60
End: 2020-04-01

## 2020-04-01 DIAGNOSIS — K62.89 RECTAL PAIN: ICD-10-CM

## 2020-04-01 DIAGNOSIS — I10 HYPERTENSION, UNSPECIFIED TYPE: Primary | ICD-10-CM

## 2020-04-01 DIAGNOSIS — N39.0 URINARY TRACT INFECTION WITHOUT HEMATURIA, SITE UNSPECIFIED: ICD-10-CM

## 2020-04-01 DIAGNOSIS — I10 HYPERTENSION, UNSPECIFIED TYPE: ICD-10-CM

## 2020-04-01 LAB
ALBUMIN SERPL BCP-MCNC: 3.9 G/DL (ref 3.5–5.2)
ALP SERPL-CCNC: 68 U/L (ref 55–135)
ALT SERPL W/O P-5'-P-CCNC: 20 U/L (ref 10–44)
ANION GAP SERPL CALC-SCNC: 11 MMOL/L (ref 8–16)
AST SERPL-CCNC: 19 U/L (ref 10–40)
BASOPHILS # BLD AUTO: 0.02 K/UL (ref 0–0.2)
BASOPHILS NFR BLD: 0.3 % (ref 0–1.9)
BILIRUB SERPL-MCNC: 0.5 MG/DL (ref 0.1–1)
BUN SERPL-MCNC: 26 MG/DL (ref 6–20)
CALCIUM SERPL-MCNC: 9.1 MG/DL (ref 8.7–10.5)
CHLORIDE SERPL-SCNC: 107 MMOL/L (ref 95–110)
CO2 SERPL-SCNC: 22 MMOL/L (ref 23–29)
CREAT SERPL-MCNC: 1.3 MG/DL (ref 0.5–1.4)
DIFFERENTIAL METHOD: ABNORMAL
EOSINOPHIL # BLD AUTO: 0.1 K/UL (ref 0–0.5)
EOSINOPHIL NFR BLD: 0.9 % (ref 0–8)
ERYTHROCYTE [DISTWIDTH] IN BLOOD BY AUTOMATED COUNT: 13.8 % (ref 11.5–14.5)
EST. GFR  (AFRICAN AMERICAN): 52.3 ML/MIN/1.73 M^2
EST. GFR  (NON AFRICAN AMERICAN): 45.3 ML/MIN/1.73 M^2
GLUCOSE SERPL-MCNC: 101 MG/DL (ref 70–110)
HCT VFR BLD AUTO: 40 % (ref 37–48.5)
HGB BLD-MCNC: 12.5 G/DL (ref 12–16)
IMM GRANULOCYTES # BLD AUTO: 0.02 K/UL (ref 0–0.04)
IMM GRANULOCYTES NFR BLD AUTO: 0.3 % (ref 0–0.5)
LYMPHOCYTES # BLD AUTO: 2.1 K/UL (ref 1–4.8)
LYMPHOCYTES NFR BLD: 29 % (ref 18–48)
MCH RBC QN AUTO: 29 PG (ref 27–31)
MCHC RBC AUTO-ENTMCNC: 31.3 G/DL (ref 32–36)
MCV RBC AUTO: 93 FL (ref 82–98)
MONOCYTES # BLD AUTO: 0.4 K/UL (ref 0.3–1)
MONOCYTES NFR BLD: 5.4 % (ref 4–15)
NEUTROPHILS # BLD AUTO: 4.7 K/UL (ref 1.8–7.7)
NEUTROPHILS NFR BLD: 64.1 % (ref 38–73)
NRBC BLD-RTO: 0 /100 WBC
PLATELET # BLD AUTO: 226 K/UL (ref 150–350)
PMV BLD AUTO: 11.5 FL (ref 9.2–12.9)
POTASSIUM SERPL-SCNC: 4.7 MMOL/L (ref 3.5–5.1)
PROT SERPL-MCNC: 8.6 G/DL (ref 6–8.4)
RBC # BLD AUTO: 4.31 M/UL (ref 4–5.4)
SODIUM SERPL-SCNC: 140 MMOL/L (ref 136–145)
WBC # BLD AUTO: 7.37 K/UL (ref 3.9–12.7)

## 2020-04-01 PROCEDURE — 99999 PR PBB SHADOW E&M-EST. PATIENT-LVL V: ICD-10-PCS | Mod: PBBFAC,,, | Performed by: INTERNAL MEDICINE

## 2020-04-01 PROCEDURE — 3078F DIAST BP <80 MM HG: CPT | Mod: CPTII,S$GLB,, | Performed by: INTERNAL MEDICINE

## 2020-04-01 PROCEDURE — 3078F PR MOST RECENT DIASTOLIC BLOOD PRESSURE < 80 MM HG: ICD-10-PCS | Mod: CPTII,S$GLB,, | Performed by: INTERNAL MEDICINE

## 2020-04-01 PROCEDURE — 85025 COMPLETE CBC W/AUTO DIFF WBC: CPT

## 2020-04-01 PROCEDURE — 3075F PR MOST RECENT SYSTOLIC BLOOD PRESS GE 130-139MM HG: ICD-10-PCS | Mod: CPTII,S$GLB,, | Performed by: INTERNAL MEDICINE

## 2020-04-01 PROCEDURE — 3008F PR BODY MASS INDEX (BMI) DOCUMENTED: ICD-10-PCS | Mod: CPTII,S$GLB,, | Performed by: INTERNAL MEDICINE

## 2020-04-01 PROCEDURE — 3008F BODY MASS INDEX DOCD: CPT | Mod: CPTII,S$GLB,, | Performed by: INTERNAL MEDICINE

## 2020-04-01 PROCEDURE — 36415 COLL VENOUS BLD VENIPUNCTURE: CPT

## 2020-04-01 PROCEDURE — 99214 PR OFFICE/OUTPT VISIT, EST, LEVL IV, 30-39 MIN: ICD-10-PCS | Mod: S$GLB,,, | Performed by: INTERNAL MEDICINE

## 2020-04-01 PROCEDURE — 3075F SYST BP GE 130 - 139MM HG: CPT | Mod: CPTII,S$GLB,, | Performed by: INTERNAL MEDICINE

## 2020-04-01 PROCEDURE — 80053 COMPREHEN METABOLIC PANEL: CPT

## 2020-04-01 PROCEDURE — 99214 OFFICE O/P EST MOD 30 MIN: CPT | Mod: S$GLB,,, | Performed by: INTERNAL MEDICINE

## 2020-04-01 PROCEDURE — 99999 PR PBB SHADOW E&M-EST. PATIENT-LVL V: CPT | Mod: PBBFAC,,, | Performed by: INTERNAL MEDICINE

## 2020-04-01 RX ORDER — ATENOLOL 25 MG/1
TABLET ORAL
COMMUNITY
Start: 2018-03-01 | End: 2020-04-01 | Stop reason: SDUPTHER

## 2020-04-01 RX ORDER — AMLODIPINE BESYLATE 5 MG/1
TABLET ORAL
COMMUNITY
Start: 2020-03-07 | End: 2020-04-01 | Stop reason: SDUPTHER

## 2020-04-01 RX ORDER — ATORVASTATIN CALCIUM 40 MG/1
TABLET, FILM COATED ORAL
COMMUNITY
Start: 2018-09-05 | End: 2020-04-01 | Stop reason: SDUPTHER

## 2020-04-02 ENCOUNTER — PATIENT MESSAGE (OUTPATIENT)
Dept: INTERNAL MEDICINE | Facility: CLINIC | Age: 60
End: 2020-04-02

## 2020-04-02 ENCOUNTER — OFFICE VISIT (OUTPATIENT)
Dept: ENDOCRINOLOGY | Facility: CLINIC | Age: 60
End: 2020-04-02
Payer: MEDICARE

## 2020-04-02 ENCOUNTER — TELEPHONE (OUTPATIENT)
Dept: ENDOCRINOLOGY | Facility: CLINIC | Age: 60
End: 2020-04-02

## 2020-04-02 ENCOUNTER — TELEPHONE (OUTPATIENT)
Dept: INTERNAL MEDICINE | Facility: CLINIC | Age: 60
End: 2020-04-02

## 2020-04-02 DIAGNOSIS — E04.2 MULTINODULAR GOITER: Chronic | ICD-10-CM

## 2020-04-02 DIAGNOSIS — I10 ESSENTIAL HYPERTENSION: Primary | ICD-10-CM

## 2020-04-02 PROCEDURE — 99214 OFFICE O/P EST MOD 30 MIN: CPT | Mod: 95,,, | Performed by: INTERNAL MEDICINE

## 2020-04-02 PROCEDURE — 99499 RISK ADDL DX/OHS AUDIT: ICD-10-PCS | Mod: 95,,, | Performed by: INTERNAL MEDICINE

## 2020-04-02 PROCEDURE — 99499 UNLISTED E&M SERVICE: CPT | Mod: 95,,, | Performed by: INTERNAL MEDICINE

## 2020-04-02 PROCEDURE — 99214 PR OFFICE/OUTPT VISIT, EST, LEVL IV, 30-39 MIN: ICD-10-PCS | Mod: 95,,, | Performed by: INTERNAL MEDICINE

## 2020-04-02 RX ORDER — HYDROCORTISONE ACETATE 25 MG/1
25 SUPPOSITORY RECTAL 2 TIMES DAILY
Qty: 20 SUPPOSITORY | Refills: 0 | Status: SHIPPED | OUTPATIENT
Start: 2020-04-02 | End: 2020-04-12

## 2020-04-02 NOTE — TELEPHONE ENCOUNTER
Called pt before  virtual visit . No changes on pt medication , medical history , pharmacy or allergies.   No Rx needed right now.   No issue  pre check  on [facility].

## 2020-04-02 NOTE — TELEPHONE ENCOUNTER
----- Message from Linda Roca sent at 4/2/2020 12:00 PM CDT -----  Contact: [facility]  hydrocortisone-pramoxine (PROCTOFOAM-HS) rectal foam    Sub for the cream    Requesting an RX refill or new RX.  Is this a refill or new RX:  New  RX name and strength:  hydrocortisone-pramoxine (PROCTOFOAM-HS) rectal cream  Directions (copy/paste from chart):    Is this a 30 day or 90 day RX:    Local pharmacy or mail order pharmacy:  Local  Pharmacy name and phone # [id number] - [place] - [address]  Comments:  Sub: hydrocortisone-pramoxine (PROCTOFOAM-HS) rectal foam for the cream form

## 2020-04-02 NOTE — PROGRESS NOTES
ENDOCRINOLOGY CLINIC FOLLOW UP  2020     The patient's last visit with me was on 2020.     The patient location is: home  The chief complaint leading to consultation is: MNG  Visit type: Virtual visit with synchronous audio and video  Total time spent with patient: 25 min  Each patient to whom he or she provides medical services by telemedicine is:  (1) informed of the relationship between the physician and patient and the respective role of any other health care provider with respect to management of the patient; and (2) notified that he or she may decline to receive medical services by telemedicine and may withdraw from such care at any time     CC:  Follow up nonfunctional MNG , previously     HPI:  After last visit labs showed SATNAM and hyperkalemia thus she was admitted and treated w/ fluids, ACEI held for several days and topiramate stopped as it was thought to be cause of kidney injury.  Since a couple weeks prior to SATNAM she had been feeling fatigue and generalized weakness.  She is feeling a little better but today still feeling weak, fatigue, lightheadedness, IVAN.  Also started having LE edema yesterday.  Normal urination      Regarding MNG:  Previously reported compressive symptoms w/ chocking sensation at night + pressure in neck however these have resolved and no longer bother her.      She initially had a right dominant nodule of 2.8 x1.9 cm and underwent FNA of this nodule x2 on 12  and  both showed benign pathology.     Had repeat US 2016 - showed multinodular thyroid with  two nodules on the right and left that meet FNA criteria. FNA 2016 of both R and L nodules were benign.    Most recent imagin2020  1.) Thyroid gland is increased in size with heterogeneous echotexture and normal vascularity    2.) 3.41 x 2.3 x 2.94 cm solid, heterogeneous predominately isoechoic nodule is seen in the right mid lobe    3.) 1.55 x 0.92 x 1.11 cm spongiform nodule is seen in the right  superior pole    4.) 2.10 x 1.09 x 1.39 cm solid, isoechoic nodule is seen in the left mid lobe    5.) 1.08 x 0.68 x 0.85 cm heterogeneous predominately hypoechoic nodule is seen in the left superior pole    6.) 1.11 x 0.84 x 0.99 cm cystic nodule with a hypoechoic solid component is seen in the left superior pole    7.) 1.26 x 0.57 x 1.11 cm spongiform nodule is seen in the left inferior pole    8.) 1.81 x 0.98 x 1.45 cm solid, heterogeneous predominately isoechoic nodule is seen in the left inferior pole    9.) 0.95 x 0.30 x 0.68 cm solid, heterogeneous predominately hypoechoic nodule is seen in the isthmus    RECOMMENDATIONS: consider FNA of left lobe 1.81 cm nodule as this meets criteria and has not undergone FNA previously.    FNA:  3/10/2020 - left inferior 1.8 cm nodule benign     Risk Factors for Thyroid Cancer:  Hx of External Beam Radiation: denies  Family Hx of Thyroid Cancer:denies    There is no known disorder of thyroid function.    Recent TSH:    Lab Results   Component Value Date    TSH 0.628 02/28/2020     ROS/Thyroid Symptoms    Weight change:  []  Gain [x]  Loss  []  Denies   Is dieting so has lost weight- down almost 20 lbs     Temperature intolerance:  []  Cold [x]  Hot (hot flashes)  []  Denies     GI:  []  Diarrhea [x]  Constipation []  Denies    Integument:  []  Hair loss []  Dry skin  [x]  Denies    Other:   []  Palpitation []  tremor     []  Increased anxiety    [x]  Denies     Lab Results   Component Value Date    TSH 0.628 02/28/2020    FREET4 0.94 03/04/2009         Hypertension:  On 5 BP meds, no missed doses w/ pill box.   Home BP- running low 90s/50 w/o some lightheadedness  Denies CP.  +IVAN  Denies vision changes.  + HA (even when BP normal)  Denies easy bruising     Renin and alix done however not reliable as she was having SATNAM 2/2 topiramate at the time thus will need to repeat when she is feeling better    With regards to the vitamin d deficiency:  Ergocalciferol 50 k weekly for  several year, was missing doses but not since 1/2020 2/2 use of pill box  No recent fractures.  Hx of foot fracture after jumping >10 year ago, no fragility fractures    Lab Results   Component Value Date    [id number] 20 (L) 12/26/2019        Current Outpatient Medications:     ALBUTEROL INHL, , Disp: , Rfl:     amLODIPine (NORVASC) 10 MG tablet, Take 1 tablet (10 mg total) by mouth once daily., Disp: 90 tablet, Rfl: 1    atenolol (TENORMIN) 25 MG tablet, Take 1 tablet (25 mg total) by mouth once daily., Disp: 90 tablet, Rfl: 1    atorvastatin (LIPITOR) 40 MG tablet, Take 1 tablet (40 mg total) by mouth once daily., Disp: 90 tablet, Rfl: 1    cholecalciferol, vitamin D3, 50,000 unit Tab, Take 1 tablet by mouth once a week., Disp: 12 tablet, Rfl: 3    hydrocortisone-pramoxine (PROCTOFOAM-HS) rectal foam, Place 1 applicator rectally 2 (two) times daily as needed for Hemorrhoids., Disp: 10 g, Rfl: 1    lisinopril (PRINIVIL,ZESTRIL) 40 MG tablet, Hold for 2 more days.  TAKE 1 TABLET(40 MG) BY MOUTH EVERY DAY, Disp: 90 tablet, Rfl: 0    NEURONTIN 300 mg capsule, Take one caps at bedtime x 3 days, than increase to 2 caps at bedtime x 3 days, than 1 caps in am and 2 caps at bedtime., Disp: 90 capsule, Rfl: 2    triamterene-hydrochlorothiazide 37.5-25 mg (DYAZIDE) 37.5-25 mg per capsule, Take 1 capsule by mouth every morning. Hold for 2 more days., Disp: 90 capsule, Rfl: 1      PHYSICAL EXAM  Constitutional:  Pleasant,  in no acute distress.   HENT:   Eyes:     No scleral icterus.   Respiratory:   Effort normal   Neurological:  normal speech  Psych:   Normal mood and affect.      LABORATORY REVIEW:  Thyroid Labs Latest Ref Rng & Units 3/7/2020 3/23/2020 4/1/2020   TSH 0.400 - 4.000 uIU/mL - - -   Free T4 0.71 - 1.51 ng/dl - - -   Sodium 136 - 145 mmol/L 142 139 140   Potassium 3.5 - 5.1 mmol/L 4.0 4.4 4.7   Chloride 95 - 110 mmol/L 107 106 107   Carbon Dioxide 23 - 29 mmol/L 24 23 22(L)   Glucose 70 - 110 mg/dL  106 107 101   Blood Urea Nitrogen 6 - 20 mg/dL 24(H) 28(H) 26(H)   Creatinine 0.5 - 1.4 mg/dL 1.8(H) 1.7(H) 1.3   Calcium 8.7 - 10.5 mg/dL 9.8 8.8 9.1   Total Protein 6.0 - 8.4 g/dL - - 8.6(H)   Albumin 3.5 - 5.2 g/dL - - 3.9   Total Bilirubin 0.1 - 1.0 mg/dL - - 0.5   AST 10 - 40 U/L - - 19   ALT 10 - 44 U/L - - 20   Anion Gap 8 - 16 mmol/L 11 10 11   eGFR (African American) >60 mL/min/1.73 m:2 35.3(A) 37.8(A) 52.3(A)   eGFR (Non-African American) >60 mL/min/1.73 m:2 30.6(A) 32.8(A) 45.3(A)   WBC 3.90 - 12.70 K/uL - - 7.37   RBC 4.00 - 5.40 M/uL - - 4.31   Hemoglobin 12.0 - 16.0 g/dL - - 12.5   Hematocrit 37.0 - 48.5 % - - 40.0   MCV 82 - 98 fL - - 93   MCH 27.0 - 31.0 pg - - 29.0   MCHC 32.0 - 36.0 g/dL - - 31.3(L)   RDW 11.5 - 14.5 % - - 13.8   Platelets 150 - 350 K/uL - - 226   MPV 9.2 - 12.9 fL - - 11.5   Gran # 1.8 - 7.7 K/uL - - 4.7   Lymph # 1.0 - 4.8 K/uL - - 2.1   Mono # 0.3 - 1.0 K/uL - - 0.4   Eos # 0.0 - 0.5 K/uL - - 0.1   Baso # 0.00 - 0.20 K/uL - - 0.02   Gran % 38.0 - 73.0 % - - 64.1   Lymph % 18.0 - 48.0 % - - 29.0   Mono% 4.0 - 15.0 % - - 5.4   Eos % 0.0 - 8.0 % - - 0.9   Baso % 0.0 - 1.9 % - - 0.3   Prothrombin Time 9.0 - 12.5 sec - - -   INR 0.8 - 1.2 - - -   aPTT 21.0 - 32.0 sec - - -        IMAGING STUDIES  See above     ASSESSMENT/PLAN    Problem List Items Addressed This Visit        1 - High    Multinodular goiter (Chronic)     Benign FNA of L inf nodule 3/2020 and now w/o compressive symptoms so will plan to repeat US in 3/2021.  Normal TSH            2     Unspecified essential hypertension - Primary     On 5 antihypertensives.  Previous renin/alix done during SATNAM from [facility] so not reliable.  She is still not feeling well with some lightheadedness which I suspect may be from hypotension.  She will f/u w/ pcp for this and send be BP log so that we an reduced BP meds if needed.  Will plan to repeat renin, alix, BMP in 3 mo once back to baseline.           Relevant Orders    Basic metabolic  panel    Renin    Aldosterone          3  Mo virtual f/u w/ 8 am labs    Yajaira Yanez MD

## 2020-04-03 ENCOUNTER — PATIENT MESSAGE (OUTPATIENT)
Dept: BARIATRICS | Facility: CLINIC | Age: 60
End: 2020-04-03

## 2020-04-03 NOTE — TELEPHONE ENCOUNTER
Dr Bergeron I did speak with [facility] . They do have the Anusol Suppositories. They have a partial fill and will be getting the remainder in on Thursday. I did call and inform the patient, she verbalized understanding.    Thanks,  Dasha

## 2020-04-03 NOTE — TELEPHONE ENCOUNTER
Please contact her pharmacy- I had received the message that she was able to get the generic prescription. Could you clarify with the pharmacist if she was able to get the hydrocortisone either in suppository or foam ?

## 2020-04-04 VITALS
HEIGHT: 67 IN | OXYGEN SATURATION: 99 % | DIASTOLIC BLOOD PRESSURE: 78 MMHG | WEIGHT: 291 LBS | BODY MASS INDEX: 45.67 KG/M2 | TEMPERATURE: 98 F | HEART RATE: 91 BPM | SYSTOLIC BLOOD PRESSURE: 136 MMHG

## 2020-04-04 NOTE — PROGRESS NOTES
Subjective:       Patient ID: Dejah Schmidt is a 59 y.o. female.    Chief Complaint: Hypertension    HPI  She returns for management of hypertension.  She has had hypertension for over a year.  Current treatment has included medications outlined in medication list.  She denies chest pain or shortness of breath.  No palpitations.  Denies left arm or neck pain.  She complains of rectal discomfort    Past medical history: Hypertension, hyperlipidemia, asthma, lumbar spinal stenosis, vitamin D deficiency, thyroid nodule, status post partial thyroidectomy, status post hysterectomy. She had a colonoscopy September 2011      Medications: Lisinopril 40 mg daily, Dyazide 1 by mouth daily, Norvasc 10 mg daily ,Neurontin, ergocalciferol, Lipitor 40 mg daily, atenolol 25 mg daily     NO KNOWN DRUG ALLERGIES     Review of Systems   Constitutional: Negative for chills, fatigue, fever and unexpected weight change.   Respiratory: Negative for chest tightness and shortness of breath.    Cardiovascular: Negative for chest pain and palpitations.   Gastrointestinal: Negative for abdominal pain and blood in stool.   Neurological: Negative for dizziness, syncope, numbness and headaches.       Objective:      Physical Exam   HENT:   Right Ear: External ear normal.   Left Ear: External ear normal.   Nose: Nose normal.   Mouth/Throat: Oropharynx is clear and moist.   Eyes: Pupils are equal, round, and reactive to light.   Neck: Normal range of motion.   Cardiovascular: Normal rate and regular rhythm.   No murmur heard.  Pulmonary/Chest: Breath sounds normal.   Abdominal: She exhibits no distension. There is no hepatosplenomegaly. There is no tenderness.   Lymphadenopathy:     She has no cervical adenopathy.     She has no axillary adenopathy.   Neurological: She has normal strength and normal reflexes. No cranial nerve deficit or sensory deficit.     rectal:  Positive hemorrhoids  Assessment/Plan       Assessment and plan:  1.   Hypertension:  Check CMP and CBC  2.  Hemorrhoid:  Proctofoam cream b.i.d. p.r.n..  Call if no relief  3.  Urine sent for urinalysis and culture

## 2020-04-05 ENCOUNTER — PATIENT MESSAGE (OUTPATIENT)
Dept: ENDOCRINOLOGY | Facility: CLINIC | Age: 60
End: 2020-04-05

## 2020-04-06 ENCOUNTER — TELEPHONE (OUTPATIENT)
Dept: ENDOCRINOLOGY | Facility: CLINIC | Age: 60
End: 2020-04-06

## 2020-04-06 RX ORDER — LISINOPRIL 40 MG/1
TABLET ORAL
Qty: 90 TABLET | Refills: 0 | Status: SHIPPED | OUTPATIENT
Start: 2020-04-06 | End: 2020-07-02 | Stop reason: SDUPTHER

## 2020-04-06 NOTE — TELEPHONE ENCOUNTER
----- Message from Yajaira Yanez MD sent at 4/5/2020 11:20 PM CDT -----  Needs 3 mo f/u with 8 am labs week before

## 2020-04-06 NOTE — ASSESSMENT & PLAN NOTE
Benign FNA of L inf nodule 3/2020 and now w/o compressive symptoms so will plan to repeat US in 3/2021.  Normal TSH

## 2020-04-06 NOTE — ASSESSMENT & PLAN NOTE
On 5 antihypertensives.  Previous renin/alix done during SATNAM from Top[facility] so not reliable.  She is still not feeling well with some lightheadedness which I suspect may be from hypotension.  She will f/u w/ pcp for this and send be BP log so that we an reduced BP meds if needed.  Will plan to repeat renin, alix, BMP in 3 mo once back to baseline.

## 2020-04-08 ENCOUNTER — PATIENT MESSAGE (OUTPATIENT)
Dept: ENDOCRINOLOGY | Facility: CLINIC | Age: 60
End: 2020-04-08

## 2020-04-09 ENCOUNTER — PATIENT MESSAGE (OUTPATIENT)
Dept: ENDOCRINOLOGY | Facility: CLINIC | Age: 60
End: 2020-04-09

## 2020-04-27 ENCOUNTER — TELEPHONE (OUTPATIENT)
Dept: SURGERY | Facility: CLINIC | Age: 60
End: 2020-04-27

## 2020-04-29 ENCOUNTER — PATIENT OUTREACH (OUTPATIENT)
Dept: ADMINISTRATIVE | Facility: OTHER | Age: 60
End: 2020-04-29

## 2020-04-30 NOTE — PROGRESS NOTES
CRS Office Visit History and Physical      SUBJECTIVE:     Chief Complaint: Hemorrhoids    History of Present Illness:  The patient is new patient to this practice.   Course is as follows:  Patient is a 59 y.o. female presents with 1 month of constipation and anal pain.  She states that she developed the constipation 1st, which started in early March.  This was proceeded by and admission to the hospital for dehydration in late February.  She endorses straining with stool in passage of hard stools.  Has taken some laxatives intermittently, but does not take any fiber stool softener on a regular basis.  She does think that she drinks at least 8 glasses of water per day.  Anal pain started with an episode of acute pain and bleeding approximately 1 month ago.  Continues to have pain with and for several hours after a bowel movement.  No prior anorectal procedures.  No blood thinners.    Last Colonoscopy: 2011 (transverse colon polyp)  Family history of colorectal cancer or IBD: None.    Review of patient's allergies indicates:  No Known Allergies    Past Medical History:   Diagnosis Date    Asthma     Colon polyp 2011    Hypercholesteremia     Hypertension     Multiple thyroid nodules      Past Surgical History:   Procedure Laterality Date    CARPAL TUNNEL RELEASE      FOOT SURGERY      HYSTERECTOMY      MASS EXCISION      at neck    ROTATOR CUFF REPAIR      left shoulder    TRIGGER FINGER RELEASE  12/2017    right hand     Family History   Problem Relation Age of Onset    Cancer Mother     Coronary artery disease Mother     Heart disease Mother     Hypertension Mother     Hyperlipidemia Mother     Hypertension Father     Cancer Father     Heart disease Maternal Grandmother     Hypertension Maternal Grandmother     Breast cancer Maternal Grandmother      Social History     Tobacco Use    Smoking status: Never Smoker    Smokeless tobacco: Never Used   Substance Use Topics    Alcohol use: Yes      "Alcohol/week: 0.0 standard drinks     Frequency: Monthly or less     Drinks per session: 3 or 4     Binge frequency: Never     Comment: once a month    Drug use: No        Review of Systems:  Review of Systems   Gastrointestinal: Positive for blood in stool and constipation.   All other systems reviewed and are negative.      OBJECTIVE:     Vital Signs (Most Recent)  /68 (BP Location: Left arm, Patient Position: Sitting, BP Method: Large (Automatic))   Pulse 78   Ht 5' 7" (1.702 m)   Wt 131.4 kg (289 lb 11 oz)   BMI 45.37 kg/m²     Physical Exam:  General: Black or  female in no distress   Neuro: Alert and oriented x 4.  Moves all extremities.     HEENT: No icterus.  Trachea midline  Respiratory: Respirations are even and unlabored  Cardiac: Regular rate  Extremities: Warm dry and intact  Skin: No rashes    Anorectal:   External exam:  Posterior midline sentinel pile, posterior midline acute appearing anal fissure.  Minimal hemorrhoidal tissue.      ASSESSMENT/PLAN:     59-year-old female presenting with new onset constipation, rectal bleeding, and anal fissure.  - recommended daily fiber supplement, with instructions for titration, in addition of MiraLax if still constipated  - provided with prescription for diltiazem and lidocaine ointment, instructed on how to use  - recommended increasing water intake to at least 8-10 glasses per day  - recommended colonoscopy given that it has been 9 years since her last and she has had an acute change in her bowel function    Sarah Beth Leavitt MD  Staff Surgeon, Colon and Rectal Surgery  [facility]  "

## 2020-05-01 ENCOUNTER — OFFICE VISIT (OUTPATIENT)
Dept: SURGERY | Facility: CLINIC | Age: 60
End: 2020-05-01
Attending: COLON & RECTAL SURGERY
Payer: MEDICARE

## 2020-05-01 VITALS
DIASTOLIC BLOOD PRESSURE: 68 MMHG | SYSTOLIC BLOOD PRESSURE: 123 MMHG | WEIGHT: 289.69 LBS | BODY MASS INDEX: 45.47 KG/M2 | HEART RATE: 78 BPM | HEIGHT: 67 IN

## 2020-05-01 DIAGNOSIS — K60.2 ANAL FISSURE: Primary | ICD-10-CM

## 2020-05-01 DIAGNOSIS — K62.89 RECTAL PAIN: ICD-10-CM

## 2020-05-01 PROCEDURE — 3078F DIAST BP <80 MM HG: CPT | Mod: CPTII,S$GLB,, | Performed by: COLON & RECTAL SURGERY

## 2020-05-01 PROCEDURE — 3008F PR BODY MASS INDEX (BMI) DOCUMENTED: ICD-10-PCS | Mod: CPTII,S$GLB,, | Performed by: COLON & RECTAL SURGERY

## 2020-05-01 PROCEDURE — 99203 OFFICE O/P NEW LOW 30 MIN: CPT | Mod: S$GLB,,, | Performed by: COLON & RECTAL SURGERY

## 2020-05-01 PROCEDURE — 3074F PR MOST RECENT SYSTOLIC BLOOD PRESSURE < 130 MM HG: ICD-10-PCS | Mod: CPTII,S$GLB,, | Performed by: COLON & RECTAL SURGERY

## 2020-05-01 PROCEDURE — 3074F SYST BP LT 130 MM HG: CPT | Mod: CPTII,S$GLB,, | Performed by: COLON & RECTAL SURGERY

## 2020-05-01 PROCEDURE — 3008F BODY MASS INDEX DOCD: CPT | Mod: CPTII,S$GLB,, | Performed by: COLON & RECTAL SURGERY

## 2020-05-01 PROCEDURE — 99999 PR PBB SHADOW E&M-EST. PATIENT-LVL IV: CPT | Mod: PBBFAC,,, | Performed by: COLON & RECTAL SURGERY

## 2020-05-01 PROCEDURE — 3078F PR MOST RECENT DIASTOLIC BLOOD PRESSURE < 80 MM HG: ICD-10-PCS | Mod: CPTII,S$GLB,, | Performed by: COLON & RECTAL SURGERY

## 2020-05-01 PROCEDURE — 99999 PR PBB SHADOW E&M-EST. PATIENT-LVL IV: ICD-10-PCS | Mod: PBBFAC,,, | Performed by: COLON & RECTAL SURGERY

## 2020-05-01 PROCEDURE — 99203 PR OFFICE/OUTPT VISIT, NEW, LEVL III, 30-44 MIN: ICD-10-PCS | Mod: S$GLB,,, | Performed by: COLON & RECTAL SURGERY

## 2020-05-01 NOTE — PATIENT INSTRUCTIONS
(1) Start daily fiber.  Take 1 tsp of fiber powder (psyllium or other sugar-free powder).  Mix in 8 oz of water.  Take x 3-5 days.  Then, increase fiber by 1 tsp every 3-5 days until stool is easy to pass.  Stop and continue at that dose.   Do not exceed 6 tsps/day. GOAL:  More well-formed stool (one continuous well-formed piece vs. Pellets) and minimize straining with initiation.    (2) If you are still constipated with the fiber, add 1 capful of MiraLax (or generic equivalent) daily.  You can take this up to 2 times per day.

## 2020-05-05 ENCOUNTER — TELEPHONE (OUTPATIENT)
Dept: ENDOSCOPY | Facility: HOSPITAL | Age: 60
End: 2020-05-05

## 2020-05-05 NOTE — TELEPHONE ENCOUNTER
Patient is a high priority case to be scheduled on 5/20/20 per Dr Leavitt.  Contacted patient to schedule colonoscopy.  She informed me that she is not ready to schedule at this time because she has family obligations.  She said she will call back in June to schedule.  Direct line phone number provided to return call when ready.

## 2020-05-22 DIAGNOSIS — Z12.11 SPECIAL SCREENING FOR MALIGNANT NEOPLASMS, COLON: Primary | ICD-10-CM

## 2020-05-22 DIAGNOSIS — U07.1 COVID-19: Primary | ICD-10-CM

## 2020-05-22 RX ORDER — POLYETHYLENE GLYCOL 3350, SODIUM SULFATE ANHYDROUS, SODIUM BICARBONATE, SODIUM CHLORIDE, POTASSIUM CHLORIDE 236; 22.74; 6.74; 5.86; 2.97 G/4L; G/4L; G/4L; G/4L; G/4L
4 POWDER, FOR SOLUTION ORAL ONCE
Qty: 4000 ML | Refills: 0 | Status: SHIPPED | OUTPATIENT
Start: 2020-05-22 | End: 2020-05-22

## 2020-06-02 ENCOUNTER — LAB VISIT (OUTPATIENT)
Dept: INTERNAL MEDICINE | Facility: CLINIC | Age: 60
End: 2020-06-02
Payer: MEDICARE

## 2020-06-02 DIAGNOSIS — U07.1 COVID-19: ICD-10-CM

## 2020-06-02 PROCEDURE — U0003 INFECTIOUS AGENT DETECTION BY NUCLEIC ACID (DNA OR RNA); SEVERE ACUTE RESPIRATORY SYNDROME CORONAVIRUS 2 (SARS-COV-2) (CORONAVIRUS DISEASE [COVID-19]), AMPLIFIED PROBE TECHNIQUE, MAKING USE OF HIGH THROUGHPUT TECHNOLOGIES AS DESCRIBED BY CMS-2020-01-R: HCPCS

## 2020-06-03 LAB — SARS-COV-2 RNA RESP QL NAA+PROBE: NOT DETECTED

## 2020-06-04 ENCOUNTER — ANESTHESIA (OUTPATIENT)
Dept: ENDOSCOPY | Facility: HOSPITAL | Age: 60
End: 2020-06-04
Payer: MEDICARE

## 2020-06-04 ENCOUNTER — ANESTHESIA EVENT (OUTPATIENT)
Dept: ENDOSCOPY | Facility: HOSPITAL | Age: 60
End: 2020-06-04
Payer: MEDICARE

## 2020-06-04 ENCOUNTER — HOSPITAL ENCOUNTER (OUTPATIENT)
Facility: HOSPITAL | Age: 60
Discharge: HOME | End: 2020-06-04
Attending: COLON & RECTAL SURGERY | Admitting: COLON & RECTAL SURGERY
Payer: MEDICARE

## 2020-06-04 VITALS
SYSTOLIC BLOOD PRESSURE: 103 MMHG | OXYGEN SATURATION: 100 % | BODY MASS INDEX: 45.2 KG/M2 | HEART RATE: 62 BPM | DIASTOLIC BLOOD PRESSURE: 64 MMHG | TEMPERATURE: 98 F | HEIGHT: 67 IN | RESPIRATION RATE: 18 BRPM | WEIGHT: 288 LBS

## 2020-06-04 DIAGNOSIS — E66.01 MORBID OBESITY: Primary | ICD-10-CM

## 2020-06-04 PROCEDURE — 88305 TISSUE EXAM BY PATHOLOGIST: CPT | Mod: 26,,, | Performed by: PATHOLOGY

## 2020-06-04 PROCEDURE — 37000008 HC ANESTHESIA 1ST 15 MINUTES: Performed by: COLON & RECTAL SURGERY

## 2020-06-04 PROCEDURE — 27201089 HC SNARE, DISP (ANY): Performed by: COLON & RECTAL SURGERY

## 2020-06-04 PROCEDURE — 88305 TISSUE EXAM BY PATHOLOGIST: CPT | Performed by: PATHOLOGY

## 2020-06-04 PROCEDURE — E9220 PRA ENDO ANESTHESIA: HCPCS | Mod: PT,,, | Performed by: NURSE ANESTHETIST, CERTIFIED REGISTERED

## 2020-06-04 PROCEDURE — 45385 COLONOSCOPY W/LESION REMOVAL: CPT | Mod: PT,,, | Performed by: COLON & RECTAL SURGERY

## 2020-06-04 PROCEDURE — 37000009 HC ANESTHESIA EA ADD 15 MINS: Performed by: COLON & RECTAL SURGERY

## 2020-06-04 PROCEDURE — 25000003 PHARM REV CODE 250: Performed by: NURSE ANESTHETIST, CERTIFIED REGISTERED

## 2020-06-04 PROCEDURE — 45385 PR COLONOSCOPY,REMV LESN,SNARE: ICD-10-PCS | Mod: PT,,, | Performed by: COLON & RECTAL SURGERY

## 2020-06-04 PROCEDURE — 25000003 PHARM REV CODE 250: Performed by: COLON & RECTAL SURGERY

## 2020-06-04 PROCEDURE — 88305 TISSUE EXAM BY PATHOLOGIST: ICD-10-PCS | Mod: 26,,, | Performed by: PATHOLOGY

## 2020-06-04 PROCEDURE — 63600175 PHARM REV CODE 636 W HCPCS: Performed by: NURSE ANESTHETIST, CERTIFIED REGISTERED

## 2020-06-04 PROCEDURE — E9220 PRA ENDO ANESTHESIA: ICD-10-PCS | Mod: PT,,, | Performed by: NURSE ANESTHETIST, CERTIFIED REGISTERED

## 2020-06-04 PROCEDURE — 45385 COLONOSCOPY W/LESION REMOVAL: CPT | Performed by: COLON & RECTAL SURGERY

## 2020-06-04 RX ORDER — GLYCOPYRROLATE 0.2 MG/ML
INJECTION INTRAMUSCULAR; INTRAVENOUS
Status: DISCONTINUED | OUTPATIENT
Start: 2020-06-04 | End: 2020-06-04

## 2020-06-04 RX ORDER — LIDOCAINE HYDROCHLORIDE 20 MG/ML
INJECTION INTRAVENOUS
Status: DISCONTINUED | OUTPATIENT
Start: 2020-06-04 | End: 2020-06-04

## 2020-06-04 RX ORDER — PROPOFOL 10 MG/ML
VIAL (ML) INTRAVENOUS CONTINUOUS PRN
Status: DISCONTINUED | OUTPATIENT
Start: 2020-06-04 | End: 2020-06-04

## 2020-06-04 RX ORDER — SODIUM CHLORIDE 9 MG/ML
INJECTION, SOLUTION INTRAVENOUS CONTINUOUS
Status: DISCONTINUED | OUTPATIENT
Start: 2020-06-04 | End: 2020-06-04 | Stop reason: HOSPADM

## 2020-06-04 RX ORDER — SODIUM CHLORIDE 9 MG/ML
INJECTION, SOLUTION INTRAVENOUS CONTINUOUS
Status: CANCELLED | OUTPATIENT
Start: 2020-06-04

## 2020-06-04 RX ORDER — SODIUM CHLORIDE 9 MG/ML
INJECTION, SOLUTION INTRAVENOUS CONTINUOUS PRN
Status: DISCONTINUED | OUTPATIENT
Start: 2020-06-04 | End: 2020-06-04

## 2020-06-04 RX ORDER — PROPOFOL 10 MG/ML
VIAL (ML) INTRAVENOUS
Status: DISCONTINUED | OUTPATIENT
Start: 2020-06-04 | End: 2020-06-04

## 2020-06-04 RX ADMIN — LIDOCAINE HYDROCHLORIDE 100 MG: 20 INJECTION, SOLUTION INTRAVENOUS at 12:06

## 2020-06-04 RX ADMIN — GLYCOPYRROLATE 0.2 MG: 0.2 INJECTION, SOLUTION INTRAMUSCULAR; INTRAVENOUS at 12:06

## 2020-06-04 RX ADMIN — SODIUM CHLORIDE: 0.9 INJECTION, SOLUTION INTRAVENOUS at 11:06

## 2020-06-04 RX ADMIN — PROPOFOL: 10 INJECTION, EMULSION INTRAVENOUS at 12:06

## 2020-06-04 RX ADMIN — PROPOFOL 20 MG: 10 INJECTION, EMULSION INTRAVENOUS at 12:06

## 2020-06-04 RX ADMIN — PROPOFOL 60 MG: 10 INJECTION, EMULSION INTRAVENOUS at 12:06

## 2020-06-04 RX ADMIN — PROPOFOL 150 MCG/KG/MIN: 10 INJECTION, EMULSION INTRAVENOUS at 12:06

## 2020-06-04 NOTE — TRANSFER OF CARE
"Anesthesia Transfer of Care Note    Patient: Dejah Schmidt    Procedure(s) Performed: Procedure(s) (LRB):  COLONOSCOPY, with me, 5/20 (N/A)    Patient location: PACU    Anesthesia Type: general    Transport from OR: Transported from OR on 6-10 L/min O2 by face mask with adequate spontaneous ventilation    Post pain: adequate analgesia    Post assessment: no apparent anesthetic complications    Post vital signs: stable    Level of consciousness: awake, alert and oriented    Nausea/Vomiting: no nausea/vomiting    Complications: none    Transfer of care protocol was followed      Last vitals:   Visit Vitals  /64   Pulse 71   Temp 36.6 °C (97.9 °F)   Ht 5' 7" (1.702 m)   Wt 130.6 kg (288 lb)   SpO2 99%   Breastfeeding? No   BMI 45.11 kg/m²     "

## 2020-06-04 NOTE — ANESTHESIA PREPROCEDURE EVALUATION
06/04/2020  Dejah Schmidt is a 59 y.o., female.    Anesthesia Evaluation    I have reviewed the Patient Summary Reports.    I have reviewed the Nursing Notes. I have reviewed the NPO Status.      Review of Systems  Social:  Social Alcohol Use, Non-Smoker    Hepatic/GI:   Bowel Prep.        Physical Exam  General:  Obesity    Airway/Jaw/Neck:  Airway Findings: Mouth Opening: Normal Tongue: Normal  General Airway Assessment: Adult  Mallampati: II  Improves to I with phonation.  TM Distance: Normal, at least 6 cm     Eyes/Ears/Nose:  Eyes/Ears/Nose Findings:          Mental Status:  Mental Status Findings:  Alert and Oriented, Cooperative         Anesthesia Plan  Type of Anesthesia, risks & benefits discussed:  Anesthesia Type:  general  Patient's Preference:   Intra-op Monitoring Plan: standard ASA monitors  Intra-op Monitoring Plan Comments:   Post Op Pain Control Plan: multimodal analgesia  Post Op Pain Control Plan Comments:   Induction:   IV  Beta Blocker:  Patient is not currently on a Beta-Blocker (No further documentation required).       Informed Consent: Patient understands risks and agrees with Anesthesia plan.  Questions answered.   ASA Score: 2     Day of Surgery Review of History & Physical:    H&P update referred to the surgeon.         Ready For Surgery From Anesthesia Perspective.

## 2020-06-04 NOTE — ANESTHESIA POSTPROCEDURE EVALUATION
Anesthesia Post Evaluation    Patient: Ada H Schmidt    Procedure(s) Performed: Procedure(s) (LRB):  COLONOSCOPY, with me, 5/20 (N/A)    Final Anesthesia Type: general    Patient location during evaluation: PACU  Patient participation: Yes- Able to Participate  Level of consciousness: awake and alert and oriented  Post-procedure vital signs: reviewed and stable  Pain management: adequate  Airway patency: patent    PONV status at discharge: No PONV  Anesthetic complications: no      Cardiovascular status: stable  Respiratory status: unassisted, spontaneous ventilation and room air  Hydration status: euvolemic  Follow-up not needed.          Vitals Value Taken Time   BP 95/57 6/4/2020 12:40 PM   Temp 36.4 °C (97.6 °F) 6/4/2020 12:40 PM   Pulse 66 6/4/2020 12:40 PM   Resp 18 6/4/2020 12:40 PM   SpO2 100 % 6/4/2020 12:40 PM         No case tracking events are documented in the log.      Pain/Dirk Score: Dirk Score: 10 (6/4/2020 12:41 PM)

## 2020-06-04 NOTE — [employer] PATIENT INSTRUCTIONS
Discharge Summary/Instructions after an Endoscopic Procedure  Patient Name: Dejah Schmidt  Patient MRN: [id number]  Patient YOB: 1960 Thursday, June 4, 2020  Sarah Beth Leavitt MD  RESTRICTIONS:  During your procedure today, you received medications for sedation.  These   medications may affect your judgment, balance and coordination.  Therefore,   for 24 hours, you have the following restrictions:   - DO NOT drive a car, operate machinery, make legal/financial decisions,   sign important papers or drink alcohol.    ACTIVITY:  Today: no heavy lifting, straining or running due to procedural   sedation/anesthesia.  The following day: return to full activity including work.  DIET:  Eat and drink normally unless instructed otherwise.     TREATMENT FOR COMMON SIDE EFFECTS:  - Mild abdominal pain, nausea, belching, bloating or excessive gas:  rest,   eat lightly and use a heating pad.  - Sore Throat: treat with throat lozenges and/or gargle with warm salt   water.  - Because air was used during the procedure, expelling large amounts of air   from your rectum or belching is normal.  - If a bowel prep was taken, you may not have a bowel movement for 1-3 days.    This is normal.  SYMPTOMS TO WATCH FOR AND REPORT TO YOUR PHYSICIAN:  1. Abdominal pain or bloating, other than gas cramps.  2. Chest pain.  3. Back pain.  4. Signs of infection such as: chills or fever occurring within 24 hours   after the procedure.  5. Rectal bleeding, which would show as bright red, maroon, or black stools.   (A tablespoon of blood from the rectum is not serious, especially if   hemorrhoids are present.)  6. Vomiting.  7. Weakness or dizziness.  GO DIRECTLY TO THE NEAREST EMERGENCY ROOM IF YOU HAVE ANY OF THE FOLLOWING:      Difficulty breathing              Chills and/or fever over 101 F   Persistent vomiting and/or vomiting blood   Severe abdominal pain   Severe chest pain   Black, tarry stools   Bleeding- more than one  tablespoon   Any other symptom or condition that you feel may need urgent attention  Your doctor recommends these additional instructions:  If any biopsies were taken, your doctors clinic will contact you in 1 to 2   weeks with any results.  - Discharge patient to home.   - Resume previous diet.   - Continue present medications.   - Await pathology results.   - Repeat colonoscopy date to be determined after pending pathology results   are reviewed for surveillance.   - Return to primary care physician.   - Written discharge instructions were provided to the patient.   - The signs and symptoms of potential delayed complications were discussed   with the patient.   - Patient has a contact number available for emergencies.   - Return to normal activities tomorrow.  For questions, problems or results please call your physician - Sarah Beth Leavitt MD at Work:  [phone].  [facility], EMERGENCY ROOM PHONE NUMBER: [phone]  IF A COMPLICATION OR EMERGENCY SITUATION ARISES AND YOU ARE UNABLE TO REACH   YOUR PHYSICIAN - GO DIRECTLY TO THE EMERGENCY ROOM.  Sarah Beth Leavitt MD  6/4/2020 12:32:49 PM  This report has been verified and signed electronically.  [employer]

## 2020-06-04 NOTE — H&P
Colonoscopy History and Physical      Procedure : Colonoscopy     Indications:  constipation    Family Hx of CRC: None    Last Colonoscopy:  8 years ago    Hx of sedation problems: none  FHX of sedation problems: none    Past Medical History:   Diagnosis Date    Asthma     Colon polyp 2011    Hypercholesteremia     Hypertension     Multiple thyroid nodules        Past Surgical History:   Procedure Laterality Date    CARPAL TUNNEL RELEASE      FOOT SURGERY      HYSTERECTOMY      MASS EXCISION      at neck    ROTATOR CUFF REPAIR      left shoulder    TRIGGER FINGER RELEASE  12/2017    right hand       Review of patient's allergies indicates:  No Known Allergies    No current facility-administered medications on file prior to encounter.      Current Outpatient Medications on File Prior to Encounter   Medication Sig Dispense Refill    amLODIPine (NORVASC) 10 MG tablet Take 1 tablet (10 mg total) by mouth once daily. 90 tablet 1    atenolol (TENORMIN) 25 MG tablet Take 1 tablet (25 mg total) by mouth once daily. 90 tablet 1    atorvastatin (LIPITOR) 40 MG tablet Take 1 tablet (40 mg total) by mouth once daily. 90 tablet 1    cholecalciferol, vitamin D3, 50,000 unit Tab Take 1 tablet by mouth once a week. 12 tablet 3    diltiazem HCl (DILTIAZEM 2% CREAM) Apply topically 3 (three) times daily. Apply topically to anal area. Use a pea-sized amount.  Apply to the OUTER [place] of your anus.  Do not insert into the anus. 30 g 3    lidocaine 5 % Gel Apply 1 application topically 3 (three) times daily. 1 Tube 3    lisinopriL (PRINIVIL,ZESTRIL) 40 MG tablet TAKE 1 TABLET(40 MG) BY MOUTH EVERY DAY 90 tablet 0    NEURONTIN 300 mg capsule Take one caps at bedtime x 3 days, than increase to 2 caps at bedtime x 3 days, than 1 caps in am and 2 caps at bedtime. 90 capsule 2    triamterene-hydrochlorothiazide 37.5-25 mg (DYAZIDE) 37.5-25 mg per capsule Take 1 capsule by mouth every morning. Hold for 2 more days. 90  capsule 1    ALBUTEROL INHL          Family History   Problem Relation Age of Onset    Cancer Mother     Coronary artery disease Mother     Heart disease Mother     Hypertension Mother     Hyperlipidemia Mother     Hypertension Father     Cancer Father     Heart disease Maternal Grandmother     Hypertension Maternal Grandmother     Breast cancer Maternal Grandmother        Social History     Socioeconomic History    Marital status:      Spouse name: Not on file    Number of children: Not on file    Years of education: Not on file    Highest education level: Not on file   Occupational History    Not on file   Social Needs    Financial resource strain: Not very hard    Food insecurity:     Worry: Never true     Inability: Never true    Transportation needs:     Medical: Yes     Non-medical: Yes   Tobacco Use    Smoking status: Never Smoker    Smokeless tobacco: Never Used   Substance and Sexual Activity    Alcohol use: Yes     Alcohol/week: 0.0 standard drinks     Frequency: Monthly or less     Drinks per session: 3 or 4     Binge frequency: Never     Comment: once a month    Drug use: No    Sexual activity: Yes   Lifestyle    Physical activity:     Days per week: 0 days     Minutes per session: 10 min    Stress: To some extent   Relationships    Social connections:     Talks on phone: More than three times a week     Gets together: Once a week     Attends Denominational service: Not on file     Active member of club or organization: Yes     Attends meetings of clubs or organizations: 1 to 4 times per year     Relationship status:    Other Topics Concern    Not on file   Social History Narrative    [employer]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

## 2020-06-04 NOTE — DISCHARGE INSTRUCTIONS
Colonoscopy     A camera attached to a flexible tube with a viewing lens is used to take video pictures.     Colonoscopy is a test to view the inside of your lower digestive tract (colon and rectum). Sometimes it can show the last part of the small intestine (ileum). During the test, small pieces of tissue may be removed for testing. This is called a biopsy. Small growths, such as polyps, may also be removed.   Why is colonoscopy done?  The test is done to help look for colon cancer. And it can help find the source of abdominal pain, bleeding, and changes in bowel habits. It may be needed once a year, depending on factors such as your:  · Age  · Health history  · Family health history  · Symptoms  · Results from any prior colonoscopy  Risks and possible complications  These include:  · Bleeding               · A puncture or tear in the colon   · Risks of anesthesia  · A cancer lesion not being seen  Getting ready   To prepare for the test:  · Talk with your healthcare provider about the risks of the test (see below). Also ask your healthcare provider about alternatives to the test.  · Tell your healthcare provider about any medicines you take. Also tell him or her about any health conditions you may have.  · Make sure your rectum and colon are empty for the test. Follow the diet and bowel prep instructions exactly. If you dont, the test may need to be rescheduled.  · Plan for a friend or family member to drive you home after the test.     Colonoscopy provides an inside view of the entire colon.     You may discuss the results with your doctor right away or at a future visit.  During the test   The test is usually done in the hospital on an outpatient basis. This means you go home the same day. The procedure takes about 30 minutes. During that time:  · You are given relaxing (sedating) medicine through an IV line. You may be drowsy, or fully asleep.  · The healthcare provider will first give you a physical exam to  check for anal and rectal problems.  · Then the anus is lubricated and the scope inserted.  · If you are awake, you may have a feeling similar to needing to have a bowel movement. You may also feel pressure as air is pumped into the colon. Its OK to pass gas during the procedure.  · Biopsy, polyp removal, or other treatments may be done during the test.  After the test   You may have gas right after the test. It can help to try to pass it to help prevent later bloating. Your healthcare provider may discuss the results with you right away. Or you may need to schedule a follow-up visit to talk about the results. After the test, you can go back to your normal eating and other activities. You may be tired from the sedation and need to rest for a few hours.  Date Last Reviewed: 11/1/2016 © 2000-2017 [employer] "[employer]". [address] [place] [id number], [place] [address]. All rights reserved. This information is not intended as a substitute for professional medical care. Always follow your healthcare professional's instructions.

## 2020-06-05 LAB
FINAL PATHOLOGIC DIAGNOSIS: NORMAL
GROSS: NORMAL

## 2020-07-02 ENCOUNTER — OFFICE VISIT (OUTPATIENT)
Dept: INTERNAL MEDICINE | Facility: CLINIC | Age: 60
End: 2020-07-02
Payer: MEDICARE

## 2020-07-02 DIAGNOSIS — E55.9 VITAMIN D DEFICIENCY: Primary | ICD-10-CM

## 2020-07-02 DIAGNOSIS — I10 HYPERTENSION, UNSPECIFIED TYPE: ICD-10-CM

## 2020-07-02 PROCEDURE — 3078F DIAST BP <80 MM HG: CPT | Mod: CPTII,S$GLB,, | Performed by: INTERNAL MEDICINE

## 2020-07-02 PROCEDURE — 99499 UNLISTED E&M SERVICE: CPT | Mod: S$GLB,,, | Performed by: INTERNAL MEDICINE

## 2020-07-02 PROCEDURE — 99214 PR OFFICE/OUTPT VISIT, EST, LEVL IV, 30-39 MIN: ICD-10-PCS | Mod: S$GLB,,, | Performed by: INTERNAL MEDICINE

## 2020-07-02 PROCEDURE — 99999 PR PBB SHADOW E&M-EST. PATIENT-LVL IV: CPT | Mod: PBBFAC,,, | Performed by: INTERNAL MEDICINE

## 2020-07-02 PROCEDURE — 99999 PR PBB SHADOW E&M-EST. PATIENT-LVL IV: ICD-10-PCS | Mod: PBBFAC,,, | Performed by: INTERNAL MEDICINE

## 2020-07-02 PROCEDURE — 3078F PR MOST RECENT DIASTOLIC BLOOD PRESSURE < 80 MM HG: ICD-10-PCS | Mod: CPTII,S$GLB,, | Performed by: INTERNAL MEDICINE

## 2020-07-02 PROCEDURE — 3008F BODY MASS INDEX DOCD: CPT | Mod: CPTII,S$GLB,, | Performed by: INTERNAL MEDICINE

## 2020-07-02 PROCEDURE — 99214 OFFICE O/P EST MOD 30 MIN: CPT | Mod: S$GLB,,, | Performed by: INTERNAL MEDICINE

## 2020-07-02 PROCEDURE — 3074F SYST BP LT 130 MM HG: CPT | Mod: CPTII,S$GLB,, | Performed by: INTERNAL MEDICINE

## 2020-07-02 PROCEDURE — 3008F PR BODY MASS INDEX (BMI) DOCUMENTED: ICD-10-PCS | Mod: CPTII,S$GLB,, | Performed by: INTERNAL MEDICINE

## 2020-07-02 PROCEDURE — 3074F PR MOST RECENT SYSTOLIC BLOOD PRESSURE < 130 MM HG: ICD-10-PCS | Mod: CPTII,S$GLB,, | Performed by: INTERNAL MEDICINE

## 2020-07-02 PROCEDURE — 99499 RISK ADDL DX/OHS AUDIT: ICD-10-PCS | Mod: S$GLB,,, | Performed by: INTERNAL MEDICINE

## 2020-07-02 RX ORDER — AMLODIPINE BESYLATE 10 MG/1
10 TABLET ORAL DAILY
Qty: 90 TABLET | Refills: 1 | Status: SHIPPED | OUTPATIENT
Start: 2020-07-02 | End: 2021-02-15 | Stop reason: SDUPTHER

## 2020-07-02 RX ORDER — TRIAMTERENE AND HYDROCHLOROTHIAZIDE 37.5; 25 MG/1; MG/1
1 CAPSULE ORAL EVERY MORNING
Qty: 90 CAPSULE | Refills: 1 | Status: SHIPPED | OUTPATIENT
Start: 2020-07-02 | End: 2020-10-12

## 2020-07-02 RX ORDER — ATORVASTATIN CALCIUM 40 MG/1
40 TABLET, FILM COATED ORAL DAILY
Qty: 90 TABLET | Refills: 1 | Status: SHIPPED | OUTPATIENT
Start: 2020-07-02 | End: 2021-02-15 | Stop reason: SDUPTHER

## 2020-07-02 RX ORDER — CYCLOBENZAPRINE HCL 5 MG
5 TABLET ORAL 2 TIMES DAILY PRN
Qty: 30 TABLET | Refills: 2 | Status: SHIPPED | OUTPATIENT
Start: 2020-07-02 | End: 2020-07-12

## 2020-07-02 RX ORDER — LISINOPRIL 40 MG/1
TABLET ORAL
Qty: 90 TABLET | Refills: 1 | Status: SHIPPED | OUTPATIENT
Start: 2020-07-02 | End: 2021-02-15 | Stop reason: SDUPTHER

## 2020-07-05 VITALS
HEIGHT: 67 IN | BODY MASS INDEX: 45.99 KG/M2 | WEIGHT: 293 LBS | SYSTOLIC BLOOD PRESSURE: 100 MMHG | DIASTOLIC BLOOD PRESSURE: 60 MMHG | OXYGEN SATURATION: 99 % | HEART RATE: 60 BPM | TEMPERATURE: 99 F

## 2020-07-05 NOTE — PROGRESS NOTES
Subjective:       Patient ID: Dejah Schmidt is a 59 y.o. female.    Chief Complaint: Hypertension    HPI  She returns for management of hypertension.  She has had hypertension for over a year.  Current treatment has included medications outlined in medication list.  She denies chest pain or shortness of breath.  No palpitations.  Denies left arm or neck pain.    Medications:  See med list    Social history:  Does not smoke, does not drink alcohol      Review of Systems   Constitutional: Negative for chills, fatigue, fever and unexpected weight change.   Respiratory: Negative for chest tightness and shortness of breath.    Cardiovascular: Negative for chest pain and palpitations.   Gastrointestinal: Negative for abdominal pain and blood in stool.   Neurological: Negative for dizziness, syncope, numbness and headaches.       Objective:      Physical Exam  HENT:      Right Ear: External ear normal.      Left Ear: External ear normal.      Nose: Nose normal.      Mouth/Throat:      Mouth: Mucous membranes are moist.      Pharynx: Oropharynx is clear.   Eyes:      Pupils: Pupils are equal, round, and reactive to light.   Neck:      Musculoskeletal: Normal range of motion.   Cardiovascular:      Rate and Rhythm: Normal rate and regular rhythm.      Heart sounds: No murmur.   Pulmonary:      Breath sounds: Normal breath sounds.   Abdominal:      General: There is no distension.      Palpations: There is no hepatomegaly or splenomegaly.      Tenderness: There is no abdominal tenderness.   Lymphadenopathy:      Cervical: No cervical adenopathy.      Upper Body:      Right upper body: No axillary adenopathy.      Left upper body: No axillary adenopathy.   Neurological:      Cranial Nerves: No cranial nerve deficit.      Sensory: No sensory deficit.      Motor: Motor function is intact.      Deep Tendon Reflexes: Reflexes are normal and symmetric.         Assessment/Plan       Assessment and plan:  Hypertension:  Possibly  overmedicated.  Wean off of atenolol over the course of 7-10 days.  Return to clinic in 1 month blood pressure check.  Check CMP, CPK , vitamin-D

## 2020-08-14 ENCOUNTER — OFFICE VISIT (OUTPATIENT)
Dept: INTERNAL MEDICINE | Facility: CLINIC | Age: 60
End: 2020-08-14
Payer: MEDICARE

## 2020-08-14 DIAGNOSIS — I10 HYPERTENSION, UNSPECIFIED TYPE: Primary | ICD-10-CM

## 2020-08-14 PROCEDURE — 3078F DIAST BP <80 MM HG: CPT | Mod: CPTII,S$GLB,, | Performed by: INTERNAL MEDICINE

## 2020-08-14 PROCEDURE — 99214 OFFICE O/P EST MOD 30 MIN: CPT | Mod: S$GLB,,, | Performed by: INTERNAL MEDICINE

## 2020-08-14 PROCEDURE — 3008F BODY MASS INDEX DOCD: CPT | Mod: CPTII,S$GLB,, | Performed by: INTERNAL MEDICINE

## 2020-08-14 PROCEDURE — 99214 PR OFFICE/OUTPT VISIT, EST, LEVL IV, 30-39 MIN: ICD-10-PCS | Mod: S$GLB,,, | Performed by: INTERNAL MEDICINE

## 2020-08-14 PROCEDURE — 3074F SYST BP LT 130 MM HG: CPT | Mod: CPTII,S$GLB,, | Performed by: INTERNAL MEDICINE

## 2020-08-14 PROCEDURE — 99999 PR PBB SHADOW E&M-EST. PATIENT-LVL IV: CPT | Mod: PBBFAC,,, | Performed by: INTERNAL MEDICINE

## 2020-08-14 PROCEDURE — 3008F PR BODY MASS INDEX (BMI) DOCUMENTED: ICD-10-PCS | Mod: CPTII,S$GLB,, | Performed by: INTERNAL MEDICINE

## 2020-08-14 PROCEDURE — 99999 PR PBB SHADOW E&M-EST. PATIENT-LVL IV: ICD-10-PCS | Mod: PBBFAC,,, | Performed by: INTERNAL MEDICINE

## 2020-08-14 PROCEDURE — 3074F PR MOST RECENT SYSTOLIC BLOOD PRESSURE < 130 MM HG: ICD-10-PCS | Mod: CPTII,S$GLB,, | Performed by: INTERNAL MEDICINE

## 2020-08-14 PROCEDURE — 3078F PR MOST RECENT DIASTOLIC BLOOD PRESSURE < 80 MM HG: ICD-10-PCS | Mod: CPTII,S$GLB,, | Performed by: INTERNAL MEDICINE

## 2020-08-14 RX ORDER — BUTALBITAL, ACETAMINOPHEN AND CAFFEINE 50; 325; 40 MG/1; MG/1; MG/1
1 TABLET ORAL 3 TIMES DAILY PRN
Qty: 30 TABLET | Refills: 1 | Status: SHIPPED | OUTPATIENT
Start: 2020-08-14 | End: 2020-09-13

## 2020-08-14 RX ORDER — HYDROCORTISONE 25 MG/G
CREAM TOPICAL
COMMUNITY
Start: 2020-04-02 | End: 2021-06-28

## 2020-08-16 VITALS
TEMPERATURE: 99 F | WEIGHT: 291.69 LBS | OXYGEN SATURATION: 99 % | HEIGHT: 67 IN | SYSTOLIC BLOOD PRESSURE: 112 MMHG | BODY MASS INDEX: 45.78 KG/M2 | DIASTOLIC BLOOD PRESSURE: 68 MMHG | HEART RATE: 66 BPM

## 2020-08-17 NOTE — PROGRESS NOTES
Subjective:       Patient ID: Dejah Schmidt is a 60 y.o. female.    Chief Complaint: Hypertension    HPI  She returns for management of hypertension.  She has had hypertension for over a year.  Current treatment has included medications outlined in medication list.  She denies chest pain or shortness of breath.  No palpitations.  Denies left arm or neck pain.    Medications:  See med list    Social history:  Does not smoke, does not drink alcohol      Review of Systems   Constitutional: Negative for chills, fatigue, fever and unexpected weight change.   Respiratory: Negative for chest tightness and shortness of breath.    Cardiovascular: Negative for chest pain and palpitations.   Gastrointestinal: Negative for abdominal pain and blood in stool.   Neurological: Negative for dizziness, syncope, numbness and headaches.       Objective:      Physical Exam  HENT:      Right Ear: External ear normal.      Left Ear: External ear normal.      Nose: Nose normal.      Mouth/Throat:      Mouth: Mucous membranes are moist.      Pharynx: Oropharynx is clear.   Eyes:      Pupils: Pupils are equal, round, and reactive to light.   Neck:      Musculoskeletal: Normal range of motion.   Cardiovascular:      Rate and Rhythm: Normal rate and regular rhythm.      Heart sounds: No murmur.   Pulmonary:      Breath sounds: Normal breath sounds.   Abdominal:      General: There is no distension.      Palpations: There is no hepatomegaly or splenomegaly.      Tenderness: There is no abdominal tenderness.   Lymphadenopathy:      Cervical: No cervical adenopathy.      Upper Body:      Right upper body: No axillary adenopathy.      Left upper body: No axillary adenopathy.   Neurological:      Cranial Nerves: No cranial nerve deficit.      Sensory: No sensory deficit.      Motor: Motor function is intact.      Deep Tendon Reflexes: Reflexes are normal and symmetric.         Assessment/Plan       Assessment and plan:  Hypertension:  Controlled

## 2021-02-03 ENCOUNTER — PATIENT OUTREACH (OUTPATIENT)
Dept: ADMINISTRATIVE | Facility: HOSPITAL | Age: 61
End: 2021-02-03

## 2021-02-03 ENCOUNTER — PATIENT MESSAGE (OUTPATIENT)
Dept: ADMINISTRATIVE | Facility: HOSPITAL | Age: 61
End: 2021-02-03

## 2021-02-03 DIAGNOSIS — Z12.31 ENCOUNTER FOR SCREENING MAMMOGRAM FOR MALIGNANT NEOPLASM OF BREAST: ICD-10-CM

## 2021-02-03 DIAGNOSIS — Z12.39 ENCOUNTER FOR SCREENING FOR MALIGNANT NEOPLASM OF BREAST, UNSPECIFIED SCREENING MODALITY: Primary | ICD-10-CM

## 2021-02-15 ENCOUNTER — LAB VISIT (OUTPATIENT)
Dept: LAB | Facility: HOSPITAL | Age: 61
End: 2021-02-15
Attending: INTERNAL MEDICINE
Payer: MEDICARE

## 2021-02-15 ENCOUNTER — TELEPHONE (OUTPATIENT)
Dept: INTERNAL MEDICINE | Facility: CLINIC | Age: 61
End: 2021-02-15

## 2021-02-15 ENCOUNTER — OFFICE VISIT (OUTPATIENT)
Dept: INTERNAL MEDICINE | Facility: CLINIC | Age: 61
End: 2021-02-15
Payer: MEDICARE

## 2021-02-15 DIAGNOSIS — M54.12 CERVICAL RADICULOPATHY: ICD-10-CM

## 2021-02-15 DIAGNOSIS — G62.9 NEUROPATHY: ICD-10-CM

## 2021-02-15 DIAGNOSIS — M54.16 LUMBAR RADICULOPATHY: ICD-10-CM

## 2021-02-15 DIAGNOSIS — R45.84 ANHEDONIA: ICD-10-CM

## 2021-02-15 DIAGNOSIS — I10 HYPERTENSION, UNSPECIFIED TYPE: Primary | ICD-10-CM

## 2021-02-15 DIAGNOSIS — I10 HYPERTENSION, UNSPECIFIED TYPE: ICD-10-CM

## 2021-02-15 LAB
ALBUMIN SERPL BCP-MCNC: 4.2 G/DL (ref 3.5–5.2)
ALP SERPL-CCNC: 81 U/L (ref 55–135)
ALT SERPL W/O P-5'-P-CCNC: 27 U/L (ref 10–44)
ANION GAP SERPL CALC-SCNC: 9 MMOL/L (ref 8–16)
AST SERPL-CCNC: 19 U/L (ref 10–40)
BILIRUB SERPL-MCNC: 0.8 MG/DL (ref 0.1–1)
BUN SERPL-MCNC: 21 MG/DL (ref 6–20)
CALCIUM SERPL-MCNC: 9.5 MG/DL (ref 8.7–10.5)
CHLORIDE SERPL-SCNC: 105 MMOL/L (ref 95–110)
CHOLEST SERPL-MCNC: 157 MG/DL (ref 120–199)
CHOLEST/HDLC SERPL: 4.4 {RATIO} (ref 2–5)
CO2 SERPL-SCNC: 27 MMOL/L (ref 23–29)
CREAT SERPL-MCNC: 1.2 MG/DL (ref 0.5–1.4)
EST. GFR  (AFRICAN AMERICAN): 57.2 ML/MIN/1.73 M^2
EST. GFR  (NON AFRICAN AMERICAN): 49.6 ML/MIN/1.73 M^2
FOLATE SERPL-MCNC: 4.4 NG/ML (ref 4–24)
GLUCOSE SERPL-MCNC: 105 MG/DL (ref 70–110)
HDLC SERPL-MCNC: 36 MG/DL (ref 40–75)
HDLC SERPL: 22.9 % (ref 20–50)
LDLC SERPL CALC-MCNC: 105.2 MG/DL (ref 63–159)
NONHDLC SERPL-MCNC: 121 MG/DL
POTASSIUM SERPL-SCNC: 4.8 MMOL/L (ref 3.5–5.1)
PROT SERPL-MCNC: 8.5 G/DL (ref 6–8.4)
SODIUM SERPL-SCNC: 141 MMOL/L (ref 136–145)
TRIGL SERPL-MCNC: 79 MG/DL (ref 30–150)
TSH SERPL DL<=0.005 MIU/L-ACNC: 0.89 UIU/ML (ref 0.4–4)
VIT B12 SERPL-MCNC: 900 PG/ML (ref 210–950)

## 2021-02-15 PROCEDURE — 82746 ASSAY OF FOLIC ACID SERUM: CPT

## 2021-02-15 PROCEDURE — 99499 UNLISTED E&M SERVICE: CPT | Mod: S$GLB,,, | Performed by: INTERNAL MEDICINE

## 2021-02-15 PROCEDURE — 3008F BODY MASS INDEX DOCD: CPT | Mod: CPTII,S$GLB,, | Performed by: INTERNAL MEDICINE

## 2021-02-15 PROCEDURE — 36415 COLL VENOUS BLD VENIPUNCTURE: CPT

## 2021-02-15 PROCEDURE — 99999 PR PBB SHADOW E&M-EST. PATIENT-LVL IV: CPT | Mod: PBBFAC,,, | Performed by: INTERNAL MEDICINE

## 2021-02-15 PROCEDURE — 82607 VITAMIN B-12: CPT

## 2021-02-15 PROCEDURE — 80053 COMPREHEN METABOLIC PANEL: CPT

## 2021-02-15 PROCEDURE — 99214 PR OFFICE/OUTPT VISIT, EST, LEVL IV, 30-39 MIN: ICD-10-PCS | Mod: S$GLB,,, | Performed by: INTERNAL MEDICINE

## 2021-02-15 PROCEDURE — 99214 OFFICE O/P EST MOD 30 MIN: CPT | Mod: S$GLB,,, | Performed by: INTERNAL MEDICINE

## 2021-02-15 PROCEDURE — 3008F PR BODY MASS INDEX (BMI) DOCUMENTED: ICD-10-PCS | Mod: CPTII,S$GLB,, | Performed by: INTERNAL MEDICINE

## 2021-02-15 PROCEDURE — 3074F PR MOST RECENT SYSTOLIC BLOOD PRESSURE < 130 MM HG: ICD-10-PCS | Mod: CPTII,S$GLB,, | Performed by: INTERNAL MEDICINE

## 2021-02-15 PROCEDURE — 80061 LIPID PANEL: CPT

## 2021-02-15 PROCEDURE — 3078F PR MOST RECENT DIASTOLIC BLOOD PRESSURE < 80 MM HG: ICD-10-PCS | Mod: CPTII,S$GLB,, | Performed by: INTERNAL MEDICINE

## 2021-02-15 PROCEDURE — 3074F SYST BP LT 130 MM HG: CPT | Mod: CPTII,S$GLB,, | Performed by: INTERNAL MEDICINE

## 2021-02-15 PROCEDURE — 99499 RISK ADDL DX/OHS AUDIT: ICD-10-PCS | Mod: S$GLB,,, | Performed by: INTERNAL MEDICINE

## 2021-02-15 PROCEDURE — 3078F DIAST BP <80 MM HG: CPT | Mod: CPTII,S$GLB,, | Performed by: INTERNAL MEDICINE

## 2021-02-15 PROCEDURE — 84443 ASSAY THYROID STIM HORMONE: CPT

## 2021-02-15 PROCEDURE — 99999 PR PBB SHADOW E&M-EST. PATIENT-LVL IV: ICD-10-PCS | Mod: PBBFAC,,, | Performed by: INTERNAL MEDICINE

## 2021-02-15 RX ORDER — ZOLPIDEM TARTRATE 5 MG/1
5 TABLET ORAL NIGHTLY PRN
Qty: 20 TABLET | Refills: 1 | Status: SHIPPED | OUTPATIENT
Start: 2021-02-15 | End: 2022-08-04

## 2021-02-15 RX ORDER — AMLODIPINE BESYLATE 10 MG/1
10 TABLET ORAL DAILY
Qty: 90 TABLET | Refills: 1 | Status: SHIPPED | OUTPATIENT
Start: 2021-02-15 | End: 2021-08-16

## 2021-02-15 RX ORDER — ATORVASTATIN CALCIUM 40 MG/1
40 TABLET, FILM COATED ORAL DAILY
Qty: 90 TABLET | Refills: 1 | Status: SHIPPED | OUTPATIENT
Start: 2021-02-15 | End: 2021-08-16

## 2021-02-15 RX ORDER — CYCLOBENZAPRINE HCL 5 MG
TABLET ORAL
COMMUNITY
Start: 2020-12-08 | End: 2021-02-15 | Stop reason: SDUPTHER

## 2021-02-15 RX ORDER — LISINOPRIL 40 MG/1
TABLET ORAL
Qty: 90 TABLET | Refills: 1 | Status: SHIPPED | OUTPATIENT
Start: 2021-02-15 | End: 2021-08-16

## 2021-02-15 RX ORDER — TRIAMTERENE AND HYDROCHLOROTHIAZIDE 37.5; 25 MG/1; MG/1
CAPSULE ORAL
Qty: 90 CAPSULE | Refills: 1 | Status: SHIPPED | OUTPATIENT
Start: 2021-02-15 | End: 2021-11-08

## 2021-02-15 RX ORDER — GABAPENTIN 600 MG/1
600 TABLET ORAL 3 TIMES DAILY
Qty: 90 TABLET | Refills: 11 | Status: SHIPPED | OUTPATIENT
Start: 2021-02-15 | End: 2022-02-18

## 2021-02-20 VITALS
WEIGHT: 293 LBS | BODY MASS INDEX: 45.99 KG/M2 | SYSTOLIC BLOOD PRESSURE: 122 MMHG | HEART RATE: 86 BPM | TEMPERATURE: 99 F | OXYGEN SATURATION: 96 % | HEIGHT: 67 IN | DIASTOLIC BLOOD PRESSURE: 76 MMHG

## 2021-03-15 ENCOUNTER — HOSPITAL ENCOUNTER (OUTPATIENT)
Dept: RADIOLOGY | Facility: HOSPITAL | Age: 61
Discharge: HOME | End: 2021-03-15
Attending: INTERNAL MEDICINE
Payer: MEDICARE

## 2021-03-15 DIAGNOSIS — Z12.31 ENCOUNTER FOR SCREENING MAMMOGRAM FOR MALIGNANT NEOPLASM OF BREAST: ICD-10-CM

## 2021-03-15 DIAGNOSIS — Z12.39 ENCOUNTER FOR SCREENING FOR MALIGNANT NEOPLASM OF BREAST, UNSPECIFIED SCREENING MODALITY: ICD-10-CM

## 2021-03-15 PROCEDURE — 77067 SCR MAMMO BI INCL CAD: CPT | Mod: TC

## 2021-03-15 PROCEDURE — 77063 MAMMO DIGITAL SCREENING BILAT WITH TOMO: ICD-10-PCS | Mod: 26,,, | Performed by: INTERNAL MEDICINE

## 2021-03-15 PROCEDURE — 77067 SCR MAMMO BI INCL CAD: CPT | Mod: 26,,, | Performed by: INTERNAL MEDICINE

## 2021-03-15 PROCEDURE — 77067 MAMMO DIGITAL SCREENING BILAT WITH TOMO: ICD-10-PCS | Mod: 26,,, | Performed by: INTERNAL MEDICINE

## 2021-03-15 PROCEDURE — 77063 BREAST TOMOSYNTHESIS BI: CPT | Mod: 26,,, | Performed by: INTERNAL MEDICINE

## 2021-06-25 ENCOUNTER — PATIENT OUTREACH (OUTPATIENT)
Dept: ADMINISTRATIVE | Facility: OTHER | Age: 61
End: 2021-06-25

## 2021-06-28 ENCOUNTER — OFFICE VISIT (OUTPATIENT)
Dept: NEUROLOGY | Facility: CLINIC | Age: 61
End: 2021-06-28
Payer: MEDICARE

## 2021-06-28 VITALS
SYSTOLIC BLOOD PRESSURE: 147 MMHG | DIASTOLIC BLOOD PRESSURE: 84 MMHG | HEART RATE: 82 BPM | BODY MASS INDEX: 45.99 KG/M2 | WEIGHT: 293 LBS | HEIGHT: 67 IN

## 2021-06-28 DIAGNOSIS — G44.209 TENSION-TYPE HEADACHE, NOT INTRACTABLE, UNSPECIFIED CHRONICITY PATTERN: ICD-10-CM

## 2021-06-28 DIAGNOSIS — M54.16 LUMBAR RADICULOPATHY: Primary | ICD-10-CM

## 2021-06-28 DIAGNOSIS — M54.9 DORSALGIA, UNSPECIFIED: ICD-10-CM

## 2021-06-28 DIAGNOSIS — G62.9 NEUROPATHY: ICD-10-CM

## 2021-06-28 PROCEDURE — 99999 PR PBB SHADOW E&M-EST. PATIENT-LVL III: CPT | Mod: PBBFAC,,, | Performed by: PSYCHIATRY & NEUROLOGY

## 2021-06-28 PROCEDURE — 1125F PR PAIN SEVERITY QUANTIFIED, PAIN PRESENT: ICD-10-PCS | Mod: S$GLB,,, | Performed by: PSYCHIATRY & NEUROLOGY

## 2021-06-28 PROCEDURE — 3008F BODY MASS INDEX DOCD: CPT | Mod: CPTII,S$GLB,, | Performed by: PSYCHIATRY & NEUROLOGY

## 2021-06-28 PROCEDURE — 1125F AMNT PAIN NOTED PAIN PRSNT: CPT | Mod: S$GLB,,, | Performed by: PSYCHIATRY & NEUROLOGY

## 2021-06-28 PROCEDURE — 99204 OFFICE O/P NEW MOD 45 MIN: CPT | Mod: S$GLB,,, | Performed by: PSYCHIATRY & NEUROLOGY

## 2021-06-28 PROCEDURE — 3008F PR BODY MASS INDEX (BMI) DOCUMENTED: ICD-10-PCS | Mod: CPTII,S$GLB,, | Performed by: PSYCHIATRY & NEUROLOGY

## 2021-06-28 PROCEDURE — 99499 UNLISTED E&M SERVICE: CPT | Mod: S$GLB,,, | Performed by: PSYCHIATRY & NEUROLOGY

## 2021-06-28 PROCEDURE — 99499 RISK ADDL DX/OHS AUDIT: ICD-10-PCS | Mod: S$GLB,,, | Performed by: PSYCHIATRY & NEUROLOGY

## 2021-06-28 PROCEDURE — 99204 PR OFFICE/OUTPT VISIT, NEW, LEVL IV, 45-59 MIN: ICD-10-PCS | Mod: S$GLB,,, | Performed by: PSYCHIATRY & NEUROLOGY

## 2021-06-28 PROCEDURE — 99999 PR PBB SHADOW E&M-EST. PATIENT-LVL III: ICD-10-PCS | Mod: PBBFAC,,, | Performed by: PSYCHIATRY & NEUROLOGY

## 2021-06-28 RX ORDER — CYCLOBENZAPRINE HCL 5 MG
5 TABLET ORAL 3 TIMES DAILY PRN
Qty: 30 TABLET | Refills: 2 | Status: SHIPPED | OUTPATIENT
Start: 2021-06-28 | End: 2021-07-08

## 2021-07-13 ENCOUNTER — HOSPITAL ENCOUNTER (OUTPATIENT)
Dept: RADIOLOGY | Facility: HOSPITAL | Age: 61
Discharge: HOME | End: 2021-07-13
Attending: PSYCHIATRY & NEUROLOGY
Payer: MEDICARE

## 2021-07-13 DIAGNOSIS — M54.9 DORSALGIA, UNSPECIFIED: ICD-10-CM

## 2021-07-13 PROCEDURE — 72148 MRI LUMBAR SPINE W/O DYE: CPT | Mod: TC

## 2021-07-13 PROCEDURE — 72148 MRI LUMBAR SPINE W/O DYE: CPT | Mod: 26,,, | Performed by: RADIOLOGY

## 2021-07-13 PROCEDURE — 72148 MRI LUMBAR SPINE WITHOUT CONTRAST: ICD-10-PCS | Mod: 26,,, | Performed by: RADIOLOGY

## 2021-07-14 DIAGNOSIS — M48.062 SPINAL STENOSIS OF LUMBAR REGION WITH NEUROGENIC CLAUDICATION: Primary | ICD-10-CM

## 2021-07-16 ENCOUNTER — PROCEDURE VISIT (OUTPATIENT)
Dept: NEUROLOGY | Facility: CLINIC | Age: 61
End: 2021-07-16
Payer: MEDICARE

## 2021-07-16 DIAGNOSIS — M54.16 LUMBAR RADICULOPATHY: ICD-10-CM

## 2021-07-16 PROCEDURE — 95911 NRV CNDJ TEST 9-10 STUDIES: CPT | Mod: S$GLB,,, | Performed by: PSYCHIATRY & NEUROLOGY

## 2021-07-16 PROCEDURE — 95886 MUSC TEST DONE W/N TEST COMP: CPT | Mod: S$GLB,,, | Performed by: PSYCHIATRY & NEUROLOGY

## 2021-07-16 PROCEDURE — 95886 PR EMG COMPLETE, W/ NERVE CONDUCTION STUDIES, 5+ MUSCLES: ICD-10-PCS | Mod: S$GLB,,, | Performed by: PSYCHIATRY & NEUROLOGY

## 2021-07-16 PROCEDURE — 95911 PR NERVE CONDUCTION STUDY; 9-10 STUDIES: ICD-10-PCS | Mod: S$GLB,,, | Performed by: PSYCHIATRY & NEUROLOGY

## 2021-08-03 ENCOUNTER — PATIENT OUTREACH (OUTPATIENT)
Dept: ADMINISTRATIVE | Facility: OTHER | Age: 61
End: 2021-08-03

## 2021-08-03 ENCOUNTER — OFFICE VISIT (OUTPATIENT)
Dept: NEUROSURGERY | Facility: CLINIC | Age: 61
End: 2021-08-03
Payer: MEDICARE

## 2021-08-03 DIAGNOSIS — M48.062 SPINAL STENOSIS OF LUMBAR REGION WITH NEUROGENIC CLAUDICATION: Primary | ICD-10-CM

## 2021-08-03 DIAGNOSIS — M54.9 DORSALGIA, UNSPECIFIED: ICD-10-CM

## 2021-08-03 PROCEDURE — 1160F RVW MEDS BY RX/DR IN RCRD: CPT | Mod: CPTII,95,, | Performed by: PHYSICIAN ASSISTANT

## 2021-08-03 PROCEDURE — 1159F PR MEDICATION LIST DOCUMENTED IN MEDICAL RECORD: ICD-10-PCS | Mod: CPTII,95,, | Performed by: PHYSICIAN ASSISTANT

## 2021-08-03 PROCEDURE — 1160F PR REVIEW ALL MEDS BY PRESCRIBER/CLIN PHARMACIST DOCUMENTED: ICD-10-PCS | Mod: CPTII,95,, | Performed by: PHYSICIAN ASSISTANT

## 2021-08-03 PROCEDURE — 99443 PR PHYSICIAN TELEPHONE EVALUATION 21-30 MIN: ICD-10-PCS | Mod: 95,,, | Performed by: PHYSICIAN ASSISTANT

## 2021-08-03 PROCEDURE — 99443 PR PHYSICIAN TELEPHONE EVALUATION 21-30 MIN: CPT | Mod: 95,,, | Performed by: PHYSICIAN ASSISTANT

## 2021-08-03 PROCEDURE — 1159F MED LIST DOCD IN RCRD: CPT | Mod: CPTII,95,, | Performed by: PHYSICIAN ASSISTANT

## 2021-08-06 ENCOUNTER — HOSPITAL ENCOUNTER (OUTPATIENT)
Dept: RADIOLOGY | Facility: HOSPITAL | Age: 61
Discharge: HOME | End: 2021-08-06
Attending: PHYSICIAN ASSISTANT
Payer: MEDICARE

## 2021-08-06 DIAGNOSIS — M48.062 SPINAL STENOSIS OF LUMBAR REGION WITH NEUROGENIC CLAUDICATION: ICD-10-CM

## 2021-08-06 PROCEDURE — 72082 X-RAY EXAM ENTIRE SPI 2/3 VW: CPT | Mod: 26,,, | Performed by: RADIOLOGY

## 2021-08-06 PROCEDURE — 72082 X-RAY EXAM ENTIRE SPI 2/3 VW: CPT | Mod: TC

## 2021-08-06 PROCEDURE — 72082 XR SCOLIOSIS COMPLETE: ICD-10-PCS | Mod: 26,,, | Performed by: RADIOLOGY

## 2021-08-11 ENCOUNTER — HOSPITAL ENCOUNTER (OUTPATIENT)
Dept: RADIOLOGY | Facility: HOSPITAL | Age: 61
Discharge: HOME | End: 2021-08-11
Attending: PHYSICIAN ASSISTANT
Payer: MEDICARE

## 2021-08-11 DIAGNOSIS — M54.9 DORSALGIA, UNSPECIFIED: ICD-10-CM

## 2021-08-11 PROCEDURE — 72110 XR LUMBAR SPINE 5 VIEW WITH FLEX AND EXT: ICD-10-PCS | Mod: 26,,, | Performed by: RADIOLOGY

## 2021-08-11 PROCEDURE — 72110 X-RAY EXAM L-2 SPINE 4/>VWS: CPT | Mod: TC

## 2021-08-11 PROCEDURE — 72110 X-RAY EXAM L-2 SPINE 4/>VWS: CPT | Mod: 26,,, | Performed by: RADIOLOGY

## 2021-08-16 ENCOUNTER — TELEPHONE (OUTPATIENT)
Dept: INTERNAL MEDICINE | Facility: CLINIC | Age: 61
End: 2021-08-16

## 2021-08-26 ENCOUNTER — PATIENT MESSAGE (OUTPATIENT)
Dept: NEUROSURGERY | Facility: CLINIC | Age: 61
End: 2021-08-26

## 2021-09-03 ENCOUNTER — PATIENT MESSAGE (OUTPATIENT)
Dept: NEUROSURGERY | Facility: CLINIC | Age: 61
End: 2021-09-03

## 2021-09-21 ENCOUNTER — OFFICE VISIT (OUTPATIENT)
Dept: NEUROSURGERY | Facility: CLINIC | Age: 61
End: 2021-09-21
Payer: MEDICARE

## 2021-09-21 VITALS
BODY MASS INDEX: 45.99 KG/M2 | HEART RATE: 71 BPM | HEIGHT: 67 IN | WEIGHT: 293 LBS | SYSTOLIC BLOOD PRESSURE: 159 MMHG | DIASTOLIC BLOOD PRESSURE: 85 MMHG

## 2021-09-21 DIAGNOSIS — M48.062 SPINAL STENOSIS OF LUMBAR REGION WITH NEUROGENIC CLAUDICATION: Primary | ICD-10-CM

## 2021-09-21 PROCEDURE — 3077F PR MOST RECENT SYSTOLIC BLOOD PRESSURE >= 140 MM HG: ICD-10-PCS | Mod: CPTII,S$GLB,, | Performed by: NEUROLOGICAL SURGERY

## 2021-09-21 PROCEDURE — 99999 PR PBB SHADOW E&M-EST. PATIENT-LVL III: ICD-10-PCS | Mod: PBBFAC,,, | Performed by: NEUROLOGICAL SURGERY

## 2021-09-21 PROCEDURE — 99999 PR PBB SHADOW E&M-EST. PATIENT-LVL III: CPT | Mod: PBBFAC,,, | Performed by: NEUROLOGICAL SURGERY

## 2021-09-21 PROCEDURE — 3008F BODY MASS INDEX DOCD: CPT | Mod: CPTII,S$GLB,, | Performed by: NEUROLOGICAL SURGERY

## 2021-09-21 PROCEDURE — 3008F PR BODY MASS INDEX (BMI) DOCUMENTED: ICD-10-PCS | Mod: CPTII,S$GLB,, | Performed by: NEUROLOGICAL SURGERY

## 2021-09-21 PROCEDURE — 3079F PR MOST RECENT DIASTOLIC BLOOD PRESSURE 80-89 MM HG: ICD-10-PCS | Mod: CPTII,S$GLB,, | Performed by: NEUROLOGICAL SURGERY

## 2021-09-21 PROCEDURE — 3079F DIAST BP 80-89 MM HG: CPT | Mod: CPTII,S$GLB,, | Performed by: NEUROLOGICAL SURGERY

## 2021-09-21 PROCEDURE — 4010F PR ACE/ARB THEARPY RXD/TAKEN: ICD-10-PCS | Mod: CPTII,S$GLB,, | Performed by: NEUROLOGICAL SURGERY

## 2021-09-21 PROCEDURE — 1159F MED LIST DOCD IN RCRD: CPT | Mod: CPTII,S$GLB,, | Performed by: NEUROLOGICAL SURGERY

## 2021-09-21 PROCEDURE — 3077F SYST BP >= 140 MM HG: CPT | Mod: CPTII,S$GLB,, | Performed by: NEUROLOGICAL SURGERY

## 2021-09-21 PROCEDURE — 4010F ACE/ARB THERAPY RXD/TAKEN: CPT | Mod: CPTII,S$GLB,, | Performed by: NEUROLOGICAL SURGERY

## 2021-09-21 PROCEDURE — 99214 PR OFFICE/OUTPT VISIT, EST, LEVL IV, 30-39 MIN: ICD-10-PCS | Mod: S$GLB,,, | Performed by: NEUROLOGICAL SURGERY

## 2021-09-21 PROCEDURE — 1159F PR MEDICATION LIST DOCUMENTED IN MEDICAL RECORD: ICD-10-PCS | Mod: CPTII,S$GLB,, | Performed by: NEUROLOGICAL SURGERY

## 2021-09-21 PROCEDURE — 99214 OFFICE O/P EST MOD 30 MIN: CPT | Mod: S$GLB,,, | Performed by: NEUROLOGICAL SURGERY

## 2021-10-07 ENCOUNTER — TELEPHONE (OUTPATIENT)
Dept: NEUROSURGERY | Facility: CLINIC | Age: 61
End: 2021-10-07

## 2021-10-13 ENCOUNTER — CLINICAL SUPPORT (OUTPATIENT)
Dept: REHABILITATION | Facility: HOSPITAL | Age: 61
End: 2021-10-13
Attending: NEUROLOGICAL SURGERY
Payer: MEDICARE

## 2021-10-13 DIAGNOSIS — M48.062 SPINAL STENOSIS OF LUMBAR REGION WITH NEUROGENIC CLAUDICATION: ICD-10-CM

## 2021-10-13 PROCEDURE — 97161 PT EVAL LOW COMPLEX 20 MIN: CPT

## 2021-10-14 ENCOUNTER — OFFICE VISIT (OUTPATIENT)
Dept: NEUROSURGERY | Facility: CLINIC | Age: 61
End: 2021-10-14
Payer: MEDICARE

## 2021-10-14 VITALS — DIASTOLIC BLOOD PRESSURE: 82 MMHG | HEART RATE: 90 BPM | TEMPERATURE: 98 F | SYSTOLIC BLOOD PRESSURE: 137 MMHG

## 2021-10-14 DIAGNOSIS — M41.50 DEGENERATIVE SCOLIOSIS IN ADULT PATIENT: Primary | ICD-10-CM

## 2021-10-14 DIAGNOSIS — M48.061 NEURAL FORAMINAL STENOSIS OF LUMBAR SPINE: ICD-10-CM

## 2021-10-14 PROCEDURE — 3079F PR MOST RECENT DIASTOLIC BLOOD PRESSURE 80-89 MM HG: ICD-10-PCS | Mod: CPTII,S$GLB,, | Performed by: NEUROLOGICAL SURGERY

## 2021-10-14 PROCEDURE — 3079F DIAST BP 80-89 MM HG: CPT | Mod: CPTII,S$GLB,, | Performed by: NEUROLOGICAL SURGERY

## 2021-10-14 PROCEDURE — 3075F PR MOST RECENT SYSTOLIC BLOOD PRESS GE 130-139MM HG: ICD-10-PCS | Mod: CPTII,S$GLB,, | Performed by: NEUROLOGICAL SURGERY

## 2021-10-14 PROCEDURE — 99999 PR PBB SHADOW E&M-EST. PATIENT-LVL III: ICD-10-PCS | Mod: PBBFAC,,, | Performed by: NEUROLOGICAL SURGERY

## 2021-10-14 PROCEDURE — 99999 PR PBB SHADOW E&M-EST. PATIENT-LVL III: CPT | Mod: PBBFAC,,, | Performed by: NEUROLOGICAL SURGERY

## 2021-10-14 PROCEDURE — 4010F ACE/ARB THERAPY RXD/TAKEN: CPT | Mod: CPTII,S$GLB,, | Performed by: NEUROLOGICAL SURGERY

## 2021-10-14 PROCEDURE — 1160F RVW MEDS BY RX/DR IN RCRD: CPT | Mod: CPTII,S$GLB,, | Performed by: NEUROLOGICAL SURGERY

## 2021-10-14 PROCEDURE — 99214 OFFICE O/P EST MOD 30 MIN: CPT | Mod: S$GLB,,, | Performed by: NEUROLOGICAL SURGERY

## 2021-10-14 PROCEDURE — 1159F MED LIST DOCD IN RCRD: CPT | Mod: CPTII,S$GLB,, | Performed by: NEUROLOGICAL SURGERY

## 2021-10-14 PROCEDURE — 1160F PR REVIEW ALL MEDS BY PRESCRIBER/CLIN PHARMACIST DOCUMENTED: ICD-10-PCS | Mod: CPTII,S$GLB,, | Performed by: NEUROLOGICAL SURGERY

## 2021-10-14 PROCEDURE — 1159F PR MEDICATION LIST DOCUMENTED IN MEDICAL RECORD: ICD-10-PCS | Mod: CPTII,S$GLB,, | Performed by: NEUROLOGICAL SURGERY

## 2021-10-14 PROCEDURE — 99214 PR OFFICE/OUTPT VISIT, EST, LEVL IV, 30-39 MIN: ICD-10-PCS | Mod: S$GLB,,, | Performed by: NEUROLOGICAL SURGERY

## 2021-10-14 PROCEDURE — 4010F PR ACE/ARB THEARPY RXD/TAKEN: ICD-10-PCS | Mod: CPTII,S$GLB,, | Performed by: NEUROLOGICAL SURGERY

## 2021-10-14 PROCEDURE — 3075F SYST BP GE 130 - 139MM HG: CPT | Mod: CPTII,S$GLB,, | Performed by: NEUROLOGICAL SURGERY

## 2021-10-15 ENCOUNTER — PATIENT MESSAGE (OUTPATIENT)
Dept: PAIN MEDICINE | Facility: OTHER | Age: 61
End: 2021-10-15
Payer: MEDICARE

## 2021-10-20 ENCOUNTER — CLINICAL SUPPORT (OUTPATIENT)
Dept: REHABILITATION | Facility: HOSPITAL | Age: 61
End: 2021-10-20
Payer: MEDICARE

## 2021-10-20 DIAGNOSIS — M48.062 SPINAL STENOSIS OF LUMBAR REGION WITH NEUROGENIC CLAUDICATION: ICD-10-CM

## 2021-10-20 PROCEDURE — 97110 THERAPEUTIC EXERCISES: CPT

## 2021-10-25 ENCOUNTER — TELEPHONE (OUTPATIENT)
Dept: PAIN MEDICINE | Facility: CLINIC | Age: 61
End: 2021-10-25
Payer: MEDICARE

## 2021-10-25 ENCOUNTER — CLINICAL SUPPORT (OUTPATIENT)
Dept: REHABILITATION | Facility: HOSPITAL | Age: 61
End: 2021-10-25
Payer: MEDICARE

## 2021-10-25 DIAGNOSIS — M48.062 SPINAL STENOSIS OF LUMBAR REGION WITH NEUROGENIC CLAUDICATION: ICD-10-CM

## 2021-10-25 PROCEDURE — 97110 THERAPEUTIC EXERCISES: CPT

## 2021-10-25 PROCEDURE — 97140 MANUAL THERAPY 1/> REGIONS: CPT

## 2021-10-27 ENCOUNTER — HOSPITAL ENCOUNTER (OUTPATIENT)
Facility: OTHER | Age: 61
Discharge: HOME | End: 2021-10-27
Attending: ANESTHESIOLOGY | Admitting: ANESTHESIOLOGY
Payer: MEDICARE

## 2021-10-27 VITALS
RESPIRATION RATE: 16 BRPM | TEMPERATURE: 98 F | BODY MASS INDEX: 46.61 KG/M2 | WEIGHT: 290 LBS | HEIGHT: 66 IN | HEART RATE: 78 BPM | OXYGEN SATURATION: 98 % | SYSTOLIC BLOOD PRESSURE: 114 MMHG | DIASTOLIC BLOOD PRESSURE: 59 MMHG

## 2021-10-27 DIAGNOSIS — M54.16 LUMBAR RADICULOPATHY: ICD-10-CM

## 2021-10-27 DIAGNOSIS — M51.16 HERNIATION OF LUMBAR INTERVERTEBRAL DISC WITH RADICULOPATHY: Primary | ICD-10-CM

## 2021-10-27 DIAGNOSIS — G89.29 CHRONIC PAIN: ICD-10-CM

## 2021-10-27 PROCEDURE — 62323 NJX INTERLAMINAR LMBR/SAC: CPT | Performed by: ANESTHESIOLOGY

## 2021-10-27 PROCEDURE — 25000003 PHARM REV CODE 250: Performed by: ANESTHESIOLOGY

## 2021-10-27 PROCEDURE — 62323 PR INJ LUMBAR/SACRAL, W/IMAGING GUIDANCE: ICD-10-PCS | Mod: ,,, | Performed by: ANESTHESIOLOGY

## 2021-10-27 PROCEDURE — 62323 NJX INTERLAMINAR LMBR/SAC: CPT | Mod: ,,, | Performed by: ANESTHESIOLOGY

## 2021-10-27 PROCEDURE — 25500020 PHARM REV CODE 255: Performed by: ANESTHESIOLOGY

## 2021-10-27 PROCEDURE — 63600175 PHARM REV CODE 636 W HCPCS: Performed by: ANESTHESIOLOGY

## 2021-10-27 PROCEDURE — A4216 STERILE WATER/SALINE, 10 ML: HCPCS | Performed by: ANESTHESIOLOGY

## 2021-10-27 RX ORDER — SODIUM CHLORIDE 9 MG/ML
INJECTION, SOLUTION INTRAVENOUS CONTINUOUS
Status: DISCONTINUED | OUTPATIENT
Start: 2021-10-27 | End: 2021-10-27 | Stop reason: HOSPADM

## 2021-10-27 RX ORDER — SODIUM CHLORIDE 9 MG/ML
INJECTION, SOLUTION INTRAMUSCULAR; INTRAVENOUS; SUBCUTANEOUS
Status: DISCONTINUED | OUTPATIENT
Start: 2021-10-27 | End: 2021-10-27 | Stop reason: HOSPADM

## 2021-10-27 RX ORDER — MIDAZOLAM HYDROCHLORIDE 1 MG/ML
INJECTION INTRAMUSCULAR; INTRAVENOUS
Status: DISCONTINUED | OUTPATIENT
Start: 2021-10-27 | End: 2021-10-27 | Stop reason: HOSPADM

## 2021-10-27 RX ORDER — LIDOCAINE HYDROCHLORIDE 10 MG/ML
INJECTION, SOLUTION EPIDURAL; INFILTRATION; INTRACAUDAL; PERINEURAL
Status: DISCONTINUED | OUTPATIENT
Start: 2021-10-27 | End: 2021-10-27 | Stop reason: HOSPADM

## 2021-10-27 RX ORDER — DEXAMETHASONE SODIUM PHOSPHATE 10 MG/ML
INJECTION INTRAMUSCULAR; INTRAVENOUS
Status: DISCONTINUED | OUTPATIENT
Start: 2021-10-27 | End: 2021-10-27 | Stop reason: HOSPADM

## 2021-10-27 RX ORDER — LIDOCAINE HYDROCHLORIDE 20 MG/ML
INJECTION, SOLUTION INFILTRATION; PERINEURAL
Status: DISCONTINUED | OUTPATIENT
Start: 2021-10-27 | End: 2021-10-27 | Stop reason: HOSPADM

## 2021-10-27 RX ADMIN — SODIUM CHLORIDE: 0.9 INJECTION, SOLUTION INTRAVENOUS at 10:10

## 2021-11-01 ENCOUNTER — CLINICAL SUPPORT (OUTPATIENT)
Dept: REHABILITATION | Facility: HOSPITAL | Age: 61
End: 2021-11-01
Payer: MEDICARE

## 2021-11-01 DIAGNOSIS — M48.062 SPINAL STENOSIS OF LUMBAR REGION WITH NEUROGENIC CLAUDICATION: ICD-10-CM

## 2021-11-01 PROCEDURE — 97110 THERAPEUTIC EXERCISES: CPT

## 2021-11-08 ENCOUNTER — CLINICAL SUPPORT (OUTPATIENT)
Dept: REHABILITATION | Facility: HOSPITAL | Age: 61
End: 2021-11-08
Payer: MEDICARE

## 2021-11-08 DIAGNOSIS — M48.062 SPINAL STENOSIS OF LUMBAR REGION WITH NEUROGENIC CLAUDICATION: ICD-10-CM

## 2021-11-08 PROCEDURE — 97140 MANUAL THERAPY 1/> REGIONS: CPT | Mod: KX

## 2021-11-08 PROCEDURE — 97110 THERAPEUTIC EXERCISES: CPT | Mod: KX

## 2021-12-02 ENCOUNTER — OFFICE VISIT (OUTPATIENT)
Dept: INTERNAL MEDICINE | Facility: CLINIC | Age: 61
End: 2021-12-02
Payer: MEDICARE

## 2021-12-02 DIAGNOSIS — E55.9 VITAMIN D DEFICIENCY: ICD-10-CM

## 2021-12-02 DIAGNOSIS — I10 HYPERTENSION, UNSPECIFIED TYPE: Primary | ICD-10-CM

## 2021-12-02 DIAGNOSIS — N18.31 CHRONIC KIDNEY DISEASE, STAGE 3A: ICD-10-CM

## 2021-12-02 DIAGNOSIS — E04.1 THYROID NODULE: ICD-10-CM

## 2021-12-02 PROCEDURE — 99214 PR OFFICE/OUTPT VISIT, EST, LEVL IV, 30-39 MIN: ICD-10-PCS | Mod: S$GLB,,, | Performed by: INTERNAL MEDICINE

## 2021-12-02 PROCEDURE — 99999 PR PBB SHADOW E&M-EST. PATIENT-LVL IV: CPT | Mod: PBBFAC,,, | Performed by: INTERNAL MEDICINE

## 2021-12-02 PROCEDURE — 99999 PR PBB SHADOW E&M-EST. PATIENT-LVL IV: ICD-10-PCS | Mod: PBBFAC,,, | Performed by: INTERNAL MEDICINE

## 2021-12-02 PROCEDURE — 4010F ACE/ARB THERAPY RXD/TAKEN: CPT | Mod: CPTII,S$GLB,, | Performed by: INTERNAL MEDICINE

## 2021-12-02 PROCEDURE — 4010F PR ACE/ARB THEARPY RXD/TAKEN: ICD-10-PCS | Mod: CPTII,S$GLB,, | Performed by: INTERNAL MEDICINE

## 2021-12-02 PROCEDURE — 99214 OFFICE O/P EST MOD 30 MIN: CPT | Mod: S$GLB,,, | Performed by: INTERNAL MEDICINE

## 2021-12-02 RX ORDER — AMLODIPINE BESYLATE 10 MG/1
10 TABLET ORAL DAILY
Qty: 90 TABLET | Refills: 1 | Status: SHIPPED | OUTPATIENT
Start: 2021-12-02 | End: 2022-09-12

## 2021-12-02 RX ORDER — ATORVASTATIN CALCIUM 40 MG/1
40 TABLET, FILM COATED ORAL DAILY
Qty: 90 TABLET | Refills: 1 | Status: SHIPPED | OUTPATIENT
Start: 2021-12-02 | End: 2022-09-12

## 2021-12-02 RX ORDER — LISINOPRIL 40 MG/1
40 TABLET ORAL DAILY
Qty: 90 TABLET | Refills: 1 | Status: SHIPPED | OUTPATIENT
Start: 2021-12-02 | End: 2022-09-12

## 2021-12-02 RX ORDER — TRIAMTERENE AND HYDROCHLOROTHIAZIDE 37.5; 25 MG/1; MG/1
CAPSULE ORAL
Qty: 90 CAPSULE | Refills: 1 | Status: SHIPPED | OUTPATIENT
Start: 2021-12-02 | End: 2022-08-04

## 2021-12-03 ENCOUNTER — LAB VISIT (OUTPATIENT)
Dept: LAB | Facility: HOSPITAL | Age: 61
End: 2021-12-03
Attending: INTERNAL MEDICINE
Payer: MEDICARE

## 2021-12-03 DIAGNOSIS — I10 HYPERTENSION, UNSPECIFIED TYPE: ICD-10-CM

## 2021-12-03 DIAGNOSIS — E55.9 VITAMIN D DEFICIENCY: ICD-10-CM

## 2021-12-03 LAB
25(OH)D3+25(OH)D2 SERPL-MCNC: 22 NG/ML (ref 30–96)
ALBUMIN SERPL BCP-MCNC: 3.9 G/DL (ref 3.5–5.2)
ALP SERPL-CCNC: 77 U/L (ref 55–135)
ALT SERPL W/O P-5'-P-CCNC: 23 U/L (ref 10–44)
ANION GAP SERPL CALC-SCNC: 8 MMOL/L (ref 8–16)
AST SERPL-CCNC: 20 U/L (ref 10–40)
BASOPHILS # BLD AUTO: 0.01 K/UL (ref 0–0.2)
BASOPHILS NFR BLD: 0.2 % (ref 0–1.9)
BILIRUB SERPL-MCNC: 0.8 MG/DL (ref 0.1–1)
BUN SERPL-MCNC: 20 MG/DL (ref 6–20)
CALCIUM SERPL-MCNC: 9.7 MG/DL (ref 8.7–10.5)
CHLORIDE SERPL-SCNC: 104 MMOL/L (ref 95–110)
CHOLEST SERPL-MCNC: 161 MG/DL (ref 120–199)
CHOLEST/HDLC SERPL: 4.6 {RATIO} (ref 2–5)
CO2 SERPL-SCNC: 28 MMOL/L (ref 23–29)
CREAT SERPL-MCNC: 1.2 MG/DL (ref 0.5–1.4)
DIFFERENTIAL METHOD: ABNORMAL
EOSINOPHIL # BLD AUTO: 0.1 K/UL (ref 0–0.5)
EOSINOPHIL NFR BLD: 1.2 % (ref 0–8)
ERYTHROCYTE [DISTWIDTH] IN BLOOD BY AUTOMATED COUNT: 13.4 % (ref 11.5–14.5)
EST. GFR  (AFRICAN AMERICAN): 56.8 ML/MIN/1.73 M^2
EST. GFR  (NON AFRICAN AMERICAN): 49.2 ML/MIN/1.73 M^2
GLUCOSE SERPL-MCNC: 105 MG/DL (ref 70–110)
HCT VFR BLD AUTO: 44.8 % (ref 37–48.5)
HDLC SERPL-MCNC: 35 MG/DL (ref 40–75)
HDLC SERPL: 21.7 % (ref 20–50)
HGB BLD-MCNC: 14 G/DL (ref 12–16)
IMM GRANULOCYTES # BLD AUTO: 0 K/UL (ref 0–0.04)
IMM GRANULOCYTES NFR BLD AUTO: 0 % (ref 0–0.5)
LDLC SERPL CALC-MCNC: 110.6 MG/DL (ref 63–159)
LYMPHOCYTES # BLD AUTO: 2 K/UL (ref 1–4.8)
LYMPHOCYTES NFR BLD: 49.4 % (ref 18–48)
MCH RBC QN AUTO: 29.6 PG (ref 27–31)
MCHC RBC AUTO-ENTMCNC: 31.3 G/DL (ref 32–36)
MCV RBC AUTO: 95 FL (ref 82–98)
MONOCYTES # BLD AUTO: 0.3 K/UL (ref 0.3–1)
MONOCYTES NFR BLD: 6.9 % (ref 4–15)
NEUTROPHILS # BLD AUTO: 1.7 K/UL (ref 1.8–7.7)
NEUTROPHILS NFR BLD: 42.3 % (ref 38–73)
NONHDLC SERPL-MCNC: 126 MG/DL
NRBC BLD-RTO: 0 /100 WBC
PLATELET # BLD AUTO: 245 K/UL (ref 150–450)
PLATELET BLD QL SMEAR: ABNORMAL
PMV BLD AUTO: 10.6 FL (ref 9.2–12.9)
POTASSIUM SERPL-SCNC: 4.7 MMOL/L (ref 3.5–5.1)
PROT SERPL-MCNC: 8 G/DL (ref 6–8.4)
RBC # BLD AUTO: 4.73 M/UL (ref 4–5.4)
SODIUM SERPL-SCNC: 140 MMOL/L (ref 136–145)
TRIGL SERPL-MCNC: 77 MG/DL (ref 30–150)
WBC # BLD AUTO: 4.07 K/UL (ref 3.9–12.7)

## 2021-12-03 PROCEDURE — 85025 COMPLETE CBC W/AUTO DIFF WBC: CPT | Performed by: INTERNAL MEDICINE

## 2021-12-03 PROCEDURE — 80061 LIPID PANEL: CPT | Performed by: INTERNAL MEDICINE

## 2021-12-03 PROCEDURE — 36415 COLL VENOUS BLD VENIPUNCTURE: CPT | Performed by: INTERNAL MEDICINE

## 2021-12-03 PROCEDURE — 80053 COMPREHEN METABOLIC PANEL: CPT | Performed by: INTERNAL MEDICINE

## 2021-12-03 PROCEDURE — 82306 VITAMIN D 25 HYDROXY: CPT | Performed by: INTERNAL MEDICINE

## 2021-12-04 ENCOUNTER — TELEPHONE (OUTPATIENT)
Dept: INTERNAL MEDICINE | Facility: CLINIC | Age: 61
End: 2021-12-04
Payer: MEDICARE

## 2021-12-06 VITALS
WEIGHT: 293 LBS | OXYGEN SATURATION: 98 % | HEART RATE: 96 BPM | SYSTOLIC BLOOD PRESSURE: 110 MMHG | DIASTOLIC BLOOD PRESSURE: 72 MMHG | TEMPERATURE: 99 F | HEIGHT: 66 IN | BODY MASS INDEX: 47.09 KG/M2

## 2021-12-06 PROBLEM — N18.31 CHRONIC KIDNEY DISEASE, STAGE 3A: Status: ACTIVE | Noted: 2021-12-06

## 2021-12-07 RX ORDER — CHOLECALCIFEROL (VITAMIN D3) 1250 MCG
1 TABLET ORAL WEEKLY
Qty: 8 TABLET | Refills: 0 | Status: SHIPPED | OUTPATIENT
Start: 2021-12-07 | End: 2022-01-26

## 2021-12-23 ENCOUNTER — PES CALL (OUTPATIENT)
Dept: ADMINISTRATIVE | Facility: CLINIC | Age: 61
End: 2021-12-23
Payer: MEDICARE

## 2021-12-30 ENCOUNTER — DOCUMENTATION ONLY (OUTPATIENT)
Dept: REHABILITATION | Facility: HOSPITAL | Age: 61
End: 2021-12-30
Payer: MEDICARE

## 2022-01-02 ENCOUNTER — PATIENT MESSAGE (OUTPATIENT)
Dept: INTERNAL MEDICINE | Facility: CLINIC | Age: 62
End: 2022-01-02
Payer: MEDICARE

## 2022-01-02 DIAGNOSIS — M79.641 PAIN IN BOTH HANDS: Primary | ICD-10-CM

## 2022-01-02 DIAGNOSIS — M79.642 PAIN IN BOTH HANDS: Primary | ICD-10-CM

## 2022-01-05 ENCOUNTER — HOSPITAL ENCOUNTER (OUTPATIENT)
Dept: RADIOLOGY | Facility: HOSPITAL | Age: 62
Discharge: HOME | End: 2022-01-05
Attending: INTERNAL MEDICINE
Payer: MEDICARE

## 2022-01-05 DIAGNOSIS — E04.1 THYROID NODULE: ICD-10-CM

## 2022-01-05 DIAGNOSIS — M79.641 PAIN IN BOTH HANDS: ICD-10-CM

## 2022-01-05 DIAGNOSIS — M79.642 PAIN IN BOTH HANDS: ICD-10-CM

## 2022-01-05 PROCEDURE — 76536 US EXAM OF HEAD AND NECK: CPT | Mod: TC

## 2022-01-05 PROCEDURE — 76536 US EXAM OF HEAD AND NECK: CPT | Mod: 26,,, | Performed by: RADIOLOGY

## 2022-01-05 PROCEDURE — 76536 US SOFT TISSUE HEAD NECK THYROID: ICD-10-PCS | Mod: 26,,, | Performed by: RADIOLOGY

## 2022-01-05 PROCEDURE — 73130 XR HAND COMPLETE 3 VIEWS BILATERAL: ICD-10-PCS | Mod: 26,50,, | Performed by: RADIOLOGY

## 2022-01-05 PROCEDURE — 73130 X-RAY EXAM OF HAND: CPT | Mod: TC,50

## 2022-01-05 PROCEDURE — 73130 X-RAY EXAM OF HAND: CPT | Mod: 26,50,, | Performed by: RADIOLOGY

## 2022-01-10 ENCOUNTER — PATIENT MESSAGE (OUTPATIENT)
Dept: ADMINISTRATIVE | Facility: OTHER | Age: 62
End: 2022-01-10
Payer: MEDICARE

## 2022-01-10 ENCOUNTER — OFFICE VISIT (OUTPATIENT)
Dept: ORTHOPEDICS | Facility: CLINIC | Age: 62
End: 2022-01-10
Payer: MEDICARE

## 2022-01-10 VITALS — BODY MASS INDEX: 47.09 KG/M2 | HEIGHT: 66 IN | WEIGHT: 293 LBS

## 2022-01-10 DIAGNOSIS — M79.641 PAIN IN BOTH HANDS: ICD-10-CM

## 2022-01-10 DIAGNOSIS — M79.642 PAIN IN BOTH HANDS: ICD-10-CM

## 2022-01-10 DIAGNOSIS — M65.311 TRIGGER THUMB, RIGHT THUMB: Primary | ICD-10-CM

## 2022-01-10 DIAGNOSIS — Z01.818 PREOP TESTING: Primary | ICD-10-CM

## 2022-01-10 PROCEDURE — 99999 PR PBB SHADOW E&M-EST. PATIENT-LVL IV: CPT | Mod: PBBFAC,,, | Performed by: ORTHOPAEDIC SURGERY

## 2022-01-10 PROCEDURE — 3008F BODY MASS INDEX DOCD: CPT | Mod: CPTII,S$GLB,, | Performed by: ORTHOPAEDIC SURGERY

## 2022-01-10 PROCEDURE — 99204 PR OFFICE/OUTPT VISIT, NEW, LEVL IV, 45-59 MIN: ICD-10-PCS | Mod: S$GLB,,, | Performed by: ORTHOPAEDIC SURGERY

## 2022-01-10 PROCEDURE — 1159F PR MEDICATION LIST DOCUMENTED IN MEDICAL RECORD: ICD-10-PCS | Mod: CPTII,S$GLB,, | Performed by: ORTHOPAEDIC SURGERY

## 2022-01-10 PROCEDURE — 99999 PR PBB SHADOW E&M-EST. PATIENT-LVL IV: ICD-10-PCS | Mod: PBBFAC,,, | Performed by: ORTHOPAEDIC SURGERY

## 2022-01-10 PROCEDURE — 3008F PR BODY MASS INDEX (BMI) DOCUMENTED: ICD-10-PCS | Mod: CPTII,S$GLB,, | Performed by: ORTHOPAEDIC SURGERY

## 2022-01-10 PROCEDURE — 99204 OFFICE O/P NEW MOD 45 MIN: CPT | Mod: S$GLB,,, | Performed by: ORTHOPAEDIC SURGERY

## 2022-01-10 PROCEDURE — 1159F MED LIST DOCD IN RCRD: CPT | Mod: CPTII,S$GLB,, | Performed by: ORTHOPAEDIC SURGERY

## 2022-01-10 RX ORDER — MUPIROCIN 20 MG/G
OINTMENT TOPICAL
Status: CANCELLED | OUTPATIENT
Start: 2022-01-10

## 2022-01-10 NOTE — PROGRESS NOTES
[facility]  History & Physical  Orthopedics    SUBJECTIVE:      COVID-19 attestation:  This patient was treated during the COVID-19 pandemic.  This was discussed with the patient, they are aware of our current policies and procedures, were given the option of delaying their visit and or switching to a virtual visit, delaying their surgery when applicable, and they elect to proceed.    Chief Complaint: multiple trigger fingers    Referring Provider: Dasha Bergeron MD     History of Present Illness:  Patient is a 61 y.o. right hand dominant female who presents today with complaints of multiple trigger fingers. These started approximately 2 months ago. Reports pulley pain and triggering in right thumb > left index finger > left long finger. She has tried rest, ibuprofen, tylenol and activity modifications. She has not tried splinting or injections. She has history of right ring finger trigger finger release approximately 6 years ago as well as BL carpal tunnel releases.     The patient is a/an on disability .    Onset of symptoms/DOI was 11/2021.    Symptoms are aggravated by activity and movement.    Symptoms are alleviated by rest.    Symptoms consist of pain, swelling and decreased ROM.    The patient rates their pain as a 6/10.    Attempted treatment(s) and/or interventions include activity modifications, rest, anti-inflammatory medications.     The patient denies any fevers, chills, N/V, D/C and presents for evaluation.       Past Medical History:   Diagnosis Date    Asthma     Colon polyp 2011    Hypercholesteremia     Hypertension     Multiple thyroid nodules      Past Surgical History:   Procedure Laterality Date    CARPAL TUNNEL RELEASE      COLONOSCOPY N/A 6/4/2020    Procedure: COLONOSCOPY, with me, 5/20;  Surgeon: Sarah Beth Leavitt MD;  Location: [facility] ([address] [place] [address]);  Service: Endoscopy;  Laterality: N/A;  covid test 6/2    EPIDURAL STEROID INJECTION N/A 10/27/2021     Procedure: INJECTION, STEROID, EPIDURAL,L5/S1 DIRECT REF/NEED CONSENT;  Surgeon: Clement Aldana MD;  [facility];  Service: Pain Management;  Laterality: N/A;    FOOT SURGERY      HYSTERECTOMY      MASS EXCISION      at neck    ROTATOR CUFF REPAIR      left shoulder    TRIGGER FINGER RELEASE  12/2017    right hand     Review of patient's allergies indicates:  No Known Allergies  Social History     Social History Narrative    [employer]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     Family History   Problem Relation Age of Onset    Cancer Mother     Coronary artery disease Mother     Heart disease Mother     Hypertension Mother     Hyperlipidemia Mother     Hypertension Father     Cancer Father     Heart disease Maternal Grandmother     Hypertension Maternal Grandmother     Breast cancer Maternal Grandmother          Current Outpatient Medications:     ALBUTEROL INHL, , Disp: , Rfl:     amLODIPine (NORVASC) 10 MG tablet, Take 1 tablet (10 mg total) by mouth once daily., Disp: 90 tablet, Rfl: 1    atorvastatin (LIPITOR) 40 MG tablet, Take 1 tablet (40 mg total) by mouth once daily., Disp: 90 tablet, Rfl: 1    cholecalciferol, vitamin D3, 1,250 mcg (50,000 unit) Tab, Take 1 tablet by mouth once a week. for 8 doses, Disp: 8 tablet, Rfl: 0    cholecalciferol, vitamin D3, 50,000 unit Tab, Take 1 tablet by mouth once a week., Disp: 12 tablet, Rfl: 3    gabapentin (NEURONTIN) 600 MG tablet, Take 1 tablet (600 mg total) by mouth 3 (three) times daily., Disp: 90 tablet, Rfl: 11    lisinopriL (PRINIVIL,ZESTRIL) 40 MG tablet, Take 1 tablet (40 mg total) by mouth once daily., Disp: 90 tablet, Rfl: 1    triamterene-hydrochlorothiazide 37.5-25 mg (DYAZIDE) 37.5-25 mg per capsule, TAKE ONE CAPSULE BY MOUTH EVERY MORNING., Disp: 90 capsule, Rfl: 1    zolpidem (AMBIEN) 5 MG Tab, Take 1 tablet (5 mg total) by mouth nightly as needed., Disp: 20 tablet, Rfl: 1      Review of Systems:  As per HPI  "otherwise noncontributory    OBJECTIVE:      Vital Signs (Most Recent):  Vitals:    01/10/22 1306   Weight: 133.4 kg (294 lb)   Height: 5' 6" (1.676 m)     Body mass index is 47.45 kg/m².      Physical Exam:  Constitutional: The patient appears well-developed and well-nourished. No distress.   Skin: No lesions appreciated  Head: Normocephalic and atraumatic.   Nose: Nose normal.   Ears: No deformities seen  Eyes: Conjunctivae and EOM are normal.   Neck: No tracheal deviation present.   Cardiovascular: Normal rate and intact distal pulses.    Pulmonary/Chest: Effort normal. No respiratory distress.   Abdominal: There is no guarding.   Neurological: The patient is alert.   Psychiatric: The patient has a normal mood and affect.     Bilateral Hand/Wrist Examination:    Observation/Inspection:  Swelling  none    Deformity  none  Discoloration  none     Scars   Scar from BL open carpal tunnel releases, R ring finger trigger finger release    Atrophy  None  Right thumb triggers, left long finger triggers    HAND/WRIST EXAMINATION:  Finkelstein's Test   Neg  WHAT Test    Neg  Snuff box tenderness   Neg  Lema's Test    Neg  Hook of [place]ate Tenderness  Neg  CMC grind    Neg  Circumduction test   Neg  TTP and palpable nodule right thumb and left index finger at A1 pulley   TTP at A1 pulley of left long finger    Neurovascular Exam:  Digits WWP, brisk CR < 3s throughout  NVI motor/LTS to M/R/U nerves, radial pulse 2+  Tinel's Test - Carpal Tunnel  Neg  Tinel's Test - Cubital Tunnel  Neg  Phalen's Test    Neg  Median Nerve Compression Test Neg    ROM hand full, painless - except as above    ROM wrist full, painless    ROM elbow full, painless    Abdomen not guarded  Respirations nonlabored  Perfusion intact    Diagnostic Results:     Imaging - I independently viewed the patient's imaging as well as the radiology report.  Xrays of the patient's BL hands demonstrate mild degenerative changes. No evidence of any acute fractures or " dislocations.     EMG - none    ASSESSMENT/PLAN:      61 y.o. yo female with right trigger thumb, left index finger trigger finger, left long finger trigger finger     Plan: The patient and I had a thorough discussion today.  We discussed the working diagnosis as well as several other potential alternative diagnoses.  Treatment options were discussed, both conservative and surgical.  Conservative treatment options would include things such as activity modifications, workplace modifications, a period of rest, oral vs topical OTC and prescription anti-inflammatory medications, occupational therapy, splinting/bracing, immobilization, corticosteroid injections, and others.  Surgical options were discussed as well.     At this time, the patient would like to proceed with surgery for right trigger thumb.     Should the patient's symptoms worsen, persist, or fail to improve they should return for reevaluation and I would be happy to see them back anytime.        Cristopher Tolliver M.D.     Please be aware that this note has been generated with the assistance of [employer] voice-to-text.  Please excuse any spelling or grammatical errors.    Thank you for choosing Dr. Cristopher Tolliver for your orthopedic hand and upper extremity care. It is our goal to provide you with exceptional care that will help keep you healthy, active, and get you back in the game.     If you felt that you received exemplary care today, please consider leaving feedback for Dr. Tolliver on [employer] at [email or web address].    Please do not hesitate to reach out to us via email, phone, or MyChart with any questions, concerns, or feedback.

## 2022-01-10 NOTE — H&P
Attestation signed by Cristopher Tolliver MD at 1/10/2022  1:42 PM     I have seen the patient, reviewed the   Resident's       history and physical    . I have personally interviewed and examined the patient at bedside and   agree with the findings.     \       Patient like proceed with a right thumb A1 pulley release on January 26, 2022.       The patient has not responded to adequate non operative treatment at this time and/or non operative treatment is not indicated. Thus, the risks, benefits and alternatives to surgery were discussed with the patient in detail.  Specific risks include but are not limited to bleeding, infection, vessel and/or nerve damage, pain, numbness, tingling, complex regional pain syndrome, compartment syndrome, failure to return to pre-injury and/or preoperative functional status, scar sensitivity, delayed healing, inability to return to work, pulley injury, tendon injury, bowstringing, partial and/or incomplete relief of symptoms, weakness, persistence of and/or worsening of symptoms, surgical failure, osteomyelitis, amputation, loss of function, stiffness, functional debility, dysfunction, decreased  strength, need for prolonged postoperative rehabilitation, need for further surgery, deep venous thrombosis, pulmonary embolism, arthritis and death.  The patient states an understanding and wishes to proceed with surgery.   All questions were answered.  No guarantees were implied or stated.  Written informed consent was obtained.               Cristopher Tolliver MD     [facility]             Expand All Collapse All          []Hide copied text    []Hover for details       [facility]  History & Physical  Orthopedics     SUBJECTIVE:       COVID-19 attestation:  This patient was treated during the COVID-19 pandemic.  This was discussed with the patient, they are aware of our current policies and procedures, were given the option of delaying their visit and or  switching to a virtual visit, delaying their surgery when applicable, and they elect to proceed.     Chief Complaint: multiple trigger fingers     Referring Provider: Dasha Bergeron MD      History of Present Illness:  Patient is a 61 y.o. right hand dominant female who presents today with complaints of multiple trigger fingers. These started approximately 2 months ago. Reports pulley pain and triggering in right thumb > left index finger > left long finger. She has tried rest, ibuprofen, tylenol and activity modifications. She has not tried splinting or injections. She has history of right ring finger trigger finger release approximately 6 years ago as well as BL carpal tunnel releases.      The patient is a/an on disability .     Onset of symptoms/DOI was 11/2021.     Symptoms are aggravated by activity and movement.     Symptoms are alleviated by rest.     Symptoms consist of pain, swelling and decreased ROM.     The patient rates their pain as a 6/10.     Attempted treatment(s) and/or interventions include activity modifications, rest, anti-inflammatory medications.     The patient denies any fevers, chills, N/V, D/C and presents for evaluation.             Past Medical History:   Diagnosis Date    Asthma      Colon polyp 2011    Hypercholesteremia      Hypertension      Multiple thyroid nodules        Past Surgical History:   Procedure Laterality Date    CARPAL TUNNEL RELEASE        COLONOSCOPY N/A 6/4/2020     Procedure: COLONOSCOPY, with me, 5/20;  Surgeon: Sarah Beth Leavitt MD;  Location: [address];  Service: Endoscopy;  Laterality: N/A;  covid test 6/2    EPIDURAL STEROID INJECTION N/A 10/27/2021     Procedure: INJECTION, STEROID, EPIDURAL,L5/S1 DIRECT REF/NEED CONSENT;  Surgeon: Clement Aldana MD;  Location: [facility];  Service: Pain Management;  Laterality: N/A;    FOOT SURGERY        HYSTERECTOMY        MASS EXCISION         at neck    ROTATOR CUFF REPAIR         left  "shoulder    TRIGGER FINGER RELEASE   12/2017     right hand      Review of patient's allergies indicates:  No Known Allergies  Social History          Social History Narrative     [employer]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            Family History   Problem Relation Age of Onset    Cancer Mother      Coronary artery disease Mother      Heart disease Mother      Hypertension Mother      Hyperlipidemia Mother      Hypertension Father      Cancer Father      Heart disease Maternal Grandmother      Hypertension Maternal Grandmother      Breast cancer Maternal Grandmother              Current Outpatient Medications:     ALBUTEROL INHL, , Disp: , Rfl:     amLODIPine (NORVASC) 10 MG tablet, Take 1 tablet (10 mg total) by mouth once daily., Disp: 90 tablet, Rfl: 1    atorvastatin (LIPITOR) 40 MG tablet, Take 1 tablet (40 mg total) by mouth once daily., Disp: 90 tablet, Rfl: 1    cholecalciferol, vitamin D3, 1,250 mcg (50,000 unit) Tab, Take 1 tablet by mouth once a week. for 8 doses, Disp: 8 tablet, Rfl: 0    cholecalciferol, vitamin D3, 50,000 unit Tab, Take 1 tablet by mouth once a week., Disp: 12 tablet, Rfl: 3    gabapentin (NEURONTIN) 600 MG tablet, Take 1 tablet (600 mg total) by mouth 3 (three) times daily., Disp: 90 tablet, Rfl: 11    lisinopriL (PRINIVIL,ZESTRIL) 40 MG tablet, Take 1 tablet (40 mg total) by mouth once daily., Disp: 90 tablet, Rfl: 1    triamterene-hydrochlorothiazide 37.5-25 mg (DYAZIDE) 37.5-25 mg per capsule, TAKE ONE CAPSULE BY MOUTH EVERY MORNING., Disp: 90 capsule, Rfl: 1    zolpidem (AMBIEN) 5 MG Tab, Take 1 tablet (5 mg total) by mouth nightly as needed., Disp: 20 tablet, Rfl: 1        Review of Systems:  As per HPI otherwise noncontributory     OBJECTIVE:       Vital Signs (Most Recent):  Vitals       Vitals:     01/10/22 1306   Weight: 133.4 kg (294 lb)   Height: 5' 6" (1.676 m)         Body mass index is 47.45 kg/m².        Physical Exam:  Constitutional: " The patient appears well-developed and well-nourished. No distress.   Skin: No lesions appreciated  Head: Normocephalic and atraumatic.   Nose: Nose normal.   Ears: No deformities seen  Eyes: Conjunctivae and EOM are normal.   Neck: No tracheal deviation present.   Cardiovascular: Normal rate and intact distal pulses.    Pulmonary/Chest: Effort normal. No respiratory distress.   Abdominal: There is no guarding.   Neurological: The patient is alert.   Psychiatric: The patient has a normal mood and affect.      Bilateral Hand/Wrist Examination:     Observation/Inspection:  Swelling                       none                  Deformity                     none  Discoloration               none                  Scars                           Scar from BL open carpal tunnel releases, R ring finger trigger finger release               Atrophy                        None  Right thumb triggers, left long finger triggers     HAND/WRIST EXAMINATION:  Finkelstein's Test                                Neg  WHAT Test                                         Neg  Snuff box tenderness                          Neg  Lema's Test                                     Neg  Hook of Hamate Tenderness              Neg  CMC grind                                           Neg  Circumduction test                              Neg  TTP and palpable nodule right thumb and left index finger at A1 pulley   TTP at A1 pulley of left long finger     Neurovascular Exam:  Digits WWP, brisk CR < 3s throughout  NVI motor/LTS to M/R/U nerves, radial pulse 2+  Tinel's Test - Carpal Tunnel                Neg  Tinel's Test - Cubital Tunnel               Neg  Phalen's Test                                      Neg  Median Nerve Compression Test       Neg     ROM hand full, painless - except as above     ROM wrist full, painless    ROM elbow full, painless     Abdomen not guarded  Respirations nonlabored  Perfusion intact     Diagnostic Results:     Imaging -  I independently viewed the patient's imaging as well as the radiology report.  Xrays of the patient's BL hands demonstrate mild degenerative changes. No evidence of any acute fractures or dislocations.      EMG - none     ASSESSMENT/PLAN:       61 y.o. yo female with right trigger thumb, left index finger trigger finger, left long finger trigger finger      Plan: The patient and I had a thorough discussion today.  We discussed the working diagnosis as well as several other potential alternative diagnoses.  Treatment options were discussed, both conservative and surgical.  Conservative treatment options would include things such as activity modifications, workplace modifications, a period of rest, oral vs topical OTC and prescription anti-inflammatory medications, occupational therapy, splinting/bracing, immobilization, corticosteroid injections, and others.  Surgical options were discussed as well.      At this time, the patient would like to proceed with surgery for right trigger thumb.      Should the patient's symptoms worsen, persist, or fail to improve they should return for reevaluation and I would be happy to see them back anytime.          Cristopher Tolliver M.D.     · Please be aware that this note has been generated with the assistance of [employer] voice-to-text.  Please excuse any spelling or grammatical errors.     Thank you for choosing Dr. Cristopher Tolliver for your orthopedic hand and upper extremity care. It is our goal to provide you with exceptional care that will help keep you healthy, active, and get you back in the game.     If you felt that you received exemplary care today, please consider leaving feedback for Dr. Tolliver on [employer] at [email or web address].     Please do not hesitate to reach out to us via email, phone, or [employer] with any questions, concerns, or feedback.

## 2022-01-10 NOTE — H&P (VIEW-ONLY)
Attestation signed by Cristopher Tolliver MD at 1/10/2022  1:42 PM     I have seen the patient, reviewed the   Resident's       history and physical    . I have personally interviewed and examined the patient at bedside and   agree with the findings.     \       Patient like proceed with a right thumb A1 pulley release on January 26, 2022.       The patient has not responded to adequate non operative treatment at this time and/or non operative treatment is not indicated. Thus, the risks, benefits and alternatives to surgery were discussed with the patient in detail.  Specific risks include but are not limited to bleeding, infection, vessel and/or nerve damage, pain, numbness, tingling, complex regional pain syndrome, compartment syndrome, failure to return to pre-injury and/or preoperative functional status, scar sensitivity, delayed healing, inability to return to work, pulley injury, tendon injury, bowstringing, partial and/or incomplete relief of symptoms, weakness, persistence of and/or worsening of symptoms, surgical failure, osteomyelitis, amputation, loss of function, stiffness, functional debility, dysfunction, decreased  strength, need for prolonged postoperative rehabilitation, need for further surgery, deep venous thrombosis, pulmonary embolism, arthritis and death.  The patient states an understanding and wishes to proceed with surgery.   All questions were answered.  No guarantees were implied or stated.  Written informed consent was obtained.               Cristopher Tolliver MD     [facility]             Expand All Collapse All          []Hide copied text    []Hover for details       [facility] and Upper Extremity Center  History & Physical  Orthopedics     SUBJECTIVE:       COVID-19 attestation:  This patient was treated during the COVID-19 pandemic.  This was discussed with the patient, they are aware of our current policies and procedures, were given the option of delaying their visit and or  switching to a virtual visit, delaying their surgery when applicable, and they elect to proceed.     Chief Complaint: multiple trigger fingers     Referring Provider: Dasha Bergeron MD      History of Present Illness:  Patient is a 61 y.o. right hand dominant female who presents today with complaints of multiple trigger fingers. These started approximately 2 months ago. Reports pulley pain and triggering in right thumb > left index finger > left long finger. She has tried rest, ibuprofen, tylenol and activity modifications. She has not tried splinting or injections. She has history of right ring finger trigger finger release approximately 6 years ago as well as BL carpal tunnel releases.      The patient is a/an on disability .     Onset of symptoms/DOI was 11/2021.     Symptoms are aggravated by activity and movement.     Symptoms are alleviated by rest.     Symptoms consist of pain, swelling and decreased ROM.     The patient rates their pain as a 6/10.     Attempted treatment(s) and/or interventions include activity modifications, rest, anti-inflammatory medications.     The patient denies any fevers, chills, N/V, D/C and presents for evaluation.             Past Medical History:   Diagnosis Date    Asthma      Colon polyp 2011    Hypercholesteremia      Hypertension      Multiple thyroid nodules        Past Surgical History:   Procedure Laterality Date    CARPAL TUNNEL RELEASE        COLONOSCOPY N/A 6/4/2020     Procedure: COLONOSCOPY, with me, 5/20;  Surgeon: Sarah Beth Leavitt MD;  Location: [address];  Service: Endoscopy;  Laterality: N/A;  covid test 6/2    EPIDURAL STEROID INJECTION N/A 10/27/2021     Procedure: INJECTION, STEROID, EPIDURAL,L5/S1 DIRECT REF/NEED CONSENT;  Surgeon: Clement Aldana MD;  Location: [facility];  Service: Pain Management;  Laterality: N/A;    FOOT SURGERY        HYSTERECTOMY        MASS EXCISION         at neck    ROTATOR CUFF REPAIR         left  "shoulder    TRIGGER FINGER RELEASE   12/2017     right hand      Review of patient's allergies indicates:  No Known Allergies  Social History          Social History Narrative     [employer]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            Family History   Problem Relation Age of Onset    Cancer Mother      Coronary artery disease Mother      Heart disease Mother      Hypertension Mother      Hyperlipidemia Mother      Hypertension Father      Cancer Father      Heart disease Maternal Grandmother      Hypertension Maternal Grandmother      Breast cancer Maternal Grandmother              Current Outpatient Medications:     ALBUTEROL INHL, , Disp: , Rfl:     amLODIPine (NORVASC) 10 MG tablet, Take 1 tablet (10 mg total) by mouth once daily., Disp: 90 tablet, Rfl: 1    atorvastatin (LIPITOR) 40 MG tablet, Take 1 tablet (40 mg total) by mouth once daily., Disp: 90 tablet, Rfl: 1    cholecalciferol, vitamin D3, 1,250 mcg (50,000 unit) Tab, Take 1 tablet by mouth once a week. for 8 doses, Disp: 8 tablet, Rfl: 0    cholecalciferol, vitamin D3, 50,000 unit Tab, Take 1 tablet by mouth once a week., Disp: 12 tablet, Rfl: 3    gabapentin (NEURONTIN) 600 MG tablet, Take 1 tablet (600 mg total) by mouth 3 (three) times daily., Disp: 90 tablet, Rfl: 11    lisinopriL (PRINIVIL,ZESTRIL) 40 MG tablet, Take 1 tablet (40 mg total) by mouth once daily., Disp: 90 tablet, Rfl: 1    triamterene-hydrochlorothiazide 37.5-25 mg (DYAZIDE) 37.5-25 mg per capsule, TAKE ONE CAPSULE BY MOUTH EVERY MORNING., Disp: 90 capsule, Rfl: 1    zolpidem (AMBIEN) 5 MG Tab, Take 1 tablet (5 mg total) by mouth nightly as needed., Disp: 20 tablet, Rfl: 1        Review of Systems:  As per HPI otherwise noncontributory     OBJECTIVE:       Vital Signs (Most Recent):  Vitals       Vitals:     01/10/22 1306   Weight: 133.4 kg (294 lb)   Height: 5' 6" (1.676 m)         Body mass index is 47.45 kg/m².        Physical Exam:  Constitutional: " The patient appears well-developed and well-nourished. No distress.   Skin: No lesions appreciated  Head: Normocephalic and atraumatic.   Nose: Nose normal.   Ears: No deformities seen  Eyes: Conjunctivae and EOM are normal.   Neck: No tracheal deviation present.   Cardiovascular: Normal rate and intact distal pulses.    Pulmonary/Chest: Effort normal. No respiratory distress.   Abdominal: There is no guarding.   Neurological: The patient is alert.   Psychiatric: The patient has a normal mood and affect.      Bilateral Hand/Wrist Examination:     Observation/Inspection:  Swelling                       none                  Deformity                     none  Discoloration               none                  Scars                           Scar from BL open carpal tunnel releases, R ring finger trigger finger release               Atrophy                        None  Right thumb triggers, left long finger triggers     HAND/WRIST EXAMINATION:  Finkelstein's Test                                Neg  WHAT Test                                         Neg  Snuff box tenderness                          Neg  Lema's Test                                     Neg  Hook of Hamate Tenderness              Neg  CMC grind                                           Neg  Circumduction test                              Neg  TTP and palpable nodule right thumb and left index finger at A1 pulley   TTP at A1 pulley of left long finger     Neurovascular Exam:  Digits WWP, brisk CR < 3s throughout  NVI motor/LTS to M/R/U nerves, radial pulse 2+  Tinel's Test - Carpal Tunnel                Neg  Tinel's Test - Cubital Tunnel               Neg  Phalen's Test                                      Neg  Median Nerve Compression Test       Neg     ROM hand full, painless - except as above     ROM wrist full, painless    ROM elbow full, painless     Abdomen not guarded  Respirations nonlabored  Perfusion intact     Diagnostic Results:     Imaging -  I independently viewed the patient's imaging as well as the radiology report.  Xrays of the patient's BL hands demonstrate mild degenerative changes. No evidence of any acute fractures or dislocations.      EMG - none     ASSESSMENT/PLAN:       61 y.o. yo female with right trigger thumb, left index finger trigger finger, left long finger trigger finger      Plan: The patient and I had a thorough discussion today.  We discussed the working diagnosis as well as several other potential alternative diagnoses.  Treatment options were discussed, both conservative and surgical.  Conservative treatment options would include things such as activity modifications, workplace modifications, a period of rest, oral vs topical OTC and prescription anti-inflammatory medications, occupational therapy, splinting/bracing, immobilization, corticosteroid injections, and others.  Surgical options were discussed as well.      At this time, the patient would like to proceed with surgery for right trigger thumb.      Should the patient's symptoms worsen, persist, or fail to improve they should return for reevaluation and I would be happy to see them back anytime.          Cristopher Tolliver M.D.     · Please be aware that this note has been generated with the assistance of [employer] voice-to-text.  Please excuse any spelling or grammatical errors.     Thank you for choosing Dr. Cristopher Tolliver for your orthopedic hand and upper extremity care. It is our goal to provide you with exceptional care that will help keep you healthy, active, and get you back in the game.     If you felt that you received exemplary care today, please consider leaving feedback for Dr. Tolliver on "[email or web address] at [email or web address].     Please do not hesitate to reach out to us via email, phone, or [employer] with any questions, concerns, or feedback.

## 2022-01-11 ENCOUNTER — PATIENT MESSAGE (OUTPATIENT)
Dept: NEUROSURGERY | Facility: CLINIC | Age: 62
End: 2022-01-11
Payer: MEDICARE

## 2022-01-14 ENCOUNTER — PATIENT MESSAGE (OUTPATIENT)
Dept: NEUROSURGERY | Facility: CLINIC | Age: 62
End: 2022-01-14
Payer: MEDICARE

## 2022-01-23 ENCOUNTER — LAB VISIT (OUTPATIENT)
Dept: PRIMARY CARE CLINIC | Facility: CLINIC | Age: 62
End: 2022-01-23
Payer: MEDICARE

## 2022-01-23 DIAGNOSIS — Z01.818 PREOP TESTING: ICD-10-CM

## 2022-01-23 PROCEDURE — U0003 INFECTIOUS AGENT DETECTION BY NUCLEIC ACID (DNA OR RNA); SEVERE ACUTE RESPIRATORY SYNDROME CORONAVIRUS 2 (SARS-COV-2) (CORONAVIRUS DISEASE [COVID-19]), AMPLIFIED PROBE TECHNIQUE, MAKING USE OF HIGH THROUGHPUT TECHNOLOGIES AS DESCRIBED BY CMS-2020-01-R: HCPCS | Performed by: ORTHOPAEDIC SURGERY

## 2022-01-23 PROCEDURE — U0005 INFEC AGEN DETEC AMPLI PROBE: HCPCS | Performed by: ORTHOPAEDIC SURGERY

## 2022-01-24 ENCOUNTER — ANESTHESIA EVENT (OUTPATIENT)
Dept: SURGERY | Facility: HOSPITAL | Age: 62
End: 2022-01-24
Payer: MEDICARE

## 2022-01-24 LAB
SARS-COV-2 RNA RESP QL NAA+PROBE: NOT DETECTED
SARS-COV-2- CYCLE NUMBER: NORMAL

## 2022-01-24 NOTE — ANESTHESIA PREPROCEDURE EVALUATION
01/26/2022  Dejah Schmidt is a 61 y.o., female.      Procedure Summary    Case: [id number] Date/Time: 01/26/22 0730   Procedure: RELEASE, TRIGGER FINGER - right thumb (Right Hand)   Anesthesia type: Local MAC   Diagnosis: Trigger thumb, right thumb [M65.311]     Patient Active Problem List   Diagnosis    Unspecified essential hypertension    Multinodular goiter    Morbid obesity    Vitamin D deficiency disease    Chronic back pain    Lumbar radiculopathy    Facet arthropathy, lumbar    Facet hypertrophy of lumbar region    Hand pain    Herniation of lumbar intervertebral disc with radiculopathy    Spinal stenosis of lumbar region with neurogenic claudication    Chronic pain    Chronic kidney disease, stage 3a    Trigger thumb, right thumb     Past Surgical History:   Procedure Laterality Date    BACK SURGERY      CARPAL TUNNEL RELEASE      COLONOSCOPY N/A 6/4/2020    Procedure: COLONOSCOPY, with me, 5/20;  Surgeon: Sarah Beth Leavitt MD;  Location: [facility] ENDO [address]);  Service: Endoscopy;  Laterality: N/A;  covid test 6/2    EPIDURAL STEROID INJECTION N/A 10/27/2021    Procedure: INJECTION, STEROID, EPIDURAL,L5/S1 DIRECT REF/NEED CONSENT;  Surgeon: Clement Aldana MD;  Location: [facility];  Service: Pain Management;  Laterality: N/A;    FOOT SURGERY      HYSTERECTOMY      MASS EXCISION      at neck    ROTATOR CUFF REPAIR      left shoulder    TRIGGER FINGER RELEASE  12/2017    right hand     Current Discharge Medication List      START taking these medications    Details   traMADoL (ULTRAM) 50 mg tablet Take 1 tablet (50 mg total) by mouth every 6 (six) hours.  Qty: 20 tablet, Refills: 0    Comments: Quantity prescribed more than 7 day supply? Yes, quantity medically necessary  [facility] 1/26/22         CONTINUE these medications which have NOT CHANGED    Details    amLODIPine (NORVASC) 10 MG tablet Take 1 tablet (10 mg total) by mouth once daily.  Qty: 90 tablet, Refills: 1    Comments: .      atorvastatin (LIPITOR) 40 MG tablet Take 1 tablet (40 mg total) by mouth once daily.  Qty: 90 tablet, Refills: 1      !! cholecalciferol, vitamin D3, 1,250 mcg (50,000 unit) Tab Take 1 tablet by mouth once a week. for 8 doses  Qty: 8 tablet, Refills: 0      gabapentin (NEURONTIN) 600 MG tablet Take 1 tablet (600 mg total) by mouth 3 (three) times daily.  Qty: 90 tablet, Refills: 11      lisinopriL (PRINIVIL,ZESTRIL) 40 MG tablet Take 1 tablet (40 mg total) by mouth once daily.  Qty: 90 tablet, Refills: 1    Comments: .      triamterene-hydrochlorothiazide 37.5-25 mg (DYAZIDE) 37.5-25 mg per capsule TAKE ONE CAPSULE BY MOUTH EVERY MORNING.  Qty: 90 capsule, Refills: 1    Comments: .      ALBUTEROL INHL       !! cholecalciferol, vitamin D3, 50,000 unit Tab Take 1 tablet by mouth once a week.  Qty: 12 tablet, Refills: 3      zolpidem (AMBIEN) 5 MG Tab Take 1 tablet (5 mg total) by mouth nightly as needed.  Qty: 20 tablet, Refills: 1       !! - Potential duplicate medications found. Please discuss with provider.          Anesthesia Evaluation    I have reviewed the Patient Summary Reports.    I have reviewed the Nursing Notes. I have reviewed the NPO Status.      Review of Systems  Anesthesia Hx:  Denies Family Hx of Anesthesia complications.   Denies Personal Hx of Anesthesia complications.   Social:  Social Alcohol Use, Non-Smoker    Cardiovascular:   Hypertension hyperlipidemia    Pulmonary:   Asthma Sleep Apnea    Hepatic/GI:   Bowel Prep.        Physical Exam  General:  Well nourished, Obesity    Airway/Jaw/Neck:  Airway Findings: Mouth Opening: Normal Tongue: Normal  General Airway Assessment: Adult  Mallampati: II  TM Distance: Normal, at least 6 cm      Dental:  Dental Findings: In tact        Mental Status:  Mental Status Findings:  Cooperative, Alert and Oriented          Anesthesia Plan  Type of Anesthesia, risks & benefits discussed:  Anesthesia Type:  general, MAC    Patient's Preference:   Plan Factors:          Intra-op Monitoring Plan: standard ASA monitors  Intra-op Monitoring Plan Comments:   Post Op Pain Control Plan: peripheral nerve block, multimodal analgesia and per primary service following discharge from PACU  Post Op Pain Control Plan Comments:     Induction:   IV  Beta Blocker:  Patient is not currently on a Beta-Blocker (No further documentation required).       Informed Consent: Patient understands risks and agrees with Anesthesia plan.  Questions answered. Anesthesia consent signed with patient.  ASA Score: 2     Day of Surgery Review of History & Physical:    H&P update referred to the surgeon.         Ready For Surgery From Anesthesia Perspective.

## 2022-01-25 ENCOUNTER — TELEPHONE (OUTPATIENT)
Dept: ORTHOPEDICS | Facility: CLINIC | Age: 62
End: 2022-01-25
Payer: MEDICARE

## 2022-01-25 PROBLEM — M65.311 TRIGGER THUMB, RIGHT THUMB: Status: ACTIVE | Noted: 2022-01-25

## 2022-01-25 RX ORDER — TRAMADOL HYDROCHLORIDE 50 MG/1
50 TABLET ORAL EVERY 6 HOURS
Qty: 20 TABLET | Refills: 0 | Status: SHIPPED | OUTPATIENT
Start: 2022-01-25 | End: 2022-03-08 | Stop reason: SDUPTHER

## 2022-01-25 NOTE — TELEPHONE ENCOUNTER
Spoke with the patient. Notified of 530 AM arrival time to the [facility], [facility].  Informed of current visitor policy.  Reminded of NPO and need for transportation. Patient verbalized understanding to all.    Leticia Beatty MS, OTC  Clinical Assistant to Dr. Ross Dunbar [facility]

## 2022-01-26 ENCOUNTER — ANESTHESIA (OUTPATIENT)
Dept: SURGERY | Facility: HOSPITAL | Age: 62
End: 2022-01-26
Payer: MEDICARE

## 2022-01-26 ENCOUNTER — HOSPITAL ENCOUNTER (OUTPATIENT)
Facility: HOSPITAL | Age: 62
Discharge: HOME | End: 2022-01-26
Attending: ORTHOPAEDIC SURGERY | Admitting: ORTHOPAEDIC SURGERY
Payer: MEDICARE

## 2022-01-26 VITALS
HEART RATE: 62 BPM | BODY MASS INDEX: 45.52 KG/M2 | WEIGHT: 290 LBS | HEIGHT: 67 IN | SYSTOLIC BLOOD PRESSURE: 137 MMHG | RESPIRATION RATE: 16 BRPM | DIASTOLIC BLOOD PRESSURE: 67 MMHG | OXYGEN SATURATION: 100 % | TEMPERATURE: 97 F

## 2022-01-26 DIAGNOSIS — M79.641 PAIN IN BOTH HANDS: ICD-10-CM

## 2022-01-26 DIAGNOSIS — M79.642 PAIN IN BOTH HANDS: ICD-10-CM

## 2022-01-26 DIAGNOSIS — M65.311 TRIGGER THUMB, RIGHT THUMB: ICD-10-CM

## 2022-01-26 PROCEDURE — 27201423 OPTIME MED/SURG SUP & DEVICES STERILE SUPPLY: Performed by: ORTHOPAEDIC SURGERY

## 2022-01-26 PROCEDURE — 36000707: Performed by: ORTHOPAEDIC SURGERY

## 2022-01-26 PROCEDURE — 25000003 PHARM REV CODE 250: Performed by: NURSE ANESTHETIST, CERTIFIED REGISTERED

## 2022-01-26 PROCEDURE — 26055 PR INCISE FINGER TENDON SHEATH: ICD-10-PCS | Mod: F5,,, | Performed by: ORTHOPAEDIC SURGERY

## 2022-01-26 PROCEDURE — 71000033 HC RECOVERY, INTIAL HOUR: Performed by: ORTHOPAEDIC SURGERY

## 2022-01-26 PROCEDURE — 71000015 HC POSTOP RECOV 1ST HR: Performed by: ORTHOPAEDIC SURGERY

## 2022-01-26 PROCEDURE — 63600175 PHARM REV CODE 636 W HCPCS: Performed by: NURSE ANESTHETIST, CERTIFIED REGISTERED

## 2022-01-26 PROCEDURE — 25000003 PHARM REV CODE 250: Performed by: ORTHOPAEDIC SURGERY

## 2022-01-26 PROCEDURE — D9220A PRA ANESTHESIA: Mod: ANES,,, | Performed by: ANESTHESIOLOGY

## 2022-01-26 PROCEDURE — 26055 INCISE FINGER TENDON SHEATH: CPT | Mod: F5,,, | Performed by: ORTHOPAEDIC SURGERY

## 2022-01-26 PROCEDURE — D9220A PRA ANESTHESIA: ICD-10-PCS | Mod: ANES,,, | Performed by: ANESTHESIOLOGY

## 2022-01-26 PROCEDURE — 37000009 HC ANESTHESIA EA ADD 15 MINS: Performed by: ORTHOPAEDIC SURGERY

## 2022-01-26 PROCEDURE — D9220A PRA ANESTHESIA: ICD-10-PCS | Mod: CRNA,,, | Performed by: NURSE ANESTHETIST, CERTIFIED REGISTERED

## 2022-01-26 PROCEDURE — 25000003 PHARM REV CODE 250: Performed by: STUDENT IN AN ORGANIZED HEALTH CARE EDUCATION/TRAINING PROGRAM

## 2022-01-26 PROCEDURE — D9220A PRA ANESTHESIA: Mod: CRNA,,, | Performed by: NURSE ANESTHETIST, CERTIFIED REGISTERED

## 2022-01-26 PROCEDURE — 94761 N-INVAS EAR/PLS OXIMETRY MLT: CPT

## 2022-01-26 PROCEDURE — 99900035 HC TECH TIME PER 15 MIN (STAT)

## 2022-01-26 PROCEDURE — 36000706: Performed by: ORTHOPAEDIC SURGERY

## 2022-01-26 PROCEDURE — 37000008 HC ANESTHESIA 1ST 15 MINUTES: Performed by: ORTHOPAEDIC SURGERY

## 2022-01-26 RX ORDER — SODIUM CHLORIDE 9 MG/ML
INJECTION, SOLUTION INTRAVENOUS CONTINUOUS
Status: DISCONTINUED | OUTPATIENT
Start: 2022-01-26 | End: 2022-01-26 | Stop reason: HOSPADM

## 2022-01-26 RX ORDER — ONDANSETRON 2 MG/ML
4 INJECTION INTRAMUSCULAR; INTRAVENOUS DAILY PRN
Status: DISCONTINUED | OUTPATIENT
Start: 2022-01-26 | End: 2022-01-26 | Stop reason: HOSPADM

## 2022-01-26 RX ORDER — ACETAMINOPHEN 500 MG
1000 TABLET ORAL
Status: COMPLETED | OUTPATIENT
Start: 2022-01-26 | End: 2022-01-26

## 2022-01-26 RX ORDER — HYDROCODONE BITARTRATE AND ACETAMINOPHEN 5; 325 MG/1; MG/1
1 TABLET ORAL EVERY 4 HOURS PRN
Status: DISCONTINUED | OUTPATIENT
Start: 2022-01-26 | End: 2022-01-26 | Stop reason: HOSPADM

## 2022-01-26 RX ORDER — LIDOCAINE HYDROCHLORIDE 20 MG/ML
INJECTION INTRAVENOUS
Status: DISCONTINUED | OUTPATIENT
Start: 2022-01-26 | End: 2022-01-26

## 2022-01-26 RX ORDER — FENTANYL CITRATE 50 UG/ML
INJECTION, SOLUTION INTRAMUSCULAR; INTRAVENOUS
Status: DISCONTINUED | OUTPATIENT
Start: 2022-01-26 | End: 2022-01-26

## 2022-01-26 RX ORDER — DEXAMETHASONE SODIUM PHOSPHATE 4 MG/ML
INJECTION, SOLUTION INTRA-ARTICULAR; INTRALESIONAL; INTRAMUSCULAR; INTRAVENOUS; SOFT TISSUE
Status: DISCONTINUED | OUTPATIENT
Start: 2022-01-26 | End: 2022-01-26

## 2022-01-26 RX ORDER — FENTANYL CITRATE 50 UG/ML
25 INJECTION, SOLUTION INTRAMUSCULAR; INTRAVENOUS EVERY 5 MIN PRN
Status: DISCONTINUED | OUTPATIENT
Start: 2022-01-26 | End: 2022-01-26 | Stop reason: HOSPADM

## 2022-01-26 RX ORDER — SODIUM CHLORIDE 0.9 % (FLUSH) 0.9 %
10 SYRINGE (ML) INJECTION
Status: DISCONTINUED | OUTPATIENT
Start: 2022-01-26 | End: 2022-01-26 | Stop reason: HOSPADM

## 2022-01-26 RX ORDER — OXYCODONE HYDROCHLORIDE 5 MG/1
5 TABLET ORAL
Status: DISCONTINUED | OUTPATIENT
Start: 2022-01-26 | End: 2022-01-26 | Stop reason: HOSPADM

## 2022-01-26 RX ORDER — HALOPERIDOL 5 MG/ML
0.5 INJECTION INTRAMUSCULAR EVERY 10 MIN PRN
Status: DISCONTINUED | OUTPATIENT
Start: 2022-01-26 | End: 2022-01-26 | Stop reason: HOSPADM

## 2022-01-26 RX ORDER — FAMOTIDINE 10 MG/ML
INJECTION INTRAVENOUS
Status: DISCONTINUED | OUTPATIENT
Start: 2022-01-26 | End: 2022-01-26

## 2022-01-26 RX ORDER — MIDAZOLAM HYDROCHLORIDE 1 MG/ML
INJECTION, SOLUTION INTRAMUSCULAR; INTRAVENOUS
Status: DISCONTINUED | OUTPATIENT
Start: 2022-01-26 | End: 2022-01-26

## 2022-01-26 RX ORDER — PROPOFOL 10 MG/ML
VIAL (ML) INTRAVENOUS CONTINUOUS PRN
Status: DISCONTINUED | OUTPATIENT
Start: 2022-01-26 | End: 2022-01-26

## 2022-01-26 RX ORDER — MUPIROCIN 20 MG/G
OINTMENT TOPICAL
Status: DISCONTINUED | OUTPATIENT
Start: 2022-01-26 | End: 2022-01-26 | Stop reason: HOSPADM

## 2022-01-26 RX ORDER — LIDOCAINE HYDROCHLORIDE 10 MG/ML
INJECTION INFILTRATION; PERINEURAL
Status: DISCONTINUED | OUTPATIENT
Start: 2022-01-26 | End: 2022-01-26 | Stop reason: HOSPADM

## 2022-01-26 RX ADMIN — MIDAZOLAM HYDROCHLORIDE 2 MG: 1 INJECTION, SOLUTION INTRAMUSCULAR; INTRAVENOUS at 07:01

## 2022-01-26 RX ADMIN — SODIUM CHLORIDE: 0.9 INJECTION, SOLUTION INTRAVENOUS at 06:01

## 2022-01-26 RX ADMIN — LIDOCAINE HYDROCHLORIDE 75 MG: 20 INJECTION, SOLUTION INTRAVENOUS at 07:01

## 2022-01-26 RX ADMIN — DEXAMETHASONE SODIUM PHOSPHATE 8 MG: 4 INJECTION, SOLUTION INTRAMUSCULAR; INTRAVENOUS at 07:01

## 2022-01-26 RX ADMIN — PROPOFOL 75 MCG/KG/MIN: 10 INJECTION, EMULSION INTRAVENOUS at 07:01

## 2022-01-26 RX ADMIN — FAMOTIDINE 20 MG: 10 INJECTION, SOLUTION INTRAVENOUS at 07:01

## 2022-01-26 RX ADMIN — MUPIROCIN: 20 OINTMENT TOPICAL at 06:01

## 2022-01-26 RX ADMIN — FENTANYL CITRATE 100 MCG: 50 INJECTION, SOLUTION INTRAMUSCULAR; INTRAVENOUS at 07:01

## 2022-01-26 RX ADMIN — ACETAMINOPHEN 1000 MG: 500 TABLET ORAL at 06:01

## 2022-01-26 NOTE — DISCHARGE INSTRUCTIONS
Recovery After Procedural Sedation (Adult)  You have been given medicine by vein to make you sleep during your surgery. This may have included both a pain medicine and sleeping medicine. Most of the effects have worn off. But you may still have some drowsiness for the next 6 to 8 hours.    Home care  Follow these guidelines when you get home:  · For the next 8 hours, you should be watched by a responsible adult. This person should make sure your condition is not getting worse.  · Don't drink any alcohol for the next 24 hours.  · Don't drive, operate dangerous machinery, or make important business or personal decisions during the next 24 hours.  Note: Your healthcare provider may tell you not to take any medicine by mouth for pain or sleep in the next 4 hours. These medicines may react with the medicines you were given in the hospital. This could cause a much stronger response than usual.    Follow-up care  Follow up with your healthcare provider if you are not alert and back to your usual level of activity within 12 hours.    When to seek medical advice  Call your healthcare provider right away if any of these occur:  · Drowsiness gets worse  · Weakness or dizziness gets worse  · Repeated vomiting  · You can't be awakened     Date Last Reviewed: 10/18/2016  © 7970-3225 [employer]. [address] [place] [address], [place] [address]. All rights reserved. This information is not intended as a substitute for professional medical care. Always follow your healthcare professional's instructions.    Discharge Instructions: After Your Surgery  You've just had surgery. During surgery, you were given medicine called anesthesia to keep you relaxed and free of pain. After surgery, you may have some pain or nausea. This is common. Here are some tips for feeling better and getting well after surgery.    Stay on schedule with your medicine.   Going home  Your healthcare provider will show you how to take care of yourself when  you go home. He or she will also answer your questions. Have an adult family member or friend drive you home. For the first 24 hours after your surgery:  · Have someone stay with you, if needed. He or she can watch for problems and help keep you safe.  Be sure to go to all follow-up visits with your healthcare provider. And rest after your surgery for as long as your healthcare provider tells you to.    Coping with pain  If you have pain after surgery, pain medicine will help you feel better. Take it as told, before pain becomes severe. Also, ask your healthcare provider or pharmacist about other ways to control pain. This might be with heat, ice, or relaxation. And follow any other instructions your surgeon or nurse gives you.    Tips for taking pain medicine  To get the best relief possible, remember these points:  · Pain medicines can upset your stomach. Taking them with a little food may help.  · Most pain relievers taken by mouth need at least 20 to 30 minutes to start to work.  · Taking medicine on a schedule can help you remember to take it. Try to time your medicine so that you can take it before starting an activity. This might be before you get dressed, go for a walk, or sit down for dinner.  · Constipation is a common side effect of pain medicines. Call your healthcare provider before taking any medicines such as laxatives or stool softeners to help ease constipation. Also ask if you should skip any foods. Drinking lots of fluids and eating foods such as fruits and vegetables that are high in fiber can also help. Remember, do not take laxatives unless your surgeon has prescribed them.    Your healthcare provider may tell you to take acetaminophen to help ease your pain. Ask him or her how much you are supposed to take each day. Acetaminophen or other pain relievers may interact with your prescription medicines or other over-the-counter (OTC) medicines. Some prescription medicines have acetaminophen and  other ingredients. Using both prescription and OTC acetaminophen for pain can cause you to overdose. Read the labels on your OTC medicines with care. This will help you to clearly know the list of ingredients, how much to take, and any warnings. It may also help you not take too much acetaminophen. If you have questions or do not understand the information, ask your pharmacist or healthcare provider to explain it to you before you take the OTC medicine.    Managing nausea  Some people have an upset stomach after surgery. This is often because of anesthesia, pain, or pain medicine, or the stress of surgery. These tips will help you handle nausea and eat healthy foods as you get better. If you were on a special food plan before surgery, ask your healthcare provider if you should follow it while you get better. These tips may help:  · Do not push yourself to eat. Your body will tell you when to eat and how much.  · Start off with clear liquids and soup. They are easier to digest.  · Next try semi-solid foods, such as mashed potatoes, applesauce, and gelatin, as you feel ready.  · Slowly move to solid foods. Don't eat fatty, rich, or spicy foods at first.  · Do not force yourself to have 3 large meals a day. Instead eat smaller amounts more often.  · Take pain medicines with a small amount of solid food, such as crackers or toast, to avoid nausea.     Call your surgeon if  · You still have pain an hour after taking medicine. The medicine may not be strong enough.  · You feel too sleepy, dizzy, or groggy. The medicine may be too strong.  · You have side effects like nausea, vomiting, or skin changes, such as rash, itching, or hives.       If you have obstructive sleep apnea  You were given anesthesia medicine during surgery to keep you comfortable and free of pain. After surgery, you may have more apnea spells because of this medicine and other medicines you were given. The spells may last longer than usual.   At  home:  · Keep using the continuous positive airway pressure (CPAP) device when you sleep. Unless your healthcare provider tells you not to, use it when you sleep, day or night. CPAP is a common device used to treat obstructive sleep apnea.  · Talk with your provider before taking any pain medicine, muscle relaxants, or sedatives. Your provider will tell you about the possible dangers of taking these medicines.  Date Last Reviewed: 12/1/2016  © 7865-5908 [employer]. [address] [place] [address]. All rights reserved. This information is not intended as a substitute for professional medical care. Always follow your healthcare professional's instructions.          PATIENT INSTRUCTIONS  POST-ANESTHESIA    IMMEDIATELY FOLLOWING SURGERY:  Do not drive or operate machinery for the first twenty four hours after surgery.  Do not make any important decisions for twenty four hours after surgery or while taking narcotic pain medications or sedatives.  If you develop intractable nausea and vomiting or a severe headache please notify your doctor immediately.    FOLLOW-UP:  Please make an appointment with your surgeon as instructed. You do not need to follow up with anesthesia unless specifically instructed to do so.    WOUND CARE INSTRUCTIONS (if applicable):  Keep a dry clean dressing on the anesthesia/puncture wound site if there is drainage.  Once the wound has quit draining you may leave it open to air.  Generally you should leave the bandage intact for twenty four hours unless there is drainage.  If the epidural site drains for more than 36-48 hours please call the anesthesia department.    QUESTIONS?:  Please feel free to call your physician or the hospital  if you have any questions, and they will be happy to assist you.       Wound Check After Surgery: Bleeding  Surgery involves cutting through layers of skin, fatty tissue, muscle, and sometimes bone and cartilage. Sutures (stitches) or  staples are used to close all layers of the wound. The sutures on the inside will dissolve in about 2 to 3 weeks. Any sutures or staples used on the outside need to be removed in about 7 to 14 days, depending on the location.  It is normal to have some clear or bloody discharge on the wound covering or bandage (dressing) for the first few days after surgery. If your wound was sutured (sewn) closed, you should not have to change the dressing more than three times a day in the first few days. Bleeding or discharge requiring more frequent dressing changes can be a sign of a problem.    Home care  Different types of surgery require different types of care and dressing changes. It is important to follow all instructions and advice from your surgeon, as well as other members of your healthcare team.    Wound care  · Keep the wound clean, as directed by your healthcare provider.  · Change the dressing as directed. Change the dressing sooner if it becomes wet or stained with blood or fluid from the wound.  · Bathe with a sponge (no shower or tub baths) for the first few days after surgery, or until there is no more drainage from the wound. Unless you received different instructions from your surgeon, you can then shower. Do not soak the area in water (no baths or swimming) until the tape, sutures, or staples are removed and any wound opening has dried out and healed.    Changing the dressing  · Wash your hands before changing the dressings.  · Carefully remove the dressing and tape; don't just yank it off. If it sticks to the wound, you may need to wet it a little to remove it, unless your healthcare provider told you not to wet it.  · Wash your hands again before putting on a new, clean dressing.  · Gently clean the wound with clean water (or saline) using gauze or a clean washcloth. Do not rub it or pick at it.  · Do not use soap, alcohol, hydrogen peroxide, or any other cleanser.  · If you were told to dry the wound  before putting on a new dressing, gently pat it dry. Do not rub.  · Throw out the old dressing. Do not reuse it!  · Wash your hands again when you are done.    Types of dressings  Your healthcare team will tell you what type of dressing to put on your wound. Follow your healthcare team's instructions carefully, and contact them if you have any questions. Two common types of dressings are described below. You may have one of these or another type.  · Dry dressing. Use dry gauze. If the wound is still draining, use a nonadherent dressing, which shouldn't stick to the wound.  · Wet-to-dry dressing. Wet the gauze, and squeeze out the excess water (or saline), before putting it on. Then, cover this with a dry pad.    Medicines  · If you were given antibiotics, take them until they are used up or your healthcare provider tells you to stop. It is important to finish the antibiotics even though you feel better, to make sure the infection has cleared.  · You can take acetaminophen or ibuprofen for pain, unless you were given a different pain medicine to use. (Note: If you have chronic liver or kidney disease, or have ever had a stomach ulcer or gastrointestinal bleeding, or are taking blood thinner medicines, talk with your healthcare provider before using these medicines.)  · Aspirin should never be used in anyone under 18 years of age who is ill with a fever. It may cause severe liver damage.    Follow-up care  Follow up with your healthcare provider, or as advised, for your next wound check or removal of sutures, staples, or tape.  · If a culture was done, you will be notified if the results will affect your treatment. You can call as directed for the results.  · If imaging tests, such as X-rays, an ultrasound, or CT scan were done, they will be reviewed by a specialist. You will be notified of the results, especially if they affect treatment.    Call 911  Call emergency services right away if any of these  occur:  · Trouble breathing or swallowing, wheezing  · Hoarse voice or trouble speaking  · Extreme confusion  · Extreme drowsiness or trouble awakening  · Fainting or loss of consciousness  · Rapid heart rate or very slow heart rate  · Vomiting blood, or large amounts of blood in stool  · Discomfort in the center of the chest that feels like pressure, squeezing, a sense of fullness, or pain  · Discomfort or pain in other upper body areas, such as the back, one or both arms, neck, jaw, or stomach  · Stroke symptoms (spot a stroke FAST)  ¨ F: Face drooping. One side of the face is numb or droops.  ¨ A: Arm weakness. One arm feels weak or numb.  ¨ S: Speech difficulty: Speech is slurred, or the person is unable to speak.  ¨ T: Time to call 911. Even if symptoms go away, call 911.    When to seek medical advice  Call your healthcare provider right away if any of the following occur:  · Fluid or blood soaking 5 or more bandages a day during the first 3 days after surgery  · Fluid or blood still draining from the wound more than 3 days after surgery  · Increasing pain at the site of surgery  · Fever over 100.4º F (38.0º C)  · Redness around the wound  · Pus coming from the wound  · Vomiting, constipation, or diarrhea    Date Last Reviewed: 9/27/2015 © 2000-2017 [employer]. [address] [place] [address]. All rights reserved. This information is not intended as a substitute for professional medical care. Always follow your healthcare professional's instructions.      Wound Check After Surgery, No Complication    It is normal to feel pain at the incision site. The pain decreases as the wound heals. Most of the pain and soreness from the skin incision should go away by the time the sutures or staples are removed. Soreness and pain from deeper tissues may last another week or two.  Pain that continues more than a few weeks after surgery or pain that worsens anytime after surgery can be a sign of a  problem, such as:  · Infection  · Separation of wound edges  · Collection of blood or other below the skin        Date Last Reviewed: 9/27/2015  © 5800-0064 [employer]. [address] [place] [address], [place] [address]. All rights reserved. This information is not intended as a substitute for professional medical care. Always follow your healthcare professional's instructions.

## 2022-01-26 NOTE — INTERVAL H&P NOTE
The patient has been examined and the H&P has been reviewed:    I concur with the findings and no changes have occurred since H&P was written.    Surgery risks, benefits and alternative options discussed and understood by patient/family.          Active Hospital Problems    Diagnosis  POA    *Trigger thumb, right thumb [M65.311]  Unknown      Resolved Hospital Problems   No resolved problems to display.

## 2022-01-26 NOTE — ANESTHESIA POSTPROCEDURE EVALUATION
Anesthesia Post Evaluation    Patient: Ada THOMAS Schmidt    Procedure(s) Performed: Procedure(s) (LRB):  RELEASE, TRIGGER FINGER - right thumb (Right)    Final Anesthesia Type: general      Patient location during evaluation: PACU  Patient participation: Yes- Able to Participate  Level of consciousness: awake and alert and oriented  Post-procedure vital signs: reviewed and stable  Pain management: adequate  Airway patency: patent    PONV status at discharge: No PONV  Anesthetic complications: no      Cardiovascular status: hemodynamically stable  Respiratory status: nasal cannula  Hydration status: euvolemic  Follow-up not needed.          Vitals Value Taken Time   /67 01/26/22 0816   Temp 36.2 °C (97.2 °F) 01/26/22 0840   Pulse 62 01/26/22 0830   Resp 16 01/26/22 0830   SpO2 100 % 01/26/22 0830         Event Time   Out of Recovery 08:34:04         Pain/Dirk Score: Pain Rating Prior to Med Admin: 7 (1/26/2022  6:10 AM)  Dirk Score: 10 (1/26/2022  8:15 AM)

## 2022-01-26 NOTE — BRIEF OP NOTE
[place] - Surgery ([facility])  Brief Operative Note    Surgery Date: 1/26/2022     Surgeon(s) and Role:     * Cristopher Tolliver MD - Primary    Assisting Surgeon: None    Pre-op Diagnosis:  Trigger thumb, right thumb [M65.311]    Post-op Diagnosis:  Post-Op Diagnosis Codes:     * Trigger thumb, right thumb [M65.311]    Procedure(s) (LRB):  RELEASE, TRIGGER FINGER - right thumb (Right)    Anesthesia: Local MAC    Operative Findings: see op note    Estimated Blood Loss: minimal         Specimens:   Specimen (24h ago, onward)            None            Discharge Note    OUTCOME: Patient tolerated treatment/procedure well without complication and is now ready for discharge.    DISPOSITION: Home or Self Care    FINAL DIAGNOSIS:  Trigger thumb, right thumb    FOLLOWUP: In clinic    DISCHARGE INSTRUCTIONS:    Discharge Procedure Orders   Diet general     Call MD for:  temperature >100.4     Call MD for:  persistent nausea and vomiting     Call MD for:  severe uncontrolled pain     Call MD for:  difficulty breathing, headache or visual disturbances     Call MD for:  redness, tenderness, or signs of infection (pain, swelling, redness, odor or green/yellow discharge around incision site)     Call MD for:  hives     Call MD for:  persistent dizziness or light-headedness     Call MD for:  extreme fatigue     Leave dressing on - Keep it clean, dry, and intact until clinic visit     Weight bearing restrictions (specify)   Order Comments: Non weight bearing operative extremity

## 2022-01-26 NOTE — TRANSFER OF CARE
"Anesthesia Transfer of Care Note    Patient: Dejah Schmidt    Procedure(s) Performed: Procedure(s) (LRB):  RELEASE, TRIGGER FINGER - right thumb (Right)    Patient location: PACU    Anesthesia Type: general    Transport from OR: Transported from OR on 6-10 L/min O2 by face mask with adequate spontaneous ventilation    Post pain: adequate analgesia    Post assessment: no apparent anesthetic complications and tolerated procedure well    Post vital signs: stable    Level of consciousness: awake, alert and oriented    Nausea/Vomiting: no nausea/vomiting    Complications: none    Transfer of care protocol was followed      Last vitals:   Visit Vitals  BP (!) 169/79   Pulse 78   Temp 37 °C (98.6 °F) (Oral)   Resp 16   Ht 5' 6.5" (1.689 m)   Wt 131.5 kg (290 lb)   SpO2 99%   Breastfeeding No   BMI 46.11 kg/m²     "

## 2022-01-26 NOTE — OP NOTE
DATE OF PROCEDURE:  01/26/2022     SERVICE:  Orthopedics.     SURGEON:  Cristopher Tolliver M.D.     FIRST ASSISTANT:  SMA Rogerio     PREOPERATIVE DIAGNOSIS:  right trigger thumb     POSTOPERATIVE DIAGNOSIS:  right trigger thumb     PROCEDURE PERFORMED:  A1 pulley release of right thumb.     ANESTHESIA:  Local MAC.     ESTIMATED BLOOD LOSS:  1 mL.     SPECIMENS:  None.     IMPLANTS:  None.     COMPLICATIONS:  None.     INTRAVENOUS FLUIDS:  Crystalloid.     TOURNIQUET TIME:  approx 9 minutes at 250mmHg.     INDICATIONS FOR PROCEDURE:  The patient is a 61-year-old female who   presented to the Orthopedics Clinic complaining of a significantly symptomatic   right trigger thumb.  The risks, benefits and alternatives to surgery were discussed with the patient in detail.  Specific risks discussed include but are not limited to bleeding, infection, vessel and/or nerve damage, pain, numbness, tingling, complex regional pain syndrome, compartment syndrome, failure to return to pre-injury and/or preoperative functional status, scar sensitivity, delayed healing, inability to return to work, pulley injury, tendon injury, bowstringing, partial and/or incomplete relief of symptoms, weakness, persistence of and/or worsening of symptoms, surgical failure, osteomyelitis, amputation, loss of function, stiffness, functional debility, dysfunction, decreased  strength, need for prolonged postoperative rehabilitation, need for further surgery, deep venous thrombosis, pulmonary embolism, arthritis and death.  The patient states an understanding and wishes to proceed with surgery.   All questions were answered.  No guarantees were implied or stated.  Written informed consent was obtained.       PROCEDURE IN DETAIL:  On the date of the operative intervention, the patient was   evaluated in the preoperative holding area.  With their  participation, the operative thumb was marked as the operative site.  The patient was then wheeled to the    Operating Room with the right  upper extremity placed on a hand table.  A   nonsterile tourniquet was placed on the patient's upper arm.  The extremity was prepped and draped in the usual sterile fashion.  A timeout   was taken to confirm the correct patient, site and procedure.  All were in   agreement, so I proceeded.  I utilized 1% plain lidocaine for local anesthesia   in the area overlying the right thumb A1 pulley.  After adequate   analgesia, an Esmarch was utilized to exsanguinate the extremity and   then tourniquet was insufflated to 250 mmHg where it remained for the duration   of the procedure.  I then marked out an approximately 1 cm incision overlying   the patient's A1 pulley of the operative thumb.  This incision was made   sharply with a scalpel and dissection was carried down through the skin and   subcutaneous tissues.  The neurovascular bundles were retracted both radially   and ulnarly and protected for the duration of the procedure.  Dissection was   carried down more deeply to the level of the A1 pulley.    This was identified and exposed and then a scalpel was utilized to enter the   flexor tendon sheath with a small poke hole in the pulley.  At this time,   scissor dissection was then utilized to complete both proximal and distal   division of the A1 pulley in its entirety.  After direct visual confirmation   that the pulley transection was complete, I then utilized a Ragnell retractor to   retract the flexor pollicis longus out of the wound, which did so without any   resistance and the thumb was taken through range of motion and noted to   glide smoothly without any evidence of further constriction.  At this time, the wound was then irrigated copiously   with sterile saline and closed with 4-0 nylon in horizontal mattress fashion.    Sterile dressings were then applied consisting of Xeroform, 4 x 4 gauze, and loose coban.  The tourniquet was then   deflated and brisk capillary refill  ensued throughout the patient's hand,   specifically to the operative thumb.  The patient was then awakened from anesthesia   and returned to the Postanesthesia Care Unit in stable condition.  There were no   complications.  As the attending surgeon, I was present and performed the   critical portion of the procedure.     POSTOPERATIVE PLAN FOR THE PATIENT:  The patient will be discharged home in   stable condition.  The patient will be seen back in clinic in approximately 2 weeks for   suture removal and reevaluation of the postoperative plan.  Range of motion as tolerated without restrictions.  Should there be any stiffness, occupational therapy will be recommended and ordered at that time.

## 2022-02-07 ENCOUNTER — OFFICE VISIT (OUTPATIENT)
Dept: ORTHOPEDICS | Facility: CLINIC | Age: 62
End: 2022-02-07
Payer: MEDICARE

## 2022-02-07 DIAGNOSIS — M65.332 TRIGGER FINGER, LEFT MIDDLE FINGER: ICD-10-CM

## 2022-02-07 DIAGNOSIS — Z98.890 POSTOPERATIVE STATE: ICD-10-CM

## 2022-02-07 DIAGNOSIS — Z01.818 PRE-OP TESTING: ICD-10-CM

## 2022-02-07 DIAGNOSIS — M65.322 TRIGGER FINGER, LEFT INDEX FINGER: ICD-10-CM

## 2022-02-07 DIAGNOSIS — M65.311 TRIGGER THUMB, RIGHT THUMB: Primary | ICD-10-CM

## 2022-02-07 PROCEDURE — 99999 PR PBB SHADOW E&M-EST. PATIENT-LVL III: ICD-10-PCS | Mod: PBBFAC,,, | Performed by: PHYSICIAN ASSISTANT

## 2022-02-07 PROCEDURE — 99214 OFFICE O/P EST MOD 30 MIN: CPT | Mod: S$GLB,,, | Performed by: PHYSICIAN ASSISTANT

## 2022-02-07 PROCEDURE — 1159F PR MEDICATION LIST DOCUMENTED IN MEDICAL RECORD: ICD-10-PCS | Mod: CPTII,S$GLB,, | Performed by: PHYSICIAN ASSISTANT

## 2022-02-07 PROCEDURE — 99214 PR OFFICE/OUTPT VISIT, EST, LEVL IV, 30-39 MIN: ICD-10-PCS | Mod: S$GLB,,, | Performed by: PHYSICIAN ASSISTANT

## 2022-02-07 PROCEDURE — 99999 PR PBB SHADOW E&M-EST. PATIENT-LVL III: CPT | Mod: PBBFAC,,, | Performed by: PHYSICIAN ASSISTANT

## 2022-02-07 PROCEDURE — 1159F MED LIST DOCD IN RCRD: CPT | Mod: CPTII,S$GLB,, | Performed by: PHYSICIAN ASSISTANT

## 2022-02-07 RX ORDER — MUPIROCIN 20 MG/G
OINTMENT TOPICAL
Status: CANCELLED | OUTPATIENT
Start: 2022-02-07

## 2022-02-07 NOTE — H&P (VIEW-ONLY)
[facility]  History & Physical  Orthopedics    SUBJECTIVE:      COVID-19 attestation:  This patient was treated during the COVID-19 pandemic.  This was discussed with the patient, they are aware of our current policies and procedures, were given the option of delaying their visit and or switching to a virtual visit, delaying their surgery when applicable, and they elect to proceed.    Chief Complaint: multiple trigger fingers    Referring Provider: No ref. provider found     History of Present Illness:  Patient is a 61 y.o. right hand dominant female who presents today with complaints of multiple trigger fingers. These started approximately 2 months ago. Reports pulley pain and triggering in right thumb > left index finger > left long finger. She has tried rest, ibuprofen, tylenol and activity modifications. She has not tried splinting or injections. She has history of right ring finger trigger finger release approximately 6 years ago as well as BL carpal tunnel releases.     Interval History 2/7/22: The patient is 2 weeks s/p Right thumb trigger finger release with Dr. Tolliver on 1/26/22. She is doing well, reports minimal pain, is not taking any medications for this. She denies any f/c/s, no numbness/tingling. She is not interested in any therapy. No other concerns regarding the right hand. She does have Left index and long trigger fingers, would like to schedule surgery for this as well.    The patient is a/an on disability .    Onset of symptoms/DOI was 11/2021.    Symptoms are aggravated by activity and movement.    Symptoms are alleviated by rest.    Symptoms consist of pain, swelling and decreased ROM.    The patient rates their pain as a 0/10 pain on the right. 6/10 pain on the left index.    Attempted treatment(s) and/or interventions include activity modifications, rest, anti-inflammatory medications.     The patient denies any fevers, chills, N/V, D/C and presents for evaluation.    Left msg for pt to call back.    Due to COVID-19, patient's post partum appt is scheduled for either phone or virtual video visit on 5/11 @ 10am.     Writer needs to set patient up before visit.            Past Medical History:   Diagnosis Date    Asthma     Colon polyp 2011    Hypercholesteremia     Hypertension     Multiple thyroid nodules     Sleep apnea      Past Surgical History:   Procedure Laterality Date    BACK SURGERY      CARPAL TUNNEL RELEASE      COLONOSCOPY N/A 6/4/2020    Procedure: COLONOSCOPY, with me, 5/20;  Surgeon: Sarah Beth Leavitt MD;  Location: [facility] ENDO (4TH FLR);  Service: Endoscopy;  Laterality: N/A;  covid test 6/2    EPIDURAL STEROID INJECTION N/A 10/27/2021    Procedure: INJECTION, STEROID, EPIDURAL,L5/S1 DIRECT REF/NEED CONSENT;  Surgeon: Clement Aldana MD;  Location: [facility] PAIN MGT;  Service: Pain Management;  Laterality: N/A;    FOOT SURGERY      HYSTERECTOMY      MASS EXCISION      at neck    ROTATOR CUFF REPAIR      left shoulder    TRIGGER FINGER RELEASE  12/2017    right hand    TRIGGER FINGER RELEASE Right 1/26/2022    Procedure: RELEASE, TRIGGER FINGER - right thumb;  Surgeon: Cristopher Tolliver MD;  Location: [facility] OR;  Service: Orthopedics;  Laterality: Right;     Review of patient's allergies indicates:  No Known Allergies  Social History     Social History Narrative    LIVIA cornelius receiving [employer]. No physical activities     Family History   Problem Relation Age of Onset    Cancer Mother     Coronary artery disease Mother     Heart disease Mother     Hypertension Mother     Hyperlipidemia Mother     Hypertension Father     Cancer Father     Heart disease Maternal Grandmother     Hypertension Maternal Grandmother     Breast cancer Maternal Grandmother          Current Outpatient Medications:     ALBUTEROL INHL, , Disp: , Rfl:     amLODIPine (NORVASC) 10 MG tablet, Take 1 tablet (10 mg total) by mouth once daily., Disp: 90 tablet, Rfl: 1    atorvastatin (LIPITOR) 40 MG tablet, Take 1 tablet (40 mg total) by mouth once daily., Disp: 90 tablet, Rfl: 1    cholecalciferol, vitamin D3, 50,000 unit Tab, Take 1 tablet by mouth once a week., Disp: 12  tablet, Rfl: 3    gabapentin (NEURONTIN) 600 MG tablet, Take 1 tablet (600 mg total) by mouth 3 (three) times daily., Disp: 90 tablet, Rfl: 11    lisinopriL (PRINIVIL,ZESTRIL) 40 MG tablet, Take 1 tablet (40 mg total) by mouth once daily., Disp: 90 tablet, Rfl: 1    traMADoL (ULTRAM) 50 mg tablet, Take 1 tablet (50 mg total) by mouth every 6 (six) hours., Disp: 20 tablet, Rfl: 0    triamterene-hydrochlorothiazide 37.5-25 mg (DYAZIDE) 37.5-25 mg per capsule, TAKE ONE CAPSULE BY MOUTH EVERY MORNING., Disp: 90 capsule, Rfl: 1    zolpidem (AMBIEN) 5 MG Tab, Take 1 tablet (5 mg total) by mouth nightly as needed., Disp: 20 tablet, Rfl: 1      Review of Systems:  As per HPI otherwise noncontributory    OBJECTIVE:      Vital Signs (Most Recent):  There were no vitals filed for this visit.  There is no height or weight on file to calculate BMI.      Physical Exam:  Constitutional: The patient appears well-developed and well-nourished. No distress.   Skin: No lesions appreciated  Head: Normocephalic and atraumatic.   Nose: Nose normal.   Ears: No deformities seen  Eyes: Conjunctivae and EOM are normal.   Neck: No tracheal deviation present.   Cardiovascular: Normal rate and intact distal pulses.    Pulmonary/Chest: Effort normal. No respiratory distress.   Abdominal: There is no guarding.   Neurological: The patient is alert.   Psychiatric: The patient has a normal mood and affect.     Bilateral Hand/Wrist Examination:    Observation/Inspection:  Swelling  none    Deformity  none  Discoloration  none     Scars   Scar from BL open carpal tunnel releases, R ring finger trigger finger release    Atrophy  None  Right thumb: healing incision noted, no signs of infection.    HAND/WRIST EXAMINATION:  Finkelstein's Test   Neg  WHAT Test    Neg  Snuff box tenderness   Neg  Lema's Test    Neg  Hook of Hamate Tenderness  Neg  CMC grind    Neg  Circumduction test   Neg  NTTP to the right thumb. She is TTP to the left index A1  pulley, mild tenderness to the left long A1 pulley.    Neurovascular Exam:  Digits WWP, brisk CR < 3s throughout  NVI motor/LTS to M/R/U nerves, radial pulse 2+    ROM hand: full thumb without pain. Full motion of the left long and index, however this is painful. Nodule noted to the index.    ROM wrist full, painless    ROM elbow full, painless    Abdomen not guarded  Respirations nonlabored  Perfusion intact    Diagnostic Results:     Imaging - I independently viewed the patient's imaging as well as the radiology report.  Xrays of the patient's BL hands demonstrate mild degenerative changes. No evidence of any acute fractures or dislocations.     EMG - none    ASSESSMENT/PLAN:      61 y.o. yo female with Right thumb trigger finger release, 2 weeks s/p. Doing well. Left long and index trigger fingers.    Plan:   Right thumb: sutures removed today, wound care and signs of infection discussed. She may have ROM and activity as tolerated. She may RTC on prn basis.    She would like to proceed with Left index and long trigger finger releases. She is consented for Left index and long trigger finger releases with Dr. Tolliver on 3/9/22.    The patient has not responded to adequate non operative treatment at this time and/or non operative treatment is not indicated. Thus, the risks, benefits and alternatives to surgery were discussed with the patient in detail.  Specific risks include but are not limited to bleeding, infection, vessel and/or nerve damage, pain, numbness, tingling, complex regional pain syndrome, compartment syndrome, failure to return to pre-injury and/or preoperative functional status, scar sensitivity, delayed healing, inability to return to work, pulley injury, tendon injury, bowstringing, partial and/or incomplete relief of symptoms, weakness, persistence of and/or worsening of symptoms, surgical failure, osteomyelitis, amputation, loss of function, stiffness, functional debility, dysfunction, decreased   strength, need for prolonged postoperative rehabilitation, need for further surgery, deep venous thrombosis, pulmonary embolism, arthritis and death.  The patient states an understanding and wishes to proceed with surgery.   All questions were answered.  No guarantees were implied or stated.  Written informed consent was obtained.    Should the patient's symptoms worsen, persist, or fail to improve they should return for reevaluation and I would be happy to see them back anytime.      Jamie Russo-DiGeorge PA-C

## 2022-02-07 NOTE — PROGRESS NOTES
[facility]  History & Physical  Orthopedics    SUBJECTIVE:      COVID-19 attestation:  This patient was treated during the COVID-19 pandemic.  This was discussed with the patient, they are aware of our current policies and procedures, were given the option of delaying their visit and or switching to a virtual visit, delaying their surgery when applicable, and they elect to proceed.    Chief Complaint: multiple trigger fingers    Referring Provider: No ref. provider found     History of Present Illness:  Patient is a 61 y.o. right hand dominant female who presents today with complaints of multiple trigger fingers. These started approximately 2 months ago. Reports pulley pain and triggering in right thumb > left index finger > left long finger. She has tried rest, ibuprofen, tylenol and activity modifications. She has not tried splinting or injections. She has history of right ring finger trigger finger release approximately 6 years ago as well as BL carpal tunnel releases.     Interval History 2/7/22: The patient is 2 weeks s/p Right thumb trigger finger release with Dr. Tolliver on 1/26/22. She is doing well, reports minimal pain, is not taking any medications for this. She denies any f/c/s, no numbness/tingling. She is not interested in any therapy. No other concerns regarding the right hand. She does have Left index and long trigger fingers, would like to schedule surgery for this as well.    The patient is a/an on disability .    Onset of symptoms/DOI was 11/2021.    Symptoms are aggravated by activity and movement.    Symptoms are alleviated by rest.    Symptoms consist of pain, swelling and decreased ROM.    The patient rates their pain as a 0/10 pain on the right. 6/10 pain on the left index.    Attempted treatment(s) and/or interventions include activity modifications, rest, anti-inflammatory medications.     The patient denies any fevers, chills, N/V, D/C and presents for evaluation.        Past Medical History:   Diagnosis Date    Asthma     Colon polyp 2011    Hypercholesteremia     Hypertension     Multiple thyroid nodules     Sleep apnea      Past Surgical History:   Procedure Laterality Date    BACK SURGERY      CARPAL TUNNEL RELEASE      COLONOSCOPY N/A 6/4/2020    Procedure: COLONOSCOPY, with me, 5/20;  Surgeon: Sarah Beth Leavitt MD;  Location: [facility] ENDO (4TH FLR);  Service: Endoscopy;  Laterality: N/A;  covid test 6/2    EPIDURAL STEROID INJECTION N/A 10/27/2021    Procedure: INJECTION, STEROID, EPIDURAL,L5/S1 DIRECT REF/NEED CONSENT;  Surgeon: Clement Aldana MD;  Location: [facility] [place] PAIN MGT;  Service: Pain Management;  Laterality: N/A;    FOOT SURGERY      HYSTERECTOMY      MASS EXCISION      at neck    ROTATOR CUFF REPAIR      left shoulder    TRIGGER FINGER RELEASE  12/2017    right hand    TRIGGER FINGER RELEASE Right 1/26/2022    Procedure: RELEASE, TRIGGER FINGER - right thumb;  Surgeon: Cristopher Tolliver MD;  Location: [facility] OR;  Service: Orthopedics;  Laterality: Right;     Review of patient's allergies indicates:  No Known Allergies  Social History     Social History Narrative    LIVIA cornelius receiving [employer]. No physical activities     Family History   Problem Relation Age of Onset    Cancer Mother     Coronary artery disease Mother     Heart disease Mother     Hypertension Mother     Hyperlipidemia Mother     Hypertension Father     Cancer Father     Heart disease Maternal Grandmother     Hypertension Maternal Grandmother     Breast cancer Maternal Grandmother          Current Outpatient Medications:     ALBUTEROL INHL, , Disp: , Rfl:     amLODIPine (NORVASC) 10 MG tablet, Take 1 tablet (10 mg total) by mouth once daily., Disp: 90 tablet, Rfl: 1    atorvastatin (LIPITOR) 40 MG tablet, Take 1 tablet (40 mg total) by mouth once daily., Disp: 90 tablet, Rfl: 1    cholecalciferol, vitamin D3, 50,000 unit Tab, Take 1 tablet by mouth once a week., Disp: 12  tablet, Rfl: 3    gabapentin (NEURONTIN) 600 MG tablet, Take 1 tablet (600 mg total) by mouth 3 (three) times daily., Disp: 90 tablet, Rfl: 11    lisinopriL (PRINIVIL,ZESTRIL) 40 MG tablet, Take 1 tablet (40 mg total) by mouth once daily., Disp: 90 tablet, Rfl: 1    traMADoL (ULTRAM) 50 mg tablet, Take 1 tablet (50 mg total) by mouth every 6 (six) hours., Disp: 20 tablet, Rfl: 0    triamterene-hydrochlorothiazide 37.5-25 mg (DYAZIDE) 37.5-25 mg per capsule, TAKE ONE CAPSULE BY MOUTH EVERY MORNING., Disp: 90 capsule, Rfl: 1    zolpidem (AMBIEN) 5 MG Tab, Take 1 tablet (5 mg total) by mouth nightly as needed., Disp: 20 tablet, Rfl: 1      Review of Systems:  As per HPI otherwise noncontributory    OBJECTIVE:      Vital Signs (Most Recent):  There were no vitals filed for this visit.  There is no height or weight on file to calculate BMI.      Physical Exam:  Constitutional: The patient appears well-developed and well-nourished. No distress.   Skin: No lesions appreciated  Head: Normocephalic and atraumatic.   Nose: Nose normal.   Ears: No deformities seen  Eyes: Conjunctivae and EOM are normal.   Neck: No tracheal deviation present.   Cardiovascular: Normal rate and intact distal pulses.    Pulmonary/Chest: Effort normal. No respiratory distress.   Abdominal: There is no guarding.   Neurological: The patient is alert.   Psychiatric: The patient has a normal mood and affect.     Bilateral Hand/Wrist Examination:    Observation/Inspection:  Swelling  none    Deformity  none  Discoloration  none     Scars   Scar from BL open carpal tunnel releases, R ring finger trigger finger release    Atrophy  None  Right thumb: healing incision noted, no signs of infection.    HAND/WRIST EXAMINATION:  Finkelstein's Test   Neg  WHAT Test    Neg  Snuff box tenderness   Neg  Lema's Test    Neg  Hook of Hamate Tenderness  Neg  CMC grind    Neg  Circumduction test   Neg  NTTP to the right thumb. She is TTP to the left index A1  pulley, mild tenderness to the left long A1 pulley.    Neurovascular Exam:  Digits WWP, brisk CR < 3s throughout  NVI motor/LTS to M/R/U nerves, radial pulse 2+    ROM hand: full thumb without pain. Full motion of the left long and index, however this is painful. Nodule noted to the index.    ROM wrist full, painless    ROM elbow full, painless    Abdomen not guarded  Respirations nonlabored  Perfusion intact    Diagnostic Results:     Imaging - I independently viewed the patient's imaging as well as the radiology report.  Xrays of the patient's BL hands demonstrate mild degenerative changes. No evidence of any acute fractures or dislocations.     EMG - none    ASSESSMENT/PLAN:      61 y.o. yo female with Right thumb trigger finger release, 2 weeks s/p. Doing well. Left long and index trigger fingers.    Plan:   Right thumb: sutures removed today, wound care and signs of infection discussed. She may have ROM and activity as tolerated. She may RTC on prn basis.    She would like to proceed with Left index and long trigger finger releases. She is consented for Left index and long trigger finger releases with Dr. Tolliver on 3/9/22.    The patient has not responded to adequate non operative treatment at this time and/or non operative treatment is not indicated. Thus, the risks, benefits and alternatives to surgery were discussed with the patient in detail.  Specific risks include but are not limited to bleeding, infection, vessel and/or nerve damage, pain, numbness, tingling, complex regional pain syndrome, compartment syndrome, failure to return to pre-injury and/or preoperative functional status, scar sensitivity, delayed healing, inability to return to work, pulley injury, tendon injury, bowstringing, partial and/or incomplete relief of symptoms, weakness, persistence of and/or worsening of symptoms, surgical failure, osteomyelitis, amputation, loss of function, stiffness, functional debility, dysfunction, decreased   strength, need for prolonged postoperative rehabilitation, need for further surgery, deep venous thrombosis, pulmonary embolism, arthritis and death.  The patient states an understanding and wishes to proceed with surgery.   All questions were answered.  No guarantees were implied or stated.  Written informed consent was obtained.    Should the patient's symptoms worsen, persist, or fail to improve they should return for reevaluation and I would be happy to see them back anytime.      Jamie Russo-DiGeorge PA-C

## 2022-02-10 ENCOUNTER — PATIENT MESSAGE (OUTPATIENT)
Dept: ADMINISTRATIVE | Facility: OTHER | Age: 62
End: 2022-02-10
Payer: MEDICARE

## 2022-02-17 NOTE — TELEPHONE ENCOUNTER
No new care gaps identified.  Powered by [employer]. Reference number: [id number].   2/17/2022 1:59:47 PM CST

## 2022-02-18 RX ORDER — GABAPENTIN 600 MG/1
TABLET ORAL
Qty: 90 TABLET | Refills: 4 | Status: SHIPPED | OUTPATIENT
Start: 2022-02-18 | End: 2022-10-06

## 2022-03-06 ENCOUNTER — LAB VISIT (OUTPATIENT)
Dept: PRIMARY CARE CLINIC | Facility: CLINIC | Age: 62
End: 2022-03-06
Payer: MEDICARE

## 2022-03-06 DIAGNOSIS — Z01.818 PRE-OP TESTING: ICD-10-CM

## 2022-03-06 PROCEDURE — U0003 INFECTIOUS AGENT DETECTION BY NUCLEIC ACID (DNA OR RNA); SEVERE ACUTE RESPIRATORY SYNDROME CORONAVIRUS 2 (SARS-COV-2) (CORONAVIRUS DISEASE [COVID-19]), AMPLIFIED PROBE TECHNIQUE, MAKING USE OF HIGH THROUGHPUT TECHNOLOGIES AS DESCRIBED BY CMS-2020-01-R: HCPCS | Performed by: PHYSICIAN ASSISTANT

## 2022-03-06 PROCEDURE — U0005 INFEC AGEN DETEC AMPLI PROBE: HCPCS | Performed by: PHYSICIAN ASSISTANT

## 2022-03-07 LAB
SARS-COV-2 RNA RESP QL NAA+PROBE: NOT DETECTED
SARS-COV-2- CYCLE NUMBER: NORMAL

## 2022-03-08 ENCOUNTER — TELEPHONE (OUTPATIENT)
Dept: ORTHOPEDICS | Facility: CLINIC | Age: 62
End: 2022-03-08
Payer: MEDICARE

## 2022-03-08 ENCOUNTER — ANESTHESIA EVENT (OUTPATIENT)
Dept: SURGERY | Facility: HOSPITAL | Age: 62
End: 2022-03-08
Payer: MEDICARE

## 2022-03-08 RX ORDER — TRAMADOL HYDROCHLORIDE 50 MG/1
50 TABLET ORAL EVERY 6 HOURS
Qty: 42 TABLET | Refills: 0 | Status: SHIPPED | OUTPATIENT
Start: 2022-03-08 | End: 2022-03-08

## 2022-03-08 RX ORDER — TRAMADOL HYDROCHLORIDE 50 MG/1
50 TABLET ORAL EVERY 6 HOURS
Qty: 42 TABLET | Refills: 0 | Status: SHIPPED | OUTPATIENT
Start: 2022-03-08 | End: 2022-03-09

## 2022-03-08 NOTE — TELEPHONE ENCOUNTER
Spoke with the patient. Notified of 530 AM arrival time to the [facility].  Informed of current visitor policy.  Reminded of NPO and need for transportation. Patient verbalized understanding to all.    Leticia Beatty MS, OTC  Clinical Assistant to Dr. Ross Dunbar [facility]    
Not currently in exacerbation  Continue with duoneb PRN and daily spiriva

## 2022-03-09 ENCOUNTER — PATIENT MESSAGE (OUTPATIENT)
Dept: SURGERY | Facility: HOSPITAL | Age: 62
End: 2022-03-09
Payer: MEDICARE

## 2022-03-09 ENCOUNTER — HOSPITAL ENCOUNTER (OUTPATIENT)
Facility: HOSPITAL | Age: 62
Discharge: HOME | End: 2022-03-09
Attending: ORTHOPAEDIC SURGERY | Admitting: ORTHOPAEDIC SURGERY
Payer: MEDICARE

## 2022-03-09 ENCOUNTER — ANESTHESIA (OUTPATIENT)
Dept: SURGERY | Facility: HOSPITAL | Age: 62
End: 2022-03-09
Payer: MEDICARE

## 2022-03-09 VITALS
WEIGHT: 290 LBS | SYSTOLIC BLOOD PRESSURE: 142 MMHG | BODY MASS INDEX: 46.61 KG/M2 | RESPIRATION RATE: 13 BRPM | TEMPERATURE: 98 F | DIASTOLIC BLOOD PRESSURE: 71 MMHG | HEART RATE: 67 BPM | HEIGHT: 66 IN | OXYGEN SATURATION: 99 %

## 2022-03-09 DIAGNOSIS — M65.332 TRIGGER FINGER, LEFT MIDDLE FINGER: ICD-10-CM

## 2022-03-09 DIAGNOSIS — M65.332 TRIGGER FINGER, LEFT MIDDLE FINGER: Primary | ICD-10-CM

## 2022-03-09 DIAGNOSIS — M65.311 TRIGGER THUMB, RIGHT THUMB: ICD-10-CM

## 2022-03-09 DIAGNOSIS — M65.322 TRIGGER FINGER, LEFT INDEX FINGER: ICD-10-CM

## 2022-03-09 PROCEDURE — 37000008 HC ANESTHESIA 1ST 15 MINUTES: Performed by: ORTHOPAEDIC SURGERY

## 2022-03-09 PROCEDURE — 25000003 PHARM REV CODE 250: Performed by: SURGERY

## 2022-03-09 PROCEDURE — 25000003 PHARM REV CODE 250: Performed by: NURSE ANESTHETIST, CERTIFIED REGISTERED

## 2022-03-09 PROCEDURE — D9220A PRA ANESTHESIA: Mod: CRNA,,, | Performed by: NURSE ANESTHETIST, CERTIFIED REGISTERED

## 2022-03-09 PROCEDURE — D9220A PRA ANESTHESIA: ICD-10-PCS | Mod: ANES,,, | Performed by: ANESTHESIOLOGY

## 2022-03-09 PROCEDURE — 37000009 HC ANESTHESIA EA ADD 15 MINS: Performed by: ORTHOPAEDIC SURGERY

## 2022-03-09 PROCEDURE — 99900035 HC TECH TIME PER 15 MIN (STAT)

## 2022-03-09 PROCEDURE — D9220A PRA ANESTHESIA: ICD-10-PCS | Mod: CRNA,,, | Performed by: NURSE ANESTHETIST, CERTIFIED REGISTERED

## 2022-03-09 PROCEDURE — 94761 N-INVAS EAR/PLS OXIMETRY MLT: CPT

## 2022-03-09 PROCEDURE — 71000015 HC POSTOP RECOV 1ST HR: Performed by: ORTHOPAEDIC SURGERY

## 2022-03-09 PROCEDURE — 25000003 PHARM REV CODE 250: Performed by: ORTHOPAEDIC SURGERY

## 2022-03-09 PROCEDURE — D9220A PRA ANESTHESIA: Mod: ANES,,, | Performed by: ANESTHESIOLOGY

## 2022-03-09 PROCEDURE — 71000033 HC RECOVERY, INTIAL HOUR: Performed by: ORTHOPAEDIC SURGERY

## 2022-03-09 PROCEDURE — 27201423 OPTIME MED/SURG SUP & DEVICES STERILE SUPPLY: Performed by: ORTHOPAEDIC SURGERY

## 2022-03-09 PROCEDURE — 63600175 PHARM REV CODE 636 W HCPCS: Performed by: PHYSICIAN ASSISTANT

## 2022-03-09 PROCEDURE — 36000707: Performed by: ORTHOPAEDIC SURGERY

## 2022-03-09 PROCEDURE — 26055 INCISE FINGER TENDON SHEATH: CPT | Mod: 79,F1,, | Performed by: ORTHOPAEDIC SURGERY

## 2022-03-09 PROCEDURE — 36000706: Performed by: ORTHOPAEDIC SURGERY

## 2022-03-09 PROCEDURE — 26055 PR INCISE FINGER TENDON SHEATH: ICD-10-PCS | Mod: 79,51,F2, | Performed by: ORTHOPAEDIC SURGERY

## 2022-03-09 PROCEDURE — 63600175 PHARM REV CODE 636 W HCPCS: Performed by: NURSE ANESTHETIST, CERTIFIED REGISTERED

## 2022-03-09 PROCEDURE — 25000003 PHARM REV CODE 250: Performed by: PHYSICIAN ASSISTANT

## 2022-03-09 RX ORDER — DEXMEDETOMIDINE HYDROCHLORIDE 100 UG/ML
INJECTION, SOLUTION INTRAVENOUS
Status: DISCONTINUED | OUTPATIENT
Start: 2022-03-09 | End: 2022-03-09

## 2022-03-09 RX ORDER — MIDAZOLAM HYDROCHLORIDE 1 MG/ML
INJECTION, SOLUTION INTRAMUSCULAR; INTRAVENOUS
Status: DISCONTINUED | OUTPATIENT
Start: 2022-03-09 | End: 2022-03-09

## 2022-03-09 RX ORDER — ONDANSETRON 2 MG/ML
INJECTION INTRAMUSCULAR; INTRAVENOUS
Status: DISCONTINUED | OUTPATIENT
Start: 2022-03-09 | End: 2022-03-09

## 2022-03-09 RX ORDER — LIDOCAINE HYDROCHLORIDE 10 MG/ML
1 INJECTION, SOLUTION EPIDURAL; INFILTRATION; INTRACAUDAL; PERINEURAL ONCE
Status: DISCONTINUED | OUTPATIENT
Start: 2022-03-09 | End: 2022-03-09 | Stop reason: HOSPADM

## 2022-03-09 RX ORDER — FENTANYL CITRATE 50 UG/ML
INJECTION, SOLUTION INTRAMUSCULAR; INTRAVENOUS
Status: DISCONTINUED | OUTPATIENT
Start: 2022-03-09 | End: 2022-03-09

## 2022-03-09 RX ORDER — PROPOFOL 10 MG/ML
VIAL (ML) INTRAVENOUS CONTINUOUS PRN
Status: DISCONTINUED | OUTPATIENT
Start: 2022-03-09 | End: 2022-03-09

## 2022-03-09 RX ORDER — FAMOTIDINE 10 MG/ML
INJECTION INTRAVENOUS
Status: DISCONTINUED | OUTPATIENT
Start: 2022-03-09 | End: 2022-03-09

## 2022-03-09 RX ORDER — ONDANSETRON 2 MG/ML
4 INJECTION INTRAMUSCULAR; INTRAVENOUS EVERY 12 HOURS PRN
Status: DISCONTINUED | OUTPATIENT
Start: 2022-03-09 | End: 2022-03-09 | Stop reason: HOSPADM

## 2022-03-09 RX ORDER — DEXAMETHASONE SODIUM PHOSPHATE 4 MG/ML
INJECTION, SOLUTION INTRA-ARTICULAR; INTRALESIONAL; INTRAMUSCULAR; INTRAVENOUS; SOFT TISSUE
Status: DISCONTINUED | OUTPATIENT
Start: 2022-03-09 | End: 2022-03-09

## 2022-03-09 RX ORDER — SODIUM CHLORIDE 0.9 % (FLUSH) 0.9 %
3 SYRINGE (ML) INJECTION
Status: DISCONTINUED | OUTPATIENT
Start: 2022-03-09 | End: 2022-03-09 | Stop reason: HOSPADM

## 2022-03-09 RX ORDER — CEFAZOLIN SODIUM/WATER 2 G/20 ML
2 SYRINGE (ML) INTRAVENOUS
Status: COMPLETED | OUTPATIENT
Start: 2022-03-09 | End: 2022-03-09

## 2022-03-09 RX ORDER — HYDROCODONE BITARTRATE AND ACETAMINOPHEN 7.5; 325 MG/1; MG/1
1 TABLET ORAL EVERY 6 HOURS PRN
Qty: 28 TABLET | Refills: 0 | Status: SHIPPED | OUTPATIENT
Start: 2022-03-09 | End: 2022-04-01

## 2022-03-09 RX ORDER — CELECOXIB 200 MG/1
400 CAPSULE ORAL ONCE
Status: DISCONTINUED | OUTPATIENT
Start: 2022-03-09 | End: 2022-03-09 | Stop reason: HOSPADM

## 2022-03-09 RX ORDER — PHENYLEPHRINE HYDROCHLORIDE 10 MG/ML
INJECTION INTRAVENOUS
Status: DISCONTINUED | OUTPATIENT
Start: 2022-03-09 | End: 2022-03-09

## 2022-03-09 RX ORDER — HYDROCODONE BITARTRATE AND ACETAMINOPHEN 10; 325 MG/1; MG/1
1 TABLET ORAL EVERY 4 HOURS PRN
Status: DISCONTINUED | OUTPATIENT
Start: 2022-03-09 | End: 2022-03-09 | Stop reason: HOSPADM

## 2022-03-09 RX ORDER — LIDOCAINE HYDROCHLORIDE 20 MG/ML
INJECTION, SOLUTION EPIDURAL; INFILTRATION; INTRACAUDAL; PERINEURAL
Status: DISCONTINUED | OUTPATIENT
Start: 2022-03-09 | End: 2022-03-09

## 2022-03-09 RX ORDER — HYDROCODONE BITARTRATE AND ACETAMINOPHEN 5; 325 MG/1; MG/1
1 TABLET ORAL EVERY 4 HOURS PRN
Status: DISCONTINUED | OUTPATIENT
Start: 2022-03-09 | End: 2022-03-09 | Stop reason: HOSPADM

## 2022-03-09 RX ORDER — MUPIROCIN 20 MG/G
OINTMENT TOPICAL
Status: DISCONTINUED | OUTPATIENT
Start: 2022-03-09 | End: 2022-03-09 | Stop reason: HOSPADM

## 2022-03-09 RX ORDER — SODIUM CHLORIDE 0.9 % (FLUSH) 0.9 %
5 SYRINGE (ML) INJECTION
Status: DISCONTINUED | OUTPATIENT
Start: 2022-03-09 | End: 2022-03-09 | Stop reason: HOSPADM

## 2022-03-09 RX ORDER — TRAMADOL HYDROCHLORIDE 50 MG/1
50 TABLET ORAL EVERY 6 HOURS
Qty: 28 TABLET | Refills: 0 | OUTPATIENT
Start: 2022-03-09

## 2022-03-09 RX ORDER — LIDOCAINE HYDROCHLORIDE 10 MG/ML
INJECTION INFILTRATION; PERINEURAL
Status: DISCONTINUED | OUTPATIENT
Start: 2022-03-09 | End: 2022-03-09 | Stop reason: HOSPADM

## 2022-03-09 RX ORDER — PROPOFOL 10 MG/ML
VIAL (ML) INTRAVENOUS
Status: DISCONTINUED | OUTPATIENT
Start: 2022-03-09 | End: 2022-03-09

## 2022-03-09 RX ORDER — ACETAMINOPHEN 500 MG
1000 TABLET ORAL
Status: COMPLETED | OUTPATIENT
Start: 2022-03-09 | End: 2022-03-09

## 2022-03-09 RX ADMIN — FENTANYL CITRATE 25 MCG: 50 INJECTION INTRAMUSCULAR; INTRAVENOUS at 07:03

## 2022-03-09 RX ADMIN — LIDOCAINE HYDROCHLORIDE 50 MG: 20 INJECTION, SOLUTION EPIDURAL; INFILTRATION; INTRACAUDAL at 07:03

## 2022-03-09 RX ADMIN — DEXAMETHASONE SODIUM PHOSPHATE 4 MG: 4 INJECTION INTRA-ARTICULAR; INTRALESIONAL; INTRAMUSCULAR; INTRAVENOUS; SOFT TISSUE at 07:03

## 2022-03-09 RX ADMIN — Medication 2 G: at 07:03

## 2022-03-09 RX ADMIN — DEXMEDETOMIDINE HYDROCHLORIDE 4 MCG: 100 INJECTION, SOLUTION INTRAVENOUS at 07:03

## 2022-03-09 RX ADMIN — SODIUM CHLORIDE: 9 INJECTION, SOLUTION INTRAVENOUS at 07:03

## 2022-03-09 RX ADMIN — ONDANSETRON 4 MG: 2 INJECTION INTRAMUSCULAR; INTRAVENOUS at 07:03

## 2022-03-09 RX ADMIN — FAMOTIDINE 20 MG: 10 INJECTION, SOLUTION INTRAVENOUS at 07:03

## 2022-03-09 RX ADMIN — MUPIROCIN: 20 OINTMENT TOPICAL at 06:03

## 2022-03-09 RX ADMIN — PHENYLEPHRINE HYDROCHLORIDE 100 MCG: 10 INJECTION INTRAVENOUS at 08:03

## 2022-03-09 RX ADMIN — ACETAMINOPHEN 1000 MG: 500 TABLET ORAL at 06:03

## 2022-03-09 RX ADMIN — PROPOFOL 30 MG: 10 INJECTION, EMULSION INTRAVENOUS at 07:03

## 2022-03-09 RX ADMIN — FENTANYL CITRATE 50 MCG: 50 INJECTION INTRAMUSCULAR; INTRAVENOUS at 07:03

## 2022-03-09 RX ADMIN — MIDAZOLAM 2 MG: 1 INJECTION INTRAMUSCULAR; INTRAVENOUS at 07:03

## 2022-03-09 RX ADMIN — PROPOFOL 125 MCG/KG/MIN: 10 INJECTION, EMULSION INTRAVENOUS at 07:03

## 2022-03-09 NOTE — OP NOTE
DATE OF PROCEDURE:  03/09/2022     SERVICE:  Orthopedics.     SURGEON:  Cristopher Tolliver M.D.     FIRST ASSISTANT:  MD ANA Gutierres     PREOPERATIVE DIAGNOSIS:    1) left index finger trigger finger.  2) left long finger trigger finger     POSTOPERATIVE DIAGNOSIS:    1) left index finger trigger finger.  2) left long finger trigger finger     PROCEDURE PERFORMED:    1) A1 pulley release of left index finger.  2) A1 pulley release of left long finger.     ANESTHESIA:  Local MAC.     ESTIMATED BLOOD LOSS:  3 mL.     SPECIMENS:  None.     IMPLANTS:  None.     COMPLICATIONS:  None.     INTRAVENOUS FLUIDS:  Crystalloid.     TOURNIQUET TIME:  13 minutes at 250mmHg.     INDICATIONS FOR PROCEDURE:  The patient is a 61-year-old female who   presented to the Orthopedics Clinic complaining of significantly symptomatic   Trigger fingers of the left index and long fingers.  The risks, benefits and alternatives to surgery were discussed with the patient in detail.  Specific risks discussed include but are not limited to bleeding, infection, vessel and/or nerve damage, pain, numbness, tingling, complex regional pain syndrome, compartment syndrome, failure to return to pre-injury and/or preoperative functional status, scar sensitivity, delayed healing, inability to return to work, pulley injury, tendon injury, bowstringing, partial and/or incomplete relief of symptoms, weakness, persistence of and/or worsening of symptoms, surgical failure, osteomyelitis, amputation, loss of function, stiffness, functional debility, dysfunction, decreased  strength, need for prolonged postoperative rehabilitation, need for further surgery, deep venous thrombosis, pulmonary embolism, arthritis and death.  The patient states an understanding and wishes to proceed with surgery.   All questions were answered.  No guarantees were implied or stated.  Written informed consent was obtained.       PROCEDURE IN DETAIL:  On the date of the operative  intervention, the patient was   evaluated in the preoperative holding area.  With their  participation, the operative digits were marked as the operative sites.  The patient was then wheeled to the   Operating Room with the left  upper extremity placed on a hand table.  A   nonsterile tourniquet was placed on the patient's upper arm.  The extremity was prepped and draped in the usual sterile fashion.  A timeout   was taken to confirm the correct patient, site and procedure.  All were in   agreement, so I proceeded.  I first utilized 1% plain lidocaine for local anesthesia   in the area overlying the left index and left long finger A1 pulleys.  After adequate   analgesia, an Esmarch was utilized to exsanguinate the extremity and   then tourniquet was insufflated to 250 mmHg where it remained for the duration   of the procedure.  I first marked out an approximately 1 cm incision overlying   the patient's A1 pulley of the left index finger.  This incision was made   sharply with a scalpel and dissection was carried down through the skin and   subcutaneous tissues.  The neurovascular bundles were retracted both radially   and ulnarly and protected for the duration of the procedure.  Dissection was   carried down more deeply to the level of the A1 pulley.    This was identified and exposed and then a scalpel was utilized to enter the   flexor tendon sheath with a small poke hole in the pulley.  At this time,   scissor dissection was then utilized to complete both proximal and distal   division of the A1 pulley in its entirety.  After direct visual confirmation   that the pulley transection was complete, I then utilized a Ragnell retractor to   retract the flexor tendons out of the wound, which they did so without any   resistance and the fingers were taken through range of motion and were noted to   glide smoothly without any evidence of further constriction.  At this time, the wound was then irrigated copiously   with  sterile saline and closed with 4-0 nylon in horizontal mattress fashion.      Having completed the prior A1 pulley release, attention was next turned toward A1 pulley release of the left long finger.   I then marked out an approximately 1 cm incision overlying   the patient's A1 pulley.  This incision was made   sharply with a scalpel and dissection was carried down through the skin and   subcutaneous tissues.  The neurovascular bundles were retracted both radially   and ulnarly and protected for the duration of the procedure.  Dissection was   carried down more deeply to the level of the A1 pulley.    This was identified and exposed and then a scalpel was utilized to enter the   flexor tendon sheath with a small poke hole in the pulley.  At this time,   scissor dissection was then utilized to complete both proximal and distal   division of the A1 pulley in its entirety.  After direct visual confirmation   that the pulley transection was complete, I then utilized a Ragnell retractor to   retract the flexor tendons out of the wound, which they did so without any   resistance and the fingers were taken through range of motion and were noted to   glide smoothly without any evidence of further constriction.   At this time, the wound was then irrigated copiously   with sterile saline and closed with 4-0 nylon in horizontal mattress fashion.        Sterile dressings were then applied consisting of Xeroform, 4 x 4 gauze, and loose coban.  The tourniquet was then   deflated and brisk capillary refill ensued throughout the patient's hand.  The patient was then awakened from anesthesia   and returned to the Postanesthesia Care Unit in stable condition.  There were no   complications.  As the attending surgeon, I was present and performed the   critical portion of the procedure.     POSTOPERATIVE PLAN FOR THE PATIENT:  The patient will be discharged home in   stable condition.  I will reevaluate the patient in clinic in  approximately 2 weeks for   suture removal and reevaluation of the postoperative plan.  Range of motion will be as tolerated and unrestricted.  Should the patient develop any stiffness, occupational therapy will be recommended and ordered at the 2 week postop visit.

## 2022-03-09 NOTE — PLAN OF CARE
Pre op complete. No family present. Call daughter in law for ride home. Call light in reach. Pt resting comfortably.

## 2022-03-09 NOTE — TRANSFER OF CARE
"Anesthesia Transfer of Care Note    Patient: Dejah Schmidt    Procedure(s) Performed: Procedure(s) (LRB):  RELEASE, TRIGGER FINGER - LEFT index and long (Left)    Patient location: PACU    Anesthesia Type: general    Transport from OR: Transported from OR on 6-10 L/min O2 by face mask with adequate spontaneous ventilation    Post pain: adequate analgesia    Post assessment: no apparent anesthetic complications and tolerated procedure well    Post vital signs: stable    Level of consciousness: awake    Nausea/Vomiting: no nausea/vomiting    Complications: none    Transfer of care protocol was followed      Last vitals:   Visit Vitals  /70 (BP Location: Right arm, Patient Position: Lying)   Pulse 77   Temp 37 °C (98.6 °F) (Oral)   Resp 18   Ht 5' 6" (1.676 m)   Wt 131.5 kg (290 lb)   SpO2 100%   Breastfeeding No   BMI 46.81 kg/m²     "

## 2022-03-09 NOTE — ANESTHESIA POSTPROCEDURE EVALUATION
Anesthesia Post Evaluation    Patient: Ada THOMAS Schmidt    Procedure(s) Performed: Procedure(s) (LRB):  RELEASE, TRIGGER FINGER - LEFT index and long (Left)    Final Anesthesia Type: general      Patient location during evaluation: PACU  Patient participation: Yes- Able to Participate  Level of consciousness: awake and alert  Post-procedure vital signs: reviewed and stable  Pain management: adequate  Airway patency: patent    PONV status at discharge: No PONV  Anesthetic complications: no      Cardiovascular status: blood pressure returned to baseline  Respiratory status: unassisted  Hydration status: euvolemic  Follow-up not needed.          Vitals Value Taken Time   /71 03/09/22 0831   Temp 36.5 °C (97.7 °F) 03/09/22 0832   Pulse 73 03/09/22 0840   Resp 12 03/09/22 0839   SpO2 98 % 03/09/22 0840   Vitals shown include unvalidated device data.      Event Time   Out of Recovery 08:41:00         Pain/Dirk Score: Pain Rating Prior to Med Admin: 4 (3/9/2022  6:11 AM)  Dirk Score: 10 (3/9/2022  8:32 AM)

## 2022-03-09 NOTE — BRIEF OP NOTE
[facility] - Surgery ([facility])  Brief Operative Note    Surgery Date: 3/9/2022     Surgeon(s) and Role:     * Cristopher Tolliver MD - Primary    Assisting Surgeon: None    Pre-op Diagnosis:  Trigger finger, left index finger [M65.322]  Trigger finger, left middle finger [M65.332]    Post-op Diagnosis:  Post-Op Diagnosis Codes:     * Trigger finger, left index finger [M65.322]     * Trigger finger, left middle finger [M65.332]    Procedure(s) (LRB):  RELEASE, TRIGGER FINGER - LEFT index and long (Left)    Anesthesia: Local MAC    Operative Findings: see op note    Estimated Blood Loss: see op note         Specimens:   Specimen (24h ago, onward)            None            Discharge Note    OUTCOME: Patient tolerated treatment/procedure well without complication and is now ready for discharge.    DISPOSITION: Home or Self Care    FINAL DIAGNOSIS:  Trigger finger, left middle finger    FOLLOWUP: In clinic    DISCHARGE INSTRUCTIONS:   No lifting more than 1-2lbs with operative hand  Do not remove surgical dressing  Call clinic with any questions or concerns

## 2022-03-09 NOTE — PATIENT INSTRUCTIONS
No lifting more than 1-2lbs with operative hand  Do not remove surgical dressing  Call clinic with any questions or concerns

## 2022-03-09 NOTE — PLAN OF CARE
VSS. Pt able to tolerate oral liquids. Pt denies c/o pain. Polar care and dressing intact. Daughter in law aware prescription sent to pts pharmacy, verbalized understanding. No distress noted. Pt states she is ready for D/C. D/C instructions reviewed with pt and daughter in law, verbalized understanding.

## 2022-03-09 NOTE — ANESTHESIA PREPROCEDURE EVALUATION
03/09/2022  Dejah Schmidt is a 61 y.o., female.    Pre-operative evaluation for Procedure(s) (LRB):  RELEASE, TRIGGER FINGER - LEFT index and long (Left)    Had trigger finger on other hand done 1/26/22 - no problems       Patient Active Problem List   Diagnosis    Unspecified essential hypertension    Multinodular goiter    Morbid obesity    Vitamin D deficiency disease    Chronic back pain    Lumbar radiculopathy    Facet arthropathy, lumbar    Facet hypertrophy of lumbar region    Hand pain    Herniation of lumbar intervertebral disc with radiculopathy    Spinal stenosis of lumbar region with neurogenic claudication    Chronic pain    Chronic kidney disease, stage 3a    Trigger thumb, right thumb       Review of patient's allergies indicates:  No Known Allergies    No current facility-administered medications on file prior to encounter.     Current Outpatient Medications on File Prior to Encounter   Medication Sig Dispense Refill    amLODIPine (NORVASC) 10 MG tablet Take 1 tablet (10 mg total) by mouth once daily. 90 tablet 1    atorvastatin (LIPITOR) 40 MG tablet Take 1 tablet (40 mg total) by mouth once daily. 90 tablet 1    cholecalciferol, vitamin D3, 50,000 unit Tab Take 1 tablet by mouth once a week. 12 tablet 3    lisinopriL (PRINIVIL,ZESTRIL) 40 MG tablet Take 1 tablet (40 mg total) by mouth once daily. 90 tablet 1    triamterene-hydrochlorothiazide 37.5-25 mg (DYAZIDE) 37.5-25 mg per capsule TAKE ONE CAPSULE BY MOUTH EVERY MORNING. 90 capsule 1    zolpidem (AMBIEN) 5 MG Tab Take 1 tablet (5 mg total) by mouth nightly as needed. 20 tablet 1    ALBUTEROL INHL          Past Surgical History:   Procedure Laterality Date    BACK SURGERY      CARPAL TUNNEL RELEASE      COLONOSCOPY N/A 6/4/2020    Procedure: COLONOSCOPY, with me, 5/20;  Surgeon: Sarah Beth Leavitt MD;  Location:  [facility] (4TH FLR);  Service: Endoscopy;  Laterality: N/A;  covid test 6/2    EPIDURAL STEROID INJECTION N/A 10/27/2021    Procedure: INJECTION, STEROID, EPIDURAL,L5/S1 DIRECT REF/NEED CONSENT;  Surgeon: Clement Aldana MD;  Location: [facility];  Service: Pain Management;  Laterality: N/A;    FOOT SURGERY      HYSTERECTOMY      MASS EXCISION      at neck    ROTATOR CUFF REPAIR      left shoulder    TRIGGER FINGER RELEASE  12/2017    right hand    TRIGGER FINGER RELEASE Right 1/26/2022    Procedure: RELEASE, TRIGGER FINGER - right thumb;  Surgeon: Cristopher Tolliver MD;  Location: [facility] OR;  Service: Orthopedics;  Laterality: Right;         CBC:  Lab Results   Component Value Date    WBC 4.07 12/03/2021    RBC 4.73 12/03/2021    HGB 14.0 12/03/2021    HCT 44.8 12/03/2021     12/03/2021    MCV 95 12/03/2021    MCH 29.6 12/03/2021    MCHC 31.3 (L) 12/03/2021       CMP:   Lab Results   Component Value Date     12/03/2021    K 4.7 12/03/2021     12/03/2021    CO2 28 12/03/2021    BUN 20 12/03/2021    CREATININE 1.2 12/03/2021     12/03/2021    CALCIUM 9.7 12/03/2021    ALBUMIN 3.9 12/03/2021    PROT 8.0 12/03/2021    ALKPHOS 77 12/03/2021    ALT 23 12/03/2021    AST 20 12/03/2021    BILITOT 0.8 12/03/2021       INR:  No results found for: PT, INR, PROTIME, APTT      Diagnostic Studies:      EKG:   Results for orders placed or performed during the hospital encounter of 02/28/20   EKG 12-lead    Collection Time: 02/28/20  6:52 PM    Narrative    Test Reason : E87.5,    Vent. Rate : 071 BPM     Atrial Rate : 071 BPM     P-R Int : 148 ms          QRS Dur : 104 ms      QT Int : 394 ms       P-R-T Axes : 050 -24 017 degrees     QTc Int : 428 ms    Normal sinus rhythm  Normal ECG  When compared with ECG of 15-NILO-2017 14:53,  No significant change was found  Confirmed by Aayush Galeas MD ([id number]) on 2/29/2020 7:09:53 PM    Referred By: [email or web address]   SELF           Confirmed By:Aayush Galeas MD     "    2D Echo:  No results found for this or any previous visit.    Stress Test:   No results found for this or any previous visit.      Pre-op Vitals [03/09/22 0552]   BP Pulse Resp Temp SpO2   127/70 77 18 37 °C (98.6 °F) 100 %      Height Weight BMI (Calculated)     5' 6" 290 lb 46.8       Pre-op Assessment    I have reviewed the Patient Summary Reports.     I have reviewed the Nursing Notes. I have reviewed the NPO Status.      Review of Systems  Social:  No Alcohol Use    Cardiovascular:   Exercise tolerance: good Hypertension    Pulmonary:   Sleep Apnea    Renal/:   Chronic Renal Disease, CRI        Physical Exam  General: Well nourished    Airway:  Mallampati: II   Mouth Opening: Normal  TM Distance: > 6 cm  Tongue: Normal  Neck ROM: Normal ROM    Dental:  Edentulous    Chest/Lungs:  Clear to auscultation    Heart:  Rate: Normal        Anesthesia Plan  Type of Anesthesia, risks & benefits discussed:    Anesthesia Type: Gen Natural Airway  Intra-op Monitoring Plan: Standard ASA Monitors  Post Op Pain Control Plan: multimodal analgesia and IV/PO Opioids PRN  Informed Consent: Informed consent signed with the Patient and all parties understand the risks and agree with anesthesia plan.  All questions answered.   ASA Score: 3    Ready For Surgery From Anesthesia Perspective.     .      "

## 2022-03-14 ENCOUNTER — PATIENT MESSAGE (OUTPATIENT)
Dept: NEUROLOGY | Facility: CLINIC | Age: 62
End: 2022-03-14
Payer: MEDICARE

## 2022-03-14 ENCOUNTER — PATIENT MESSAGE (OUTPATIENT)
Dept: ADMINISTRATIVE | Facility: OTHER | Age: 62
End: 2022-03-14
Payer: MEDICARE

## 2022-03-15 RX ORDER — CYCLOBENZAPRINE HCL 5 MG
5 TABLET ORAL 3 TIMES DAILY PRN
Qty: 30 TABLET | Refills: 0 | Status: SHIPPED | OUTPATIENT
Start: 2022-03-15 | End: 2024-03-25

## 2022-03-15 RX ORDER — BUTALBITAL, ACETAMINOPHEN AND CAFFEINE 50; 325; 40 MG/1; MG/1; MG/1
1 TABLET ORAL EVERY 6 HOURS PRN
Qty: 12 TABLET | Refills: 3 | Status: SHIPPED | OUTPATIENT
Start: 2022-03-15 | End: 2022-04-14

## 2022-03-15 RX ORDER — CYCLOBENZAPRINE HCL 5 MG
5 TABLET ORAL 3 TIMES DAILY PRN
COMMUNITY
Start: 2021-12-06 | End: 2022-03-15 | Stop reason: SDUPTHER

## 2022-03-22 ENCOUNTER — OFFICE VISIT (OUTPATIENT)
Dept: ORTHOPEDICS | Facility: CLINIC | Age: 62
End: 2022-03-22
Payer: MEDICARE

## 2022-03-22 VITALS — HEIGHT: 67 IN | BODY MASS INDEX: 45.99 KG/M2 | WEIGHT: 293 LBS

## 2022-03-22 DIAGNOSIS — M65.322 TRIGGER FINGER, LEFT INDEX FINGER: Primary | ICD-10-CM

## 2022-03-22 DIAGNOSIS — Z98.890 POSTOPERATIVE STATE: ICD-10-CM

## 2022-03-22 DIAGNOSIS — M65.332 TRIGGER FINGER, LEFT MIDDLE FINGER: ICD-10-CM

## 2022-03-22 PROCEDURE — 99024 PR POST-OP FOLLOW-UP VISIT: ICD-10-PCS | Mod: S$GLB,,, | Performed by: PHYSICIAN ASSISTANT

## 2022-03-22 PROCEDURE — 4010F PR ACE/ARB THEARPY RXD/TAKEN: ICD-10-PCS | Mod: CPTII,S$GLB,, | Performed by: PHYSICIAN ASSISTANT

## 2022-03-22 PROCEDURE — 99024 POSTOP FOLLOW-UP VISIT: CPT | Mod: S$GLB,,, | Performed by: PHYSICIAN ASSISTANT

## 2022-03-22 PROCEDURE — 99999 PR PBB SHADOW E&M-EST. PATIENT-LVL III: ICD-10-PCS | Mod: PBBFAC,,, | Performed by: PHYSICIAN ASSISTANT

## 2022-03-22 PROCEDURE — 1159F PR MEDICATION LIST DOCUMENTED IN MEDICAL RECORD: ICD-10-PCS | Mod: CPTII,S$GLB,, | Performed by: PHYSICIAN ASSISTANT

## 2022-03-22 PROCEDURE — 99999 PR PBB SHADOW E&M-EST. PATIENT-LVL III: CPT | Mod: PBBFAC,,, | Performed by: PHYSICIAN ASSISTANT

## 2022-03-22 PROCEDURE — 1159F MED LIST DOCD IN RCRD: CPT | Mod: CPTII,S$GLB,, | Performed by: PHYSICIAN ASSISTANT

## 2022-03-22 PROCEDURE — 3008F PR BODY MASS INDEX (BMI) DOCUMENTED: ICD-10-PCS | Mod: CPTII,S$GLB,, | Performed by: PHYSICIAN ASSISTANT

## 2022-03-22 PROCEDURE — 3008F BODY MASS INDEX DOCD: CPT | Mod: CPTII,S$GLB,, | Performed by: PHYSICIAN ASSISTANT

## 2022-03-22 PROCEDURE — 4010F ACE/ARB THERAPY RXD/TAKEN: CPT | Mod: CPTII,S$GLB,, | Performed by: PHYSICIAN ASSISTANT

## 2022-03-22 NOTE — PROGRESS NOTES
Dejah Schmidt presents for post-operative evaluation.  The patient is now 2 weeks s/p Left index and long trigger finger releases with Dr. Tolliver on 3/9/22.  Overall the patient reports doing well.  She reports 5/10 pain and some swelling in the fingers. Takes Ibuprofen as needed. She is doing home stretches, no OT. Denies any f/c/s.    PE:    AA&O x 4.  NAD  HEENT:  NCAT, sclera nonicteric  Lungs:  Respirations are equal and unlabored.  CV:  2+ bilateral upper and lower extremity pulses.  MSK: The wounds are healing well with no signs of erythema or warmth.  There is no drainage.  No clinical signs or symptoms of infection are present.  Mild swelling present. Long finger PIP stiffness with decreased PIP extension, otherwise full extension, near full flexion. SILT. DNVI.    A/P: Status post above, doing well  1) Discussed OT referral, she declines. Encouraged continued home hand stretching, ROM/activity as tolerated. Should this not improve, she can call into clinic for OT referral.  2) All sutures removed today. Wound care and signs of infection discussed.  3) F/U as needed  4) Call with any questions/concerns in the interim      Jamie Russo-DiGeorge PA-C

## 2022-04-01 ENCOUNTER — PATIENT MESSAGE (OUTPATIENT)
Dept: INTERNAL MEDICINE | Facility: CLINIC | Age: 62
End: 2022-04-01
Payer: MEDICARE

## 2022-04-01 ENCOUNTER — OFFICE VISIT (OUTPATIENT)
Dept: ENDOCRINOLOGY | Facility: CLINIC | Age: 62
End: 2022-04-01
Payer: MEDICARE

## 2022-04-01 ENCOUNTER — TELEPHONE (OUTPATIENT)
Dept: SURGERY | Facility: CLINIC | Age: 62
End: 2022-04-01
Payer: MEDICARE

## 2022-04-01 VITALS
SYSTOLIC BLOOD PRESSURE: 114 MMHG | OXYGEN SATURATION: 98 % | DIASTOLIC BLOOD PRESSURE: 64 MMHG | BODY MASS INDEX: 45.95 KG/M2 | HEART RATE: 70 BPM | HEIGHT: 67 IN | WEIGHT: 292.75 LBS

## 2022-04-01 DIAGNOSIS — E04.1 THYROID NODULE: ICD-10-CM

## 2022-04-01 DIAGNOSIS — Z12.31 SCREENING MAMMOGRAM, ENCOUNTER FOR: Primary | ICD-10-CM

## 2022-04-01 DIAGNOSIS — E04.2 MULTINODULAR GOITER: Chronic | ICD-10-CM

## 2022-04-01 DIAGNOSIS — I10 ESSENTIAL HYPERTENSION: Primary | ICD-10-CM

## 2022-04-01 DIAGNOSIS — E55.9 VITAMIN D DEFICIENCY DISEASE: Chronic | ICD-10-CM

## 2022-04-01 PROCEDURE — 1160F PR REVIEW ALL MEDS BY PRESCRIBER/CLIN PHARMACIST DOCUMENTED: ICD-10-PCS | Mod: CPTII,S$GLB,, | Performed by: INTERNAL MEDICINE

## 2022-04-01 PROCEDURE — 99999 PR PBB SHADOW E&M-EST. PATIENT-LVL V: ICD-10-PCS | Mod: PBBFAC,,, | Performed by: INTERNAL MEDICINE

## 2022-04-01 PROCEDURE — 99499 RISK ADDL DX/OHS AUDIT: ICD-10-PCS | Mod: S$GLB,,, | Performed by: INTERNAL MEDICINE

## 2022-04-01 PROCEDURE — 3008F BODY MASS INDEX DOCD: CPT | Mod: CPTII,S$GLB,, | Performed by: INTERNAL MEDICINE

## 2022-04-01 PROCEDURE — 3074F PR MOST RECENT SYSTOLIC BLOOD PRESSURE < 130 MM HG: ICD-10-PCS | Mod: CPTII,S$GLB,, | Performed by: INTERNAL MEDICINE

## 2022-04-01 PROCEDURE — 3078F PR MOST RECENT DIASTOLIC BLOOD PRESSURE < 80 MM HG: ICD-10-PCS | Mod: CPTII,S$GLB,, | Performed by: INTERNAL MEDICINE

## 2022-04-01 PROCEDURE — 99214 PR OFFICE/OUTPT VISIT, EST, LEVL IV, 30-39 MIN: ICD-10-PCS | Mod: S$GLB,,, | Performed by: INTERNAL MEDICINE

## 2022-04-01 PROCEDURE — 99214 OFFICE O/P EST MOD 30 MIN: CPT | Mod: S$GLB,,, | Performed by: INTERNAL MEDICINE

## 2022-04-01 PROCEDURE — 3008F PR BODY MASS INDEX (BMI) DOCUMENTED: ICD-10-PCS | Mod: CPTII,S$GLB,, | Performed by: INTERNAL MEDICINE

## 2022-04-01 PROCEDURE — 3078F DIAST BP <80 MM HG: CPT | Mod: CPTII,S$GLB,, | Performed by: INTERNAL MEDICINE

## 2022-04-01 PROCEDURE — 99499 UNLISTED E&M SERVICE: CPT | Mod: S$GLB,,, | Performed by: INTERNAL MEDICINE

## 2022-04-01 PROCEDURE — 4010F ACE/ARB THERAPY RXD/TAKEN: CPT | Mod: CPTII,S$GLB,, | Performed by: INTERNAL MEDICINE

## 2022-04-01 PROCEDURE — 1160F RVW MEDS BY RX/DR IN RCRD: CPT | Mod: CPTII,S$GLB,, | Performed by: INTERNAL MEDICINE

## 2022-04-01 PROCEDURE — 99999 PR PBB SHADOW E&M-EST. PATIENT-LVL V: CPT | Mod: PBBFAC,,, | Performed by: INTERNAL MEDICINE

## 2022-04-01 PROCEDURE — 1159F MED LIST DOCD IN RCRD: CPT | Mod: CPTII,S$GLB,, | Performed by: INTERNAL MEDICINE

## 2022-04-01 PROCEDURE — 3074F SYST BP LT 130 MM HG: CPT | Mod: CPTII,S$GLB,, | Performed by: INTERNAL MEDICINE

## 2022-04-01 PROCEDURE — 4010F PR ACE/ARB THEARPY RXD/TAKEN: ICD-10-PCS | Mod: CPTII,S$GLB,, | Performed by: INTERNAL MEDICINE

## 2022-04-01 PROCEDURE — 1159F PR MEDICATION LIST DOCUMENTED IN MEDICAL RECORD: ICD-10-PCS | Mod: CPTII,S$GLB,, | Performed by: INTERNAL MEDICINE

## 2022-04-01 NOTE — ASSESSMENT & PLAN NOTE
Blood pressure at goal in clinic and at home per patient but she is on 4 agents and had history of abnormal renin and aldosterone.  Will recheck 8:00 a.m. now.

## 2022-04-01 NOTE — ASSESSMENT & PLAN NOTE
Patient with longstanding history of multinodular goiter with multiple benign FNA of both left and right dominant nodule.  Most recent ultrasound with interval growth in several of the nodules patient is now having compressive symptoms and interested in thyroidectomy.  Referral placed to endocrine surgeon to discuss surgery, I do not think that she needs repeat FNA prior to surgery given multiple previous normal.    Check TSH now

## 2022-04-01 NOTE — Clinical Note
Hello,  Patient referred for thyroidectomy due to multinodular goiter with compressive symptoms.  Can you please assist with getting scheduled with Dr. Garcia?  Yajaira Yanez MD

## 2022-04-01 NOTE — ASSESSMENT & PLAN NOTE
Patient reports difficulty remembering to take weekly ergocalciferol 86586 IU so has not been consistent with taking it.  Will discontinue weekly ergocalciferol and change to over-the-counter vitamin-D 2000 IU daily.

## 2022-04-01 NOTE — PROGRESS NOTES
ENDOCRINOLOGY CLINIC FOLLOW UP  04/01/2022     The patient's last visit with me was on 4/2/2020.     CC:  Follow up nonfunctional MNG     HPI:    Regarding MNG:    She has longstanding history of multinodular goiter and has undergone several FNAs of left and right dominant nodules which were benign.  Most recent neck ultrasound from January 2022 with interval increase in size of several nodules.  Patient is also having compressive symptoms and is now interested in thyroidectomy.      FNA:  3/10/2020 - left inferior 1.8 cm nodule benign  4/2018 - left and right dominant nodules benign  5/2014 - right dominant nodule benign  1/5/12- right dominant nodule benign    Most recent imaging:  Neck us 1/5/22  Of note, patient has undergone FNA of multiple nodules in the left and right thyroid lobes, all of which have demonstrated benign results per pathology report.     The right lobe of the thyroid measures 6.6 x 2.8 x 3.7 cm and demonstrates multiple (4-5 in number) nodules, the largest at the mid aspect of the right thyroid lobe measuring approximately 4.2 x 2.6 x 3.6 cm (previously measuring 3.4 x 2.3 x 2.9 cm).  This nodule is solid, hyperechoic, not taller than wide, demonstrates well-circumscribed margins, and does not demonstrate internal calcification.  The 2nd largest nodule measures 1.7 x 1.3 x 1.9 cm at the superior pole of the right thyroid lobe.  This nodule is mixed solid and cystic, isoechoic, not taller than wide, demonstrates smooth margins, and does not demonstrate internal calcification.     The thyroid isthmus demonstrates a mixed cystic and solid nodule measuring 0.9 x 0.4 x 0.7 cm.     The left lobe of the thyroid measures 6.6 x 2.4 x 3.1 cm and demonstrates multiple (4-5 in number) nodules, the largest at the superior pole of the left thyroid lobe measuring 2.8 x 1.3 x 1.7 cm (previously measuring 2.1 x 1.1 x 1.4 cm).  This nodule is solid and isoechoic, not taller than wide, demonstrates smooth  margins, and does not demonstrate internal calcification.  The 2nd largest nodule measures approximately 1.9 x 1.1 x 1.7 cm at the mid aspect of the left thyroid lobe.  This nodule is solid and isoechoic to hyperechoic, not taller than wide, demonstrates smooth margins, and does not demonstrate internal calcification.  Additional nodules are mostly cystic or spongiform, and do not meet criteria for FNA or follow-up.     Thyroid vascularity is within normal limits.     There are no abnormal lymph nodes within the visualized portions of the neck.     Impression:     Enlarged, heterogeneous multinodular thyroid.  Some nodules have slightly increased in size when compared to ultrasound 02/28/2020, for example the largest nodule in the right thyroid lobe.  Multiple nodules meet criteria for FNA, including the largest nodule in each lobe.  Per chart review, patient has undergone multiple prior fine-needle aspirations with benign results per pathology report.  Correlation with prior site and pathology is recommended.  Repeat FNA can be performed if clinically indicated versus repeat ultrasound in 1 year to ensure stability.             Risk Factors for Thyroid Cancer:  Hx of External Beam Radiation: denies  Family Hx of Thyroid Cancer:denies    There is no known disorder of thyroid function.    Recent TSH:    Lab Results   Component Value Date    TSH 0.888 02/15/2021     ROS/Thyroid Symptoms  Normal energy    Weight change:  []  Gain []  Loss  [x]  Denies     Temperature intolerance:  [x]  Cold (new x 3 mo) []  Hot   []  Denies     GI:  []  Diarrhea []  Constipation [x]  Denies    Integument:  []  Hair loss []  Dry skin  [x]  Denies    Other:   [x]  Palpitation (infrequent, few seconds) []  tremor     []  Increased anxiety    [x]  Denies     Lab Results   Component Value Date    TSH 0.888 02/15/2021    FREET4 0.94 03/04/2009         Hypertension:  On 5 BP meds, no missed doses w/ pill box.   Home BP- at goal  Denies CP.   +IVAN  Denies vision changes.  + HA (unchanged)  Denies easy bruising     Renin and alix done however not reliable as she was having SATNAM 2/2 topiramate at the time thus will need to repeat   Kidney function now stable    With regards to the vitamin d deficiency:  Ergocalciferol 50 k weekly for several year, was not taking due to difficulty remembering weekly medication.  No recent fractures.  Hx of foot fracture after jumping >10 year ago, no fragility fractures  Restarted weekly ergo after last labs in 12/2021 but still not consistently taking.  Feels daily med would be easier    Lab Results   Component Value Date    [id number] 22 (L) 12/03/2021        Current Outpatient Medications:     ALBUTEROL INHL, , Disp: , Rfl:     amLODIPine (NORVASC) 10 MG tablet, Take 1 tablet (10 mg total) by mouth once daily., Disp: 90 tablet, Rfl: 1    atorvastatin (LIPITOR) 40 MG tablet, Take 1 tablet (40 mg total) by mouth once daily., Disp: 90 tablet, Rfl: 1    butalbital-acetaminophen-caffeine -40 mg (FIORICET, ESGIC) -40 mg per tablet, Take 1 tablet by mouth every 6 (six) hours as needed for Headaches (DO NOT EXCEED THREE TABLETS IN ONE WEEK)., Disp: 12 tablet, Rfl: 3    cholecalciferol, vitamin D3, 50,000 unit Tab, Take 1 tablet by mouth once a week., Disp: 12 tablet, Rfl: 3    cyclobenzaprine (FLEXERIL) 5 MG tablet, Take 1 tablet (5 mg total) by mouth 3 (three) times daily as needed for Muscle spasms., Disp: 30 tablet, Rfl: 0    gabapentin (NEURONTIN) 600 MG tablet, TAKE 1 TABLET(600 MG) BY MOUTH THREE TIMES DAILY, Disp: 90 tablet, Rfl: 4    lisinopriL (PRINIVIL,ZESTRIL) 40 MG tablet, Take 1 tablet (40 mg total) by mouth once daily., Disp: 90 tablet, Rfl: 1    triamterene-hydrochlorothiazide 37.5-25 mg (DYAZIDE) 37.5-25 mg per capsule, TAKE ONE CAPSULE BY MOUTH EVERY MORNING., Disp: 90 capsule, Rfl: 1    HYDROcodone-acetaminophen (NORCO) 7.5-325 mg per tablet, Take 1 tablet by mouth every 6 (six) hours as  needed for Pain. (Patient not taking: Reported on 4/1/2022), Disp: 28 tablet, Rfl: 0    zolpidem (AMBIEN) 5 MG Tab, Take 1 tablet (5 mg total) by mouth nightly as needed., Disp: 20 tablet, Rfl: 1      PHYSICAL EXAM  Constitutional:  Pleasant,  in no acute distress.   HENT:   Significantly enlarged nodular thyroid gland, no cervical LAD  Eyes:     No scleral icterus.   Respiratory:   Effort normal   Neurological:  normal speech  Psych:   Normal mood and affect.      LABORATORY REVIEW:  Thyroid Labs Latest Ref Rng & Units 7/2/2020 2/15/2021 12/3/2021   TSH 0.400 - 4.000 uIU/mL - 0.888 -   Free T4 0.71 - 1.51 ng/dl - - -   Sodium 136 - 145 mmol/L 142 141 140   Potassium 3.5 - 5.1 mmol/L 4.7 4.8 4.7   Chloride 95 - 110 mmol/L 108 105 104   Carbon Dioxide 23 - 29 mmol/L 24 27 28   Glucose 70 - 110 mg/dL 108 105 105   Blood Urea Nitrogen 6 - 20 mg/dL 29(H) 21(H) 20   Creatinine 0.5 - 1.4 mg/dL 1.4 1.2 1.2   Calcium 8.7 - 10.5 mg/dL 10.1 9.5 9.7   Total Protein 6.0 - 8.4 g/dL 8.2 8.5(H) 8.0   Albumin 3.5 - 5.2 g/dL 3.8 4.2 3.9   Total Bilirubin 0.1 - 1.0 mg/dL 0.5 0.8 0.8   AST 10 - 40 U/L 17 19 20   ALT 10 - 44 U/L 21 27 23   Anion Gap 8 - 16 mmol/L 10 9 8   eGFR (African American) >60 mL/min/1.73 m:2 47.8(A) 57.2(A) 56.8(A)   eGFR (Non-African American) >60 mL/min/1.73 m:2 41.4(A) 49.6(A) 49.2(A)   WBC 3.90 - 12.70 K/uL - - 4.07   RBC 4.00 - 5.40 M/uL - - 4.73   Hemoglobin 12.0 - 16.0 g/dL - - 14.0   Hematocrit 37.0 - 48.5 % - - 44.8   MCV 82 - 98 fL - - 95   MCH 27.0 - 31.0 pg - - 29.6   MCHC 32.0 - 36.0 g/dL - - 31.3(L)   RDW 11.5 - 14.5 % - - 13.4   Platelets 150 - 450 K/uL - - 245   MPV 9.2 - 12.9 fL - - 10.6   Gran # 1.8 - 7.7 K/uL - - 1.7(L)   Lymph # 1.0 - 4.8 K/uL - - 2.0   Mono # 0.3 - 1.0 K/uL - - 0.3   Eos # 0.0 - 0.5 K/uL - - 0.1   Baso # 0.00 - 0.20 K/uL - - 0.01   Gran % 38.0 - 73.0 % - - 42.3   Lymph % 18.0 - 48.0 % - - 49.4(H)   Mono% 4.0 - 15.0 % - - 6.9   Eos % 0.0 - 8.0 % - - 1.2   Baso % 0.0 - 1.9 %  - - 0.2   Prothrombin Time 9.0 - 12.5 sec - - -   INR 0.8 - 1.2 - - -   aPTT 21.0 - 32.0 sec - - -        IMAGING STUDIES  See above     ASSESSMENT/PLAN    Problem List Items Addressed This Visit        1 - High    Multinodular goiter (Chronic)     Patient with longstanding history of multinodular goiter with multiple benign FNA of both left and right dominant nodule.  Most recent ultrasound with interval growth in several of the nodules patient is now having compressive symptoms and interested in thyroidectomy.  Referral placed to endocrine surgeon to discuss surgery, I do not think that she needs repeat FNA prior to surgery given multiple previous normal.    Check TSH now              2     Unspecified essential hypertension - Primary     Blood pressure at goal in clinic and at home per patient but she is on 4 agents and had history of abnormal renin and aldosterone.  Will recheck 8:00 a.m. now.           Relevant Orders    Basic Metabolic Panel    Renin    Aldosterone       3     Vitamin D deficiency disease (Chronic)     Patient reports difficulty remembering to take weekly ergocalciferol 04753 IU so has not been consistent with taking it.  Will discontinue weekly ergocalciferol and change to over-the-counter vitamin-D 2000 IU daily.             Other Visit Diagnoses     Thyroid nodule        Relevant Orders    TSH    Ambulatory referral/consult to General Surgery          4 month(s)   8 am lab soon    Yajaira Yanez MD

## 2022-04-04 ENCOUNTER — HOSPITAL ENCOUNTER (OUTPATIENT)
Dept: RADIOLOGY | Facility: HOSPITAL | Age: 62
Discharge: HOME | End: 2022-04-04
Attending: INTERNAL MEDICINE
Payer: MEDICARE

## 2022-04-04 VITALS — BODY MASS INDEX: 46.55 KG/M2 | HEIGHT: 67 IN

## 2022-04-04 DIAGNOSIS — Z12.31 SCREENING MAMMOGRAM, ENCOUNTER FOR: ICD-10-CM

## 2022-04-04 PROCEDURE — 77067 MAMMO DIGITAL SCREENING BILAT WITH TOMO: ICD-10-PCS | Mod: 26,,, | Performed by: RADIOLOGY

## 2022-04-04 PROCEDURE — 77063 BREAST TOMOSYNTHESIS BI: CPT | Mod: 26,,, | Performed by: RADIOLOGY

## 2022-04-04 PROCEDURE — 77067 SCR MAMMO BI INCL CAD: CPT | Mod: TC

## 2022-04-04 PROCEDURE — 77063 BREAST TOMOSYNTHESIS BI: CPT | Mod: TC

## 2022-04-04 PROCEDURE — 77063 MAMMO DIGITAL SCREENING BILAT WITH TOMO: ICD-10-PCS | Mod: 26,,, | Performed by: RADIOLOGY

## 2022-04-04 PROCEDURE — 77067 SCR MAMMO BI INCL CAD: CPT | Mod: 26,,, | Performed by: RADIOLOGY

## 2022-04-07 ENCOUNTER — PES CALL (OUTPATIENT)
Dept: ADMINISTRATIVE | Facility: CLINIC | Age: 62
End: 2022-04-07
Payer: MEDICARE

## 2022-04-11 ENCOUNTER — TELEPHONE (OUTPATIENT)
Dept: SURGERY | Facility: CLINIC | Age: 62
End: 2022-04-11
Payer: MEDICARE

## 2022-04-11 NOTE — TELEPHONE ENCOUNTER
----- Message from Elzbieta Sheikh sent at 4/11/2022  8:23 AM CDT -----  Regarding: speak with nurse  Contact: patient  [phone]    please call patient need to reschedule appointment waiting on a call back from the nurse thanks.

## 2022-04-20 ENCOUNTER — TELEPHONE (OUTPATIENT)
Dept: SURGERY | Facility: CLINIC | Age: 62
End: 2022-04-20
Payer: MEDICARE

## 2022-04-21 ENCOUNTER — PATIENT MESSAGE (OUTPATIENT)
Dept: ORTHOPEDICS | Facility: CLINIC | Age: 62
End: 2022-04-21
Payer: MEDICARE

## 2022-04-22 DIAGNOSIS — M65.322 TRIGGER FINGER, LEFT INDEX FINGER: Primary | ICD-10-CM

## 2022-04-22 DIAGNOSIS — M65.332 TRIGGER FINGER, LEFT MIDDLE FINGER: ICD-10-CM

## 2022-04-25 ENCOUNTER — HOSPITAL ENCOUNTER (OUTPATIENT)
Dept: CARDIOLOGY | Facility: CLINIC | Age: 62
Discharge: HOME | End: 2022-04-25
Payer: MEDICARE

## 2022-04-25 ENCOUNTER — OFFICE VISIT (OUTPATIENT)
Dept: SURGERY | Facility: CLINIC | Age: 62
End: 2022-04-25
Payer: MEDICARE

## 2022-04-25 VITALS
HEART RATE: 66 BPM | OXYGEN SATURATION: 98 % | TEMPERATURE: 98 F | HEIGHT: 66 IN | SYSTOLIC BLOOD PRESSURE: 110 MMHG | RESPIRATION RATE: 18 BRPM | BODY MASS INDEX: 47.09 KG/M2 | DIASTOLIC BLOOD PRESSURE: 64 MMHG | WEIGHT: 293 LBS

## 2022-04-25 DIAGNOSIS — E04.2 GOITER, NONTOXIC, MULTINODULAR: ICD-10-CM

## 2022-04-25 DIAGNOSIS — E55.9 VITAMIN D DEFICIENCY DISEASE: Chronic | ICD-10-CM

## 2022-04-25 DIAGNOSIS — E04.2 GOITER, NONTOXIC, MULTINODULAR: Primary | ICD-10-CM

## 2022-04-25 DIAGNOSIS — E04.2 MULTINODULAR GOITER: Primary | Chronic | ICD-10-CM

## 2022-04-25 PROCEDURE — 93005 ELECTROCARDIOGRAM TRACING: CPT | Mod: S$GLB,,, | Performed by: STUDENT IN AN ORGANIZED HEALTH CARE EDUCATION/TRAINING PROGRAM

## 2022-04-25 PROCEDURE — 1159F PR MEDICATION LIST DOCUMENTED IN MEDICAL RECORD: ICD-10-PCS | Mod: CPTII,S$GLB,, | Performed by: STUDENT IN AN ORGANIZED HEALTH CARE EDUCATION/TRAINING PROGRAM

## 2022-04-25 PROCEDURE — 99205 PR OFFICE/OUTPT VISIT, NEW, LEVL V, 60-74 MIN: ICD-10-PCS | Mod: S$GLB,,, | Performed by: STUDENT IN AN ORGANIZED HEALTH CARE EDUCATION/TRAINING PROGRAM

## 2022-04-25 PROCEDURE — 99999 PR PBB SHADOW E&M-EST. PATIENT-LVL IV: ICD-10-PCS | Mod: PBBFAC,,, | Performed by: STUDENT IN AN ORGANIZED HEALTH CARE EDUCATION/TRAINING PROGRAM

## 2022-04-25 PROCEDURE — 3078F PR MOST RECENT DIASTOLIC BLOOD PRESSURE < 80 MM HG: ICD-10-PCS | Mod: CPTII,S$GLB,, | Performed by: STUDENT IN AN ORGANIZED HEALTH CARE EDUCATION/TRAINING PROGRAM

## 2022-04-25 PROCEDURE — 4010F PR ACE/ARB THEARPY RXD/TAKEN: ICD-10-PCS | Mod: CPTII,S$GLB,, | Performed by: STUDENT IN AN ORGANIZED HEALTH CARE EDUCATION/TRAINING PROGRAM

## 2022-04-25 PROCEDURE — 3008F BODY MASS INDEX DOCD: CPT | Mod: CPTII,S$GLB,, | Performed by: STUDENT IN AN ORGANIZED HEALTH CARE EDUCATION/TRAINING PROGRAM

## 2022-04-25 PROCEDURE — 3074F SYST BP LT 130 MM HG: CPT | Mod: CPTII,S$GLB,, | Performed by: STUDENT IN AN ORGANIZED HEALTH CARE EDUCATION/TRAINING PROGRAM

## 2022-04-25 PROCEDURE — 3074F PR MOST RECENT SYSTOLIC BLOOD PRESSURE < 130 MM HG: ICD-10-PCS | Mod: CPTII,S$GLB,, | Performed by: STUDENT IN AN ORGANIZED HEALTH CARE EDUCATION/TRAINING PROGRAM

## 2022-04-25 PROCEDURE — 3008F PR BODY MASS INDEX (BMI) DOCUMENTED: ICD-10-PCS | Mod: CPTII,S$GLB,, | Performed by: STUDENT IN AN ORGANIZED HEALTH CARE EDUCATION/TRAINING PROGRAM

## 2022-04-25 PROCEDURE — 1159F MED LIST DOCD IN RCRD: CPT | Mod: CPTII,S$GLB,, | Performed by: STUDENT IN AN ORGANIZED HEALTH CARE EDUCATION/TRAINING PROGRAM

## 2022-04-25 PROCEDURE — 4010F ACE/ARB THERAPY RXD/TAKEN: CPT | Mod: CPTII,S$GLB,, | Performed by: STUDENT IN AN ORGANIZED HEALTH CARE EDUCATION/TRAINING PROGRAM

## 2022-04-25 PROCEDURE — 93005 EKG 12-LEAD: ICD-10-PCS | Mod: S$GLB,,, | Performed by: STUDENT IN AN ORGANIZED HEALTH CARE EDUCATION/TRAINING PROGRAM

## 2022-04-25 PROCEDURE — 99205 OFFICE O/P NEW HI 60 MIN: CPT | Mod: S$GLB,,, | Performed by: STUDENT IN AN ORGANIZED HEALTH CARE EDUCATION/TRAINING PROGRAM

## 2022-04-25 PROCEDURE — 99999 PR PBB SHADOW E&M-EST. PATIENT-LVL IV: CPT | Mod: PBBFAC,,, | Performed by: STUDENT IN AN ORGANIZED HEALTH CARE EDUCATION/TRAINING PROGRAM

## 2022-04-25 PROCEDURE — 99499 UNLISTED E&M SERVICE: CPT | Mod: S$GLB,,, | Performed by: STUDENT IN AN ORGANIZED HEALTH CARE EDUCATION/TRAINING PROGRAM

## 2022-04-25 PROCEDURE — 93010 EKG 12-LEAD: ICD-10-PCS | Mod: S$GLB,,, | Performed by: INTERNAL MEDICINE

## 2022-04-25 PROCEDURE — 93010 ELECTROCARDIOGRAM REPORT: CPT | Mod: S$GLB,,, | Performed by: INTERNAL MEDICINE

## 2022-04-25 PROCEDURE — 99499 RISK ADDL DX/OHS AUDIT: ICD-10-PCS | Mod: S$GLB,,, | Performed by: STUDENT IN AN ORGANIZED HEALTH CARE EDUCATION/TRAINING PROGRAM

## 2022-04-25 PROCEDURE — 3078F DIAST BP <80 MM HG: CPT | Mod: CPTII,S$GLB,, | Performed by: STUDENT IN AN ORGANIZED HEALTH CARE EDUCATION/TRAINING PROGRAM

## 2022-04-25 RX ORDER — ASPIRIN 325 MG
50000 TABLET, DELAYED RELEASE (ENTERIC COATED) ORAL
COMMUNITY
Start: 2021-12-07 | End: 2022-08-04

## 2022-04-25 RX ORDER — SODIUM CHLORIDE 9 MG/ML
INJECTION, SOLUTION INTRAVENOUS CONTINUOUS
Status: CANCELLED | OUTPATIENT
Start: 2022-04-25

## 2022-04-25 RX ORDER — ONDANSETRON 2 MG/ML
4 INJECTION INTRAMUSCULAR; INTRAVENOUS EVERY 12 HOURS PRN
Status: CANCELLED | OUTPATIENT
Start: 2022-04-25

## 2022-04-25 RX ORDER — LIDOCAINE HYDROCHLORIDE 10 MG/ML
1 INJECTION, SOLUTION EPIDURAL; INFILTRATION; INTRACAUDAL; PERINEURAL ONCE
Status: CANCELLED | OUTPATIENT
Start: 2022-04-25 | End: 2022-04-25

## 2022-04-25 NOTE — ASSESSMENT & PLAN NOTE
Symptomatic nontoxic multinodular goiter with benign cytology of the dominant nodules.  Will proceed to the OR for total thyroidectomy.

## 2022-04-25 NOTE — PROGRESS NOTES
Endocrine Surgery History & Physical     REFERRING PROVIDER: Yajaira Yanez MD    REASON FOR VISIT: symptomatic multinodular goiter    HPI: Dejah Schmidt is a 61 y.o. female patient with a history notable for hypertension, CKD stage 3, vitamin D deficiency, morbid obesity, chronic pain and a longstanding multinodular goiter who presents in consultation for surgical management of the multinodular goiter.     The last ultrasound demonstrated an enlarged thyroid gland with multiple thyroid nodules with interval growth.  Multiple previous FNA biopsies of the dominant bilateral nodules have resulted with benign cytology.  She is biochemically euthyroid.  The patient denies overt hyperthyroid or hypothyroid symptoms.  She does have dysphagia and globus sensation but denies anterior neck pain.  The patient has no hoarseness or voice changes.  The patient has has had a prior excisional biopsy of a neck mass but no prior thyroid surgery.  The patient has no history of radiation exposure or goitrogens.  The patient has not recently been on lithium, biotin, amiodarone or received iodine contrast.  There is not a significant history of thyroid cancer, thyroid disease or familial endocrinopathies though her daughter also has a multinodular goiter.  Calcium levels have been normal and she denies symptoms of hypercalcemia, she is low risk for concurrent parathyroid disease.      LABORATORY STUDIES:  I personally and independently reviewed relevant lab test results, including the following:    TSH   Date Value Ref Range Status   04/04/2022 1.771 0.400 - 4.000 uIU/mL Final   02/15/2021 0.888 0.400 - 4.000 uIU/mL Final   02/28/2020 0.628 0.400 - 4.000 uIU/mL Final     Free T4   Date Value Ref Range Status   03/04/2009 0.94 0.71 - 1.51 ng/dl Final     Vit D, 25-Hydroxy   Date Value Ref Range Status   12/03/2021 22 (L) 30 - 96 ng/mL Final     Comment:     Vitamin D deficiency.........<10 ng/mL                              Vitamin D  insufficiency......10-29 ng/mL       Vitamin D sufficiency........> or equal to 30 ng/mL  Vitamin D toxicity............>100 ng/mL     07/02/2020 34 30 - 96 ng/mL Final     Comment:     Vitamin D deficiency.........<10 ng/mL                              Vitamin D insufficiency......10-29 ng/mL       Vitamin D sufficiency........> or equal to 30 ng/mL  Vitamin D toxicity............>100 ng/mL     12/26/2019 20 (L) 30 - 96 ng/mL Final     Comment:     Vitamin D deficiency.........<10 ng/mL                              Vitamin D insufficiency......10-29 ng/mL       Vitamin D sufficiency........> or equal to 30 ng/mL  Vitamin D toxicity............>100 ng/mL       Calcium   Date Value Ref Range Status   04/04/2022 9.2 8.7 - 10.5 mg/dL Final   12/03/2021 9.7 8.7 - 10.5 mg/dL Final   02/15/2021 9.5 8.7 - 10.5 mg/dL Final     Albumin   Date Value Ref Range Status   12/03/2021 3.9 3.5 - 5.2 g/dL Final   02/15/2021 4.2 3.5 - 5.2 g/dL Final   07/02/2020 3.8 3.5 - 5.2 g/dL Final        PAST MEDICAL HISTORY:  Patient Active Problem List   Diagnosis    Unspecified essential hypertension    Multinodular goiter    Morbid obesity    Vitamin D deficiency disease    Chronic back pain    Lumbar radiculopathy    Facet arthropathy, lumbar    Facet hypertrophy of lumbar region    Hand pain    Herniation of lumbar intervertebral disc with radiculopathy    Spinal stenosis of lumbar region with neurogenic claudication    Chronic pain    Chronic kidney disease, stage 3a    Trigger thumb, right thumb    Trigger finger, left middle finger    Trigger finger, left index finger        PAST SURGICAL HISTORY:  Past Surgical History:   Procedure Laterality Date    BACK SURGERY      CARPAL TUNNEL RELEASE      COLONOSCOPY N/A 6/4/2020    Procedure: COLONOSCOPY, with me, 5/20;  Surgeon: Sarah Beth Leavitt MD;  Location: [facility] (4TH FLR);  Service: Endoscopy;  Laterality: N/A;  covid test 6/2    EPIDURAL STEROID INJECTION N/A  10/27/2021    Procedure: INJECTION, STEROID, EPIDURAL,L5/S1 DIRECT REF/NEED CONSENT;  Surgeon: Clement Aldana MD;  [facility];  Service: Pain Management;  Laterality: N/A;    FOOT SURGERY      HYSTERECTOMY      MASS EXCISION      at neck    ROTATOR CUFF REPAIR      left shoulder    TRIGGER FINGER RELEASE  12/2017    right hand    TRIGGER FINGER RELEASE Right 1/26/2022    Procedure: RELEASE, TRIGGER FINGER - right thumb;  Surgeon: Cristopher Tolliver MD;  Location: [facility] OR;  Service: Orthopedics;  Laterality: Right;    TRIGGER FINGER RELEASE Left 3/9/2022    Procedure: RELEASE, TRIGGER FINGER - LEFT index and long;  Surgeon: Cristopher Tolliver MD;  Location: [facility] OR;  Service: Orthopedics;  Laterality: Left;        MEDICATIONS:  Current Outpatient Medications   Medication Sig Dispense Refill    amLODIPine (NORVASC) 10 MG tablet Take 1 tablet (10 mg total) by mouth once daily. 90 tablet 1    atorvastatin (LIPITOR) 40 MG tablet Take 1 tablet (40 mg total) by mouth once daily. 90 tablet 1    cholecalciferol, vitamin D3, 1,250 mcg (50,000 unit) capsule Take 50,000 Units by mouth every 7 days.      cyclobenzaprine (FLEXERIL) 5 MG tablet Take 1 tablet (5 mg total) by mouth 3 (three) times daily as needed for Muscle spasms. 30 tablet 0    gabapentin (NEURONTIN) 600 MG tablet TAKE 1 TABLET(600 MG) BY MOUTH THREE TIMES DAILY 90 tablet 4    lisinopriL (PRINIVIL,ZESTRIL) 40 MG tablet Take 1 tablet (40 mg total) by mouth once daily. 90 tablet 1    triamterene-hydrochlorothiazide 37.5-25 mg (DYAZIDE) 37.5-25 mg per capsule TAKE ONE CAPSULE BY MOUTH EVERY MORNING. 90 capsule 1    ALBUTEROL INHL       zolpidem (AMBIEN) 5 MG Tab Take 1 tablet (5 mg total) by mouth nightly as needed. (Patient not taking: Reported on 4/25/2022) 20 tablet 1     No current facility-administered medications for this visit.       ALLERGIES:  Review of patient's allergies indicates:  No Known Allergies    SOCIAL HISTORY:  Social  History     Socioeconomic History    Marital status:    Tobacco Use    Smoking status: Never Smoker    Smokeless tobacco: Never Used   Substance and Sexual Activity    Alcohol use: Yes     Alcohol/week: 0.0 standard drinks     Comment: once a month    Drug use: No    Sexual activity: Yes   Social History Narrative    LIVIA cornelius receiving [employer]. No physical activities     Social Determinants of Health     Financial Resource Strain: Medium Risk    Difficulty of Paying Living Expenses: Somewhat hard   Food Insecurity: Food Insecurity Present    Worried About Running Out of Food in the Last Year: Sometimes true    Ran Out of Food in the Last Year: Never true   Transportation Needs: Unmet Transportation Needs    Lack of Transportation (Medical): Yes    Lack of Transportation (Non-Medical): Yes   Physical Activity: Inactive    Days of Exercise per Week: 0 days    Minutes of Exercise per Session: 0 min   Stress: Stress Concern Present    Feeling of Stress : To some extent   Social Connections: Unknown    Frequency of Communication with Friends and Family: More than three times a week    Frequency of Social Gatherings with Friends and Family: Three times a week    Active Member of Clubs or Organizations: No    Attends Club or Organization Meetings: Never    Marital Status:    Housing Stability: Low Risk     Unable to Pay for Housing in the Last Year: No    Number of Places Lived in the Last Year: 2    Unstable Housing in the Last Year: No         FAMILY HISTORY:  Family History   Problem Relation Age of Onset    Cancer Mother     Coronary artery disease Mother     Heart disease Mother     Hypertension Mother     Hyperlipidemia Mother     Hypertension Father     Cancer Father     Heart disease Maternal Grandmother     Hypertension Maternal Grandmother     Breast cancer Maternal Grandmother          REVIEW OF SYSTEMS:  A detailed review of systems was reviewed with the  "patient, pertinent positives and negatives are presented in the note and is otherwise negative.    PHYSICAL EXAMINATION:  Vital Signs: /64 (BP Location: Left arm, Patient Position: Sitting, BP Method: Medium (Automatic))   Pulse 66   Temp 97.9 °F (36.6 °C) (Oral)   Resp 18   Ht 5' 6" (1.676 m)   Wt 135.4 kg (298 lb 8.1 oz)   SpO2 98%   BMI 48.18 kg/m²     Constitutional: no acute distress, comfortable, well appearing  HENT: no lid lag, no exophthalmos, no scleral icterus  Neck: supple, trachea in midline, thyroid is soft and moves well with swallowing, the thyroid is enlarged without distinct nodularity, good neck extension, well healed transverse scar over the right sternocleidomastoid muscle  Heme/Lymph: no cervical or supraclavicular lymphadenopathy  Respiratory: normal respiratory effort, no wheezes or stridor  Cardiovascular: regular rate and rhythm  Extremities: no edema  Skin: warm and dry, no rashes  Neurologic: no resting tremor of outstretched hands, voice adequate  Vascular: radial pulses palpable bilaterally  Psychiatric: affect normal    IMAGING STUDIES:  I personally and independently reviewed, visualized and interpreted the images of the below listed radiology studies (including the thyroid ultrasound dated 1/2022) and my findings are notable for right greater than left thyroid goiter with multiple nodules bilaterally.  Reports below for reference.    US Soft Tissue Head Neck Thyroid 01/05/2022  FINDINGS:  Of note, patient has undergone FNA of multiple nodules in the left and right thyroid lobes, all of which have demonstrated benign results per pathology report.    The right lobe of the thyroid measures 6.6 x 2.8 x 3.7 cm and demonstrates multiple (4-5 in number) nodules, the largest at the mid aspect of the right thyroid lobe measuring approximately 4.2 x 2.6 x 3.6 cm (previously measuring 3.4 x 2.3 x 2.9 cm).  This nodule is solid, hyperechoic, not taller than wide, demonstrates " well-circumscribed margins, and does not demonstrate internal calcification.  The 2nd largest nodule measures 1.7 x 1.3 x 1.9 cm at the superior pole of the right thyroid lobe.  This nodule is mixed solid and cystic, isoechoic, not taller than wide, demonstrates smooth margins, and does not demonstrate internal calcification.    The thyroid isthmus demonstrates a mixed cystic and solid nodule measuring 0.9 x 0.4 x 0.7 cm.    The left lobe of the thyroid measures 6.6 x 2.4 x 3.1 cm and demonstrates multiple (4-5 in number) nodules, the largest at the superior pole of the left thyroid lobe measuring 2.8 x 1.3 x 1.7 cm (previously measuring 2.1 x 1.1 x 1.4 cm).  This nodule is solid and isoechoic, not taller than wide, demonstrates smooth margins, and does not demonstrate internal calcification.  The 2nd largest nodule measures approximately 1.9 x 1.1 x 1.7 cm at the mid aspect of the left thyroid lobe.  This nodule is solid and isoechoic to hyperechoic, not taller than wide, demonstrates smooth margins, and does not demonstrate internal calcification.  Additional nodules are mostly cystic or spongiform, and do not meet criteria for FNA or follow-up.    Thyroid vascularity is within normal limits.    There are no abnormal lymph nodes within the visualized portions of the neck.    Impression  Enlarged, heterogeneous multinodular thyroid.  Some nodules have slightly increased in size when compared to ultrasound 02/28/2020, for example the largest nodule in the right thyroid lobe.  Multiple nodules meet criteria for FNA, including the largest nodule in each lobe.  Per chart review, patient has undergone multiple prior fine-needle aspirations with benign results per pathology report.  Correlation with prior site and pathology is recommended.  Repeat FNA can be performed if clinically indicated versus repeat ultrasound in 1 year to ensure stability.    Electronically signed by: Ede Bowens  MD  Date:    01/05/2022  Time:    14:04      CYTOLOGY:  Final Pathologic Diagnosis   Date Value Ref Range Status   06/04/2020   Final    BIOPSY OF TRANSVERSE COLON:  POLYPOID GRANULATION TISSUE WITH NO NEOPLASIA IDENTIFIED       Comment:     Interpreted by: Delroy Morse M.D., Signed on 06/05/2020 at 08:50   03/10/2020 (A)  Final    THYROID, LEFT INFERIOR LOBE, FINE NEEDLE ASPIRATION:  - [place] [facility] Thyroid Cytology Category: Benign  - Abundant colloid, benign follicular cells, macrophages, and scattered  lymphocytes present       Comment:     Interpreted by: Murphy Johnson M.D., Signed on 03/13/2020 at 11:03         IMPRESSION:  I had the pleasure of seeing Ms. Schmidt in endocrine surgical consultation regarding her symptomatic multinodular goiter with benign cytology of the dominant nodules.  I have discussed with Ms. Schmidt at length regarding the current surgical and non-surgical treatment options.  Based on the current clinical findings and patient's preference she will necessitate a total thyroidectomy.     I have discussed in detail the meaning of this thyroid disease process.  I have discussed in detail the possible complications associated with thyroid surgery, which may include (but not limited to) hoarseness, recurrent laryngeal nerve injury, superior laryngeal nerve injury - temporary or permanent, hypocalcemia and hypoparathyroidism - temporary or permanent, neck hematoma, infection, scarring, death and imponderables.  I discussed the potential need for a staged completion thyroidectomy in the event of unexpected intraoperative findings or recurrent laryngeal nerve dysfunction and thus the initial surgery would be limited to a thyroid lobectomy.  She understood that thyroid hormone replacement will be necessary lifelong. Thyroid function will need to be monitored.     The patient expressed understanding of all the risks at hand, agreed with this plan and informed consent was signed and  witnessed.     Problem List Items Addressed This Visit        Endocrine    Multinodular goiter - Primary (Chronic)     Symptomatic nontoxic multinodular goiter with benign cytology of the dominant nodules.  Will proceed to the OR for total thyroidectomy.            Vitamin D deficiency disease (Chronic)     Continue vitamin D supplementation per Endocrinology recommendations                  Anai Garcia MD  Endocrine Surgery   4/25/2022

## 2022-04-26 ENCOUNTER — PATIENT MESSAGE (OUTPATIENT)
Dept: ADMINISTRATIVE | Facility: OTHER | Age: 62
End: 2022-04-26
Payer: MEDICARE

## 2022-04-26 NOTE — PROGRESS NOTES
Patient, Dejah Schmidt (MRN #[id number]), presented with a recorded BMI of 48.18 kg/m^2 consistent with the definition of morbid obesity (ICD-10 E66.01). The patient's morbid obesity was monitored, evaluated, addressed and/or treated. This addendum to the medical record is made on 04/25/2022.

## 2022-04-27 ENCOUNTER — CLINICAL SUPPORT (OUTPATIENT)
Dept: REHABILITATION | Facility: HOSPITAL | Age: 62
End: 2022-04-27
Payer: MEDICARE

## 2022-04-27 DIAGNOSIS — M65.332 TRIGGER FINGER, LEFT MIDDLE FINGER: ICD-10-CM

## 2022-04-27 DIAGNOSIS — M79.645 PAIN OF FINGER OF LEFT HAND: ICD-10-CM

## 2022-04-27 DIAGNOSIS — M25.642 FINGER STIFFNESS, LEFT: ICD-10-CM

## 2022-04-27 DIAGNOSIS — M65.322 TRIGGER FINGER, LEFT INDEX FINGER: ICD-10-CM

## 2022-04-27 PROCEDURE — 97166 OT EVAL MOD COMPLEX 45 MIN: CPT

## 2022-04-27 NOTE — PLAN OF CARE
[facility]  Occupational Therapy Initial Evaluation    Date: 4/27/2022  Name: Dejah Schmidt  Clinic Number: [id number]    Therapy Diagnosis:   Encounter Diagnoses   Name Primary?    Trigger finger, left index finger     Trigger finger, left middle finger     Finger stiffness, left     Pain of finger of left hand      Physician: Russo-Digeorge, Jamie L*; Dr. Cristopher Tolliver    Physician Orders: eval and treat  Medical Diagnosis: Trigger finger, left index finger [M65.322], Trigger finger, left middle finger [M65.332]    Surgical Procedure and Date: 3/9/22, s/p L IF/LF TFR / Date of Injury/Onset: reports for about a year and progressively got worse prior to surgery.  Evaluation Date: 4/27/22  Insurance Authorization Period Expiration: 5/25/22  Plan of Care Expiration: 6/24/22  Progress Note Due: 5/27/22   Date of Return to MD: not scheduled  Visit # / Visits authorized: 1 / 1  FOTO: initial eval     Precautions:  Standard    Time In: 2:00  Time Out: 2:55  Total Appointment Time (timed & untimed codes): 55 minutes      SUBJECTIVE       History of Current Condition/Mechanism of Injury: Ada reports: conservative treatment did not work for her fingers, and opted for surgery    Falls: n/a    Involved Side: left  Dominant Side: Right  Imaging: see chart  Prior Therapy: none  Occupation:  Pt is on disabilty  Working presently: disability  Duties: house hold tasks    Functional Limitations/Social History:    Previous functional status includes: Independent with all ADLs.     Current Functional Status   Home/Living environment: lives with their son      Limitation of Functional Status as follows:   ADLs/IADLs:     - Feeding: I    - Bathing: I    - Dressing/Grooming: difficulty with buttoning    - Driving: n/a, difficulty with unbuckling seat belt    -reports her son is helping with house    -she reports she is able to cook, with difficulty due to back issues         Leisure: reports enjoys [employer],  does not report limitations with leisure activities    Pain:  Functional Pain Scale Rating 0-10: Current 5/10, worst 10/10, best 0/10   Location: left LF and hand  Description: Tight and Shooting  Aggravating Factors: Flexing and doing activities throughout the day  Easing Factors: massage, pain medication occasionally    Patient's Goals for Therapy: to be able to make a fist    Medical History:   Past Medical History:   Diagnosis Date    Asthma     Colon polyp 2011    Hypercholesteremia     Hypertension     Multiple thyroid nodules     Sleep apnea        Surgical History:    has a past surgical history that includes Mass excision; Carpal tunnel release; Rotator cuff repair; Hysterectomy; Foot surgery; Trigger finger release (12/2017); Colonoscopy (N/A, 6/4/2020); Epidural steroid injection (N/A, 10/27/2021); Back surgery; Trigger finger release (Right, 1/26/2022); and Trigger finger release (Left, 3/9/2022).    Medications:   has a current medication list which includes the following prescription(s): albuterol, amlodipine, atorvastatin, cholecalciferol (vitamin d3), cyclobenzaprine, gabapentin, lisinopril, triamterene-hydrochlorothiazide 37.5-25 mg, and zolpidem.    Allergies:   Review of patient's allergies indicates:  No Known Allergies       OBJECTIVE     Observation/Appearance: moderate scar adherent noted in palm, A1 pulley areas for IF/LF. Flexed posture noted of PIP.     Edema. Measured in centimeters.   4/27/2022 4/27/2022    Left Right   DPC 21 22.4   MCPs 20.6 21.2     Edema. Measured in centimeters.   4/27/2022 4/27/2022    Left Right   Index:       P1 7 6.5    PIP 6.5 6.5   P2 5.8 5.9    DIP 5.1 5.3   P3 4.8 5   Long:       P1 7.1 6.6    PIP 7.1 6.7   P2 6.4 6    DIP 5.4 5.3   P3 4.9 5     Elbow and Wrist ROM. Measured in degrees.   4/27/2022 4/27/2022    Left Right   Elbow Ext/Flex     Supination/Pronation     Wrist Ext/Flex 45/45 50/55   Wrist RD/UD WFL WFL     Hand ROM. Measured in degrees.  R  hand WFL   4/27/2022    Left       Index: MP  70              PIP     8/80              DIP 42              FRIAS 184       Long:  MP 75              PIP 17/90              DIP 53              FRIAS 201       Ring:   MP               PIP               DIP               FRIAS WFL       Small:  MP                PIP                DIP               FRIAS WFL       Thumb: MP                 IP        Rad ADD/ABD        Pal ADD/ABD           Opposition       Strength (Dynamometer) and Pinch Strength (Pinch Gauge)  Measured in pounds.   4/27/2022 4/27/2022    Left Right   Rung II 14 37   Key Pinch NT NT   3pt Pinch 5 10   2pt Pinch 5.5 7     Sensation: occasional numbness and tingling in fingers (IF/LF), sensation intact   4/27/2022 4/27/2022    Left Right   Cable Cortes     Normal 1.65-2.83     Diminished Light Touch 3.22-3.61     Diminished Protective 3.84-4.31     Loss of Protective 4.56-6.65     Untestable >6.65     2 Point Discrimination     Static     Dynamic               Limitation/Restriction for FOTO Hand Survey    Therapist reviewed FOTO scores for Dejah Schmidt on 4/27/2022.   FOTO documents entered into [employer] - see Media section.    Limitation Score: TBA         Treatment   Total Treatment time (time-based codes) separate from Evaluation: 18 minutes    Ada received the treatments listed below:     Supervised modalities after being cleared for contradictions: Paraffin bath -   Patient received paraffin bath to L hand(s) for 8 minutes to increase blood flow, circulation, pain management and for tissue elasticity prior to therex.         Therapeutic exercises to develop ROM and flexibility for 10 minutes, including:    AROM   DIP blocking  PIP blocking   X 10 reps each    Wave  Hook  straight fist, composite fist finger spreads finger lifts   X 10 reps each    Theraputty - yellow  -molding/gripping  -finger spread   -2'  -5 reps         Patient Education and Home Exercises      Education provided:   - educated  on HEP as above. Educated on scar massage    Written Home Exercises Provided: yes.  Exercises were reviewed and Ada was able to demonstrate them prior to the end of the session.  Ada demonstrated good  understanding of the education provided. See EMR under Patient Instructions for exercises provided during therapy sessions.     Pt was advised to perform these exercises free of pain, and to stop performing them if pain occurs.    Patient/Family Education: role of OT, goals for OT, scheduling/cancellations - pt verbalized understanding. Discussed insurance limitations with patient.    ASSESSMENT     Dejah Schmidt is a 61 y.o. female referred to outpatient occupational therapy and presents with a medical diagnosis of s/p left IF/LF TFR.  Patient presents with the following therapy deficits: Decreased ROM, Decreased  strength, Decreased pinch strength, Decreased muscle strength, Decreased functional hand use, Increased pain, Edema, Joint Stiffness and Scar Adhesions and demonstrates limitations as described in the chart below. Following medical record review it is determined that pt will benefit from occupational therapy services in order to maximize pain free and/or functional use of left hand. The following goals were discussed with the patient and patient is in agreement with them as to be addressed in the treatment plan. The patient's rehab potential is Good.     Anticipated barriers to occupational therapy: stiffness, scar adherent  Pt has no cultural, educational or language barriers to learning provided.    Profile and History Assessment of Occupational Performance Level of Clinical Decision Making Complexity Score   Occupational Profile:   Dejah Schmidt is a 61 y.o. female who lives with their son and is on disability Dejah Schmidt has difficulty with  ADLs and IADLs as listed previously, which  Affecting herdaily functional abilities.      Comorbidities:    has a past medical history of Asthma, Colon  polyp, Hypercholesteremia, Hypertension, Multiple thyroid nodules, and Sleep apnea.    Medical and Therapy History Review:   Expanded               Performance Deficits    Physical:  Joint Mobility  Joint Stability  Muscle Power/Strength  Muscle Endurance  Skin Integrity/Scar Formation  Edema   Strength  Pinch Strength  Fine Motor Coordination  Pain    Cognitive:  No Deficits    Psychosocial:    Habits  Routines     Clinical Decision Making:  moderate    Assessment Process:  Comprehensive Assessments    Modification/Need for Assistance:  Minimal-Moderate Modifications/Assistance    Intervention Selection:  Multiple Treatment Options       moderate  Based on PMHX, co morbidities , data from assessments and functional level of assistance required with task and clinical presentation directly impacting function.         Goals:   The following goals were discussed with the patient and patient is in agreement with them as to be addressed in the treatment plan.   Long Term Goals (LTGs); to be met by discharge.  LTG #1: Pt will report a pain level of 1 out of 10 with ADLs, house hold tasks, and cooking   LTG #2: Pt will demo improved FOTO score by at least 20 points.   LTG #3: Pt will return to prior level of function for ADLs and household management.   LTG #4: Pt will demonstrate improved left digits AROM WFL with minimal ext lag for performing daily tasks  LTG #5: Pt will demonstrate improved left  strength by at least 5-10# for performing ADLs and other house hold tasks  LTG #6: Pt will demonstrate improved left pinch strength by at least 2-4 psi for opening things and turning keys    Short Term Goals (STGs); to be met within 4 weeks (5/27/22).  STG #1: Pt will report 3 out of 10 pain level with ADLs.  STG #2: Pt will report/demo [place] with buttoning.  STG #3: Pt will report/demo [place] with unbuckling seatbelt.  STG #4: Pt will demonstrate independence with issued HEP.   STG #5: Pt will demo  improved left IF/LF FRIAS by at least 20  Degrees each needed to aid with functional grasp of objects.    PLAN   Plan of Care Certification: 4/27/2022 to 6/24/22.     Outpatient Occupational Therapy 2 times weekly for 8 weeks to include the following interventions: Paraffin, Fluidotherapy, Manual therapy/joint mobilizations, Modalities for pain management, US 3 mhz, Therapeutic exercises/activities., Strengthening, Orthotic Fabrication/Fit/Training, Edema Control, Scar Management, Joint Protection and Energy Conservation.      MARV Alexis, T      I CERTIFY THE NEED FOR THESE SERVICES FURNISHED UNDER THIS PLAN OF TREATMENT AND WHILE UNDER MY CARE  Physician's comments:      Physician's Signature: ___________________________________________________

## 2022-04-27 NOTE — PATIENT INSTRUCTIONS
10  [facility], OCCUPATIONAL THERAPY  HOME EXERCISE PROGRAM      Complete massage for 2-3 minutes 4 times a day:      Complete 10 repetitions of each exercise 4 times a day:          [facility]  HOME EXERCISE PROGRAM     Complete the following strengthening program 1x/day.

## 2022-04-29 ENCOUNTER — CLINICAL SUPPORT (OUTPATIENT)
Dept: REHABILITATION | Facility: HOSPITAL | Age: 62
End: 2022-04-29
Payer: MEDICARE

## 2022-04-29 DIAGNOSIS — M25.642 FINGER STIFFNESS, LEFT: Primary | ICD-10-CM

## 2022-04-29 DIAGNOSIS — M79.645 PAIN OF FINGER OF LEFT HAND: ICD-10-CM

## 2022-04-29 PROCEDURE — 97140 MANUAL THERAPY 1/> REGIONS: CPT

## 2022-04-29 PROCEDURE — 97110 THERAPEUTIC EXERCISES: CPT

## 2022-04-29 PROCEDURE — 97018 PARAFFIN BATH THERAPY: CPT

## 2022-04-29 NOTE — PROGRESS NOTES
"  [facility]  Occupational Therapy Treatment Note    Date: 4/29/2022  Name: Dejah Schmidt  Clinic Number: [id number]    Therapy Diagnosis:   Encounter Diagnoses   Name Primary?    Finger stiffness, left Yes    Pain of finger of left hand      Physician: Russo-Digeorge, Jamie L*    Physician: Russo-Digeorge, Jamie L*; Dr. Cristopher Tolliver     Physician Orders: eval and treat  Medical Diagnosis: Trigger finger, left index finger [M65.322], Trigger finger, left middle finger [M65.332]     Surgical Procedure and Date: 3/9/22, s/p L IF/LF TFR / Date of Injury/Onset: reports for about a year and progressively got worse prior to surgery.  Evaluation Date: 4/27/22  Insurance Authorization Period Expiration: 5/25/22  Plan of Care Expiration: 6/24/22  Progress Note Due: 5/27/22   Date of Return to MD: not scheduled  Visit # / Visits authorized: 2 / 12  FOTO: initial eval      Precautions:  Standard    Time In: 2:10  Time Out: 3:05  Total Billable Time: 55 minutes      SUBJECTIVE     Pt reports: "it's about the same"  She was compliant with home exercise program given last session.   Response to previous treatment:continued tightness, some improved ROM  Functional change: none noted    Pain: 5/10  Location: left LF     OBJECTIVE   Objective Measures updated at progress report unless specified.    Observation/Appearance: moderate scar adherent noted in palm, A1 pulley areas for IF/LF. Flexed posture noted of PIP.      Edema. Measured in centimeters.    4/27/2022 4/27/2022     Left Right   DPC 21 22.4   MCPs 20.6 21.2      Edema. Measured in centimeters.    4/27/2022 4/27/2022     Left Right   Index:         P1 7 6.5    PIP 6.5 6.5   P2 5.8 5.9    DIP 5.1 5.3   P3 4.8 5   Long:         P1 7.1 6.6    PIP 7.1 6.7   P2 6.4 6    DIP 5.4 5.3   P3 4.9 5      Elbow and Wrist ROM. Measured in degrees.    4/27/2022 4/27/2022     Left Right   Elbow Ext/Flex       Supination/Pronation       Wrist Ext/Flex 45/45 " 50/55   Wrist RD/UD WFL WFL      Hand ROM. Measured in degrees.  R hand WFL    4/27/2022     Left         Index: MP  70              PIP     8/80              DIP 42              FRIAS 184         Long:  MP 75              PIP 17/90              DIP 53              FRIAS 201         Ring:   MP                PIP                DIP                FRIAS WFL         Small:  MP                 PIP                 DIP                FRIAS WFL         Thumb: MP                  IP         Rad ADD/ABD         Pal ADD/ABD            Opposition         Strength (Dynamometer) and Pinch Strength (Pinch Gauge)  Measured in pounds.    4/27/2022 4/27/2022     Left Right   Rung II 14 37   Key Pinch NT NT   3pt Pinch 5 10   2pt Pinch 5.5 7         Treatment     Ada received the treatments listed below:     Supervised modalities after being cleared for contradictions: Paraffin bath -   Patient received paraffin bath to L hand(s) for 10 minutes to increase blood flow, circulation, pain management and for tissue elasticity prior to therex.            Therapeutic exercises to develop ROM and flexibility for 35 minutes, including:     AROM   DIP blocking  PIP blocking    X 10 reps each    Wave  Hook  straight fist, composite fist finger spreads finger lifts    X 10 reps each    Theraputty - yellow  -molding/gripping  -finger spread  -log rolls with 3 pt pinches     -2'  -5 reps  -2 sets   Isospheres X 2'   In hand manipulation with med poms X 2 containers   Towel scrunches X 2'             Manual therapy techniques: Manual Lymphatic Drainage and Soft tissue Mobilization were applied to the: L hand for 10 minutes, including:  Performed scar massage to L palmar area with mini vibratory scar massager to decrease adhesions and improve tensile glide.       Patient Education and Home Exercises      Education provided:   - cont HEP  -issued an LMB size D to wear on and off during the day, 15 min at a time  - Progress towards goals     Written Home  Exercises Provided: Patient instructed to cont prior HEP.  Exercises were reviewed and Ada was able to demonstrate them prior to the end of the session.  Ada demonstrated good  understanding of the HEP provided. See EMR under Patient Instructions for exercises provided during therapy sessions.      ASSESSMENT     Pt would continue to benefit from skilled OT. Pt tolerated tx fairly well today. She reports her fingers are still stiff, painful, and has weakness. However, she does demonstrates some improvement in LF PIP extension. She participated well and is motivated.      Ada is progressing well towards her goals and there are no updates to goals at this time. Pt prognosis is Good.     Pt will continue to benefit from skilled outpatient occupational therapy to address the deficits listed in the problem list on initial evaluation, provide pt/family education and to maximize pt's level of independence in the home and community environment.     Pt's spiritual, cultural and educational needs considered and pt agreeable to plan of care and goals.    Anticipated barriers to occupational therapy: stiffness, scar adherent    Goals:  Long Term Goals (LTGs); to be met by discharge.  LTG #1: Pt will report a pain level of 1 out of 10 with ADLs, house hold tasks, and cooking             LTG #2: Pt will demo improved FOTO score by at least 20 points.   LTG #3: Pt will return to prior level of function for ADLs and household management.   LTG #4: Pt will demonstrate improved left digits AROM WFL with minimal ext lag for performing daily tasks  LTG #5: Pt will demonstrate improved left  strength by at least 5-10# for performing ADLs and other house hold tasks  LTG #6: Pt will demonstrate improved left pinch strength by at least 2-4 psi for opening things and turning keys     Short Term Goals (STGs); to be met within 4 weeks (5/27/22).  STG #1: Pt will report 3 out of 10 pain level with ADLs.  STG #2: Pt will report/demo  [place] with buttoning.  STG #3: Pt will report/demo [place] with unbuckling seatbelt.  STG #4: Pt will demonstrate independence with issued HEP.   STG #5: Pt will demo improved left IF/LF FRIAS by at least 20  Degrees each needed to aid with functional grasp of objects.      PLAN     Continue skilled occupational therapy with individualized plan of care focusing on maximizing functional use of hand    Updates/Grading for next session: cont to progress with ROM and strengthening as tolerated    MARV Alexis, SUNNYT

## 2022-05-02 ENCOUNTER — CLINICAL SUPPORT (OUTPATIENT)
Dept: REHABILITATION | Facility: HOSPITAL | Age: 62
End: 2022-05-02
Payer: MEDICARE

## 2022-05-02 DIAGNOSIS — M79.645 PAIN OF FINGER OF LEFT HAND: ICD-10-CM

## 2022-05-02 DIAGNOSIS — M25.642 FINGER STIFFNESS, LEFT: Primary | ICD-10-CM

## 2022-05-02 PROCEDURE — 97022 WHIRLPOOL THERAPY: CPT

## 2022-05-02 PROCEDURE — 97110 THERAPEUTIC EXERCISES: CPT

## 2022-05-02 PROCEDURE — 97140 MANUAL THERAPY 1/> REGIONS: CPT

## 2022-05-02 NOTE — PROGRESS NOTES
"  [facility]  Occupational Therapy Treatment Note    Date: 5/2/2022  Name: Dejah Schmidt  Clinic Number: [id number]    Therapy Diagnosis:   Encounter Diagnoses   Name Primary?    Finger stiffness, left Yes    Pain of finger of left hand      Physician: Russo-Digeorge, Jamie L*    Physician: Russo-Digeorge, Jamie L*; Dr. Cristopher Tolliver     Physician Orders: eval and treat  Medical Diagnosis: Trigger finger, left index finger [M65.322], Trigger finger, left middle finger [M65.332]     Surgical Procedure and Date: 3/9/22, s/p L IF/LF TFR / Date of Injury/Onset: reports for about a year and progressively got worse prior to surgery.  Evaluation Date: 4/27/22  Insurance Authorization Period Expiration: 5/25/22  Plan of Care Expiration: 6/24/22  Progress Note Due: 5/27/22   Date of Return to MD: not scheduled  Visit # / Visits authorized: 3 / 12  FOTO: initial eval      Precautions:  Standard    Time In: 12:00 pm  Time Out: 12:55 pm  Total Billable Time: 55 minutes      SUBJECTIVE     Pt reports: "it's bothering me today" Pt reports she has been doing HEP and wearing LMB. Pt with c/o pain and tightness in LF PIP.   She was compliant with home exercise program given last session.   Response to previous treatment:continued tightness, some improved ROM  Functional change: none noted    Pain: 5/10  Location: left LF     OBJECTIVE   Objective Measures updated at progress report unless specified.    Observation/Appearance: moderate scar adherent noted in palm, A1 pulley areas for IF/LF. Flexed posture noted of PIP.      Edema. Measured in centimeters.    4/27/2022 4/27/2022     Left Right   DPC 21 22.4   MCPs 20.6 21.2      Edema. Measured in centimeters.    4/27/2022 4/27/2022     Left Right   Index:         P1 7 6.5    PIP 6.5 6.5   P2 5.8 5.9    DIP 5.1 5.3   P3 4.8 5   Long:         P1 7.1 6.6    PIP 7.1 6.7   P2 6.4 6    DIP 5.4 5.3   P3 4.9 5      Elbow and Wrist ROM. Measured in degrees.    " 4/27/2022 4/27/2022     Left Right   Elbow Ext/Flex       Supination/Pronation       Wrist Ext/Flex 45/45 50/55   Wrist RD/UD WFL WFL      Hand ROM. Measured in degrees.  R hand WFL    4/27/2022     Left         Index: MP  70              PIP     8/80              DIP 42              FRIAS 184         Long:  MP 75              PIP 17/90              DIP 53              FRIAS 201         Ring:   MP                PIP                DIP                FRIAS WFL         Small:  MP                 PIP                 DIP                FRIAS WFL         Thumb: MP                  IP         Rad ADD/ABD         Pal ADD/ABD            Opposition         Strength (Dynamometer) and Pinch Strength (Pinch Gauge)  Measured in pounds.    4/27/2022 4/27/2022     Left Right   Rung II 14 37   Key Pinch NT NT   3pt Pinch 5 10   2pt Pinch 5.5 7         Treatment     Ada received the treatments listed below:     Supervised modalities after being cleared for contradictions: Paraffin bath -   Fluidotherapy: To L hand for 10 min, continuous air, 110 deg, air speed 100 to decrease pain, edema & scar tissue and increased tissue extensibility.          Therapeutic exercises to develop ROM and flexibility for 20 minutes, including: (10 min supervised)     AROM   DIP blocking  PIP blocking    X 10 reps each    Wave  Hook  straight fist, composite fist finger spreads finger lifts    X 10 reps each    Theraputty - yellow  -molding/gripping  -finger spread  -log rolls with 3 pt pinches     -2'  -5 reps  -2 sets   Isospheres X 2'   In hand manipulation with med poms X 2 containers   Towel scrunches X 2'   Digiextend, yellow 2 x 10 reps   Ergo gripper, 2 yellow bands 2 x 10 reps         Manual therapy techniques: Manual Lymphatic Drainage and Soft tissue Mobilization were applied to the: L hand for 10 minutes, including:  Performed scar massage to L palmar area with mini vibratory scar massager and scar pump to decrease adhesions and improve tensile  glide.       Fabricated a hand based LF volar extension gutter orthosis to L LF to improve extension of PIP joint.  Instructed to wear nightly when sleeping, and wear LMB during day 2-3x/day.  Instructed to monitor for pain/pressure, increased edema, or redness and to contact office for adjustments as needed. Patient reported good understanding of orthotic wear and care schedule. (x 15 min)        Patient Education and Home Exercises      Education provided:   - cont HEP  -issued an LMB size D to wear on and off during the day, 15 min at a time  - Progress towards goals     Written Home Exercises Provided: Patient instructed to cont prior HEP.  Exercises were reviewed and Ada was able to demonstrate them prior to the end of the session.  Ada demonstrated good  understanding of the HEP provided. See EMR under Patient Instructions for exercises provided during therapy sessions.      ASSESSMENT     Pt would continue to benefit from skilled OT. Pt tolerated tx fairly well today. She reports her fingers are still stiff, painful, and has weakness, especially with LF. However, she does demonstrates some improvement in LF PIP extension. Good fit noted of night time extension orthosis. She participated well and is motivated.  Will cont to progress with ROM and strengthening.     Ada is progressing well towards her goals and there are no updates to goals at this time. Pt prognosis is Good.     Pt will continue to benefit from skilled outpatient occupational therapy to address the deficits listed in the problem list on initial evaluation, provide pt/family education and to maximize pt's level of independence in the home and community environment.     Pt's spiritual, cultural and educational needs considered and pt agreeable to plan of care and goals.    Anticipated barriers to occupational therapy: stiffness, scar adherent    Goals:  Long Term Goals (LTGs); to be met by discharge.  LTG #1: Pt will report a pain level of 1 out  of 10 with ADLs, house hold tasks, and cooking --progressing         LTG #2: Pt will demo improved FOTO score by at least 20 points.  --progressing    LTG #3: Pt will return to prior level of function for ADLs and household management.  --progressing    LTG #4: Pt will demonstrate improved left digits AROM WFL with minimal ext lag for performing daily tasks --progressing    LTG #5: Pt will demonstrate improved left  strength by at least 5-10# for performing ADLs and other house hold tasks --progressing    LTG #6: Pt will demonstrate improved left pinch strength by at least 2-4 psi for opening things and turning keys --progressing       Short Term Goals (STGs); to be met within 4 weeks (5/27/22).  STG #1: Pt will report 3 out of 10 pain level with ADLs. --progressing    STG #2: Pt will report/demo [place] with buttoning. --progressing    STG #3: Pt will report/demo [place] with unbuckling seatbelt. --progressing    STG #4: Pt will demonstrate independence with issued HEP.  --progressing    STG #5: Pt will demo improved left IF/LF FRIAS by at least 20  Degrees each needed to aid with functional grasp of objects. --progressing        PLAN     Continue skilled occupational therapy with individualized plan of care focusing on maximizing functional use of hand    Updates/Grading for next session: cont to progress with ROM and strengthening as tolerated. Measure next session.     MARV Alexis, CHT

## 2022-05-03 ENCOUNTER — PATIENT MESSAGE (OUTPATIENT)
Dept: SURGERY | Facility: HOSPITAL | Age: 62
End: 2022-05-03
Payer: MEDICARE

## 2022-05-08 ENCOUNTER — PATIENT MESSAGE (OUTPATIENT)
Dept: NEUROSURGERY | Facility: CLINIC | Age: 62
End: 2022-05-08
Payer: MEDICARE

## 2022-05-09 ENCOUNTER — CLINICAL SUPPORT (OUTPATIENT)
Dept: REHABILITATION | Facility: HOSPITAL | Age: 62
End: 2022-05-09
Payer: MEDICARE

## 2022-05-09 DIAGNOSIS — M25.642 FINGER STIFFNESS, LEFT: Primary | ICD-10-CM

## 2022-05-09 DIAGNOSIS — M79.645 PAIN OF FINGER OF LEFT HAND: ICD-10-CM

## 2022-05-09 DIAGNOSIS — M54.16 LUMBAR RADICULOPATHY: Primary | ICD-10-CM

## 2022-05-09 PROCEDURE — 97022 WHIRLPOOL THERAPY: CPT

## 2022-05-09 PROCEDURE — 97110 THERAPEUTIC EXERCISES: CPT

## 2022-05-09 PROCEDURE — 97140 MANUAL THERAPY 1/> REGIONS: CPT

## 2022-05-09 NOTE — PROGRESS NOTES
"  [facility]  Occupational Therapy Treatment Note    Date: 5/9/2022  Name: Dejah Schmidt  Clinic Number: [id number]    Therapy Diagnosis:   Encounter Diagnoses   Name Primary?    Finger stiffness, left Yes    Pain of finger of left hand      Physician: Russo-Digeorge, Jamie L*    Physician: Russo-Digeorge, Jamie L*; Dr. Cristopher Tolliver     Physician Orders: eval and treat  Medical Diagnosis: Trigger finger, left index finger [M65.322], Trigger finger, left middle finger [M65.332]     Surgical Procedure and Date: 3/9/22, s/p L IF/LF TFR / Date of Injury/Onset: reports for about a year and progressively got worse prior to surgery.  Evaluation Date: 4/27/22  Insurance Authorization Period Expiration: 5/25/22  Plan of Care Expiration: 6/24/22  Progress Note Due: 5/27/22   Date of Return to MD: not scheduled  Visit # / Visits authorized: 4 / 12  FOTO: initial eval      Precautions:  Standard    Time In: 2:55 pm  Time Out: 3:50 pm  Total Billable Time: 55 minutes      SUBJECTIVE     Pt reports: "it's doing much better." Pt reports she has been doing HEP, wearing night time orthosis, and wearing LMB. Pt reports improved pain today  She was compliant with home exercise program given last session.   Response to previous treatment: improved pain, strength, and ROM  Functional change: using hand for light tasks, tolerating custom orthosis and HEP    Pain: 0/10  Location: left LF     OBJECTIVE   Objective Measures updated at progress report unless specified.    Observation/Appearance: moderate scar adherent noted in palm, A1 pulley areas for IF/LF. Flexed posture noted of PIP.      Edema. Measured in centimeters.    4/27/2022 4/27/2022     Left Right   DPC 21 22.4   MCPs 20.6 21.2      Edema. Measured in centimeters.    4/27/2022 4/27/2022 5/9/22     Left Right Left   Index:          P1 7 6.5 6.9    PIP 6.5 6.5 6.4   P2 5.8 5.9 5.7    DIP 5.1 5.3 5.2   P3 4.8 5 4.8   Long:          P1 7.1 6.6 6.8    PIP " 7.1 6.7 6.9   P2 6.4 6 6.1    DIP 5.4 5.3 5.5   P3 4.9 5 5.1      Elbow and Wrist ROM. Measured in degrees.    4/27/2022 4/27/2022     Left Right   Elbow Ext/Flex       Supination/Pronation       Wrist Ext/Flex 45/45 50/55   Wrist RD/UD WFL WFL      Hand ROM. Measured in degrees.  R hand WFL    4/27/2022 5/9/22     Left Left          Index: MP  70 72              PIP     8/80 5/95              DIP 42 60              FRIAS 184 222 (+38)          Long:  MP 75 80              PIP 17/90 10/95              DIP 53 54              FRIAS 201 219 (+18)          Ring:   MP                 PIP                 DIP                 FRIAS WFL           Small:  MP                  PIP                  DIP                 FRIAS WFL           Thumb: MP                   IP          Rad ADD/ABD          Pal ADD/ABD             Opposition          Strength (Dynamometer) and Pinch Strength (Pinch Gauge)  Measured in pounds.    4/27/2022 4/27/2022 5/9/22     Left Right Left   Rung II 14 37 40 (+26)   Key Pinch NT NT NT   3pt Pinch 5 10 9.5 (+4.5)   2pt Pinch 5.5 7 8 (+2.5)         Treatment     Ada received the treatments listed below:     Supervised modalities after being cleared for contradictions:   Fluidotherapy: To L hand for 10 min, continuous air, 110 deg, air speed 100 to decrease pain, edema & scar tissue and increased tissue extensibility.          Therapeutic exercises to develop ROM and flexibility for 35 minutes, including: (10 min supervised)     AROM   DIP blocking  PIP blocking    X 10 reps each    Wave  Hook  straight fist, composite fist finger spreads finger lifts    X 10 reps each    Theraputty - red  -molding/gripping  -finger spread  -log rolls with 3 pt pinches     -2'  -5 reps  -2 sets   Isospheres X 2'   In hand manipulation with coins X 2 containers   LF pompom flicks X 3 containers, large poms   Digiextend, yellow 2 x 10 reps   Ergo gripper, 2 yellow bands 2 x 10 reps         Manual therapy techniques: Manual  Lymphatic Drainage and Soft tissue Mobilization were applied to the: L hand for 10 minutes, including:  Performed scar massage to L palmar area with mini vibratory scar massager and scar pump to decrease adhesions and improve tensile glide.             Patient Education and Home Exercises      Education provided:   - cont HEP, upgraded to red putty  -issued an LMB size D to wear on and off during the day, 15 min at a time; cont night time orthosis  - Progress towards goals     Written Home Exercises Provided: Patient instructed to cont prior HEP.  Exercises were reviewed and Ada was able to demonstrate them prior to the end of the session.  Ada demonstrated good  understanding of the HEP provided. See EMR under Patient Instructions for exercises provided during therapy sessions.      ASSESSMENT     Pt would continue to benefit from skilled OT. Pt tolerated tx well today. She reports improved pain and stiffness today. Per measurements, she demonstrates improved swelling, ROM, and strength in  and pinch. Mild flex contracture still noted of LF PIP but improved. Decreased scar adhesions noted, scars more pliable.   She participated well and is motivated.  Will cont to progress with ROM and strengthening.     Ada is progressing well towards her goals and there are no updates to goals at this time. Pt prognosis is Good.     Pt will continue to benefit from skilled outpatient occupational therapy to address the deficits listed in the problem list on initial evaluation, provide pt/family education and to maximize pt's level of independence in the home and community environment.     Pt's spiritual, cultural and educational needs considered and pt agreeable to plan of care and goals.    Anticipated barriers to occupational therapy: stiffness, scar adherent    Goals:  Long Term Goals (LTGs); to be met by discharge.  LTG #1: Pt will report a pain level of 1 out of 10 with ADLs, house hold tasks, and cooking --progressing          LTG #2: Pt will demo improved FOTO score by at least 20 points.  --progressing    LTG #3: Pt will return to prior level of function for ADLs and household management.  --progressing    LTG #4: Pt will demonstrate improved left digits AROM WFL with minimal ext lag for performing daily tasks --progressing    LTG #5: Pt will demonstrate improved left  strength by at least 5-10# for performing ADLs and other house hold tasks --progressing    LTG #6: Pt will demonstrate improved left pinch strength by at least 2-4 psi for opening things and turning keys --progressing       Short Term Goals (STGs); to be met within 4 weeks (5/27/22).  STG #1: Pt will report 3 out of 10 pain level with ADLs. --progressing    STG #2: Pt will report/demo [place] with buttoning. --progressing    STG #3: Pt will report/demo [place] with unbuckling seatbelt. --progressing    STG #4: Pt will demonstrate independence with issued HEP.  --progressing    STG #5: Pt will demo improved left IF/LF FRIAS by at least 20  Degrees each needed to aid with functional grasp of objects. --progressing        PLAN     Continue skilled occupational therapy with individualized plan of care focusing on maximizing functional use of hand    Updates/Grading for next session: cont to progress with ROM and strengthening as tolerated.     MARV Alexis, CHT

## 2022-05-10 ENCOUNTER — TELEPHONE (OUTPATIENT)
Dept: ADMINISTRATIVE | Facility: OTHER | Age: 62
End: 2022-05-10
Payer: MEDICARE

## 2022-05-10 ENCOUNTER — TELEPHONE (OUTPATIENT)
Dept: NEUROSURGERY | Facility: CLINIC | Age: 62
End: 2022-05-10
Payer: MEDICARE

## 2022-05-10 ENCOUNTER — PATIENT MESSAGE (OUTPATIENT)
Dept: PAIN MEDICINE | Facility: OTHER | Age: 62
End: 2022-05-10
Payer: MEDICARE

## 2022-05-10 NOTE — TELEPHONE ENCOUNTER
----- Message from Giulia Parker NP sent at 5/10/2022  1:28 PM CDT -----  Regarding: RE: need additional information  She will need to do a virtual with me so that I can document a note with that information so that insurance will approve the injection.   ----- Message -----  From: Ivana Rocha LPN  Sent: 5/10/2022   1:24 PM CDT  To: Giulia Parker NP  Subject: FW: need additional information                    ----- Message -----  From: Jefry Fine MA  Sent: 5/10/2022   1:09 PM CDT  To: Ivana Rocha LPN  Subject: FW: need additional information                    ----- Message -----  From: Kirstin Castillo  Sent: 5/10/2022  12:43 PM CDT  To: Jovan Vaughan Staff  Subject: need additional information                      Good afternoon insurance is requesting up to date notes and the pain relief from previous injection that was done on 10/21/2021

## 2022-05-11 ENCOUNTER — OFFICE VISIT (OUTPATIENT)
Dept: NEUROSURGERY | Facility: CLINIC | Age: 62
End: 2022-05-11
Payer: MEDICARE

## 2022-05-11 DIAGNOSIS — M54.16 LUMBAR RADICULOPATHY: Primary | ICD-10-CM

## 2022-05-11 DIAGNOSIS — M41.50 DEGENERATIVE SCOLIOSIS IN ADULT PATIENT: ICD-10-CM

## 2022-05-11 DIAGNOSIS — M48.061 NEURAL FORAMINAL STENOSIS OF LUMBAR SPINE: ICD-10-CM

## 2022-05-11 PROCEDURE — 4010F PR ACE/ARB THEARPY RXD/TAKEN: ICD-10-PCS | Mod: CPTII,95,, | Performed by: NURSE PRACTITIONER

## 2022-05-11 PROCEDURE — 1160F PR REVIEW ALL MEDS BY PRESCRIBER/CLIN PHARMACIST DOCUMENTED: ICD-10-PCS | Mod: CPTII,95,, | Performed by: NURSE PRACTITIONER

## 2022-05-11 PROCEDURE — 1159F PR MEDICATION LIST DOCUMENTED IN MEDICAL RECORD: ICD-10-PCS | Mod: CPTII,95,, | Performed by: NURSE PRACTITIONER

## 2022-05-11 PROCEDURE — 99441 PR PHYSICIAN TELEPHONE EVALUATION 5-10 MIN: CPT | Mod: 95,,, | Performed by: NURSE PRACTITIONER

## 2022-05-11 PROCEDURE — 4010F ACE/ARB THERAPY RXD/TAKEN: CPT | Mod: CPTII,95,, | Performed by: NURSE PRACTITIONER

## 2022-05-11 PROCEDURE — 1159F MED LIST DOCD IN RCRD: CPT | Mod: CPTII,95,, | Performed by: NURSE PRACTITIONER

## 2022-05-11 PROCEDURE — 1160F RVW MEDS BY RX/DR IN RCRD: CPT | Mod: CPTII,95,, | Performed by: NURSE PRACTITIONER

## 2022-05-11 PROCEDURE — 99441 PR PHYSICIAN TELEPHONE EVALUATION 5-10 MIN: ICD-10-PCS | Mod: 95,,, | Performed by: NURSE PRACTITIONER

## 2022-05-11 NOTE — PROGRESS NOTES
Established Patient - Audio Only Telehealth Visit     The patient location is: Home  The chief complaint leading to consultation is: Injection  Visit type: Virtual visit with audio only (telephone)  Total time spent with patient: 10 minutes       The reason for the audio only service rather than synchronous audio and video virtual visit was related to technical difficulties or patient preference/necessity.     Each patient to whom I provide medical services by telemedicine is:  (1) informed of the relationship between the physician and patient and the respective role of any other health care provider with respect to management of the patient; and (2) notified that they may decline to receive medical services by telemedicine and may withdraw from such care at any time. Patient verbally consented to receive this service via voice-only telephone call.       HPI/Plan: Dejah Schmidt is a 61 y.o. female seen virtually today to discuss concerns with worsening low back that radiates down the legs. After her last appointment with Dr. Scott it was discussed that that the patient should exhaust all conservative therapy prior to proceeding with deformity surgery. She obtained the L5/S1 DB with Dr. Aldana on 10/27/21. States that she obtained 100% relief until about 2 weeks ago. The patient is interested in obtaining an additional injection as she benefited greatly from the previous injection. I have placed the order and she is scheduled later this month.     I would like the patient to follow-up in clinic as needed. I have encouraged her to contact the clinic with any questions, concerns, or adverse clinical changes. She verbalized understanding.          Giulia Parker, WILL-C  Neurosurgery  [facility]. [place].           This service was not originating from a related E/M service provided within the previous 7 days nor will  to an E/M service or procedure within the next 24 hours or my soonest  available appointment.  Prevailing standard of care was able to be met in this audio-only visit.

## 2022-05-13 ENCOUNTER — CLINICAL SUPPORT (OUTPATIENT)
Dept: REHABILITATION | Facility: HOSPITAL | Age: 62
End: 2022-05-13
Payer: MEDICARE

## 2022-05-13 DIAGNOSIS — M79.645 PAIN OF FINGER OF LEFT HAND: ICD-10-CM

## 2022-05-13 DIAGNOSIS — M25.642 FINGER STIFFNESS, LEFT: Primary | ICD-10-CM

## 2022-05-13 PROCEDURE — 97110 THERAPEUTIC EXERCISES: CPT | Mod: CO

## 2022-05-13 PROCEDURE — 97140 MANUAL THERAPY 1/> REGIONS: CPT | Mod: CO

## 2022-05-13 PROCEDURE — 97022 WHIRLPOOL THERAPY: CPT | Mod: CO

## 2022-05-13 NOTE — PROGRESS NOTES
"  [facility]  Occupational Therapy Treatment Note    Date: 5/13/2022  Name: Dejah Schmidt  Clinic Number: [id number]    Therapy Diagnosis:   Encounter Diagnoses   Name Primary?    Finger stiffness, left Yes    Pain of finger of left hand      Physician: Russo-Digeorge, Jamie L*    Physician: Russo-Digeorge, Jamie L*; Dr. Cristopher Tolliver     Physician Orders: eval and treat  Medical Diagnosis: Trigger finger, left index finger [M65.322], Trigger finger, left middle finger [M65.332]     Surgical Procedure and Date: 3/9/22, s/p L IF/LF TFR / Date of Injury/Onset: reports for about a year and progressively got worse prior to surgery.  Evaluation Date: 4/27/22  Insurance Authorization Period Expiration: 5/25/22  Plan of Care Expiration: 6/24/22  Progress Note Due: 5/27/22   Date of Return to MD: not scheduled  Visit # / Visits authorized: 4 / 12  FOTO: initial eval      Precautions:  Standard    Time In: 2:00 pm  Time Out: 2:53 pm  Total Billable Time: 53 minutes      SUBJECTIVE     Pt reports: "it's much straighter than it was before."  She was compliant with home exercise program given last session.   Response to previous treatment: improved pain, strength, and ROM  Functional change: using hand for light tasks, tolerating custom orthosis and HEP    Pain: 0/10  Location: left LF     OBJECTIVE   Objective Measures updated at progress report unless specified.    Observation/Appearance: moderate scar adherent noted in palm, A1 pulley areas for IF/LF. Flexed posture noted of PIP.      Edema. Measured in centimeters.    4/27/2022 4/27/2022     Left Right   DPC 21 22.4   MCPs 20.6 21.2      Edema. Measured in centimeters.    4/27/2022 4/27/2022 5/9/22     Left Right Left   Index:          P1 7 6.5 6.9    PIP 6.5 6.5 6.4   P2 5.8 5.9 5.7    DIP 5.1 5.3 5.2   P3 4.8 5 4.8   Long:          P1 7.1 6.6 6.8    PIP 7.1 6.7 6.9   P2 6.4 6 6.1    DIP 5.4 5.3 5.5   P3 4.9 5 5.1      Elbow and Wrist ROM. " Measured in degrees.    4/27/2022 4/27/2022     Left Right   Elbow Ext/Flex       Supination/Pronation       Wrist Ext/Flex 45/45 50/55   Wrist RD/UD WFL WFL      Hand ROM. Measured in degrees.  R hand WFL    4/27/2022 5/9/22     Left Left          Index: MP  70 72              PIP     8/80 5/95              DIP 42 60              FRIAS 184 222 (+38)          Long:  MP 75 80              PIP 17/90 10/95              DIP 53 54              FRIAS 201 219 (+18)          Ring:   MP                 PIP                 DIP                 FRIAS WFL           Small:  MP                  PIP                  DIP                 FRIAS WFL           Thumb: MP                   IP          Rad ADD/ABD          Pal ADD/ABD             Opposition          Strength (Dynamometer) and Pinch Strength (Pinch Gauge)  Measured in pounds.    4/27/2022 4/27/2022 5/9/22     Left Right Left   Rung II 14 37 40 (+26)   Key Pinch NT NT NT   3pt Pinch 5 10 9.5 (+4.5)   2pt Pinch 5.5 7 8 (+2.5)         Treatment     Ada received the treatments listed below:     Supervised modalities after being cleared for contradictions:   Fluidotherapy: To L hand for 10 min, continuous air, 110 deg, air speed 100 to decrease pain, edema & scar tissue and increased tissue extensibility.          Therapeutic exercises to develop ROM and flexibility for 33 minutes, including:      AROM   DIP blocking  PIP blocking    X 10 reps each    Wave  Hook  straight fist, composite fist finger spreads finger lifts    X 10 reps each    Theraputty - red  -molding/gripping  -finger spread  -log rolls with 3 pt pinches     -2'  -5 reps  -2 sets   Isospheres X 2'   In hand manipulation with coins X 2 containers   LF/IF pompom flicks X 2 containers ea, large poms   Digiextend, yellow 2 x 10 reps   Ergo gripper, 2 yellow bands 2 x 15 reps         Manual therapy techniques: Manual Lymphatic Drainage and Soft tissue Mobilization were applied to the: L hand for 10 minutes,  including:  Performed scar massage to L palmar area with mini vibratory scar massager and scar pump to decrease adhesions and improve tensile glide.             Patient Education and Home Exercises      Education provided:   - cont HEP, upgraded to red putty  -issued an LMB size D to wear on and off during the day, 15 min at a time; cont night time orthosis  - Progress towards goals     Written Home Exercises Provided: Patient instructed to cont prior HEP.  Exercises were reviewed and Ada was able to demonstrate them prior to the end of the session.  Ada demonstrated good  understanding of the HEP provided. See EMR under Patient Instructions for exercises provided during therapy sessions.      ASSESSMENT     Pt would continue to benefit from skilled OT. Pt tolerated tx well today with incr reps for strengthening.  Mild flex contracture still noted of LF PIP but improved. Decreased scar adhesions noted, scars more pliable.  She participated well and is motivated.  Will cont to progress with ROM and strengthening.    Ada is progressing well towards her goals and there are no updates to goals at this time. Pt prognosis is Good.     Pt will continue to benefit from skilled outpatient occupational therapy to address the deficits listed in the problem list on initial evaluation, provide pt/family education and to maximize pt's level of independence in the home and community environment.     Pt's spiritual, cultural and educational needs considered and pt agreeable to plan of care and goals.    Anticipated barriers to occupational therapy: stiffness, scar adherent    Goals:  Long Term Goals (LTGs); to be met by discharge.  LTG #1: Pt will report a pain level of 1 out of 10 with ADLs, house hold tasks, and cooking --progressing         LTG #2: Pt will demo improved FOTO score by at least 20 points.  --progressing    LTG #3: Pt will return to prior level of function for ADLs and household management.  --progressing    LTG  #4: Pt will demonstrate improved left digits AROM WFL with minimal ext lag for performing daily tasks --progressing    LTG #5: Pt will demonstrate improved left  strength by at least 5-10# for performing ADLs and other house hold tasks --progressing    LTG #6: Pt will demonstrate improved left pinch strength by at least 2-4 psi for opening things and turning keys --progressing       Short Term Goals (STGs); to be met within 4 weeks (5/27/22).  STG #1: Pt will report 3 out of 10 pain level with ADLs. --progressing    STG #2: Pt will report/demo [place] with buttoning. --progressing    STG #3: Pt will report/demo [place] with unbuckling seatbelt. --progressing    STG #4: Pt will demonstrate independence with issued HEP.  --progressing    STG #5: Pt will demo improved left IF/LF FRIAS by at least 20  Degrees each needed to aid with functional grasp of objects. --progressing        PLAN     Continue skilled occupational therapy with individualized plan of care focusing on maximizing functional use of hand    Updates/Grading for next session: cont to progress with ROM and strengthening as tolerated.     JACLYN Davies/L        Client Care conference completed with evaluating therapist in regards to this patients POC as evidenced by co signature of supervising therapist.

## 2022-05-18 ENCOUNTER — CLINICAL SUPPORT (OUTPATIENT)
Dept: REHABILITATION | Facility: HOSPITAL | Age: 62
End: 2022-05-18
Payer: MEDICARE

## 2022-05-18 DIAGNOSIS — M79.645 PAIN OF FINGER OF LEFT HAND: ICD-10-CM

## 2022-05-18 DIAGNOSIS — M25.642 FINGER STIFFNESS, LEFT: Primary | ICD-10-CM

## 2022-05-18 PROCEDURE — 97140 MANUAL THERAPY 1/> REGIONS: CPT

## 2022-05-18 PROCEDURE — 97110 THERAPEUTIC EXERCISES: CPT

## 2022-05-18 PROCEDURE — 97022 WHIRLPOOL THERAPY: CPT

## 2022-05-18 NOTE — PROGRESS NOTES
"  [facility]  Occupational Therapy Treatment Note    Date: 5/18/2022  Name: Dejah Schmidt  Clinic Number: [id number]    Therapy Diagnosis:   Encounter Diagnoses   Name Primary?    Finger stiffness, left Yes    Pain of finger of left hand      Physician: Russo-Digeorge, Jamie L*    Physician: Russo-Digeorge, Jamie L*; Dr. Cristopher Tolliver     Physician Orders: eval and treat  Medical Diagnosis: Trigger finger, left index finger [M65.322], Trigger finger, left middle finger [M65.332]     Surgical Procedure and Date: 3/9/22, s/p L IF/LF TFR / Date of Injury/Onset: reports for about a year and progressively got worse prior to surgery.  Evaluation Date: 4/27/22  Insurance Authorization Period Expiration: 5/25/22  Plan of Care Expiration: 6/24/22  Progress Note Due: 5/27/22   Date of Return to MD: not scheduled  Visit # / Visits authorized: 5 / 12  FOTO: initial eval      Precautions:  Standard    Time In: 3:05 pm  Time Out: 4:00 pm  Total Billable Time: 55 minutes      SUBJECTIVE     Pt reports: "it's good." Pt denies pain and reports only mild discomfort.   She was compliant with home exercise program given last session.   Response to previous treatment: improved pain, strength, and ROM  Functional change: using hand for light tasks, tolerating custom orthosis and HEP    Pain: 0/10  Location: left LF     OBJECTIVE   Objective Measures updated at progress report unless specified.    Observation/Appearance: moderate scar adherent noted in palm, A1 pulley areas for IF/LF. Flexed posture noted of PIP.      Edema. Measured in centimeters.    4/27/2022 4/27/2022     Left Right   DPC 21 22.4   MCPs 20.6 21.2      Edema. Measured in centimeters.    4/27/2022 4/27/2022 5/9/22     Left Right Left   Index:          P1 7 6.5 6.9    PIP 6.5 6.5 6.4   P2 5.8 5.9 5.7    DIP 5.1 5.3 5.2   P3 4.8 5 4.8   Long:          P1 7.1 6.6 6.8    PIP 7.1 6.7 6.9   P2 6.4 6 6.1    DIP 5.4 5.3 5.5   P3 4.9 5 5.1      Elbow " and Wrist ROM. Measured in degrees.    4/27/2022 4/27/2022     Left Right   Elbow Ext/Flex       Supination/Pronation       Wrist Ext/Flex 45/45 50/55   Wrist RD/UD WFL WFL      Hand ROM. Measured in degrees.  R hand WFL    4/27/2022 5/9/22     Left Left          Index: MP  70 72              PIP     8/80 5/95              DIP 42 60              FRIAS 184 222 (+38)          Long:  MP 75 80              PIP 17/90 10/95              DIP 53 54              FRIAS 201 219 (+18)          Ring:   MP                 PIP                 DIP                 FRIAS WFL           Small:  MP                  PIP                  DIP                 FRIAS WFL           Thumb: MP                   IP          Rad ADD/ABD          Pal ADD/ABD             Opposition          Strength (Dynamometer) and Pinch Strength (Pinch Gauge)  Measured in pounds.    4/27/2022 4/27/2022 5/9/22     Left Right Left   Rung II 14 37 40 (+26)   Key Pinch NT NT NT   3pt Pinch 5 10 9.5 (+4.5)   2pt Pinch 5.5 7 8 (+2.5)         Treatment     Ada received the treatments listed below:     Supervised modalities after being cleared for contradictions:   Fluidotherapy: To L hand for 10 min, continuous air, 110 deg, air speed 100 to decrease pain, edema & scar tissue and increased tissue extensibility.          Therapeutic exercises to develop ROM and flexibility for 35 minutes, including:      AROM   DIP blocking  PIP blocking    X 10 reps each    Wave  Hook  straight fist, composite fist finger spreads finger lifts    X 10 reps each    Theraputty - red  -molding/gripping  -finger spread  -log rolls with 3 pt pinches     -2'  -5 reps  -2 sets   Isospheres X 2'   In hand manipulation with small buttons X 2 containers   LF/IF pompom flicks X 3 containers ea, large poms   Digiflex, green, individual and composite X 15 reps each   Digiextend, yellow 2 x 10 reps   Ergo gripper, 1 yellow 1 red bands 2 x 15 reps   Yellow flexbar, twists X 10 reps each way         Manual  therapy techniques: Manual Lymphatic Drainage and Soft tissue Mobilization were applied to the: L hand for 10 minutes, including:  Performed scar massage to L palmar area with mini vibratory scar massager and scar pump to decrease adhesions and improve tensile glide.             Patient Education and Home Exercises      Education provided:   - cont HEP, upgraded to red putty  -issued an LMB size D to wear on and off during the day, 15 min at a time; cont night time orthosis  - Progress towards goals     Written Home Exercises Provided: Patient instructed to cont prior HEP.  Exercises were reviewed and Ada was able to demonstrate them prior to the end of the session.  Ada demonstrated good  understanding of the HEP provided. See EMR under Patient Instructions for exercises provided during therapy sessions.      ASSESSMENT     Pt would continue to benefit from skilled OT. Pt tolerated tx well today and able to progress with light strengthening.  Mild flex contracture still noted of LF PIP but improved. Scar more pliable. Pt reports little to no pain overall.  She participated well and is motivated.  Will cont to progress with ROM and strengthening.    Ada is progressing well towards her goals and there are no updates to goals at this time. Pt prognosis is Good.     Pt will continue to benefit from skilled outpatient occupational therapy to address the deficits listed in the problem list on initial evaluation, provide pt/family education and to maximize pt's level of independence in the home and community environment.     Pt's spiritual, cultural and educational needs considered and pt agreeable to plan of care and goals.    Anticipated barriers to occupational therapy: stiffness, scar adherent    Goals:  Long Term Goals (LTGs); to be met by discharge.  LTG #1: Pt will report a pain level of 1 out of 10 with ADLs, house hold tasks, and cooking --progressing         LTG #2: Pt will demo improved FOTO score by at least  20 points.  --progressing    LTG #3: Pt will return to prior level of function for ADLs and household management.  --progressing    LTG #4: Pt will demonstrate improved left digits AROM WFL with minimal ext lag for performing daily tasks --progressing    LTG #5: Pt will demonstrate improved left  strength by at least 5-10# for performing ADLs and other house hold tasks --progressing    LTG #6: Pt will demonstrate improved left pinch strength by at least 2-4 psi for opening things and turning keys --progressing       Short Term Goals (STGs); to be met within 4 weeks (5/27/22).  STG #1: Pt will report 3 out of 10 pain level with ADLs. --progressing    STG #2: Pt will report/demo [place] with buttoning. --progressing    STG #3: Pt will report/demo [place] with unbuckling seatbelt. --progressing    STG #4: Pt will demonstrate independence with issued HEP.  --progressing    STG #5: Pt will demo improved left IF/LF FRIAS by at least 20  Degrees each needed to aid with functional grasp of objects. --progressing        PLAN     Continue skilled occupational therapy with individualized plan of care focusing on maximizing functional use of hand    Updates/Grading for next session: cont to progress with ROM and strengthening as tolerated.     MARV Alexis, CHT

## 2022-05-23 ENCOUNTER — CLINICAL SUPPORT (OUTPATIENT)
Dept: REHABILITATION | Facility: HOSPITAL | Age: 62
End: 2022-05-23
Payer: MEDICARE

## 2022-05-23 DIAGNOSIS — M79.645 PAIN OF FINGER OF LEFT HAND: ICD-10-CM

## 2022-05-23 DIAGNOSIS — M25.642 FINGER STIFFNESS, LEFT: Primary | ICD-10-CM

## 2022-05-23 PROCEDURE — 97110 THERAPEUTIC EXERCISES: CPT | Mod: CO

## 2022-05-23 PROCEDURE — 97140 MANUAL THERAPY 1/> REGIONS: CPT | Mod: CO

## 2022-05-23 PROCEDURE — 97022 WHIRLPOOL THERAPY: CPT | Mod: CO

## 2022-05-23 NOTE — PROGRESS NOTES
"  [facility]  Occupational Therapy Treatment Note    Date: 5/23/2022  Name: Dejah Schmidt  Clinic Number: [id number]    Therapy Diagnosis:   Encounter Diagnoses   Name Primary?    Finger stiffness, left Yes    Pain of finger of left hand      Physician: Russo-Digeorge, Jamie L*    Physician: Russo-Digeorge, Jamie L*; Dr. Cristopher Tolliver     Physician Orders: eval and treat  Medical Diagnosis: Trigger finger, left index finger [M65.322], Trigger finger, left middle finger [M65.332]     Surgical Procedure and Date: 3/9/22, s/p L IF/LF TFR / Date of Injury/Onset: reports for about a year and progressively got worse prior to surgery.  Evaluation Date: 4/27/22  Insurance Authorization Period Expiration: 6/23/22  Plan of Care Expiration: 6/24/22  Progress Note Due: 5/27/22   Date of Return to MD: not scheduled  Visit # / Visits authorized: 6 / 12  FOTO: initial eval      Precautions:  Standard    Time In: 2:00 pm  Time Out: 2:47 pm  Total Billable Time: 47 minutes      SUBJECTIVE     Pt reports: "My hand is feeling much better."  She was compliant with home exercise program given last session.   Response to previous treatment: improved pain, strength, and ROM  Functional change: using hand for light tasks, tolerating custom orthosis and HEP    Pain: 0/10  Location: left LF     OBJECTIVE   Objective Measures updated at progress report unless specified.    Observation/Appearance: moderate scar adherent noted in palm, A1 pulley areas for IF/LF. Flexed posture noted of PIP.      Edema. Measured in centimeters.    4/27/2022 4/27/2022     Left Right   DPC 21 22.4   MCPs 20.6 21.2      Edema. Measured in centimeters.    4/27/2022 4/27/2022 5/9/22     Left Right Left   Index:          P1 7 6.5 6.9    PIP 6.5 6.5 6.4   P2 5.8 5.9 5.7    DIP 5.1 5.3 5.2   P3 4.8 5 4.8   Long:          P1 7.1 6.6 6.8    PIP 7.1 6.7 6.9   P2 6.4 6 6.1    DIP 5.4 5.3 5.5   P3 4.9 5 5.1      Elbow and Wrist ROM. Measured in " degrees.    4/27/2022 4/27/2022     Left Right   Elbow Ext/Flex       Supination/Pronation       Wrist Ext/Flex 45/45 50/55   Wrist RD/UD WFL WFL      Hand ROM. Measured in degrees.  R hand WFL    4/27/2022 5/9/22     Left Left          Index: MP  70 72              PIP     8/80 5/95              DIP 42 60              FRIAS 184 222 (+38)          Long:  MP 75 80              PIP 17/90 10/95              DIP 53 54              FRIAS 201 219 (+18)          Ring:   MP                 PIP                 DIP                 FRIAS WFL           Small:  MP                  PIP                  DIP                 FRIAS WFL           Thumb: MP                   IP          Rad ADD/ABD          Pal ADD/ABD             Opposition          Strength (Dynamometer) and Pinch Strength (Pinch Gauge)  Measured in pounds.    4/27/2022 4/27/2022 5/9/22     Left Right Left   Rung II 14 37 40 (+26)   Key Pinch NT NT NT   3pt Pinch 5 10 9.5 (+4.5)   2pt Pinch 5.5 7 8 (+2.5)         Treatment     Ada received the treatments listed below:     Supervised modalities after being cleared for contradictions:   Fluidotherapy: To L hand for 10 min, continuous air, 110 deg, air speed 100 to decrease pain, edema & scar tissue and increased tissue extensibility.          Therapeutic exercises to develop ROM and flexibility for 29 minutes, including:      AROM   DIP blocking  PIP blocking    X 10 reps each    Wave  Hook  straight fist, composite fist finger spreads finger lifts    X 10 reps each    Theraputty - red  -molding/gripping  -finger spread  -log rolls with 3 pt pinches   (NT)  -2'  -5 reps  -2 sets   Isospheres X 2'   In hand manipulation with small buttons X 2 containers (NT)   LF/IF pompom flicks X 3 containers ea, large poms   Digiflex, green, individual and composite X 15 reps each   Digiextend, red 2 x 10 reps   Ergo gripper, 2 yellow 1 red bands 2 x 15 reps   Yellow flexbar, twists X 10 reps each way (NT)   Grooved pegs  In hand manip  to fill board, red cp to take out         Manual therapy techniques: Manual Lymphatic Drainage and Soft tissue Mobilization were applied to the: L hand for 8 minutes, including:  Performed scar massage to L palmar area with mini vibratory scar massager and scar pump to decrease adhesions and improve tensile glide.             Patient Education and Home Exercises      Education provided:   - cont HEP, upgraded to red putty  -issued an LMB size D to wear on and off during the day, 15 min at a time; cont night time orthosis  - Progress towards goals     Written Home Exercises Provided: Patient instructed to cont prior HEP.  Exercises were reviewed and Ada was able to demonstrate them prior to the end of the session.  Ada demonstrated good  understanding of the HEP provided. See EMR under Patient Instructions for exercises provided during therapy sessions.      ASSESSMENT     Pt would continue to benefit from skilled OT. Pt tolerated tx well today and able to progress with strengthening.  Mild flex contracture still noted of LF PIP but improved. Scar more pliable. Pt reports little to no pain overall.  She participated well and is motivated.  Will cont to progress with ROM and strengthening.     Ada is progressing well towards her goals and there are no updates to goals at this time. Pt prognosis is Good.     Pt will continue to benefit from skilled outpatient occupational therapy to address the deficits listed in the problem list on initial evaluation, provide pt/family education and to maximize pt's level of independence in the home and community environment.     Pt's spiritual, cultural and educational needs considered and pt agreeable to plan of care and goals.    Anticipated barriers to occupational therapy: stiffness, scar adherent    Goals:  Long Term Goals (LTGs); to be met by discharge.  LTG #1: Pt will report a pain level of 1 out of 10 with ADLs, house hold tasks, and cooking --progressing         LTG #2: Pt  will demo improved FOTO score by at least 20 points.  --progressing    LTG #3: Pt will return to prior level of function for ADLs and household management.  --progressing    LTG #4: Pt will demonstrate improved left digits AROM WFL with minimal ext lag for performing daily tasks --progressing    LTG #5: Pt will demonstrate improved left  strength by at least 5-10# for performing ADLs and other house hold tasks --progressing    LTG #6: Pt will demonstrate improved left pinch strength by at least 2-4 psi for opening things and turning keys --progressing       Short Term Goals (STGs); to be met within 4 weeks (5/27/22).  STG #1: Pt will report 3 out of 10 pain level with ADLs. --progressing    STG #2: Pt will report/demo [place] with buttoning. --progressing    STG #3: Pt will report/demo [place] with unbuckling seatbelt. --progressing    STG #4: Pt will demonstrate independence with issued HEP.  --progressing    STG #5: Pt will demo improved left IF/LF FRIAS by at least 20  Degrees each needed to aid with functional grasp of objects. --progressing        PLAN     Continue skilled occupational therapy with individualized plan of care focusing on maximizing functional use of hand    Updates/Grading for next session: cont to progress with ROM and strengthening as tolerated.     JACLYN Davies/CORINNE

## 2022-05-25 ENCOUNTER — HOSPITAL ENCOUNTER (OUTPATIENT)
Facility: OTHER | Age: 62
Discharge: HOME | End: 2022-05-25
Attending: ANESTHESIOLOGY | Admitting: ANESTHESIOLOGY
Payer: MEDICARE

## 2022-05-25 VITALS
DIASTOLIC BLOOD PRESSURE: 66 MMHG | BODY MASS INDEX: 45.52 KG/M2 | HEIGHT: 67 IN | TEMPERATURE: 98 F | HEART RATE: 71 BPM | RESPIRATION RATE: 14 BRPM | SYSTOLIC BLOOD PRESSURE: 122 MMHG | WEIGHT: 290 LBS | OXYGEN SATURATION: 97 %

## 2022-05-25 DIAGNOSIS — M51.36 DDD (DEGENERATIVE DISC DISEASE), LUMBAR: Primary | ICD-10-CM

## 2022-05-25 DIAGNOSIS — M54.17 LUMBOSACRAL RADICULOPATHY: ICD-10-CM

## 2022-05-25 PROCEDURE — 25000003 PHARM REV CODE 250: Performed by: ANESTHESIOLOGY

## 2022-05-25 PROCEDURE — 62323 NJX INTERLAMINAR LMBR/SAC: CPT | Performed by: ANESTHESIOLOGY

## 2022-05-25 PROCEDURE — 62323 NJX INTERLAMINAR LMBR/SAC: CPT | Mod: ,,, | Performed by: ANESTHESIOLOGY

## 2022-05-25 PROCEDURE — 25000003 PHARM REV CODE 250: Performed by: STUDENT IN AN ORGANIZED HEALTH CARE EDUCATION/TRAINING PROGRAM

## 2022-05-25 PROCEDURE — 63600175 PHARM REV CODE 636 W HCPCS: Performed by: ANESTHESIOLOGY

## 2022-05-25 PROCEDURE — 62323 PR INJ LUMBAR/SACRAL, W/IMAGING GUIDANCE: ICD-10-PCS | Mod: ,,, | Performed by: ANESTHESIOLOGY

## 2022-05-25 RX ORDER — SODIUM CHLORIDE 9 MG/ML
500 INJECTION, SOLUTION INTRAVENOUS CONTINUOUS
Status: DISCONTINUED | OUTPATIENT
Start: 2022-05-25 | End: 2022-05-25 | Stop reason: HOSPADM

## 2022-05-25 RX ORDER — DEXAMETHASONE SODIUM PHOSPHATE 4 MG/ML
INJECTION, SOLUTION INTRA-ARTICULAR; INTRALESIONAL; INTRAMUSCULAR; INTRAVENOUS; SOFT TISSUE
Status: DISCONTINUED | OUTPATIENT
Start: 2022-05-25 | End: 2022-05-25 | Stop reason: HOSPADM

## 2022-05-25 RX ORDER — LIDOCAINE HYDROCHLORIDE 20 MG/ML
INJECTION, SOLUTION EPIDURAL; INFILTRATION; INTRACAUDAL; PERINEURAL
Status: DISCONTINUED | OUTPATIENT
Start: 2022-05-25 | End: 2022-05-25 | Stop reason: HOSPADM

## 2022-05-25 RX ORDER — LIDOCAINE HYDROCHLORIDE 10 MG/ML
INJECTION, SOLUTION EPIDURAL; INFILTRATION; INTRACAUDAL; PERINEURAL
Status: DISCONTINUED | OUTPATIENT
Start: 2022-05-25 | End: 2022-05-25 | Stop reason: HOSPADM

## 2022-05-25 RX ORDER — MIDAZOLAM HYDROCHLORIDE 2 MG/2ML
INJECTION, SOLUTION INTRAMUSCULAR; INTRAVENOUS
Status: DISCONTINUED | OUTPATIENT
Start: 2022-05-25 | End: 2022-05-25 | Stop reason: HOSPADM

## 2022-05-25 RX ORDER — FENTANYL CITRATE 50 UG/ML
INJECTION, SOLUTION INTRAMUSCULAR; INTRAVENOUS
Status: DISCONTINUED | OUTPATIENT
Start: 2022-05-25 | End: 2022-05-25 | Stop reason: HOSPADM

## 2022-05-25 NOTE — H&P
"HPI  Patient presenting for Procedure(s) (LRB):  INJECTION, STEROID, EPIDURAL, L5-S1 CONTRAST DIRECT REF (N/A)     Patient on Anti-coagulation No    No health changes since previous encounter    Past Medical History:   Diagnosis Date    Asthma     Colon polyp 2011    Hypercholesteremia     Hypertension     Multiple thyroid nodules     Sleep apnea      Past Surgical History:   Procedure Laterality Date    BACK SURGERY      CARPAL TUNNEL RELEASE      COLONOSCOPY N/A 6/4/2020    Procedure: COLONOSCOPY, with me, 5/20;  Surgeon: Sarah Beth Leavitt MD;  Location: [facility] ENDO ([address] [place] [address]);  Service: Endoscopy;  Laterality: N/A;  covid test 6/2    EPIDURAL STEROID INJECTION N/A 10/27/2021    Procedure: INJECTION, STEROID, EPIDURAL,L5/S1 DIRECT REF/NEED CONSENT;  Surgeon: Clement Aldana MD;  Location: [facility] PAIN T;  Service: Pain Management;  Laterality: N/A;    FOOT SURGERY      HYSTERECTOMY      MASS EXCISION      at neck    ROTATOR CUFF REPAIR      left shoulder    TRIGGER FINGER RELEASE  12/2017    right hand    TRIGGER FINGER RELEASE Right 1/26/2022    Procedure: RELEASE, TRIGGER FINGER - right thumb;  Surgeon: Cristopher Tolliver MD;  Location: [facility] OR;  Service: Orthopedics;  Laterality: Right;    TRIGGER FINGER RELEASE Left 3/9/2022    Procedure: RELEASE, TRIGGER FINGER - LEFT index and long;  Surgeon: Cristopher Tolliver MD;  Location: [facility] OR;  Service: Orthopedics;  Laterality: Left;     Review of patient's allergies indicates:  No Known Allergies   No current facility-administered medications for this encounter.       PMHx, PSHx, Allergies, Medications reviewed in epic    ROS negative except pain complaints in HPI    OBJECTIVE:    BP (!) 154/72 (BP Location: Right arm, Patient Position: Lying)   Pulse 72   Temp 98.1 °F (36.7 °C) (Oral)   Resp 16   Ht 5' 7" (1.702 m)   Wt 131.5 kg (290 lb)   SpO2 98%   Breastfeeding No   BMI 45.42 kg/m²     PHYSICAL EXAMINATION:    GENERAL: Well appearing, in no " acute distress, alert and oriented x3.  PSYCH:  Mood and affect appropriate.  SKIN: Skin color, texture, turgor normal, no rashes or lesions which will impact the procedure.  CV: RRR with palpation of the radial artery.  PULM: No evidence of respiratory difficulty, symmetric chest rise. Clear to auscultation.  NEURO: Cranial nerves grossly intact.    Plan:    Proceed with procedure as planned Procedure(s) (LRB):  INJECTION, STEROID, EPIDURAL, L5-S1 CONTRAST DIRECT REF (N/A)    Shalom Lo  05/25/2022

## 2022-05-25 NOTE — DISCHARGE SUMMARY
Discharge Note  Short Stay      SUMMARY     Admit Date: 5/25/2022    Attending Physician: Shalom Lo      Discharge Physician: Shalom Lo      Discharge Date: 5/25/2022 2:52 PM    Procedure(s) (LRB):  INJECTION, STEROID, EPIDURAL, L5-S1 CONTRAST DIRECT REF (N/A)    Final Diagnosis: Lumbar radiculopathy [M54.16]    Disposition: Home or self care    Patient Instructions:   Current Discharge Medication List      CONTINUE these medications which have NOT CHANGED    Details   ALBUTEROL INHL       amLODIPine (NORVASC) 10 MG tablet Take 1 tablet (10 mg total) by mouth once daily.  Qty: 90 tablet, Refills: 1    Comments: .      atorvastatin (LIPITOR) 40 MG tablet Take 1 tablet (40 mg total) by mouth once daily.  Qty: 90 tablet, Refills: 1      cholecalciferol, vitamin D3, 1,250 mcg (50,000 unit) capsule Take 50,000 Units by mouth every 7 days.      cyclobenzaprine (FLEXERIL) 5 MG tablet Take 1 tablet (5 mg total) by mouth 3 (three) times daily as needed for Muscle spasms.  Qty: 30 tablet, Refills: 0      gabapentin (NEURONTIN) 600 MG tablet TAKE 1 TABLET(600 MG) BY MOUTH THREE TIMES DAILY  Qty: 90 tablet, Refills: 4      lisinopriL (PRINIVIL,ZESTRIL) 40 MG tablet Take 1 tablet (40 mg total) by mouth once daily.  Qty: 90 tablet, Refills: 1    Comments: .      triamterene-hydrochlorothiazide 37.5-25 mg (DYAZIDE) 37.5-25 mg per capsule TAKE ONE CAPSULE BY MOUTH EVERY MORNING.  Qty: 90 capsule, Refills: 1    Comments: .      zolpidem (AMBIEN) 5 MG Tab Take 1 tablet (5 mg total) by mouth nightly as needed.  Qty: 20 tablet, Refills: 1                 Discharge Diagnosis: Lumbar radiculopathy [M54.16]  Condition on Discharge: Stable with no complications to procedure   Diet on Discharge: Same as before.  Activity: as per instruction sheet.  Discharge to: Home with a responsible adult.  Follow up: 2-4 weeks       Please call my office or pager at [phone] if experienced any weakness or loss of sensation, fever >  101.5, pain uncontrolled with oral medications, persistent nausea/vomiting/or diarrhea, redness or drainage from the incisions, or any other worrisome concerns. If physician on call was not reached or could not communicate with our office for any reason please go to the nearest emergency department

## 2022-05-25 NOTE — OP NOTE
Lumbar Interlaminar Epidural Steroid Injection under Fluoroscopic Guidance    The procedure, risks, benefits, and options were discussed with the patient. There are no contraindications to the procedure. The patent expressed understanding and agreed to the procedure. Informed written consent was obtained prior to the start of the procedure and can be found in the patient's chart.    PATIENT NAME: Dejah Schmidt   MRN: [id number]     DATE OF PROCEDURE: 05/25/2022    PROCEDURE: Lumbar Interlaminar Epidural Steroid Injection L5/S1 under Fluoroscopic Guidance    PRE-OP DIAGNOSIS: Lumbar radiculopathy [M54.16] Lumbar radiculopathy [M54.16] DDD    POST-OP DIAGNOSIS: Same    PHYSICIAN: Clement Aldana MD    MEDICATIONS INJECTED: Preservative-free Decadron 10mg with 4cc of Lidocaine 1% MPF and preservative free normal saline    LOCAL ANESTHETIC INJECTED: Xylocaine 2%     SEDATION:   Versed 2mg and Fentanyl 50mcg                                                                                                                                                                                     Conscious sedation ordered by M.D. Patient re-evaluation prior to administration of conscious sedation. No changes noted in patient's status from initial evaluation. The patient's vital signs were monitored by RN and patient remained hemodynamically stable throughout the procedure.    Event Time In   Sedation Start 1441   Sedation End 1448       ESTIMATED BLOOD LOSS: None    COMPLICATIONS: None    TECHNIQUE: Time-out was performed to identify the patient and procedure to be performed. With the patient laying in a prone position, the surgical area was prepped and draped in the usual sterile fashion using ChloraPrep and a fenestrated drape. The level was determined under fluoroscopy guidance. Skin anesthesia was achieved by injecting Lidocaine 2% over the injection site. The interlaminar space was then approached with a 20 gauge,  3.5 inch  Tuohy needle that was introduced under fluoroscopic guidance in the AP, lateral and/or contralateral oblique imaging. Once the Ligamentum flavum was encountered loss of resistance to saline was used to enter the epidural space. With positive loss of resistance and negative aspiration for CSF or Blood, contrast dye Omnipaque (240mg/mL) was injected to confirm placement and there was no vascular runoff. 6 mL of the medication mixture listed above was injected slowly. Displacement of the radio opaque contrast after injection of the medication confirmed that the medication went into the area of the epidural space. The needles were removed and bleeding was nil. A sterile dressing was applied. No specimens collected. The patient tolerated the procedure well.       The patient was monitored after the procedure in the recovery area. They were given post-procedure and discharge instructions to follow at home. The patient was discharged in a stable condition.    I reviewed and edited the fellow's note. I conducted my own interview and physical examination. I agree with the findings. I was present and supervising all critical portions of the procedure.    Clement Aldana MD

## 2022-05-25 NOTE — DISCHARGE INSTRUCTIONS

## 2022-05-30 ENCOUNTER — CLINICAL SUPPORT (OUTPATIENT)
Dept: REHABILITATION | Facility: HOSPITAL | Age: 62
End: 2022-05-30
Payer: MEDICARE

## 2022-05-30 DIAGNOSIS — M25.642 FINGER STIFFNESS, LEFT: Primary | ICD-10-CM

## 2022-05-30 DIAGNOSIS — M79.645 PAIN OF FINGER OF LEFT HAND: ICD-10-CM

## 2022-05-30 PROCEDURE — 97110 THERAPEUTIC EXERCISES: CPT | Mod: CO

## 2022-05-30 PROCEDURE — 97022 WHIRLPOOL THERAPY: CPT | Mod: CO

## 2022-05-30 NOTE — PROGRESS NOTES
"  [facility]  Occupational Therapy Treatment Note    Date: 5/30/2022  Name: Dejah Schmidt  Clinic Number: [id number]    Therapy Diagnosis:   Encounter Diagnoses   Name Primary?    Finger stiffness, left Yes    Pain of finger of left hand      Physician: Russo-Digeorge, Jamie L*    Physician: Russo-Digeorge, Jamie L*; Dr. Cristopher Tolliver     Physician Orders: eval and treat  Medical Diagnosis: Trigger finger, left index finger [M65.322], Trigger finger, left middle finger [M65.332]     Surgical Procedure and Date: 3/9/22, s/p L IF/LF TFR / Date of Injury/Onset: reports for about a year and progressively got worse prior to surgery.  Evaluation Date: 4/27/22  Insurance Authorization Period Expiration: 6/23/22  Plan of Care Expiration: 6/24/22  Progress Note Due: 5/27/22   Date of Return to MD: not scheduled  Visit # / Visits authorized: 7 / 12  FOTO: initial eval      Precautions:  Standard    Time In: 1:56 pm  Time Out: 2:36 pm  Total Billable Time: 46 minutes      SUBJECTIVE     Pt reports: "I'm ready to be done with therapy."  She was compliant with home exercise program given last session.   Response to previous treatment: improved pain, strength, and ROM  Functional change: using hand for light tasks, tolerating custom orthosis and HEP    Pain: 0/10  Location: left LF     OBJECTIVE   Objective Measures updated at progress report unless specified.    Observation/Appearance: moderate scar adherent noted in palm, A1 pulley areas for IF/LF. Flexed posture noted of PIP.      Edema. Measured in centimeters.    4/27/2022 4/27/2022     Left Right   DPC 21 22.4   MCPs 20.6 21.2      Edema. Measured in centimeters.    4/27/2022 4/27/2022 5/9/22     Left Right Left   Index:          P1 7 6.5 6.9    PIP 6.5 6.5 6.4   P2 5.8 5.9 5.7    DIP 5.1 5.3 5.2   P3 4.8 5 4.8   Long:          P1 7.1 6.6 6.8    PIP 7.1 6.7 6.9   P2 6.4 6 6.1    DIP 5.4 5.3 5.5   P3 4.9 5 5.1      Elbow and Wrist ROM. Measured in " degrees.    4/27/2022 4/27/2022 5/30/2022     Left Right Left   Elbow Ext/Flex        Supination/Pronation        Wrist Ext/Flex 45/45 50/55 56/60   Wrist RD/UD WFL WFL       Hand ROM. Measured in degrees.  R hand WFL    4/27/2022 5/9/22 5/30/2022     Left Left Left           Index: MP  70 72 83              PIP     8/80 5/95 5/94              DIP 42 60 60              FRIAS 184 222 (+38) 232 (+10)           Long:  MP 75 80 89              PIP 17/90 10/95 6/95              DIP 53 54 59              FRIAS 201 219 (+18) 237 (+18)           Ring:   MP                  PIP                  DIP                  FRIAS WFL             Small:  MP                   PIP                   DIP                  FRIAS WFL             Thumb: MP                    IP           Rad ADD/ABD           Pal ADD/ABD              Opposition           Strength (Dynamometer) and Pinch Strength (Pinch Gauge)  Measured in pounds.    4/27/2022 4/27/2022 5/9/22 5/30/2022     Left Right Left Left   Rung II 14 37 40 (+26) 50 (+10)   Key Pinch NT NT NT NT   3pt Pinch 5 10 9.5 (+4.5) 11 (+1.5)   2pt Pinch 5.5 7 8 (+2.5) 9(+1)         Treatment     Ada received the treatments listed below:     Supervised modalities after being cleared for contradictions:   Fluidotherapy: To L hand for 10 min, continuous air, 110 deg, air speed 100 to decrease pain, edema & scar tissue and increased tissue extensibility.          Therapeutic exercises to develop ROM and flexibility for 36 minutes, including:      AROM   DIP blocking  PIP blocking    X 10 reps each    Wave  Hook  straight fist, composite fist finger spreads finger lifts    X 10 reps each    Theraputty - green  -molding/gripping  -finger spread  -log rolls with 3 pt pinches    -2'  -5 reps  -2 sets                                         *objective measures taken            Patient Education and Home Exercises      Education provided:   - cont HEP, upgraded to green putty  -issued an LMB size D to wear on  and off during the day, 15 min at a time; cont night time orthosis  - Progress towards goals     Written Home Exercises Provided: Patient instructed to cont prior HEP.  Exercises were reviewed and Ada was able to demonstrate them prior to the end of the session.  Ada demonstrated good  understanding of the HEP provided. See EMR under Patient Instructions for exercises provided during therapy sessions.      ASSESSMENT     Upon arrival pt reported improved use of L hand and verbalized readiness to d/c from therapy. Objective measures taken and discussed with supervising therapist, ANAYA and OT in agreement for pt D/C also evidenced by cosignature. Pt met all goals and has returned to PLOF. Discharged with independence HEP    Ada is progressing well towards her goals and updates to goals at this time are noted below. Pt prognosis is Good.     \Pt's spiritual, cultural and educational needs considered and pt agreeable to plan of care and goals.    Anticipated barriers to occupational therapy: stiffness, scar adherent    Goals:  Long Term Goals (LTGs); to be met by discharge.  LTG #1: Pt will report a pain level of 1 out of 10 with ADLs, house hold tasks, and cooking -- MET 5/30/22        LTG #2: Pt will demo improved FOTO score by at least 20 points.  --progressing    LTG #3: Pt will return to prior level of function for ADLs and household management.  --MET 5/30/22  LTG #4: Pt will demonstrate improved left digits AROM WFL with minimal ext lag for performing daily tasks -- MET 5/30/22  LTG #5: Pt will demonstrate improved left  strength by at least 5-10# for performing ADLs and other house hold tasks - MET  LTG #6: Pt will demonstrate improved left pinch strength by at least 2-4 psi for opening things and turning keys --MET      Short Term Goals (STGs); to be met within 4 weeks (5/27/22).  STG #1: Pt will report 3 out of 10 pain level with ADLs. --MET  STG #2: Pt will report/demo [place] with buttoning. -  MET  STG #3: Pt will report/demo [place] with unbuckling seatbelt. --MET    STG #4: Pt will demonstrate independence with issued HEP.  --MET   STG #5: Pt will demo improved left IF/LF FRIAS by at least 20  Degrees each needed to aid with functional grasp of objects. -MET      PLAN     Continue skilled occupational therapy with individualized plan of care focusing on maximizing functional use of hand    Updates/Grading for next session: cont to progress with ROM and strengthening as tolerated.     JACLYN Davies/CORINNE

## 2022-05-31 ENCOUNTER — PES CALL (OUTPATIENT)
Dept: ADMINISTRATIVE | Facility: CLINIC | Age: 62
End: 2022-05-31
Payer: MEDICARE

## 2022-05-31 NOTE — PROGRESS NOTES
Primary OT had close client conference with ANAYA this date in regards to patient discharge from therapy this date. Spoke with patient as well, and she was in agreement with discharge.     MARV Alexis, SUNNYT

## 2022-06-27 ENCOUNTER — LAB VISIT (OUTPATIENT)
Dept: LAB | Facility: HOSPITAL | Age: 62
End: 2022-06-27
Attending: STUDENT IN AN ORGANIZED HEALTH CARE EDUCATION/TRAINING PROGRAM
Payer: MEDICARE

## 2022-06-27 DIAGNOSIS — E04.2 GOITER, NONTOXIC, MULTINODULAR: ICD-10-CM

## 2022-06-27 LAB
ALBUMIN SERPL BCP-MCNC: 3.7 G/DL (ref 3.5–5.2)
ALP SERPL-CCNC: 70 U/L (ref 55–135)
ALT SERPL W/O P-5'-P-CCNC: 16 U/L (ref 10–44)
ANION GAP SERPL CALC-SCNC: 7 MMOL/L (ref 8–16)
AST SERPL-CCNC: 16 U/L (ref 10–40)
BASOPHILS # BLD AUTO: 0.01 K/UL (ref 0–0.2)
BASOPHILS NFR BLD: 0.2 % (ref 0–1.9)
BILIRUB SERPL-MCNC: 0.6 MG/DL (ref 0.1–1)
BUN SERPL-MCNC: 14 MG/DL (ref 6–20)
CALCIUM SERPL-MCNC: 9.1 MG/DL (ref 8.7–10.5)
CHLORIDE SERPL-SCNC: 108 MMOL/L (ref 95–110)
CO2 SERPL-SCNC: 28 MMOL/L (ref 23–29)
CREAT SERPL-MCNC: 0.9 MG/DL (ref 0.5–1.4)
DIFFERENTIAL METHOD: ABNORMAL
EOSINOPHIL # BLD AUTO: 0 K/UL (ref 0–0.5)
EOSINOPHIL NFR BLD: 0.7 % (ref 0–8)
ERYTHROCYTE [DISTWIDTH] IN BLOOD BY AUTOMATED COUNT: 13.2 % (ref 11.5–14.5)
EST. GFR  (AFRICAN AMERICAN): >60 ML/MIN/1.73 M^2
EST. GFR  (NON AFRICAN AMERICAN): >60 ML/MIN/1.73 M^2
GLUCOSE SERPL-MCNC: 98 MG/DL (ref 70–110)
HCT VFR BLD AUTO: 40.3 % (ref 37–48.5)
HGB BLD-MCNC: 13.1 G/DL (ref 12–16)
IMM GRANULOCYTES # BLD AUTO: 0.03 K/UL (ref 0–0.04)
IMM GRANULOCYTES NFR BLD AUTO: 0.7 % (ref 0–0.5)
LYMPHOCYTES # BLD AUTO: 1.7 K/UL (ref 1–4.8)
LYMPHOCYTES NFR BLD: 41.9 % (ref 18–48)
MCH RBC QN AUTO: 30.8 PG (ref 27–31)
MCHC RBC AUTO-ENTMCNC: 32.5 G/DL (ref 32–36)
MCV RBC AUTO: 95 FL (ref 82–98)
MONOCYTES # BLD AUTO: 0.3 K/UL (ref 0.3–1)
MONOCYTES NFR BLD: 7.1 % (ref 4–15)
NEUTROPHILS # BLD AUTO: 2 K/UL (ref 1.8–7.7)
NEUTROPHILS NFR BLD: 49.4 % (ref 38–73)
NRBC BLD-RTO: 0 /100 WBC
PLATELET # BLD AUTO: 218 K/UL (ref 150–450)
PLATELET BLD QL SMEAR: ABNORMAL
PMV BLD AUTO: 10.7 FL (ref 9.2–12.9)
POTASSIUM SERPL-SCNC: 4.9 MMOL/L (ref 3.5–5.1)
PROT SERPL-MCNC: 7 G/DL (ref 6–8.4)
RBC # BLD AUTO: 4.25 M/UL (ref 4–5.4)
SODIUM SERPL-SCNC: 143 MMOL/L (ref 136–145)
WBC # BLD AUTO: 4.08 K/UL (ref 3.9–12.7)

## 2022-06-27 PROCEDURE — 36415 COLL VENOUS BLD VENIPUNCTURE: CPT | Performed by: STUDENT IN AN ORGANIZED HEALTH CARE EDUCATION/TRAINING PROGRAM

## 2022-06-27 PROCEDURE — 85025 COMPLETE CBC W/AUTO DIFF WBC: CPT | Performed by: STUDENT IN AN ORGANIZED HEALTH CARE EDUCATION/TRAINING PROGRAM

## 2022-06-27 PROCEDURE — 80053 COMPREHEN METABOLIC PANEL: CPT | Performed by: STUDENT IN AN ORGANIZED HEALTH CARE EDUCATION/TRAINING PROGRAM

## 2022-06-28 ENCOUNTER — ANESTHESIA EVENT (OUTPATIENT)
Dept: SURGERY | Facility: HOSPITAL | Age: 62
End: 2022-06-28
Payer: MEDICARE

## 2022-06-28 ENCOUNTER — TELEPHONE (OUTPATIENT)
Dept: SURGERY | Facility: CLINIC | Age: 62
End: 2022-06-28
Payer: MEDICARE

## 2022-06-28 NOTE — TELEPHONE ENCOUNTER
Pre-operative instructions given to pt. Instructed to have have someone bring and park on second level in patient parking garage. Report to Day of Surgery for 6 a, case start approx 8am on tomorrow 6/29/22. Taught to shower with Hibiclens tonight and morning of surgery. Instructed nothing to eat after midnight. Went over post op instructions,labs and appt on 7/14 2pm labs, 3pm Dr. Garcia. Hold TUMS prior to lab and resume after.  All questions addressed, Pt verbalized understanding.

## 2022-06-28 NOTE — ANESTHESIA PREPROCEDURE EVALUATION
[facility]  Anesthesia Pre-Operative Evaluation         Patient Name: Dejah Schmidt  YOB: 1960  MRN: [id number]    SUBJECTIVE:     Pre-operative evaluation for Procedure(s) (LRB):  THYROIDECTOMY, total (N/A)     06/28/2022    Dejah Schmidt is a 61 y.o. female w/ a significant PMHx of HTN, morbid obesity, CDK3, and lumbago who presents for the above procedure.    She was recently seen by her endocrinologist, Dr. Yanez, in March. She has longstanding history of multinodular goiter and has undergone several FNAs of left and right dominant nodules which were benign.  Most recent neck ultrasound from January 2022 with interval increase in size of several nodules. Patient at that time was experiencing compressive symtomes and was interested in thyroidectomy.    Prev airway: None documented.     Drips: None documented.    Patient Active Problem List   Diagnosis    Unspecified essential hypertension    Multinodular goiter    Morbid obesity    Vitamin D deficiency disease    Chronic back pain    Lumbar radiculopathy    Facet arthropathy, lumbar    Facet hypertrophy of lumbar region    Hand pain    Herniation of lumbar intervertebral disc with radiculopathy    Spinal stenosis of lumbar region with neurogenic claudication    Chronic pain    Chronic kidney disease, stage 3a    Trigger thumb, right thumb    Trigger finger, left middle finger    Trigger finger, left index finger    Finger stiffness, left    Pain of finger of left hand       Review of patient's allergies indicates:  No Known Allergies    Current Inpatient Medications:      No current facility-administered medications on file prior to encounter.     Current Outpatient Medications on File Prior to Encounter   Medication Sig Dispense Refill    amLODIPine (NORVASC) 10 MG tablet Take 1 tablet (10 mg total) by mouth once daily. (Patient taking differently: Take 10 mg by mouth every morning.) 90 tablet 1     atorvastatin (LIPITOR) 40 MG tablet Take 1 tablet (40 mg total) by mouth once daily. (Patient taking differently: Take 40 mg by mouth every morning.) 90 tablet 1    cholecalciferol, vitamin D3, 1,250 mcg (50,000 unit) capsule Take 50,000 Units by mouth every 7 days. SUNDAYS      gabapentin (NEURONTIN) 600 MG tablet TAKE 1 TABLET(600 MG) BY MOUTH THREE TIMES DAILY 90 tablet 4    lisinopriL (PRINIVIL,ZESTRIL) 40 MG tablet Take 1 tablet (40 mg total) by mouth once daily. (Patient taking differently: Take 40 mg by mouth every morning.) 90 tablet 1    triamterene-hydrochlorothiazide 37.5-25 mg (DYAZIDE) 37.5-25 mg per capsule TAKE ONE CAPSULE BY MOUTH EVERY MORNING. 90 capsule 1    ALBUTEROL INHL Inhale into the lungs every 4 to 6 hours as needed.      cyclobenzaprine (FLEXERIL) 5 MG tablet Take 1 tablet (5 mg total) by mouth 3 (three) times daily as needed for Muscle spasms. 30 tablet 0    zolpidem (AMBIEN) 5 MG Tab Take 1 tablet (5 mg total) by mouth nightly as needed. (Patient not taking: Reported on 4/25/2022) 20 tablet 1       Past Surgical History:   Procedure Laterality Date    BACK SURGERY      CARPAL TUNNEL RELEASE      COLONOSCOPY N/A 6/4/2020    Procedure: COLONOSCOPY, with me, 5/20;  Surgeon: Sarah Beth Leavitt MD;  Location: [address]);  Service: Endoscopy;  Laterality: N/A;  covid test 6/2    EPIDURAL STEROID INJECTION N/A 10/27/2021    Procedure: INJECTION, STEROID, EPIDURAL,L5/S1 DIRECT REF/NEED CONSENT;  Surgeon: Clement Aldana MD;  Location: [facility] PAIN MGT;  Service: Pain Management;  Laterality: N/A;    EPIDURAL STEROID INJECTION N/A 5/25/2022    Procedure: INJECTION, STEROID, EPIDURAL, L5-S1 CONTRAST DIRECT REF;  Surgeon: Clement Aldana MD;  Location: [facility] PAIN MGT;  Service: Pain Management;  Laterality: N/A;    FOOT SURGERY      HYSTERECTOMY      MASS EXCISION      at neck    ROTATOR CUFF REPAIR      left shoulder    TRIGGER FINGER RELEASE  12/2017    right hand     TRIGGER FINGER RELEASE Right 1/26/2022    Procedure: RELEASE, TRIGGER FINGER - right thumb;  Surgeon: Cristopher Tolliver MD;  Location: [facility] OR;  Service: Orthopedics;  Laterality: Right;    TRIGGER FINGER RELEASE Left 3/9/2022    Procedure: RELEASE, TRIGGER FINGER - LEFT index and long;  Surgeon: Cristopher Tolliver MD;  Location: [facility] OR;  Service: Orthopedics;  Laterality: Left;       Social History     Socioeconomic History    Marital status:    Tobacco Use    Smoking status: Never Smoker    Smokeless tobacco: Never Used   Substance and Sexual Activity    Alcohol use: Yes     Alcohol/week: 0.0 standard drinks     Comment: once a month    Drug use: No    Sexual activity: Yes   Social History Narrative    LIVIA ashli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     Social Determinants of Health     Financial Resource Strain: Medium Risk    Difficulty of Paying Living Expenses: Somewhat hard   Food Insecurity: Food Insecurity Present    Worried About Running Out of Food in the Last Year: Sometimes true    Ran Out of Food in the Last Year: Never true   Transportation Needs: Unmet Transportation Needs    Lack of Transportation (Medical): Yes    Lack of Transportation (Non-Medical): Yes   Physical Activity: Inactive    Days of Exercise per Week: 0 days    Minutes of Exercise per Session: 0 min   Stress: Stress Concern Present    Feeling of Stress : To some extent   Social Connections: Unknown    Frequency of Communication with Friends and Family: More than three times a week    Frequency of Social Gatherings with Friends and Family: Three times a week    Active Member of Clubs or Organizations: No    Attends Club or Organization Meetings: Never    Marital Status:    Housing Stability: Low Risk     Unable to Pay for Housing in the Last Year: No    Number of Places Lived in the Last Year: 2    Unstable Housing in the Last Year: No       OBJECTIVE:     Vital Signs Range (Last 24H):          CBC:   Recent Labs     06/27/22  1133   WBC 4.08   RBC 4.25   HGB 13.1   HCT 40.3      MCV 95   MCH 30.8   MCHC 32.5       CMP:   Recent Labs     06/27/22  1133      K 4.9      CO2 28   BUN 14   CREATININE 0.9   GLU 98   CALCIUM 9.1   ALBUMIN 3.7   PROT 7.0   ALKPHOS 70   ALT 16   AST 16   BILITOT 0.6       INR:  No results for input(s): PT, INR, PROTIME, APTT in the last 72 hours.    Diagnostic Studies: No relevant studies.    EKG:   Results for orders placed or performed during the hospital encounter of 04/25/22   EKG 12-lead    Collection Time: 04/25/22  2:07 PM    Narrative    Test Reason : E04.2,    Vent. Rate : 065 BPM     Atrial Rate : 065 BPM     P-R Int : 136 ms          QRS Dur : 098 ms      QT Int : 408 ms       P-R-T Axes : 047 -25 018 degrees     QTc Int : 424 ms    Normal sinus rhythm  Normal ECG  When compared with ECG of 28-FEB-2020 18:52,  No significant change was found  Confirmed by Jorge Gross MD ([id number]) on 4/25/2022 3:13:42 PM    Referred By: BALJIT SAGASTUME           Confirmed By:Jorge Gross MD        2D ECHO:   No results found for this or any previous visit.         ASSESSMENT/PLAN:           Pre-op Assessment    I have reviewed the Patient Summary Reports.     I have reviewed the Nursing Notes.    I have reviewed the Medications.     Review of Systems  Anesthesia Hx:  No problems with previous Anesthesia Denies Hx of Anesthetic complications  Denies Family Hx of Anesthesia complications.   Denies Personal Hx of Anesthesia complications.   Hematology/Oncology:  Hematology Normal   Oncology Normal     Cardiovascular:   Hypertension    Pulmonary:   Asthma Sleep Apnea    Renal/:   Chronic Renal Disease    Hepatic/GI:  Hepatic/GI Normal    Endocrine:   Denies Diabetes.  Morbid Obesity / BMI > 40      Physical Exam  General: Well nourished, Alert and Oriented    Airway:  Mallampati: II / II  Mouth Opening: Normal  Tongue: Large  Neck ROM: Normal  ROM    Dental:  Edentulous        Anesthesia Plan  Type of Anesthesia, risks & benefits discussed:    Anesthesia Type: Gen ETT  Intra-op Monitoring Plan: Standard ASA Monitors  Post Op Pain Control Plan: multimodal analgesia and IV/PO Opioids PRN  Induction:  IV  Airway Plan: Video  Informed Consent: Informed consent signed with the Patient and all parties understand the risks and agree with anesthesia plan.  All questions answered.   ASA Score: 3  Day of Surgery Review of History & Physical: H&P Update referred to the surgeon/provider.    Ready For Surgery From Anesthesia Perspective.     .

## 2022-06-29 ENCOUNTER — HOSPITAL ENCOUNTER (OUTPATIENT)
Facility: HOSPITAL | Age: 62
Discharge: HOSPICE | End: 2022-06-30
Attending: STUDENT IN AN ORGANIZED HEALTH CARE EDUCATION/TRAINING PROGRAM | Admitting: STUDENT IN AN ORGANIZED HEALTH CARE EDUCATION/TRAINING PROGRAM
Payer: MEDICARE

## 2022-06-29 ENCOUNTER — ANESTHESIA (OUTPATIENT)
Dept: SURGERY | Facility: HOSPITAL | Age: 62
End: 2022-06-29
Payer: MEDICARE

## 2022-06-29 DIAGNOSIS — E04.2 GOITER, NONTOXIC, MULTINODULAR: Primary | ICD-10-CM

## 2022-06-29 LAB
CALCIUM SERPL-MCNC: 8.5 MG/DL (ref 8.7–10.5)
PTH-INTACT SERPL-MCNC: 73.1 PG/ML (ref 9–77)

## 2022-06-29 PROCEDURE — 71000039 HC RECOVERY, EACH ADD'L HOUR: Performed by: STUDENT IN AN ORGANIZED HEALTH CARE EDUCATION/TRAINING PROGRAM

## 2022-06-29 PROCEDURE — 25000003 PHARM REV CODE 250: Performed by: STUDENT IN AN ORGANIZED HEALTH CARE EDUCATION/TRAINING PROGRAM

## 2022-06-29 PROCEDURE — 63600175 PHARM REV CODE 636 W HCPCS: Performed by: STUDENT IN AN ORGANIZED HEALTH CARE EDUCATION/TRAINING PROGRAM

## 2022-06-29 PROCEDURE — 27201423 OPTIME MED/SURG SUP & DEVICES STERILE SUPPLY: Performed by: STUDENT IN AN ORGANIZED HEALTH CARE EDUCATION/TRAINING PROGRAM

## 2022-06-29 PROCEDURE — 71000015 HC POSTOP RECOV 1ST HR: Performed by: STUDENT IN AN ORGANIZED HEALTH CARE EDUCATION/TRAINING PROGRAM

## 2022-06-29 PROCEDURE — 36000706: Performed by: STUDENT IN AN ORGANIZED HEALTH CARE EDUCATION/TRAINING PROGRAM

## 2022-06-29 PROCEDURE — 63600175 PHARM REV CODE 636 W HCPCS

## 2022-06-29 PROCEDURE — 36000707: Performed by: STUDENT IN AN ORGANIZED HEALTH CARE EDUCATION/TRAINING PROGRAM

## 2022-06-29 PROCEDURE — 25000003 PHARM REV CODE 250

## 2022-06-29 PROCEDURE — D9220A PRA ANESTHESIA: Mod: ,,, | Performed by: STUDENT IN AN ORGANIZED HEALTH CARE EDUCATION/TRAINING PROGRAM

## 2022-06-29 PROCEDURE — 37000009 HC ANESTHESIA EA ADD 15 MINS: Performed by: STUDENT IN AN ORGANIZED HEALTH CARE EDUCATION/TRAINING PROGRAM

## 2022-06-29 PROCEDURE — 60240 REMOVAL OF THYROID: CPT | Mod: ,,, | Performed by: STUDENT IN AN ORGANIZED HEALTH CARE EDUCATION/TRAINING PROGRAM

## 2022-06-29 PROCEDURE — 60240 PR THYROIDECTOMY: ICD-10-PCS | Mod: ,,, | Performed by: STUDENT IN AN ORGANIZED HEALTH CARE EDUCATION/TRAINING PROGRAM

## 2022-06-29 PROCEDURE — 82310 ASSAY OF CALCIUM: CPT | Performed by: STUDENT IN AN ORGANIZED HEALTH CARE EDUCATION/TRAINING PROGRAM

## 2022-06-29 PROCEDURE — 27000221 HC OXYGEN, UP TO 24 HOURS

## 2022-06-29 PROCEDURE — 83970 ASSAY OF PARATHORMONE: CPT | Performed by: STUDENT IN AN ORGANIZED HEALTH CARE EDUCATION/TRAINING PROGRAM

## 2022-06-29 PROCEDURE — 94761 N-INVAS EAR/PLS OXIMETRY MLT: CPT

## 2022-06-29 PROCEDURE — 71000033 HC RECOVERY, INTIAL HOUR: Performed by: STUDENT IN AN ORGANIZED HEALTH CARE EDUCATION/TRAINING PROGRAM

## 2022-06-29 PROCEDURE — D9220A PRA ANESTHESIA: ICD-10-PCS | Mod: ,,, | Performed by: STUDENT IN AN ORGANIZED HEALTH CARE EDUCATION/TRAINING PROGRAM

## 2022-06-29 PROCEDURE — 71000016 HC POSTOP RECOV ADDL HR: Performed by: STUDENT IN AN ORGANIZED HEALTH CARE EDUCATION/TRAINING PROGRAM

## 2022-06-29 PROCEDURE — 37000008 HC ANESTHESIA 1ST 15 MINUTES: Performed by: STUDENT IN AN ORGANIZED HEALTH CARE EDUCATION/TRAINING PROGRAM

## 2022-06-29 PROCEDURE — 94799 UNLISTED PULMONARY SVC/PX: CPT

## 2022-06-29 RX ORDER — CALCIUM CARBONATE 400(1000)
2 TABLET,CHEWABLE ORAL 2 TIMES DAILY WITH MEALS
Refills: 0 | COMMUNITY
Start: 2022-06-29 | End: 2022-07-14 | Stop reason: ALTCHOICE

## 2022-06-29 RX ORDER — LABETALOL HCL 20 MG/4 ML
10 SYRINGE (ML) INTRAVENOUS EVERY 6 HOURS PRN
Status: DISCONTINUED | OUTPATIENT
Start: 2022-06-29 | End: 2022-06-30 | Stop reason: HOSPADM

## 2022-06-29 RX ORDER — PROPOFOL 10 MG/ML
VIAL (ML) INTRAVENOUS
Status: DISCONTINUED | OUTPATIENT
Start: 2022-06-29 | End: 2022-06-29

## 2022-06-29 RX ORDER — AMLODIPINE BESYLATE 10 MG/1
10 TABLET ORAL DAILY
Status: DISCONTINUED | OUTPATIENT
Start: 2022-06-30 | End: 2022-06-30 | Stop reason: HOSPADM

## 2022-06-29 RX ORDER — SODIUM CHLORIDE 9 MG/ML
INJECTION, SOLUTION INTRAVENOUS CONTINUOUS
Status: DISCONTINUED | OUTPATIENT
Start: 2022-06-29 | End: 2022-06-29

## 2022-06-29 RX ORDER — SUCCINYLCHOLINE CHLORIDE 20 MG/ML
INJECTION INTRAMUSCULAR; INTRAVENOUS
Status: DISCONTINUED | OUTPATIENT
Start: 2022-06-29 | End: 2022-06-29

## 2022-06-29 RX ORDER — OXYCODONE HYDROCHLORIDE 5 MG/1
5 TABLET ORAL EVERY 4 HOURS PRN
Status: DISCONTINUED | OUTPATIENT
Start: 2022-06-29 | End: 2022-06-30 | Stop reason: HOSPADM

## 2022-06-29 RX ORDER — OXYCODONE HYDROCHLORIDE 5 MG/1
5 TABLET ORAL EVERY 4 HOURS PRN
Qty: 20 TABLET | Refills: 0 | Status: SHIPPED | OUTPATIENT
Start: 2022-06-29 | End: 2022-07-14 | Stop reason: ALTCHOICE

## 2022-06-29 RX ORDER — HYDROMORPHONE HYDROCHLORIDE 2 MG/ML
INJECTION, SOLUTION INTRAMUSCULAR; INTRAVENOUS; SUBCUTANEOUS
Status: DISCONTINUED | OUTPATIENT
Start: 2022-06-29 | End: 2022-06-29

## 2022-06-29 RX ORDER — MIDAZOLAM HYDROCHLORIDE 1 MG/ML
INJECTION, SOLUTION INTRAMUSCULAR; INTRAVENOUS
Status: DISCONTINUED | OUTPATIENT
Start: 2022-06-29 | End: 2022-06-29

## 2022-06-29 RX ORDER — ONDANSETRON 2 MG/ML
4 INJECTION INTRAMUSCULAR; INTRAVENOUS EVERY 12 HOURS PRN
Status: DISCONTINUED | OUTPATIENT
Start: 2022-06-29 | End: 2022-06-29 | Stop reason: HOSPADM

## 2022-06-29 RX ORDER — PHENYLEPHRINE HCL IN 0.9% NACL 1 MG/10 ML
SYRINGE (ML) INTRAVENOUS
Status: DISCONTINUED | OUTPATIENT
Start: 2022-06-29 | End: 2022-06-29

## 2022-06-29 RX ORDER — GABAPENTIN 300 MG/1
600 CAPSULE ORAL 3 TIMES DAILY
Status: DISCONTINUED | OUTPATIENT
Start: 2022-06-29 | End: 2022-06-30 | Stop reason: HOSPADM

## 2022-06-29 RX ORDER — FENTANYL CITRATE 50 UG/ML
25 INJECTION, SOLUTION INTRAMUSCULAR; INTRAVENOUS EVERY 5 MIN PRN
Status: DISCONTINUED | OUTPATIENT
Start: 2022-06-29 | End: 2022-06-29 | Stop reason: HOSPADM

## 2022-06-29 RX ORDER — OXYCODONE HYDROCHLORIDE 5 MG/1
5 TABLET ORAL
Status: DISCONTINUED | OUTPATIENT
Start: 2022-06-29 | End: 2022-06-29 | Stop reason: HOSPADM

## 2022-06-29 RX ORDER — KETAMINE HCL IN 0.9 % NACL 50 MG/5 ML
SYRINGE (ML) INTRAVENOUS
Status: DISCONTINUED | OUTPATIENT
Start: 2022-06-29 | End: 2022-06-29

## 2022-06-29 RX ORDER — HYDROMORPHONE HYDROCHLORIDE 1 MG/ML
0.2 INJECTION, SOLUTION INTRAMUSCULAR; INTRAVENOUS; SUBCUTANEOUS EVERY 5 MIN PRN
Status: DISCONTINUED | OUTPATIENT
Start: 2022-06-29 | End: 2022-06-29 | Stop reason: HOSPADM

## 2022-06-29 RX ORDER — HYDROMORPHONE HYDROCHLORIDE 1 MG/ML
INJECTION, SOLUTION INTRAMUSCULAR; INTRAVENOUS; SUBCUTANEOUS
Status: COMPLETED
Start: 2022-06-29 | End: 2022-06-29

## 2022-06-29 RX ORDER — LIDOCAINE HYDROCHLORIDE 10 MG/ML
1 INJECTION, SOLUTION EPIDURAL; INFILTRATION; INTRACAUDAL; PERINEURAL ONCE
Status: DISCONTINUED | OUTPATIENT
Start: 2022-06-29 | End: 2022-06-29 | Stop reason: HOSPADM

## 2022-06-29 RX ORDER — BUPIVACAINE HYDROCHLORIDE 2.5 MG/ML
INJECTION, SOLUTION EPIDURAL; INFILTRATION; INTRACAUDAL
Status: DISCONTINUED | OUTPATIENT
Start: 2022-06-29 | End: 2022-06-29 | Stop reason: HOSPADM

## 2022-06-29 RX ORDER — LEVOTHYROXINE SODIUM 150 UG/1
150 TABLET ORAL
Qty: 30 TABLET | Refills: 11 | Status: SHIPPED | OUTPATIENT
Start: 2022-06-29 | End: 2022-08-05

## 2022-06-29 RX ORDER — SCOLOPAMINE TRANSDERMAL SYSTEM 1 MG/1
1 PATCH, EXTENDED RELEASE TRANSDERMAL
Status: DISCONTINUED | OUTPATIENT
Start: 2022-06-29 | End: 2022-06-30 | Stop reason: HOSPADM

## 2022-06-29 RX ORDER — LEVOTHYROXINE SODIUM 150 UG/1
150 TABLET ORAL
Status: DISCONTINUED | OUTPATIENT
Start: 2022-06-30 | End: 2022-06-30 | Stop reason: HOSPADM

## 2022-06-29 RX ORDER — ACETAMINOPHEN 500 MG
1000 TABLET ORAL EVERY 8 HOURS
Status: DISCONTINUED | OUTPATIENT
Start: 2022-06-29 | End: 2022-06-30 | Stop reason: HOSPADM

## 2022-06-29 RX ORDER — ACETAMINOPHEN 500 MG
1000 TABLET ORAL
Status: COMPLETED | OUTPATIENT
Start: 2022-06-29 | End: 2022-06-29

## 2022-06-29 RX ORDER — FENTANYL CITRATE 50 UG/ML
INJECTION, SOLUTION INTRAMUSCULAR; INTRAVENOUS
Status: DISCONTINUED | OUTPATIENT
Start: 2022-06-29 | End: 2022-06-29

## 2022-06-29 RX ORDER — CALCIUM CARBONATE 200(500)MG
2000 TABLET,CHEWABLE ORAL 2 TIMES DAILY WITH MEALS
Status: DISCONTINUED | OUTPATIENT
Start: 2022-06-29 | End: 2022-06-30 | Stop reason: HOSPADM

## 2022-06-29 RX ORDER — LIDOCAINE HYDROCHLORIDE 20 MG/ML
INJECTION, SOLUTION EPIDURAL; INFILTRATION; INTRACAUDAL; PERINEURAL
Status: DISCONTINUED | OUTPATIENT
Start: 2022-06-29 | End: 2022-06-29

## 2022-06-29 RX ORDER — ONDANSETRON 2 MG/ML
4 INJECTION INTRAMUSCULAR; INTRAVENOUS DAILY PRN
Status: DISCONTINUED | OUTPATIENT
Start: 2022-06-29 | End: 2022-06-29 | Stop reason: HOSPADM

## 2022-06-29 RX ORDER — ONDANSETRON 4 MG/1
4 TABLET, ORALLY DISINTEGRATING ORAL ONCE
Status: COMPLETED | OUTPATIENT
Start: 2022-06-29 | End: 2022-06-29

## 2022-06-29 RX ORDER — LISINOPRIL 20 MG/1
40 TABLET ORAL DAILY
Status: DISCONTINUED | OUTPATIENT
Start: 2022-06-30 | End: 2022-06-30 | Stop reason: HOSPADM

## 2022-06-29 RX ORDER — DEXAMETHASONE SODIUM PHOSPHATE 4 MG/ML
INJECTION, SOLUTION INTRA-ARTICULAR; INTRALESIONAL; INTRAMUSCULAR; INTRAVENOUS; SOFT TISSUE
Status: DISCONTINUED | OUTPATIENT
Start: 2022-06-29 | End: 2022-06-29

## 2022-06-29 RX ADMIN — OXYCODONE 5 MG: 5 TABLET ORAL at 11:06

## 2022-06-29 RX ADMIN — ACETAMINOPHEN 1000 MG: 500 TABLET ORAL at 06:06

## 2022-06-29 RX ADMIN — ACETAMINOPHEN 1000 MG: 500 TABLET ORAL at 02:06

## 2022-06-29 RX ADMIN — SUCCINYLCHOLINE CHLORIDE 180 MG: 20 INJECTION, SOLUTION INTRAMUSCULAR; INTRAVENOUS; PARENTERAL at 08:06

## 2022-06-29 RX ADMIN — ONDANSETRON 4 MG: 2 INJECTION INTRAMUSCULAR; INTRAVENOUS at 10:06

## 2022-06-29 RX ADMIN — MIDAZOLAM 2 MG: 1 INJECTION INTRAMUSCULAR; INTRAVENOUS at 07:06

## 2022-06-29 RX ADMIN — SODIUM CHLORIDE 0.2 MCG/KG/MIN: 9 INJECTION, SOLUTION INTRAVENOUS at 08:06

## 2022-06-29 RX ADMIN — HYDROMORPHONE HYDROCHLORIDE 0.2 MG: 1 INJECTION, SOLUTION INTRAMUSCULAR; INTRAVENOUS; SUBCUTANEOUS at 11:06

## 2022-06-29 RX ADMIN — FENTANYL CITRATE 100 MCG: 50 INJECTION INTRAMUSCULAR; INTRAVENOUS at 08:06

## 2022-06-29 RX ADMIN — HYDROMORPHONE HYDROCHLORIDE 0.4 MG: 2 INJECTION, SOLUTION INTRAMUSCULAR; INTRAVENOUS; SUBCUTANEOUS at 10:06

## 2022-06-29 RX ADMIN — DEXAMETHASONE SODIUM PHOSPHATE 4 MG: 4 INJECTION INTRA-ARTICULAR; INTRALESIONAL; INTRAMUSCULAR; INTRAVENOUS; SOFT TISSUE at 08:06

## 2022-06-29 RX ADMIN — SODIUM CHLORIDE: 0.9 INJECTION, SOLUTION INTRAVENOUS at 08:06

## 2022-06-29 RX ADMIN — PROPOFOL 100 MG: 10 INJECTION, EMULSION INTRAVENOUS at 11:06

## 2022-06-29 RX ADMIN — ONDANSETRON 4 MG: 4 TABLET, ORALLY DISINTEGRATING ORAL at 04:06

## 2022-06-29 RX ADMIN — LIDOCAINE HYDROCHLORIDE 100 MG: 20 INJECTION, SOLUTION EPIDURAL; INFILTRATION; INTRACAUDAL at 08:06

## 2022-06-29 RX ADMIN — Medication 10 MG: at 10:06

## 2022-06-29 RX ADMIN — Medication 100 MCG: at 08:06

## 2022-06-29 RX ADMIN — GABAPENTIN 600 MG: 300 CAPSULE ORAL at 09:06

## 2022-06-29 RX ADMIN — SODIUM CHLORIDE, SODIUM GLUCONATE, SODIUM ACETATE, POTASSIUM CHLORIDE, MAGNESIUM CHLORIDE, SODIUM PHOSPHATE, DIBASIC, AND POTASSIUM PHOSPHATE: .53; .5; .37; .037; .03; .012; .00082 INJECTION, SOLUTION INTRAVENOUS at 08:06

## 2022-06-29 RX ADMIN — REMIFENTANIL HYDROCHLORIDE 0.1 MCG/KG/MIN: 1 INJECTION, POWDER, LYOPHILIZED, FOR SOLUTION INTRAVENOUS at 08:06

## 2022-06-29 RX ADMIN — SCOPALAMINE 1 PATCH: 1 PATCH, EXTENDED RELEASE TRANSDERMAL at 06:06

## 2022-06-29 RX ADMIN — ACETAMINOPHEN 1000 MG: 500 TABLET ORAL at 09:06

## 2022-06-29 RX ADMIN — Medication 200 MCG: at 08:06

## 2022-06-29 RX ADMIN — Medication 500 MCG: at 08:06

## 2022-06-29 RX ADMIN — HYDROMORPHONE HYDROCHLORIDE 0.4 MG: 2 INJECTION, SOLUTION INTRAMUSCULAR; INTRAVENOUS; SUBCUTANEOUS at 11:06

## 2022-06-29 RX ADMIN — OXYCODONE 5 MG: 5 TABLET ORAL at 09:06

## 2022-06-29 RX ADMIN — Medication 10 MG: at 04:06

## 2022-06-29 RX ADMIN — Medication 30 MG: at 08:06

## 2022-06-29 RX ADMIN — PROPOFOL 200 MG: 10 INJECTION, EMULSION INTRAVENOUS at 08:06

## 2022-06-29 RX ADMIN — Medication 10 MG: at 09:06

## 2022-06-29 RX ADMIN — CALCIUM CARBONATE (ANTACID) CHEW TAB 500 MG 2000 MG: 500 CHEW TAB at 04:06

## 2022-06-29 RX ADMIN — GLYCOPYRROLATE 0.2 MG: 0.2 INJECTION INTRAMUSCULAR; INTRAVENOUS at 09:06

## 2022-06-29 NOTE — OP NOTE
[facility]  Endocrine Surgery  Operative Report         Date of Procedure: 6/29/2022     Procedure: Procedure(s) (LRB):  THYROIDECTOMY, total (N/A)     Indications: This patient presents with a multinodular goiter. The patient now presents for a total thyroidectomy.     Surgeon(s) and Role:     * Anai Garcia MD - Primary     * Roger Nieves MD - Resident - Assisting      Pre-Operative Diagnosis: Goiter, nontoxic, multinodular [E04.2]    Post-Operative Diagnosis: Goiter, nontoxic, multinodular [E04.2]    Anesthesia: General      Procedures:   1. Total thyroidectomy  2. Intraoperative monitoring and interpretation of bilateral cranial nerves (vagus and recurrent laryngeal nerves) using the [place] NIM system    Intraoperative Findings:   1. Multinodular goiter, friable capsule  2. The bilateral recurrent laryngeal and vagus nerves were identified.  Function was verified using the NIM nerve monitoring system.  3. Parathyroid tissue was identified and preserved with a viable blood supply. Bilateral superior and inferior glands preserved.    Description of the Procedure:  The patient was seen in the Holding Room. The risks, benefits, complications, treatment options, and expected outcomes were discussed with the patient.  I discussed in detail the possible complications associated with thyroid surgery, which may include (but not limited to) hoarseness or voice changes, recurrent laryngeal nerve injury - temporary or permanent, superior laryngeal nerve injury - temporary or permanent, hypocalcemia and hypoparathyroidism - temporary or permanent, neck hematoma, bleeding, infection, scarring, reaction to medication, pulmonary aspiration, perforation of viscus, failure to diagnose a condition, complications requiring transfusion, death and imponderables.  I discussed the potential need for a staged completion thyroidectomy in the event of unexpected intraoperative findings or recurrent laryngeal nerve  dysfunction and thus the initial surgery would be limited to a thyroid lobectomy.  Thyroid hormone replacement will be necessary lifelong. Thyroid function will need to be monitored.  The patient agreed to the procedure despite the risks involved with the proposed plan, giving informed consent.  The site of surgery properly noted/marked. The patient was taken to Operating Room, identified as Dejah Schmidt and the procedure verified as total thyroidectomy.     The patient was placed supine after induction of a general anesthetic.  Appropriate lines and access were confirmed by the anesthesia team.  The neck was supported in an extended position and the surgical field was prepped and draped in sterile fashion.  A comprehensive time out was performed.  A 6 cm transverse cervical incision was created below the cricoid cartilage within a natural skin fold.  Dissection was carried down through the platysma layer.  Once this was completed, sub-platysmal flaps were raised superiorly to the thyroid cartilage and inferiorly to the sternal notch. The strap muscles were identified and divided at the midline. Sharp and blunt dissection were used to mobilize the right thyroid lobe in a medial direction.  Dissection continued posteriorly to expose the tracheoesophageal groove and right carotid artery.  Vagus nerve signal was confirmed with the NIM system.  The right thyroid lobe was mobilized further and the superior and inferior pole vessels were divided with silk ties and the Ligasure.  The middle thyroid vein was similarly divided.  The right recurrent laryngeal nerve was identified and preserved.  Function was verified using the NIM system.  The ligament of Alanis was carefully dissected and divided taking care to preserve the recurrent laryngeal nerve.  The superior and inferior parathyroid glands were preserved in situ on viable vascular pedicles.  The thyroid was dissected off the trachea using the Ligasure and bipolar  cautery. The isthmus was dissected off the thyroid cartilage also using the bipolar cautery.   A smallpyramidal lobe was identified, dissected free from the anterior surface of the cricothyroid muscle and thyroid cartilage and divided at the level of the thyroid cartilage.  Vagus and recurrent laryngeal nerve function were confirmed with the NIM system prior to proceeding to the contralateral dissection.      Attention was then given to the contralateral lobe.  Sharp and blunt dissection were again used to mobilize the left thyroid lobe in a medial direction. Dissection continued posteriorly to expose the tracheoesophageal groove and left carotid artery.  Vagus nerve signal was confirmed with the NIM system.  Similar to the contralateral side, the left thyroid lobe was mobilized further and the superior and inferior pole vessels were divided with silk ties and the Ligasure. The middle thyroid vein was similarly divided. The left recurrent laryngeal nerve was identified and preserved.  Function was verified using the NIMS.  The ligament of Alanis was carefully dissected and divided taking care to preserve the recurrent laryngeal nerve.  The superior and inferior parathyroid glands were preserved in situ on viable vascular pedicles.  The thyroid was dissected off the trachea using the Ligasure and bipolar cautery.  Vagus and recurrent laryngeal nerve function were confirmed with the NIM system.  The specimen was submitted to pathology for permanent evaluation.  A suture was placed for orientation purposes, stitch marks the right superior pole.     The wound was irrigated and inspected carefully.  Multiple Valsalva maneuvers were performed at 30-35 cm of water and additional hemostasis was achieved as necessary with focal application of bipolar cautery. This was augmented with Fibrillar which was placed in the thyroid fossa.  The bilateral superior and inferior parathyroid glands were inspected and confirmed to appear  viable.  Function of the bilateral recurrent laryngeal and vagus nerves were again verified using the NIM system prior to closure. 0.25% Marcaine was injected into the subcutaneous tissue of the incision for post-op analgesia. The strap muscles were then closed with interrupted 3-0 Vicryl suture.  The platysma was closed with interrupted 3-0 Vicryl suture, and the skin incision was closed with a 6-0 Vicryl subcuticular knot-less closure. Sterile skin glue was applied to the incision.    Instrument, sponge and needle counts were correct prior to closure and at the conclusion of the case.  Procedures and specimens were confirmed with the circulating nurse at the completion of the case.    Complications: No    Estimated Blood Loss (EBL): less than 50 mL           Drains: None    Implants: None    Specimens:   Specimen (24h ago, onward)             Start     Ordered    06/29/22 1047  Specimen to Pathology, Surgery General Surgery  Once        Comments: Pre-op Diagnosis: Goiter, nontoxic, multinodular [E04.2]Procedure(s):THYROIDECTOMY, total Number of specimens: 1Name of specimens: 1. Thyroid - Suture marks right superior pole; Permanent specimen     References:    Click here for ordering Quick Tip   Question Answer Comment   Procedure Type: General Surgery    Specimen Class: Routine/Screening    Which provider would you like to cc? BALJIT SAGASTUME    Release to patient Immediate        06/29/22 1058                        Condition: stable    Disposition: PACU - hemodynamically stable.    Attestation: I was present and scrubbed for the entire procedure.

## 2022-06-29 NOTE — PLAN OF CARE
Pt arrived from PACU Aox4. Hypertensive but otherwise VSS. S/P thyroidectomy. No signs of respiratory distress on 2L NC. Pt remained injury free through shift. Will continue POC.

## 2022-06-29 NOTE — H&P
No major changes since H+P below.    Roger Nieves MD  General Surgery, PGY-4  [phone]      Endocrine Surgery History & Physical      REFERRING PROVIDER: Yajaira Yanez MD     REASON FOR VISIT: symptomatic multinodular goiter     HPI: Dejah Schmidt is a 61 y.o. female patient with a history notable for hypertension, CKD stage 3, vitamin D deficiency, morbid obesity, chronic pain and a longstanding multinodular goiter who presents in consultation for surgical management of the multinodular goiter.      The last ultrasound demonstrated an enlarged thyroid gland with multiple thyroid nodules with interval growth.  Multiple previous FNA biopsies of the dominant bilateral nodules have resulted with benign cytology.  She is biochemically euthyroid.  The patient denies overt hyperthyroid or hypothyroid symptoms.  She does have dysphagia and globus sensation but denies anterior neck pain.  The patient has no hoarseness or voice changes.  The patient has has had a prior excisional biopsy of a neck mass but no prior thyroid surgery.  The patient has no history of radiation exposure or goitrogens.  The patient has not recently been on lithium, biotin, amiodarone or received iodine contrast.  There is not a significant history of thyroid cancer, thyroid disease or familial endocrinopathies though her daughter also has a multinodular goiter.  Calcium levels have been normal and she denies symptoms of hypercalcemia, she is low risk for concurrent parathyroid disease.        LABORATORY STUDIES:  I personally and independently reviewed relevant lab test results, including the following:           TSH   Date Value Ref Range Status   04/04/2022 1.771 0.400 - 4.000 uIU/mL Final   02/15/2021 0.888 0.400 - 4.000 uIU/mL Final   02/28/2020 0.628 0.400 - 4.000 uIU/mL Final            Free T4   Date Value Ref Range Status   03/04/2009 0.94 0.71 - 1.51 ng/dl Final              Vit D, 25-Hydroxy   Date Value Ref Range Status   12/03/2021  22 (L) 30 - 96 ng/mL Final       Comment:       Vitamin D deficiency.........<10 ng/mL                              Vitamin D insufficiency......10-29 ng/mL       Vitamin D sufficiency........> or equal to 30 ng/mL  Vitamin D toxicity............>100 ng/mL      07/02/2020 34 30 - 96 ng/mL Final       Comment:       Vitamin D deficiency.........<10 ng/mL                              Vitamin D insufficiency......10-29 ng/mL       Vitamin D sufficiency........> or equal to 30 ng/mL  Vitamin D toxicity............>100 ng/mL      12/26/2019 20 (L) 30 - 96 ng/mL Final       Comment:       Vitamin D deficiency.........<10 ng/mL                              Vitamin D insufficiency......10-29 ng/mL       Vitamin D sufficiency........> or equal to 30 ng/mL  Vitamin D toxicity............>100 ng/mL               Calcium   Date Value Ref Range Status   04/04/2022 9.2 8.7 - 10.5 mg/dL Final   12/03/2021 9.7 8.7 - 10.5 mg/dL Final   02/15/2021 9.5 8.7 - 10.5 mg/dL Final            Albumin   Date Value Ref Range Status   12/03/2021 3.9 3.5 - 5.2 g/dL Final   02/15/2021 4.2 3.5 - 5.2 g/dL Final   07/02/2020 3.8 3.5 - 5.2 g/dL Final         PAST MEDICAL HISTORY:      Patient Active Problem List   Diagnosis    Unspecified essential hypertension    Multinodular goiter    Morbid obesity    Vitamin D deficiency disease    Chronic back pain    Lumbar radiculopathy    Facet arthropathy, lumbar    Facet hypertrophy of lumbar region    Hand pain    Herniation of lumbar intervertebral disc with radiculopathy    Spinal stenosis of lumbar region with neurogenic claudication    Chronic pain    Chronic kidney disease, stage 3a    Trigger thumb, right thumb    Trigger finger, left middle finger    Trigger finger, left index finger         PAST SURGICAL HISTORY:        Past Surgical History:   Procedure Laterality Date    BACK SURGERY        CARPAL TUNNEL RELEASE        COLONOSCOPY N/A 6/4/2020     Procedure: COLONOSCOPY, with  me, 5/20;  Surgeon: Sarah Beth Leavitt MD;  Location: [facility] ENDO (4TH FLR);  Service: Endoscopy;  Laterality: N/A;  covid test 6/2    EPIDURAL STEROID INJECTION N/A 10/27/2021     Procedure: INJECTION, STEROID, EPIDURAL,L5/S1 DIRECT REF/NEED CONSENT;  Surgeon: Clement Aldana MD;  Location: [facility], [place], operated by [facility] PAIN MGT;  Service: Pain Management;  Laterality: N/A;    FOOT SURGERY        HYSTERECTOMY        MASS EXCISION         at neck    ROTATOR CUFF REPAIR         left shoulder    TRIGGER FINGER RELEASE   12/2017     right hand    TRIGGER FINGER RELEASE Right 1/26/2022     Procedure: RELEASE, TRIGGER FINGER - right thumb;  Surgeon: Cristopher Tolliver MD;  Location: [facility] OR;  Service: Orthopedics;  Laterality: Right;    TRIGGER FINGER RELEASE Left 3/9/2022     Procedure: RELEASE, TRIGGER FINGER - LEFT index and long;  Surgeon: Cristopher Tolliver MD;  Location: [facility] OR;  Service: Orthopedics;  Laterality: Left;         MEDICATIONS:  Current Medications          Current Outpatient Medications   Medication Sig Dispense Refill    amLODIPine (NORVASC) 10 MG tablet Take 1 tablet (10 mg total) by mouth once daily. 90 tablet 1    atorvastatin (LIPITOR) 40 MG tablet Take 1 tablet (40 mg total) by mouth once daily. 90 tablet 1    cholecalciferol, vitamin D3, 1,250 mcg (50,000 unit) capsule Take 50,000 Units by mouth every 7 days.        cyclobenzaprine (FLEXERIL) 5 MG tablet Take 1 tablet (5 mg total) by mouth 3 (three) times daily as needed for Muscle spasms. 30 tablet 0    gabapentin (NEURONTIN) 600 MG tablet TAKE 1 TABLET(600 MG) BY MOUTH THREE TIMES DAILY 90 tablet 4    lisinopriL (PRINIVIL,ZESTRIL) 40 MG tablet Take 1 tablet (40 mg total) by mouth once daily. 90 tablet 1    triamterene-hydrochlorothiazide 37.5-25 mg (DYAZIDE) 37.5-25 mg per capsule TAKE ONE CAPSULE BY MOUTH EVERY MORNING. 90 capsule 1    ALBUTEROL INHL          zolpidem (AMBIEN) 5 MG Tab Take 1 tablet (5 mg total) by mouth nightly as needed. (Patient not  taking: Reported on 4/25/2022) 20 tablet 1      No current facility-administered medications for this visit.            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      Social Determinants of Health          Financial Resource Strain: Medium Risk    Difficulty of Paying Living Expenses: Somewhat hard   Food Insecurity: Food Insecurity Present    Worried About Running Out of Food in the Last Year: Sometimes true    Ran Out of Food in the Last Year: Never true   Transportation Needs: Unmet Transportation Needs    Lack of Transportation (Medical): Yes    Lack of Transportation (Non-Medical): Yes   Physical Activity: Inactive    Days of Exercise per Week: 0 days    Minutes of Exercise per Session: 0 min   Stress: Stress Concern Present    Feeling of Stress : To some extent   Social Connections: Unknown    Frequency of Communication with Friends and Family: More than three times a week    Frequency of Social Gatherings with Friends and Family: Three times a week    Active Member of Clubs or Organizations: No    Attends Club or Organization Meetings: Never    Marital Status:    Housing Stability: Low Risk     Unable to Pay for Housing in the Last Year: No    Number of Places Lived in the Last Year: 2    Unstable Housing in the Last Year: No             FAMILY HISTORY:        Family History   Problem Relation Age of Onset    Cancer Mother      Coronary artery disease Mother      Heart disease Mother      Hypertension Mother      Hyperlipidemia  "Mother      Hypertension Father      Cancer Father      Heart disease Maternal Grandmother      Hypertension Maternal Grandmother      Breast cancer Maternal Grandmother            REVIEW OF SYSTEMS:  A detailed review of systems was reviewed with the patient, pertinent positives and negatives are presented in the note and is otherwise negative.     PHYSICAL EXAMINATION:  Vital Signs: /64 (BP Location: Left arm, Patient Position: Sitting, BP Method: Medium (Automatic))   Pulse 66   Temp 97.9 °F (36.6 °C) (Oral)   Resp 18   Ht 5' 6" (1.676 m)   Wt 135.4 kg (298 lb 8.1 oz)   SpO2 98%   BMI 48.18 kg/m²      Constitutional: no acute distress, comfortable, well appearing  HENT: no lid lag, no exophthalmos, no scleral icterus  Neck: supple, trachea in midline, thyroid is soft and moves well with swallowing, the thyroid is enlarged without distinct nodularity, good neck extension, well healed transverse scar over the right sternocleidomastoid muscle  Heme/Lymph: no cervical or supraclavicular lymphadenopathy  Respiratory: normal respiratory effort, no wheezes or stridor  Cardiovascular: regular rate and rhythm  Extremities: no edema  Skin: warm and dry, no rashes  Neurologic: no resting tremor of outstretched hands, voice adequate  Vascular: radial pulses palpable bilaterally  Psychiatric: affect normal     IMAGING STUDIES:  I personally and independently reviewed, visualized and interpreted the images of the below listed radiology studies (including the thyroid ultrasound dated 1/2022) and my findings are notable for right greater than left thyroid goiter with multiple nodules bilaterally.  Reports below for reference.     US Soft Tissue Head Neck Thyroid 01/05/2022  FINDINGS:  Of note, patient has undergone FNA of multiple nodules in the left and right thyroid lobes, all of which have demonstrated benign results per pathology report.     The right lobe of the thyroid measures 6.6 x 2.8 x 3.7 cm and " demonstrates multiple (4-5 in number) nodules, the largest at the mid aspect of the right thyroid lobe measuring approximately 4.2 x 2.6 x 3.6 cm (previously measuring 3.4 x 2.3 x 2.9 cm).  This nodule is solid, hyperechoic, not taller than wide, demonstrates well-circumscribed margins, and does not demonstrate internal calcification.  The 2nd largest nodule measures 1.7 x 1.3 x 1.9 cm at the superior pole of the right thyroid lobe.  This nodule is mixed solid and cystic, isoechoic, not taller than wide, demonstrates smooth margins, and does not demonstrate internal calcification.     The thyroid isthmus demonstrates a mixed cystic and solid nodule measuring 0.9 x 0.4 x 0.7 cm.     The left lobe of the thyroid measures 6.6 x 2.4 x 3.1 cm and demonstrates multiple (4-5 in number) nodules, the largest at the superior pole of the left thyroid lobe measuring 2.8 x 1.3 x 1.7 cm (previously measuring 2.1 x 1.1 x 1.4 cm).  This nodule is solid and isoechoic, not taller than wide, demonstrates smooth margins, and does not demonstrate internal calcification.  The 2nd largest nodule measures approximately 1.9 x 1.1 x 1.7 cm at the mid aspect of the left thyroid lobe.  This nodule is solid and isoechoic to hyperechoic, not taller than wide, demonstrates smooth margins, and does not demonstrate internal calcification.  Additional nodules are mostly cystic or spongiform, and do not meet criteria for FNA or follow-up.     Thyroid vascularity is within normal limits.     There are no abnormal lymph nodes within the visualized portions of the neck.     Impression  Enlarged, heterogeneous multinodular thyroid.  Some nodules have slightly increased in size when compared to ultrasound 02/28/2020, for example the largest nodule in the right thyroid lobe.  Multiple nodules meet criteria for FNA, including the largest nodule in each lobe.  Per chart review, patient has undergone multiple prior fine-needle aspirations with benign results  per pathology report.  Correlation with prior site and pathology is recommended.  Repeat FNA can be performed if clinically indicated versus repeat ultrasound in 1 year to ensure stability.     Electronically signed by:      Ede Bowens MD  Date:                                     01/05/2022  Time:                                                14:04        CYTOLOGY:          Final Pathologic Diagnosis   Date Value Ref Range Status   06/04/2020     Final     BIOPSY OF TRANSVERSE COLON:  POLYPOID GRANULATION TISSUE WITH NO NEOPLASIA IDENTIFIED          Comment:       Interpreted by: Delroy Morse M.D., Signed on 06/05/2020 at 08:50   03/10/2020 (A)   Final     THYROID, LEFT INFERIOR LOBE, FINE NEEDLE ASPIRATION:  - [place] [facility] [facility] Thyroid Cytology Category: Benign  - Abundant colloid, benign follicular cells, macrophages, and scattered  lymphocytes present          Comment:       Interpreted by: Murphy Johnson M.D., Signed on 03/13/2020 at 11:03            IMPRESSION:  I had the pleasure of seeing Ms. Schmidt in endocrine surgical consultation regarding her symptomatic multinodular goiter with benign cytology of the dominant nodules.  I have discussed with Ms. Schmidt at length regarding the current surgical and non-surgical treatment options.  Based on the current clinical findings and patient's preference she will necessitate a total thyroidectomy.      I have discussed in detail the meaning of this thyroid disease process.  I have discussed in detail the possible complications associated with thyroid surgery, which may include (but not limited to) hoarseness, recurrent laryngeal nerve injury, superior laryngeal nerve injury - temporary or permanent, hypocalcemia and hypoparathyroidism - temporary or permanent, neck hematoma, infection, scarring, death and imponderables.  I discussed the potential need for a staged completion thyroidectomy in the event of unexpected intraoperative findings or recurrent  laryngeal nerve dysfunction and thus the initial surgery would be limited to a thyroid lobectomy.  She understood that thyroid hormone replacement will be necessary lifelong. Thyroid function will need to be monitored.     The patient expressed understanding of all the risks at hand, agreed with this plan and informed consent was signed and witnessed.           Problem List Items Addressed This Visit                 Endocrine      Multinodular goiter - Primary (Chronic)        Symptomatic nontoxic multinodular goiter with benign cytology of the dominant nodules.  Will proceed to the OR for total thyroidectomy.                Vitamin D deficiency disease (Chronic)        Continue vitamin D supplementation per Endocrinology recommendations                       Anai Garcia MD  Endocrine Surgery   4/25/2022

## 2022-06-29 NOTE — PROGRESS NOTES
Patient assigned to Amparo Ridley RN writer by charge nurse. The patient was  escorted to [facility] room 10. The patient is currently  changing into a hospital gown. This writer is completing a chart review and reviewing MD orders.

## 2022-06-29 NOTE — PATIENT INSTRUCTIONS
Post-Operative Instructions: Thyroidectomy    MEDICATIONS  You are being discharged home on the following medications:  Thyroid hormone  Levothyroxine (Synthroid): 150 mcg, once daily  Take first thing in the morning, on an empty stomach.  Wait 30-60 minutes before eating or taking any other medications.    Calcium Supplementation  Calcium Carbonate (Tums Ultra): 2 tablets, 2 times a day with meals  One Tums Ultra tablet has 1000 mg calcium carbonate which is equal to 400 mg elemental calcium.  *If you notice any numbness or tingling of the face, hands, or feet, take 2 extra tablets of Tums.  If symptoms do not subside within a half-hour, please call your surgeon's office.  Do NOT take your Tums the morning before your labs are drawn.     Pain medication  Chloraseptic lozenges or spray: Use as needed for sore throat  Please take Tylenol for mild pain.  You can take up to 1000mg every 6-8 hours.  Do not exceed 4000mg in 24 hours  You can take the narcotic pain (Roxicodone) medication for more severe pain.    You may resume taking your normal medications, with the EXCEPTION of the following:  Please check with your surgeon and internist/cardiologist for specific instructions about when you should re-start:  Apixaban (Eliquis), Clopidogrel (Plavix), Dabigatran (Pradaxa), Dipyridamole (Aggrenox), Rivaroxaban (Xarelto), Warfarin (Coumadin), or any other type of blood thinner you may be taking.  Aspirin & anti-inflammatories (i.e. Goody's, Excedrin, ibuprofen, Advil, Aleve): May begin 72 hours after surgery.  Vitamins, minerals, and herbal supplements: Please wait 1 week after surgery to restart these medications  Stop taking methimazole and propranolol if taking    WOUND CARE  Please use your ice pack for 30 minutes on / 30 minutes off for at least the first 7 days.  You will be discharged from the hospital with your incision covered by skin glue that will remain on until your postoperative appointment.  It is normal to  develop a lump under the incision, this is expected and is related to the healing process.  The lump will gradually go away with time.  You may shower when you return home after the surgery. No bathing or swimming (do not submerge in water) until cleared by your surgeon.  Please notify your physician if your incision is red, warm/hot, if you have an increase in swelling, any new drainage, or if you have a fever >101.5 F.  Please call 911 or proceed to the Emergency Room if you have difficulty breathing.    ACTIVITIES  You may resume normal activities, depending on your energy level.  Please refrain from driving for at least 3 days, or until you have complete mobility of your neck. Do not drive if you are taking narcotic pain medication.  Please refrain from heavy lifting (10 lbs.) for 10 days.    If you have any questions or concerns, please call the surgeon's office at [phone].      Future Appointments   Date Time Provider Department Center   7/14/2022  2:00 PM LAB, APPOINTMENT [facility]   7/14/2022  3:00 PM Anai Garcia MD [facility] [place] [facility]   8/4/2022  9:30 AM Yajaira Yanez MD [facility] [place]      Do NOT take your calcium supplements in the morning before your lab appointment.

## 2022-06-29 NOTE — ANESTHESIA POSTPROCEDURE EVALUATION
Anesthesia Post Evaluation    Patient: Dejah GUZMAN Brayan    Procedure(s) Performed: Procedure(s) (LRB):  THYROIDECTOMY, total (N/A)    Final Anesthesia Type: general      Patient location during evaluation: PACU  Patient participation: Yes- Able to Participate  Level of consciousness: awake and alert  Post-procedure vital signs: reviewed and stable  Pain management: adequate  Airway patency: patent  CALIXTO mitigation strategies: Multimodal analgesia  PONV status at discharge: No PONV  Anesthetic complications: no      Cardiovascular status: blood pressure returned to baseline and hemodynamically stable  Respiratory status: unassisted  Hydration status: euvolemic  Follow-up not needed.          Vitals Value Taken Time   /74 06/29/22 1202   Temp 36.3 °C (97.3 °F) 06/29/22 1121   Pulse 96 06/29/22 1206   Resp 12 06/29/22 1206   SpO2 95 % 06/29/22 1206   Vitals shown include unvalidated device data.      No case tracking events are documented in the log.      Pain/Dirk Score: Pain Rating Prior to Med Admin: 10 (6/29/2022 11:45 AM)  Dirk Score: 8 (6/29/2022 11:45 AM)

## 2022-06-29 NOTE — ANESTHESIA PROCEDURE NOTES
Intubation    Date/Time: 6/29/2022 8:12 AM  Performed by: Sohail Merida MD  Authorized by: Ang Kwan MD     Intubation:     Induction:  Intravenous    Intubated:  Postinduction    Mask Ventilation:  Easy with oral airway    Attempts:  1    Attempted By:  Resident anesthesiologist    Method of Intubation:  Video laryngoscopy    Blade:  Steele 3    Laryngeal View Grade: Grade I - full view of cords      Difficult Airway Encountered?: No      Complications:  None    Airway Device:  EMG ETT (NIMS)    Airway Device Size:  7.0    Style/Cuff Inflation:  Cuffed (inflated to minimal occlusive pressure)    Tube secured:  22    Secured at:  The lips    Placement Verified By:  Capnometry    Complicating Factors:  None    Findings Post-Intubation:  BS equal bilateral and atraumatic/condition of teeth unchanged

## 2022-06-29 NOTE — NURSING TRANSFER
Nursing Transfer Note      6/29/2022     Reason patient is being transferred: post op    Transfer To: 1061    Transfer via stretcher    Transfer with 02 2 liters NC    Transported by pct    Medicines sent: None    Any special needs or follow-up needed: None    Chart send with patient: Yes    Notified: daughter    Patient reassessed at: 6/29/2022 1330    Upon arrival to floor: patient oriented to room, call bell in reach and bed in lowest position

## 2022-06-29 NOTE — TRANSFER OF CARE
"Anesthesia Transfer of Care Note    Patient: Dejah Schmidt    Procedure(s) Performed: Procedure(s) (LRB):  THYROIDECTOMY, total (N/A)    Patient location: PACU    Anesthesia Type: general    Transport from OR: Transported from OR on 6-10 L/min O2 by face mask with adequate spontaneous ventilation    Post pain: adequate analgesia    Post assessment: no apparent anesthetic complications    Post vital signs: stable    Level of consciousness: awake and alert    Nausea/Vomiting: no nausea/vomiting    Complications: none    Transfer of care protocol was followed      Last vitals:   Visit Vitals  BP (!) 178/81 (BP Location: Left arm, Patient Position: Lying)   Pulse (!) 117   Temp 36.3 °C (97.3 °F) (Temporal)   Resp 18   Ht 5' 7" (1.702 m)   Wt 131.5 kg (290 lb)   SpO2 98%   Breastfeeding No   BMI 45.42 kg/m²     "

## 2022-06-30 VITALS
DIASTOLIC BLOOD PRESSURE: 62 MMHG | TEMPERATURE: 98 F | HEIGHT: 67 IN | OXYGEN SATURATION: 97 % | BODY MASS INDEX: 45.52 KG/M2 | SYSTOLIC BLOOD PRESSURE: 139 MMHG | WEIGHT: 290 LBS | HEART RATE: 76 BPM | RESPIRATION RATE: 18 BRPM

## 2022-06-30 PROCEDURE — 25000003 PHARM REV CODE 250: Performed by: STUDENT IN AN ORGANIZED HEALTH CARE EDUCATION/TRAINING PROGRAM

## 2022-06-30 RX ADMIN — AMLODIPINE BESYLATE 10 MG: 10 TABLET ORAL at 09:06

## 2022-06-30 RX ADMIN — LISINOPRIL 40 MG: 20 TABLET ORAL at 09:06

## 2022-06-30 RX ADMIN — OXYCODONE 5 MG: 5 TABLET ORAL at 03:06

## 2022-06-30 RX ADMIN — LEVOTHYROXINE SODIUM 150 MCG: 150 TABLET ORAL at 05:06

## 2022-06-30 RX ADMIN — GABAPENTIN 600 MG: 300 CAPSULE ORAL at 09:06

## 2022-06-30 RX ADMIN — ACETAMINOPHEN 1000 MG: 500 TABLET ORAL at 05:06

## 2022-06-30 RX ADMIN — CALCIUM CARBONATE (ANTACID) CHEW TAB 500 MG 2000 MG: 500 CHEW TAB at 09:06

## 2022-06-30 NOTE — DISCHARGE SUMMARY
[facility]  General Surgery  Discharge Summary      Patient Name: Dejah Schmidt  MRN: [id number]  Admission Date: 6/29/2022  Hospital Length of Stay: 0 days  Discharge Date and Time:  06/30/2022 8:48 AM  Attending Physician: Anai Garcia MD   Discharging Provider: Roger Nieves MD  Primary Care Provider: Dasha Bergeron MD    HPI:   No notes on file    Procedure(s) (LRB):  THYROIDECTOMY, total (N/A)      Indwelling Lines/Drains at time of discharge:   Lines/Drains/Airways     None               Hospital Course: Admitted following surgery (outpatient extended recovery). Did well overnight.  Discharged in AM.    Physical exam day of discharge:  Gen: NAD, sitting up in bed  Neck: Incision c/d/i, no underlying hematoma    Goals of Care Treatment Preferences:  Code Status: Full Code      Consults:     Significant Diagnostic Studies: Labs:   BMP:   Recent Labs   Lab 06/29/22  1122   CALCIUM 8.5*       Pending Diagnostic Studies:     Procedure Component Value Units Date/Time    Specimen to Pathology, Surgery General Surgery [[id number]] Collected: 06/29/22 1058    Order Status: Sent Lab Status: In process Updated: 06/29/22 1348    Specimen: Tissue         Final Active Diagnoses:    Diagnosis Date Noted POA    PRINCIPAL PROBLEM:  Multinodular goiter [E04.2] 01/30/2014 Yes     Chronic      Problems Resolved During this Admission:      Discharged Condition: good    Disposition: Home or Self Care    Follow Up:   Follow-up Information     Anai Garcia MD Follow up in 16 day(s).    Specialty: Surgery  Why: Post-op  Contact information:  [address]  [facility] [address]  [phone]                       Patient Instructions:      Diet Adult Regular     No dressing needed     Activity as tolerated     Medications:  Reconciled Home Medications:      Medication List      START taking these medications    calcium carbonate 400 mg calcium (1,000 mg) Chew  Commonly known as: TUMS ULTRA  Take 2 tablets (800 mg  total) by mouth 2 (two) times daily with meals.     levothyroxine 150 MCG tablet  Commonly known as: SYNTHROID  Take 1 tablet (150 mcg total) by mouth before breakfast.     oxyCODONE 5 MG immediate release tablet  Commonly known as: ROXICODONE  Take 1 tablet (5 mg total) by mouth every 4 (four) hours as needed for Pain.        CHANGE how you take these medications    amLODIPine 10 MG tablet  Commonly known as: NORVASC  Take 1 tablet (10 mg total) by mouth once daily.  What changed: when to take this     atorvastatin 40 MG tablet  Commonly known as: LIPITOR  Take 1 tablet (40 mg total) by mouth once daily.  What changed: when to take this     lisinopriL 40 MG tablet  Commonly known as: PRINIVIL,ZESTRIL  Take 1 tablet (40 mg total) by mouth once daily.  What changed: when to take this        CONTINUE taking these medications    ALBUTEROL INHL  Inhale into the lungs every 4 to 6 hours as needed.     cholecalciferol (vitamin D3) 1,250 mcg (50,000 unit) capsule  Take 50,000 Units by mouth every 7 days. SUNDAYS     cyclobenzaprine 5 MG tablet  Commonly known as: FLEXERIL  Take 1 tablet (5 mg total) by mouth 3 (three) times daily as needed for Muscle spasms.     gabapentin 600 MG tablet  Commonly known as: NEURONTIN  TAKE 1 TABLET(600 MG) BY MOUTH THREE TIMES DAILY     triamterene-hydrochlorothiazide 37.5-25 mg 37.5-25 mg per capsule  Commonly known as: DYAZIDE  TAKE ONE CAPSULE BY MOUTH EVERY MORNING.     zolpidem 5 MG Tab  Commonly known as: AMBIEN  Take 1 tablet (5 mg total) by mouth nightly as needed.          Time spent on the discharge of patient: 15 minutes    Roger Nieves MD  General Surgery  [facility]

## 2022-06-30 NOTE — PLAN OF CARE
Problem: Adult Inpatient Plan of Care  Goal: Plan of Care Review  Outcome: Ongoing, Progressing  Flowsheets (Taken 6/29/2022 2319)  Plan of Care Reviewed With: patient  Goal: Patient-Specific Goal (Individualized)  Outcome: Ongoing, Progressing  Goal: Absence of Hospital-Acquired Illness or Injury  Outcome: Ongoing, Progressing  Intervention: Identify and Manage Fall Risk  Flowsheets (Taken 6/29/2022 2319)  Safety Promotion/Fall Prevention:   assistive device/personal item within reach   nonskid shoes/socks when out of bed   side rails raised x 3   medications reviewed  Intervention: Prevent Skin Injury  Flowsheets (Taken 6/29/2022 2319)  Body Position:   30 degrees   position changed independently  Skin Protection: adhesive use limited  Intervention: Prevent and Manage VTE (Venous Thromboembolism) Risk  Flowsheets (Taken 6/29/2022 2319)  Activity Management: Rolling - L1  VTE Prevention/Management:   remove, assess skin, and reapply sequential compression device   ambulation promoted  Intervention: Prevent Infection  Flowsheets (Taken 6/29/2022 2319)  Infection Prevention:   hand hygiene promoted   single patient room provided   rest/sleep promoted  Goal: Optimal Comfort and Wellbeing  Outcome: Ongoing, Progressing  Intervention: Monitor Pain and Promote Comfort  Flowsheets (Taken 6/29/2022 2319)  Pain Management Interventions:   quiet environment facilitated   relaxation techniques promoted   care clustered  Intervention: Provide Person-Centered Care  Flowsheets (Taken 6/29/2022 2319)  Trust Relationship/Rapport:   care explained   questions answered   choices provided  Goal: Readiness for Transition of Care  Outcome: Ongoing, Progressing     Problem: Bariatric Environmental Safety  Goal: Safety Maintained with Care  Outcome: Ongoing, Progressing

## 2022-06-30 NOTE — DISCHARGE INSTRUCTIONS
Post-Operative Instructions: Thyroidectomy    MEDICATIONS  You are being discharged home on the following medications:  Thyroid hormone  Levothyroxine (Synthroid): 150 mcg, once daily  Take first thing in the morning, on an empty stomach.  Wait 30-60 minutes before eating or taking any other medications.    Calcium Supplementation  Calcium Carbonate (Tums Ultra): 2 tablets, 2 times a day with meals  One Tums Ultra tablet has 1000 mg calcium carbonate which is equal to 400 mg elemental calcium.  *If you notice any numbness or tingling of the face, hands, or feet, take 2 extra tablets of Tums.  If symptoms do not subside within a half-hour, please call your surgeon's office.  Do NOT take your Tums the morning before your labs are drawn.     Pain medication  Chloraseptic lozenges or spray: Use as needed for sore throat  Please take Tylenol for mild pain.  You can take up to 1000mg every 6-8 hours.  Do not exceed 4000mg in 24 hours  You can take the narcotic pain (oxycodone) medication for more severe pain.    You may resume taking your normal medications, with the EXCEPTION of the following:  Please check with your surgeon and internist/cardiologist for specific instructions about when you should re-start:  Apixaban (Eliquis), Clopidogrel (Plavix), Dabigatran (Pradaxa), Dipyridamole (Aggrenox), Rivaroxaban (Xarelto), Warfarin (Coumadin), or any other type of blood thinner you may be taking.  Aspirin & anti-inflammatories (i.e. Goody's, Excedrin, ibuprofen, Advil, Aleve): May begin 72 hours after surgery.  Vitamins, minerals, and herbal supplements: Please wait 1 week after surgery to restart these medications    WOUND CARE  Please use your ice pack for 30 minutes on / 30 minutes off for at least the first 7 days.  You will be discharged from the hospital with your incision covered by skin glue that will remain on until your postoperative appointment.  It is normal to develop a lump under the incision, this is expected  and is related to the healing process.  The lump will gradually go away with time.  You may shower when you return home after the surgery. No bathing or swimming (do not submerge in water) until cleared by your surgeon.  Please notify your physician if your incision is red, warm/hot, if you have an increase in swelling, any new drainage, or if you have a fever >101.5 F.  Please call 911 or proceed to the Emergency Room if you have difficulty breathing.    ACTIVITIES  You may resume normal activities, depending on your energy level.  Please refrain from driving for at least 3 days, or until you have complete mobility of your neck. Do not drive if you are taking narcotic pain medication.  Please refrain from heavy lifting (10 lbs.) for 10 days.    If you have any questions or concerns, please call the surgeon's office at [phone].      Future Appointments   Date Time Provider Department Center   7/14/2022  2:00 PM LAB, APPOINTMENT [facility]   7/14/2022  3:00 PM Anai Garcia MD [facility] [place]   8/4/2022  9:30 AM Yajaira Yanez MD [facility] [place]      Do NOT take your calcium supplements in the morning before your lab appointment.

## 2022-06-30 NOTE — NURSING
VS stable, neck incision intact, dry. Patient ate lunch with no problems. Discharge instructions were given, family member at the bedside. Patient received bedside delivery medicines from Pharmacy. Right IV was D/c'd, inner cannula intact. Patient left with daughter via wheelchair.

## 2022-07-07 LAB
FINAL PATHOLOGIC DIAGNOSIS: NORMAL
Lab: NORMAL

## 2022-07-12 ENCOUNTER — TELEPHONE (OUTPATIENT)
Dept: ENDOCRINOLOGY | Facility: CLINIC | Age: 62
End: 2022-07-12
Payer: MEDICARE

## 2022-07-14 ENCOUNTER — OFFICE VISIT (OUTPATIENT)
Dept: SURGERY | Facility: CLINIC | Age: 62
End: 2022-07-14
Payer: MEDICARE

## 2022-07-14 ENCOUNTER — LAB VISIT (OUTPATIENT)
Dept: LAB | Facility: HOSPITAL | Age: 62
End: 2022-07-14
Attending: STUDENT IN AN ORGANIZED HEALTH CARE EDUCATION/TRAINING PROGRAM
Payer: MEDICARE

## 2022-07-14 VITALS
SYSTOLIC BLOOD PRESSURE: 128 MMHG | BODY MASS INDEX: 45.99 KG/M2 | HEIGHT: 67 IN | DIASTOLIC BLOOD PRESSURE: 63 MMHG | WEIGHT: 293 LBS | OXYGEN SATURATION: 96 % | HEART RATE: 76 BPM | TEMPERATURE: 98 F

## 2022-07-14 DIAGNOSIS — E04.2 MULTINODULAR GOITER: Chronic | ICD-10-CM

## 2022-07-14 DIAGNOSIS — E04.2 GOITER, NONTOXIC, MULTINODULAR: ICD-10-CM

## 2022-07-14 LAB
ALBUMIN SERPL BCP-MCNC: 3.8 G/DL (ref 3.5–5.2)
ANION GAP SERPL CALC-SCNC: 8 MMOL/L (ref 8–16)
BUN SERPL-MCNC: 17 MG/DL (ref 6–20)
CALCIUM SERPL-MCNC: 9.9 MG/DL (ref 8.7–10.5)
CHLORIDE SERPL-SCNC: 104 MMOL/L (ref 95–110)
CO2 SERPL-SCNC: 26 MMOL/L (ref 23–29)
CREAT SERPL-MCNC: 1.2 MG/DL (ref 0.5–1.4)
EST. GFR  (AFRICAN AMERICAN): 56.8 ML/MIN/1.73 M^2
EST. GFR  (NON AFRICAN AMERICAN): 49.2 ML/MIN/1.73 M^2
GLUCOSE SERPL-MCNC: 83 MG/DL (ref 70–110)
PHOSPHATE SERPL-MCNC: 3.4 MG/DL (ref 2.7–4.5)
POTASSIUM SERPL-SCNC: 4 MMOL/L (ref 3.5–5.1)
PTH-INTACT SERPL-MCNC: 101.7 PG/ML (ref 9–77)
SODIUM SERPL-SCNC: 138 MMOL/L (ref 136–145)

## 2022-07-14 PROCEDURE — 3074F PR MOST RECENT SYSTOLIC BLOOD PRESSURE < 130 MM HG: ICD-10-PCS | Mod: CPTII,S$GLB,, | Performed by: STUDENT IN AN ORGANIZED HEALTH CARE EDUCATION/TRAINING PROGRAM

## 2022-07-14 PROCEDURE — 3078F PR MOST RECENT DIASTOLIC BLOOD PRESSURE < 80 MM HG: ICD-10-PCS | Mod: CPTII,S$GLB,, | Performed by: STUDENT IN AN ORGANIZED HEALTH CARE EDUCATION/TRAINING PROGRAM

## 2022-07-14 PROCEDURE — 1159F MED LIST DOCD IN RCRD: CPT | Mod: CPTII,S$GLB,, | Performed by: STUDENT IN AN ORGANIZED HEALTH CARE EDUCATION/TRAINING PROGRAM

## 2022-07-14 PROCEDURE — 3078F DIAST BP <80 MM HG: CPT | Mod: CPTII,S$GLB,, | Performed by: STUDENT IN AN ORGANIZED HEALTH CARE EDUCATION/TRAINING PROGRAM

## 2022-07-14 PROCEDURE — 4010F ACE/ARB THERAPY RXD/TAKEN: CPT | Mod: CPTII,S$GLB,, | Performed by: STUDENT IN AN ORGANIZED HEALTH CARE EDUCATION/TRAINING PROGRAM

## 2022-07-14 PROCEDURE — 4010F PR ACE/ARB THEARPY RXD/TAKEN: ICD-10-PCS | Mod: CPTII,S$GLB,, | Performed by: STUDENT IN AN ORGANIZED HEALTH CARE EDUCATION/TRAINING PROGRAM

## 2022-07-14 PROCEDURE — 99024 POSTOP FOLLOW-UP VISIT: CPT | Mod: S$GLB,,, | Performed by: STUDENT IN AN ORGANIZED HEALTH CARE EDUCATION/TRAINING PROGRAM

## 2022-07-14 PROCEDURE — 99999 PR PBB SHADOW E&M-EST. PATIENT-LVL III: ICD-10-PCS | Mod: PBBFAC,,, | Performed by: STUDENT IN AN ORGANIZED HEALTH CARE EDUCATION/TRAINING PROGRAM

## 2022-07-14 PROCEDURE — 36415 COLL VENOUS BLD VENIPUNCTURE: CPT | Performed by: STUDENT IN AN ORGANIZED HEALTH CARE EDUCATION/TRAINING PROGRAM

## 2022-07-14 PROCEDURE — 80069 RENAL FUNCTION PANEL: CPT | Performed by: STUDENT IN AN ORGANIZED HEALTH CARE EDUCATION/TRAINING PROGRAM

## 2022-07-14 PROCEDURE — 83970 ASSAY OF PARATHORMONE: CPT | Performed by: STUDENT IN AN ORGANIZED HEALTH CARE EDUCATION/TRAINING PROGRAM

## 2022-07-14 PROCEDURE — 3008F BODY MASS INDEX DOCD: CPT | Mod: CPTII,S$GLB,, | Performed by: STUDENT IN AN ORGANIZED HEALTH CARE EDUCATION/TRAINING PROGRAM

## 2022-07-14 PROCEDURE — 99999 PR PBB SHADOW E&M-EST. PATIENT-LVL III: CPT | Mod: PBBFAC,,, | Performed by: STUDENT IN AN ORGANIZED HEALTH CARE EDUCATION/TRAINING PROGRAM

## 2022-07-14 PROCEDURE — 3008F PR BODY MASS INDEX (BMI) DOCUMENTED: ICD-10-PCS | Mod: CPTII,S$GLB,, | Performed by: STUDENT IN AN ORGANIZED HEALTH CARE EDUCATION/TRAINING PROGRAM

## 2022-07-14 PROCEDURE — 3074F SYST BP LT 130 MM HG: CPT | Mod: CPTII,S$GLB,, | Performed by: STUDENT IN AN ORGANIZED HEALTH CARE EDUCATION/TRAINING PROGRAM

## 2022-07-14 PROCEDURE — 99024 PR POST-OP FOLLOW-UP VISIT: ICD-10-PCS | Mod: S$GLB,,, | Performed by: STUDENT IN AN ORGANIZED HEALTH CARE EDUCATION/TRAINING PROGRAM

## 2022-07-14 PROCEDURE — 1159F PR MEDICATION LIST DOCUMENTED IN MEDICAL RECORD: ICD-10-PCS | Mod: CPTII,S$GLB,, | Performed by: STUDENT IN AN ORGANIZED HEALTH CARE EDUCATION/TRAINING PROGRAM

## 2022-07-14 NOTE — ASSESSMENT & PLAN NOTE
- Levothyroxine 150 mcg daily, reviewed instructions to take in the morning on an empty stomach  - Will call with lab results to discuss plan for calcium supplementation  - Recommended taking daily caltrate with vitamin D supplementation, or a calcium supplement of her preference to equal 1,200 mg a day  - Incision care discussed, scar massage and routine scar care information provided  - Reviewed pathology, consistent with pre-operative diagnosis  - Discontinue narcotics  - Follow up with endocrinology to check thyroid function tests in 4-6 weeks

## 2022-07-14 NOTE — PROGRESS NOTES
Postoperative Endocrine Surgery Clinic Note    Reason for visit / Chief complaint: Postoperative evaluation  Procedure:  THYROIDECTOMY, total  Procedure Date: 6/29/2022      Subjective:     Dejah Schmidt returns today for postoperative evaluation, she is approximately 2 weeks post op.    Procedure:   1. Total thyroidectomy  2. Intraoperative monitoring and interpretation of bilateral cranial nerves (vagus and recurrent laryngeal nerves) using the [employer] NIM system    Operative findings:   1. Multinodular goiter, friable capsule  2. The bilateral recurrent laryngeal and vagus nerves were identified.  Function was verified using the NIM nerve monitoring system.  3. Parathyroid tissue was identified and preserved with a viable blood supply. Bilateral superior and inferior glands preserved.    She has had an uncomplicated postoperative course. She denies signs or symptoms of hypocalcemia. She is currently taking 2 tablets of Tums Ultra BID, but has not taken any this morning.  Her phonation is at baseline.  She denies hyperthyroid or hypothyroid symptoms.  She is currently on 150 mcg levothyroxine and reports taking it in the morning on an empty stomach and 30-60 minutes before breakfast or other medications.    Current Outpatient Medications   Medication Sig Dispense Refill    ALBUTEROL INHL Inhale into the lungs every 4 to 6 hours as needed.      amLODIPine (NORVASC) 10 MG tablet Take 1 tablet (10 mg total) by mouth once daily. (Patient taking differently: Take 10 mg by mouth every morning.) 90 tablet 1    atorvastatin (LIPITOR) 40 MG tablet Take 1 tablet (40 mg total) by mouth once daily. (Patient taking differently: Take 40 mg by mouth every morning.) 90 tablet 1    calcium carbonate (TUMS ULTRA) 400 mg calcium (1,000 mg) Chew Take 2 tablets (800 mg total) by mouth 2 (two) times daily with meals.  0    cholecalciferol, vitamin D3, 1,250 mcg (50,000 unit) capsule Take 50,000 Units by mouth every 7 days.  "SUNDAYS      cyclobenzaprine (FLEXERIL) 5 MG tablet Take 1 tablet (5 mg total) by mouth 3 (three) times daily as needed for Muscle spasms. 30 tablet 0    gabapentin (NEURONTIN) 600 MG tablet TAKE 1 TABLET(600 MG) BY MOUTH THREE TIMES DAILY 90 tablet 4    levothyroxine (SYNTHROID) 150 MCG tablet Take 1 tablet (150 mcg total) by mouth before breakfast. 30 tablet 11    lisinopriL (PRINIVIL,ZESTRIL) 40 MG tablet Take 1 tablet (40 mg total) by mouth once daily. (Patient taking differently: Take 40 mg by mouth every morning.) 90 tablet 1    oxyCODONE (ROXICODONE) 5 MG immediate release tablet Take 1 tablet (5 mg total) by mouth every 4 (four) hours as needed for Pain. 20 tablet 0    triamterene-hydrochlorothiazide 37.5-25 mg (DYAZIDE) 37.5-25 mg per capsule TAKE ONE CAPSULE BY MOUTH EVERY MORNING. 90 capsule 1    zolpidem (AMBIEN) 5 MG Tab Take 1 tablet (5 mg total) by mouth nightly as needed. (Patient not taking: No sig reported) 20 tablet 1     No current facility-administered medications for this visit.     Review of patient's allergies indicates:  No Known Allergies     Review of Systems  Negative except as per HPI.     Objective:   /63 (BP Location: Left arm, Patient Position: Sitting, BP Method: Large (Automatic))   Pulse 76   Temp 98.4 °F (36.9 °C) (Oral)   Ht 5' 7" (1.702 m)   Wt 133.8 kg (295 lb)   SpO2 96%   BMI 46.20 kg/m²     General: alert, well appearing, and in no distress  Neck: neck is flat, no erythema or edema  Incision: well approximated, healing well  Neurological: phonation is normal    Labs:  Pending    Pathology  Final Pathologic Diagnosis   Date Value Ref Range Status   06/29/2022   [facility]:  THYROID, TOTAL THYROIDECTOMY:  -  Benign thyroid parenchyma with multinodular hyperplasia.  -  No evidence of malignancy.    Deloris Christopher M.D.  Report attached.  Performing site:  [place] [address] [place] [address]  "Disclaimer:  This case diagnosis " "was rendered completely by the outside  consultation pathologist and the case is electronically signed by an [facility]  pathologist listed below solely to release the report into the medical  record."       Comment:     Interp By Shruti Hines M.D., Signed on 07/07/2022 at 10:19       Assessment and Plan:     S/p THYROIDECTOMY, total on 6/29/2022.  Doing well postoperatively.       Problem List Items Addressed This Visit        Endocrine    Multinodular goiter (Chronic)    Current Assessment & Plan     - Levothyroxine 150 mcg daily, reviewed instructions to take in the morning on an empty stomach  - Will call with lab results to discuss plan for calcium supplementation  - Recommended taking daily caltrate with vitamin D supplementation, or a calcium supplement of her preference to equal 1,200 mg a day  - Incision care discussed, scar massage and routine scar care information provided  - Reviewed pathology, consistent with pre-operative diagnosis  - Discontinue narcotics  - Follow up with endocrinology to check thyroid function tests in 4-6 weeks               Anai Garcia MD  Endocrine Surgery  7/14/22                   "

## 2022-07-27 ENCOUNTER — PATIENT MESSAGE (OUTPATIENT)
Dept: ADMINISTRATIVE | Facility: OTHER | Age: 62
End: 2022-07-27
Payer: MEDICARE

## 2022-08-04 ENCOUNTER — OFFICE VISIT (OUTPATIENT)
Dept: ENDOCRINOLOGY | Facility: CLINIC | Age: 62
End: 2022-08-04
Payer: MEDICARE

## 2022-08-04 VITALS
WEIGHT: 293 LBS | HEART RATE: 101 BPM | SYSTOLIC BLOOD PRESSURE: 138 MMHG | OXYGEN SATURATION: 99 % | BODY MASS INDEX: 45.99 KG/M2 | DIASTOLIC BLOOD PRESSURE: 90 MMHG | HEIGHT: 67 IN

## 2022-08-04 DIAGNOSIS — I10 ESSENTIAL HYPERTENSION: Primary | ICD-10-CM

## 2022-08-04 DIAGNOSIS — E55.9 VITAMIN D DEFICIENCY DISEASE: ICD-10-CM

## 2022-08-04 DIAGNOSIS — E89.0 POSTOPERATIVE HYPOTHYROIDISM: ICD-10-CM

## 2022-08-04 PROCEDURE — 1160F PR REVIEW ALL MEDS BY PRESCRIBER/CLIN PHARMACIST DOCUMENTED: ICD-10-PCS | Mod: CPTII,S$GLB,, | Performed by: INTERNAL MEDICINE

## 2022-08-04 PROCEDURE — 1159F MED LIST DOCD IN RCRD: CPT | Mod: CPTII,S$GLB,, | Performed by: INTERNAL MEDICINE

## 2022-08-04 PROCEDURE — 1160F RVW MEDS BY RX/DR IN RCRD: CPT | Mod: CPTII,S$GLB,, | Performed by: INTERNAL MEDICINE

## 2022-08-04 PROCEDURE — 3008F PR BODY MASS INDEX (BMI) DOCUMENTED: ICD-10-PCS | Mod: CPTII,S$GLB,, | Performed by: INTERNAL MEDICINE

## 2022-08-04 PROCEDURE — 99999 PR PBB SHADOW E&M-EST. PATIENT-LVL III: CPT | Mod: PBBFAC,,, | Performed by: INTERNAL MEDICINE

## 2022-08-04 PROCEDURE — 4010F PR ACE/ARB THEARPY RXD/TAKEN: ICD-10-PCS | Mod: CPTII,S$GLB,, | Performed by: INTERNAL MEDICINE

## 2022-08-04 PROCEDURE — 3080F PR MOST RECENT DIASTOLIC BLOOD PRESSURE >= 90 MM HG: ICD-10-PCS | Mod: CPTII,S$GLB,, | Performed by: INTERNAL MEDICINE

## 2022-08-04 PROCEDURE — 3008F BODY MASS INDEX DOCD: CPT | Mod: CPTII,S$GLB,, | Performed by: INTERNAL MEDICINE

## 2022-08-04 PROCEDURE — 99214 OFFICE O/P EST MOD 30 MIN: CPT | Mod: S$GLB,,, | Performed by: INTERNAL MEDICINE

## 2022-08-04 PROCEDURE — 3080F DIAST BP >= 90 MM HG: CPT | Mod: CPTII,S$GLB,, | Performed by: INTERNAL MEDICINE

## 2022-08-04 PROCEDURE — 1159F PR MEDICATION LIST DOCUMENTED IN MEDICAL RECORD: ICD-10-PCS | Mod: CPTII,S$GLB,, | Performed by: INTERNAL MEDICINE

## 2022-08-04 PROCEDURE — 3075F SYST BP GE 130 - 139MM HG: CPT | Mod: CPTII,S$GLB,, | Performed by: INTERNAL MEDICINE

## 2022-08-04 PROCEDURE — 99214 PR OFFICE/OUTPT VISIT, EST, LEVL IV, 30-39 MIN: ICD-10-PCS | Mod: S$GLB,,, | Performed by: INTERNAL MEDICINE

## 2022-08-04 PROCEDURE — 4010F ACE/ARB THERAPY RXD/TAKEN: CPT | Mod: CPTII,S$GLB,, | Performed by: INTERNAL MEDICINE

## 2022-08-04 PROCEDURE — 99999 PR PBB SHADOW E&M-EST. PATIENT-LVL III: ICD-10-PCS | Mod: PBBFAC,,, | Performed by: INTERNAL MEDICINE

## 2022-08-04 PROCEDURE — 3075F PR MOST RECENT SYSTOLIC BLOOD PRESS GE 130-139MM HG: ICD-10-PCS | Mod: CPTII,S$GLB,, | Performed by: INTERNAL MEDICINE

## 2022-08-04 RX ORDER — TRIAMTERENE AND HYDROCHLOROTHIAZIDE 37.5; 25 MG/1; MG/1
CAPSULE ORAL
Qty: 90 CAPSULE | Refills: 1 | Status: SHIPPED | OUTPATIENT
Start: 2022-08-04 | End: 2023-10-02 | Stop reason: SDUPTHER

## 2022-08-04 NOTE — ASSESSMENT & PLAN NOTE
Blood pressure above goal in clinic and at home per patient but she is on 4 agents.  Normal renin and aldosterone in the past.  Will message primary care provider to arrange follow up

## 2022-08-04 NOTE — Clinical Note
Hello,  I saw this patient in endocrine clinic for postoperative hypothyroidism.  Since her total thyroidectomy for multinodular goiter at the end of June she has experienced worsening fatigue, dyspnea on exertion, palpitations with exertion and at rest she was tachycardic in the low 100s in clinic.  To does not appear to be volume overloaded.  When I saw her before surgery she was having similar symptoms but she is reporting that they are worse now.  Blood pressure has not been well controlled and she gets daily headaches.  I do not think this is related to her dose of levothyroxine although she is on a lower dose than her weight based predicted dose but I do not think that would explain the tachycardia.  Are you able to get her in to address the dyspnea on exertion and palpitations?

## 2022-08-04 NOTE — TELEPHONE ENCOUNTER
No new care gaps identified.  [facility] Embedded Care Gaps. Reference number: [id number]. 8/04/2022   6:07:51 AM JEFFRYT

## 2022-08-04 NOTE — PROGRESS NOTES
ENDOCRINOLOGY CLINIC FOLLOW UP  08/04/2022     The patient's last visit with me was on 4/2/2020.     CC:   Follow up post surgical hypothyroidsm, nonfunctional MNG     HPI:    Interval hx:  Underwent total thyroidectomy 6/29/22 by Dr. Garcia for MNG (benign path) with compressive symptoms and subsequently was started on levothyroxine 150 mcg daily.  Was on standard Ca taper after surgery with most recent Ca normal on 7/14/22. Now off Ca.     Since surgery she has been having significant fatigue as well as dyspnea exertion.    With regards to hypothyroidism:    Etiology determine to be:  Postsurgical s/p thyroidecomy for MNG 6/2022    Current medication:  generic lt4 150 mcg daily    Weight based dose = 211 mcg daily    Takes thyroid medication properly without food first thing in the morning and is waiting at least 30 minutes to eat.      any Ca, iron, multivitamins, acid reflux medications, or supplements from thyroid medication by 4 hours.      Thyroid Symptoms  + for worse fatigue since surgery    Weight change:  []  Gain []  Loss  [x]  Denies     Wt Readings from Last 3 Encounters:   08/04/22 132.9 kg (292 lb 15.5 oz)   07/14/22 133.8 kg (295 lb)   06/29/22 131.5 kg (290 lb)        Temperature intolerance:  []  Cold []  Hot   [x]  Denies     GI:  []  Hyperdefication []  Constipation [x]  Denies   itchy hands    Integument:  []  Hair loss []  Dry skin  [x]  Denies    Other:  [x]  Palpitation []  tremor     []  Increased anxiety    []  Denies      With minimal exertion like walking having palpitation, IVAN, HA  Denies chest pain, no LE edema     Hypertension:  On 4 BP meds, no missed doses w/ pill box.   Home BP- 150s/90  Denies CP.  +IVAN  Denies vision changes.  + HA (unchanged)    Renin and alix normal in 4/2022  Component      Latest Ref Rng & Units 4/4/2022   Sodium      136 - 145 mmol/L 140   Potassium      3.5 - 5.1 mmol/L 4.4   Chloride      95 - 110 mmol/L 105   CO2      23 - 29 mmol/L 26    Glucose      70 - 110 mg/dL 113 (H)   BUN      6 - 20 mg/dL 14   Creatinine      0.5 - 1.4 mg/dL 1.1   Calcium      8.7 - 10.5 mg/dL 9.2   Anion Gap      8 - 16 mmol/L 9   eGFR if African American      >60 mL/min/1.73 m:2 >60.0   eGFR if non African American      >60 mL/min/1.73 m:2 54.7 (A)   Renin Activity      ng/mL/h 7.5   ALDOSTERONE      ng/dL 6.4       With regards to the vitamin d deficiency:  Ergocalciferol 50 k weekly for several years, was not taking due to difficulty remembering weekly medication.  Now taking OTC vitamin D 5000 IU daily since 4/2022 as this is easier for her    Lab Results   Component Value Date    [id number] 22 (L) 12/03/2021      Lab Results   Component Value Date    CALCIUM 9.9 07/14/2022    ALBUMIN 3.8 07/14/2022    ESTGFRAFRICA 56.8 (A) 07/14/2022    EGFRNONAA 49.2 (A) 07/14/2022    PHOS 3.4 07/14/2022    ALKPHOS 70 06/27/2022    [id number] 22 (L) 12/03/2021    .7 (H) 07/14/2022    TSH 1.771 04/04/2022        Current Outpatient Medications:     ALBUTEROL INHL, Inhale into the lungs every 4 to 6 hours as needed., Disp: , Rfl:     amLODIPine (NORVASC) 10 MG tablet, Take 1 tablet (10 mg total) by mouth once daily. (Patient taking differently: Take 10 mg by mouth every morning.), Disp: 90 tablet, Rfl: 1    atorvastatin (LIPITOR) 40 MG tablet, Take 1 tablet (40 mg total) by mouth once daily. (Patient taking differently: Take 40 mg by mouth every morning.), Disp: 90 tablet, Rfl: 1    cholecalciferol, vitamin D3, 1,250 mcg (50,000 unit) capsule, Take 50,000 Units by mouth every 7 days. SUNDAYS, Disp: , Rfl:     cyclobenzaprine (FLEXERIL) 5 MG tablet, Take 1 tablet (5 mg total) by mouth 3 (three) times daily as needed for Muscle spasms., Disp: 30 tablet, Rfl: 0    gabapentin (NEURONTIN) 600 MG tablet, TAKE 1 TABLET(600 MG) BY MOUTH THREE TIMES DAILY, Disp: 90 tablet, Rfl: 4    levothyroxine (SYNTHROID) 150 MCG tablet, Take 1 tablet (150 mcg total) by mouth before  "breakfast., Disp: 30 tablet, Rfl: 11    lisinopriL (PRINIVIL,ZESTRIL) 40 MG tablet, Take 1 tablet (40 mg total) by mouth once daily. (Patient taking differently: Take 40 mg by mouth every morning.), Disp: 90 tablet, Rfl: 1    triamterene-hydrochlorothiazide 37.5-25 mg (DYAZIDE) 37.5-25 mg per capsule, TAKE ONE CAPSULE BY MOUTH EVERY MORNING., Disp: 90 capsule, Rfl: 1    zolpidem (AMBIEN) 5 MG Tab, Take 1 tablet (5 mg total) by mouth nightly as needed. (Patient not taking: No sig reported), Disp: 20 tablet, Rfl: 1      PHYSICAL EXAM  Vitals:    08/04/22 0927   BP: (!) 138/90   BP Location: Right arm   Patient Position: Sitting   BP Method: Large (Manual)   Pulse: 101   SpO2: 99%   Weight: 132.9 kg (292 lb 15.5 oz)   Height: 5' 7" (1.702 m)      Constitutional:  Pleasant,  in no acute distress.   Eyes:     No scleral icterus.   Respiratory:   Effort normal   Cards:   2+ radial pulses blx, regular rhythm but tachycardic in 90s, no LE edema  Neurological:  normal speech  Psych:   Normal mood and affect.      LABORATORY REVIEW:    Chemistry        Component Value Date/Time     07/14/2022 1340    K 4.0 07/14/2022 1340     07/14/2022 1340    CO2 26 07/14/2022 1340    BUN 17 07/14/2022 1340    CREATININE 1.2 07/14/2022 1340    GLU 83 07/14/2022 1340        Component Value Date/Time    CALCIUM 9.9 07/14/2022 1340    ALKPHOS 70 06/27/2022 1133    AST 16 06/27/2022 1133    ALT 16 06/27/2022 1133    BILITOT 0.6 06/27/2022 1133    ESTGFRAFRICA 56.8 (A) 07/14/2022 1340    EGFRNONAA 49.2 (A) 07/14/2022 1340           FNA:  3/10/2020 - left inferior 1.8 cm nodule benign  4/2018 - left and right dominant nodules benign  5/2014 - right dominant nodule benign  1/5/12- right dominant nodule benign    IMAGING STUDIES  See above     ASSESSMENT/PLAN    Problem List Items Addressed This Visit        1 - High    Postoperative hypothyroidism     Has been on levothyroxine 150 mcg daily since surgery now with severe fatigue and " palpitations (mixed symptoms).  Will check TSH and adjust levothyroxine dose as needed to keep TSH within the normal range.  We discussed that if TSH is normal symptoms are unlikely related to her dose of thyroid medication and I recommend follow-up with primary care physician to further evaluate palpitations and dyspnea on exertion.           Relevant Orders    TSH       2     Unspecified essential hypertension - Primary     Blood pressure above goal in clinic and at home per patient but she is on 4 agents.  Normal renin and aldosterone in the past.  Will message primary care provider to arrange follow up            Relevant Orders    Comprehensive Metabolic Panel       3     Vitamin D deficiency disease (Chronic)     Reports compliance with over-the-counter vitamin-D 5000 IU daily since April 2022. Most recent labs with normal serum calcium and elevated PTH likely due to insufficient calcium and vitamin-D intake.  Discussed importance of getting 1200 mg of calcium daily from diet and supplementation combined.  Will check vitamin-D level and titrate vitamin-D supplementation to keep within the normal range.    In the future will need to recheck PTH and renal function panel.           Relevant Orders    Vitamin D        4-5  month(s)   8 am lab soon    Yajaira Yanez MD

## 2022-08-04 NOTE — PATIENT INSTRUCTIONS
For calcium intake:  I advise you get 1200 mg per day of calcium, split up over the day into 2 to 4 divided doses.  This amount includes calcium from both dietary sources and/or calcium supplements, and it is best to get smaller amounts throughout the day rather than all of the calcium at one time; this allows for better absorption of the calcium.     To estimate calcium content from a nutrition label, the label lists the % calcium in that food.   For example, the label for an 8 oz glass of milk lists the calcium content as 30%.  This is equivalent to 300 mg of calcium (multiply the % by 10 to get the mg of calcium per serving).  It is best to try to get the majority of calcium intake from the diet.  I've included a table below of some calcium-rich foods.    If you are not getting enough calcium in the diet, then you can add low doses of calcium supplements.  For calcium supplements, your body can only absorb about 500-600 mg of calcium at one time.  Thus, it is best to split the tablets up over the course of a day.  It is also best to avoid taking calcium supplements at the same time as a calcium-rich food to maximize your calcium absorption.  Calcium carbonate is best taken with or after a meal.  Calcium citrate can be taken on an empty stomach or with/after a meal.  Please look at any multivitamin you are taking as these often usually contain calcium as well.    Estimated Calcium Content of Foods:  Produce  Serving Size Estimated Calcium*    Maria Isabel greens, frozen 8 oz 360 mg   Broccoli brunilda 8 oz 200 mg   Kale, frozen 8 oz 180 mg   Soy Beans, green, boiled 8 oz 175 mg   Bok Artemio, cooked, boiled 8 oz 160 mg   Figs, dried 2 figs 65 mg   Broccoli, fresh, cooked 8 oz 60 mg   Oranges 1 whole 55 mg   Seafood Serving Size Estimated Calcium*    Sardines, canned with bones 3 oz 325 mg   [place], canned with bones 3 oz 180 mg   Shrimp, canned 3 oz 125 mg   Dairy Serving Size Estimated Calcium*    Ricotta, part-skim 4 oz  335 mg   Yogurt, plain, low-fat 6 oz 310 mg   Milk, skim, low-fat, whole 8 oz 300 mg   Yogurt with fruit, low-fat 6 oz 260 mg   Mozzarella, part-skim 1 oz 210 mg   Cheddar 1 oz 205 mg   Yogurt, Greek 6 oz 200 mg   American Cheese 1 oz 195 mg   Feta Cheese 4 oz 140 mg   Cottage Cheese, 2% 4 oz 105 mg   Frozen yogurt, vanilla 8 oz 105 mg   Ice Cream, vanilla 8 oz 85 mg   Parmesan 1 tbsp 55 mg   Fortified Food Serving Size Estimated Calcium*   [place] milk, rice milk or soy milk, fortified 8 oz 300 mg   Orange juice and other fruit juices, fortified 8 oz 300 mg   Tofu, prepared with calcium 4 oz 205 mg   Waffle, frozen, fortified 2 pieces 200 mg   Oatmeal, fortified 1 packet 140 mg   English muffin, fortified 1 muffin 100 mg   Cereal, fortified 8 oz 100-1,000 mg   Other Serving Size Estimated Calcium*   Mac & cheese, frozen 1 package 325 mg   Pizza, cheese, frozen 1 serving 115 mg   Pudding, chocolate, prepared with 2% milk 4 oz 160 mg   Beans, baked, canned 4 oz 160 mg   *The calcium content listed for most foods is estimated and can vary due to multiple factors. Check the food label to determine how much calcium is in a particular product.  If you read the nutrition label for a food source, it lists the % calcium in that food.  For an 8 oz glass of milk, for example, the label states calcium 30%.  This is equivalent to 300 mg of calcium (multiply the listed number by 10).   **Table from the [employer]

## 2022-08-04 NOTE — TELEPHONE ENCOUNTER
Refill Decision Note   Dejah Schmidt  is requesting a refill authorization.  Brief Assessment and Rationale for Refill:  Approve     Medication Therapy Plan:       Medication Reconciliation Completed: No   Comments:     No Care Gaps recommended.     Note composed:3:38 PM 08/04/2022

## 2022-08-04 NOTE — ASSESSMENT & PLAN NOTE
Reports compliance with over-the-counter vitamin-D 5000 IU daily since April 2022. Most recent labs with normal serum calcium and elevated PTH likely due to insufficient calcium and vitamin-D intake.  Discussed importance of getting 1200 mg of calcium daily from diet and supplementation combined.  Will check vitamin-D level and titrate vitamin-D supplementation to keep within the normal range.    In the future will need to recheck PTH and renal function panel.

## 2022-08-04 NOTE — ASSESSMENT & PLAN NOTE
Has been on levothyroxine 150 mcg daily since surgery now with severe fatigue and palpitations (mixed symptoms).  Will check TSH and adjust levothyroxine dose as needed to keep TSH within the normal range.  We discussed that if TSH is normal symptoms are unlikely related to her dose of thyroid medication and I recommend follow-up with primary care physician to further evaluate palpitations and dyspnea on exertion.

## 2022-08-05 ENCOUNTER — LAB VISIT (OUTPATIENT)
Dept: LAB | Facility: HOSPITAL | Age: 62
End: 2022-08-05
Payer: MEDICARE

## 2022-08-05 DIAGNOSIS — E89.0 POSTOPERATIVE HYPOTHYROIDISM: Primary | ICD-10-CM

## 2022-08-05 DIAGNOSIS — E89.0 POSTOPERATIVE HYPOTHYROIDISM: ICD-10-CM

## 2022-08-05 DIAGNOSIS — E55.9 VITAMIN D DEFICIENCY DISEASE: ICD-10-CM

## 2022-08-05 DIAGNOSIS — I10 ESSENTIAL HYPERTENSION: ICD-10-CM

## 2022-08-05 LAB
25(OH)D3+25(OH)D2 SERPL-MCNC: 39 NG/ML (ref 30–96)
ALBUMIN SERPL BCP-MCNC: 4.3 G/DL (ref 3.5–5.2)
ALP SERPL-CCNC: 82 U/L (ref 55–135)
ALT SERPL W/O P-5'-P-CCNC: 33 U/L (ref 10–44)
ANION GAP SERPL CALC-SCNC: 12 MMOL/L (ref 8–16)
AST SERPL-CCNC: 22 U/L (ref 10–40)
BILIRUB SERPL-MCNC: 1.2 MG/DL (ref 0.1–1)
BUN SERPL-MCNC: 26 MG/DL (ref 6–20)
CALCIUM SERPL-MCNC: 9.8 MG/DL (ref 8.7–10.5)
CHLORIDE SERPL-SCNC: 105 MMOL/L (ref 95–110)
CO2 SERPL-SCNC: 24 MMOL/L (ref 23–29)
CREAT SERPL-MCNC: 1.3 MG/DL (ref 0.5–1.4)
EST. GFR  (NO RACE VARIABLE): 47.1 ML/MIN/1.73 M^2
GLUCOSE SERPL-MCNC: 101 MG/DL (ref 70–110)
POTASSIUM SERPL-SCNC: 4.5 MMOL/L (ref 3.5–5.1)
PROT SERPL-MCNC: 8.2 G/DL (ref 6–8.4)
SODIUM SERPL-SCNC: 141 MMOL/L (ref 136–145)
T4 FREE SERPL-MCNC: 1.31 NG/DL (ref 0.71–1.51)
TSH SERPL DL<=0.005 MIU/L-ACNC: 0.04 UIU/ML (ref 0.4–4)

## 2022-08-05 PROCEDURE — 36415 COLL VENOUS BLD VENIPUNCTURE: CPT | Performed by: INTERNAL MEDICINE

## 2022-08-05 PROCEDURE — 84443 ASSAY THYROID STIM HORMONE: CPT | Performed by: INTERNAL MEDICINE

## 2022-08-05 PROCEDURE — 80053 COMPREHEN METABOLIC PANEL: CPT | Performed by: INTERNAL MEDICINE

## 2022-08-05 PROCEDURE — 84439 ASSAY OF FREE THYROXINE: CPT | Performed by: INTERNAL MEDICINE

## 2022-08-05 PROCEDURE — 82306 VITAMIN D 25 HYDROXY: CPT | Performed by: INTERNAL MEDICINE

## 2022-08-05 RX ORDER — LEVOTHYROXINE SODIUM 125 UG/1
125 TABLET ORAL
Qty: 30 TABLET | Refills: 11 | Status: SHIPPED | OUTPATIENT
Start: 2022-08-05 | End: 2022-09-20

## 2022-08-05 NOTE — PROGRESS NOTES
Please schedule TSH in 6 wks    Symptoms of hyperthryoidism and low TSH on levothyroxine 150 mcg daily so will decrease to 125 mcg daily.

## 2022-09-16 ENCOUNTER — LAB VISIT (OUTPATIENT)
Dept: LAB | Facility: HOSPITAL | Age: 62
End: 2022-09-16
Payer: MEDICARE

## 2022-09-16 DIAGNOSIS — E89.0 POSTOPERATIVE HYPOTHYROIDISM: ICD-10-CM

## 2022-09-16 LAB
T4 FREE SERPL-MCNC: 1.18 NG/DL (ref 0.71–1.51)
TSH SERPL DL<=0.005 MIU/L-ACNC: 0.07 UIU/ML (ref 0.4–4)

## 2022-09-16 PROCEDURE — 36415 COLL VENOUS BLD VENIPUNCTURE: CPT | Performed by: INTERNAL MEDICINE

## 2022-09-16 PROCEDURE — 84443 ASSAY THYROID STIM HORMONE: CPT | Performed by: INTERNAL MEDICINE

## 2022-09-16 PROCEDURE — 84439 ASSAY OF FREE THYROXINE: CPT | Performed by: INTERNAL MEDICINE

## 2022-09-20 DIAGNOSIS — E89.0 POSTOPERATIVE HYPOTHYROIDISM: Primary | ICD-10-CM

## 2022-09-20 RX ORDER — LEVOTHYROXINE SODIUM 100 UG/1
100 TABLET ORAL
Qty: 30 TABLET | Refills: 11 | Status: SHIPPED | OUTPATIENT
Start: 2022-09-20 | End: 2023-09-12

## 2022-09-20 NOTE — PROGRESS NOTES
Still with low TSH on levothyroxine 125 mcg daily, given significant symptoms will reduce to 100 mcg daily.    Please schedule TSH and free T4 in 6 weeks

## 2022-11-02 ENCOUNTER — LAB VISIT (OUTPATIENT)
Dept: LAB | Facility: HOSPITAL | Age: 62
End: 2022-11-02
Payer: MEDICARE

## 2022-11-02 DIAGNOSIS — E89.0 POSTOPERATIVE HYPOTHYROIDISM: ICD-10-CM

## 2022-11-02 LAB
T4 FREE SERPL-MCNC: 0.87 NG/DL (ref 0.71–1.51)
TSH SERPL DL<=0.005 MIU/L-ACNC: 1.21 UIU/ML (ref 0.4–4)

## 2022-11-02 PROCEDURE — 84443 ASSAY THYROID STIM HORMONE: CPT | Performed by: INTERNAL MEDICINE

## 2022-11-02 PROCEDURE — 84439 ASSAY OF FREE THYROXINE: CPT | Performed by: INTERNAL MEDICINE

## 2022-11-02 PROCEDURE — 36415 COLL VENOUS BLD VENIPUNCTURE: CPT | Performed by: INTERNAL MEDICINE

## 2022-11-22 ENCOUNTER — LAB VISIT (OUTPATIENT)
Dept: LAB | Facility: HOSPITAL | Age: 62
End: 2022-11-22
Attending: INTERNAL MEDICINE
Payer: MEDICARE

## 2022-11-22 ENCOUNTER — OFFICE VISIT (OUTPATIENT)
Dept: INTERNAL MEDICINE | Facility: CLINIC | Age: 62
End: 2022-11-22
Payer: MEDICARE

## 2022-11-22 DIAGNOSIS — Z00.00 PREVENTATIVE HEALTH CARE: ICD-10-CM

## 2022-11-22 DIAGNOSIS — I10 HYPERTENSION, UNSPECIFIED TYPE: Primary | ICD-10-CM

## 2022-11-22 DIAGNOSIS — I10 HYPERTENSION, UNSPECIFIED TYPE: ICD-10-CM

## 2022-11-22 PROCEDURE — 3078F DIAST BP <80 MM HG: CPT | Mod: CPTII,S$GLB,, | Performed by: INTERNAL MEDICINE

## 2022-11-22 PROCEDURE — 1159F PR MEDICATION LIST DOCUMENTED IN MEDICAL RECORD: ICD-10-PCS | Mod: CPTII,S$GLB,, | Performed by: INTERNAL MEDICINE

## 2022-11-22 PROCEDURE — 99396 PREV VISIT EST AGE 40-64: CPT | Mod: GZ,S$GLB,, | Performed by: INTERNAL MEDICINE

## 2022-11-22 PROCEDURE — 99999 PR PBB SHADOW E&M-EST. PATIENT-LVL III: ICD-10-PCS | Mod: PBBFAC,,, | Performed by: INTERNAL MEDICINE

## 2022-11-22 PROCEDURE — 3078F PR MOST RECENT DIASTOLIC BLOOD PRESSURE < 80 MM HG: ICD-10-PCS | Mod: CPTII,S$GLB,, | Performed by: INTERNAL MEDICINE

## 2022-11-22 PROCEDURE — 80048 BASIC METABOLIC PNL TOTAL CA: CPT | Performed by: INTERNAL MEDICINE

## 2022-11-22 PROCEDURE — 4010F ACE/ARB THERAPY RXD/TAKEN: CPT | Mod: CPTII,S$GLB,, | Performed by: INTERNAL MEDICINE

## 2022-11-22 PROCEDURE — 80061 LIPID PANEL: CPT | Performed by: INTERNAL MEDICINE

## 2022-11-22 PROCEDURE — 1159F MED LIST DOCD IN RCRD: CPT | Mod: CPTII,S$GLB,, | Performed by: INTERNAL MEDICINE

## 2022-11-22 PROCEDURE — 99999 PR PBB SHADOW E&M-EST. PATIENT-LVL III: CPT | Mod: PBBFAC,,, | Performed by: INTERNAL MEDICINE

## 2022-11-22 PROCEDURE — 3075F PR MOST RECENT SYSTOLIC BLOOD PRESS GE 130-139MM HG: ICD-10-PCS | Mod: CPTII,S$GLB,, | Performed by: INTERNAL MEDICINE

## 2022-11-22 PROCEDURE — 3008F BODY MASS INDEX DOCD: CPT | Mod: CPTII,S$GLB,, | Performed by: INTERNAL MEDICINE

## 2022-11-22 PROCEDURE — 3008F PR BODY MASS INDEX (BMI) DOCUMENTED: ICD-10-PCS | Mod: CPTII,S$GLB,, | Performed by: INTERNAL MEDICINE

## 2022-11-22 PROCEDURE — 3075F SYST BP GE 130 - 139MM HG: CPT | Mod: CPTII,S$GLB,, | Performed by: INTERNAL MEDICINE

## 2022-11-22 PROCEDURE — 36415 COLL VENOUS BLD VENIPUNCTURE: CPT | Performed by: INTERNAL MEDICINE

## 2022-11-22 PROCEDURE — 99396 PR PREVENTIVE VISIT,EST,40-64: ICD-10-PCS | Mod: GZ,S$GLB,, | Performed by: INTERNAL MEDICINE

## 2022-11-22 PROCEDURE — 4010F PR ACE/ARB THEARPY RXD/TAKEN: ICD-10-PCS | Mod: CPTII,S$GLB,, | Performed by: INTERNAL MEDICINE

## 2022-11-22 RX ORDER — LISINOPRIL 40 MG/1
40 TABLET ORAL DAILY
Qty: 90 TABLET | Refills: 1 | Status: SHIPPED | OUTPATIENT
Start: 2022-11-22 | End: 2023-06-20

## 2022-11-22 RX ORDER — ATORVASTATIN CALCIUM 40 MG/1
40 TABLET, FILM COATED ORAL DAILY
Qty: 90 TABLET | Refills: 1 | Status: SHIPPED | OUTPATIENT
Start: 2022-11-22 | End: 2023-10-02

## 2022-11-22 RX ORDER — AMLODIPINE BESYLATE 10 MG/1
10 TABLET ORAL DAILY
Qty: 90 TABLET | Refills: 1 | Status: SHIPPED | OUTPATIENT
Start: 2022-11-22 | End: 2023-06-20

## 2022-11-23 LAB
ANION GAP SERPL CALC-SCNC: 11 MMOL/L (ref 8–16)
BUN SERPL-MCNC: 14 MG/DL (ref 8–23)
CALCIUM SERPL-MCNC: 9.9 MG/DL (ref 8.7–10.5)
CHLORIDE SERPL-SCNC: 103 MMOL/L (ref 95–110)
CHOLEST SERPL-MCNC: 157 MG/DL (ref 120–199)
CHOLEST/HDLC SERPL: 4.2 {RATIO} (ref 2–5)
CO2 SERPL-SCNC: 25 MMOL/L (ref 23–29)
CREAT SERPL-MCNC: 1.1 MG/DL (ref 0.5–1.4)
EST. GFR  (NO RACE VARIABLE): 57.2 ML/MIN/1.73 M^2
GLUCOSE SERPL-MCNC: 92 MG/DL (ref 70–110)
HDLC SERPL-MCNC: 37 MG/DL (ref 40–75)
HDLC SERPL: 23.6 % (ref 20–50)
LDLC SERPL CALC-MCNC: 102 MG/DL (ref 63–159)
NONHDLC SERPL-MCNC: 120 MG/DL
POTASSIUM SERPL-SCNC: 4.4 MMOL/L (ref 3.5–5.1)
SODIUM SERPL-SCNC: 139 MMOL/L (ref 136–145)
TRIGL SERPL-MCNC: 90 MG/DL (ref 30–150)

## 2022-11-25 ENCOUNTER — PATIENT MESSAGE (OUTPATIENT)
Dept: INTERNAL MEDICINE | Facility: CLINIC | Age: 62
End: 2022-11-25
Payer: MEDICARE

## 2022-11-25 RX ORDER — ALBUTEROL SULFATE 90 UG/1
1-2 AEROSOL, METERED RESPIRATORY (INHALATION) EVERY 6 HOURS PRN
Qty: 8 G | Refills: 0 | Status: SHIPPED | OUTPATIENT
Start: 2022-11-25 | End: 2023-11-30 | Stop reason: SDUPTHER

## 2022-11-25 NOTE — TELEPHONE ENCOUNTER
No new care gaps identified.  [facility] Embedded Care Gaps. Reference number: [id number]. 11/25/2022   7:56:26 AM CST

## 2022-11-27 VITALS
WEIGHT: 293 LBS | TEMPERATURE: 99 F | BODY MASS INDEX: 45.99 KG/M2 | OXYGEN SATURATION: 98 % | DIASTOLIC BLOOD PRESSURE: 78 MMHG | SYSTOLIC BLOOD PRESSURE: 130 MMHG | HEART RATE: 96 BPM | HEIGHT: 67 IN

## 2022-11-27 NOTE — PROGRESS NOTES
Subjective:       Patient ID: Dejah Schmidt is a 62 y.o. female.    Chief Complaint: Annual Exam    HPI  She is here for annual exam     Past medical history: Hypertension, hyperlipidemia, asthma, lumbar spinal stenosis, vitamin D deficiency,  status post thyroidectomy, status post hysterectomy. She had a colonoscopy June 2020     Medications: Lisinopril 40 mg daily, Dyazide 1 by mouth daily, Norvasc 10 mg daily , Synthroid 0.1 mg daily, Lipitor 40 mg daily     NO KNOWN DRUG ALLERGIES       Review of Systems   Constitutional:  Negative for chills, fatigue, fever and unexpected weight change.   Respiratory:  Negative for chest tightness and shortness of breath.    Cardiovascular:  Negative for chest pain and palpitations.   Gastrointestinal:  Negative for abdominal pain and blood in stool.   Neurological:  Negative for dizziness, syncope, numbness and headaches.     Objective:      Physical Exam  HENT:      Right Ear: External ear normal.      Left Ear: External ear normal.      Nose: Nose normal.      Mouth/Throat:      Mouth: Mucous membranes are moist.      Pharynx: Oropharynx is clear.   Eyes:      Pupils: Pupils are equal, round, and reactive to light.   Cardiovascular:      Rate and Rhythm: Normal rate and regular rhythm.      Heart sounds: No murmur heard.  Pulmonary:      Breath sounds: Normal breath sounds.   Abdominal:      General: There is no distension.      Palpations: There is no hepatomegaly or splenomegaly.      Tenderness: There is no abdominal tenderness.   Musculoskeletal:      Cervical back: Normal range of motion.   Lymphadenopathy:      Cervical: No cervical adenopathy.      Upper Body:      Right upper body: No axillary adenopathy.      Left upper body: No axillary adenopathy.   Neurological:      Cranial Nerves: No cranial nerve deficit.      Sensory: No sensory deficit.      Motor: Motor function is intact.      Deep Tendon Reflexes: Reflexes are normal and symmetric.       Assessment/Plan        Assessment and plan:  Annual exam.  Check lipid panel.  Check BMP.  Discussed Pap smear, pelvic exam.  She declined all

## 2022-12-08 ENCOUNTER — OFFICE VISIT (OUTPATIENT)
Dept: ENDOCRINOLOGY | Facility: CLINIC | Age: 62
End: 2022-12-08
Payer: MEDICARE

## 2022-12-08 VITALS
BODY MASS INDEX: 45.92 KG/M2 | SYSTOLIC BLOOD PRESSURE: 130 MMHG | RESPIRATION RATE: 16 BRPM | WEIGHT: 293 LBS | HEART RATE: 68 BPM | OXYGEN SATURATION: 99 % | DIASTOLIC BLOOD PRESSURE: 80 MMHG

## 2022-12-08 DIAGNOSIS — R73.03 PREDIABETES: ICD-10-CM

## 2022-12-08 DIAGNOSIS — I10 ESSENTIAL HYPERTENSION: ICD-10-CM

## 2022-12-08 DIAGNOSIS — E89.0 POSTOPERATIVE HYPOTHYROIDISM: Primary | ICD-10-CM

## 2022-12-08 PROCEDURE — 3008F BODY MASS INDEX DOCD: CPT | Mod: CPTII,S$GLB,, | Performed by: INTERNAL MEDICINE

## 2022-12-08 PROCEDURE — 3079F DIAST BP 80-89 MM HG: CPT | Mod: CPTII,S$GLB,, | Performed by: INTERNAL MEDICINE

## 2022-12-08 PROCEDURE — 3008F PR BODY MASS INDEX (BMI) DOCUMENTED: ICD-10-PCS | Mod: CPTII,S$GLB,, | Performed by: INTERNAL MEDICINE

## 2022-12-08 PROCEDURE — 4010F PR ACE/ARB THEARPY RXD/TAKEN: ICD-10-PCS | Mod: CPTII,S$GLB,, | Performed by: INTERNAL MEDICINE

## 2022-12-08 PROCEDURE — 3079F PR MOST RECENT DIASTOLIC BLOOD PRESSURE 80-89 MM HG: ICD-10-PCS | Mod: CPTII,S$GLB,, | Performed by: INTERNAL MEDICINE

## 2022-12-08 PROCEDURE — 99214 OFFICE O/P EST MOD 30 MIN: CPT | Mod: S$GLB,,, | Performed by: INTERNAL MEDICINE

## 2022-12-08 PROCEDURE — 99999 PR PBB SHADOW E&M-EST. PATIENT-LVL III: CPT | Mod: PBBFAC,,, | Performed by: INTERNAL MEDICINE

## 2022-12-08 PROCEDURE — 99214 PR OFFICE/OUTPT VISIT, EST, LEVL IV, 30-39 MIN: ICD-10-PCS | Mod: S$GLB,,, | Performed by: INTERNAL MEDICINE

## 2022-12-08 PROCEDURE — 99999 PR PBB SHADOW E&M-EST. PATIENT-LVL III: ICD-10-PCS | Mod: PBBFAC,,, | Performed by: INTERNAL MEDICINE

## 2022-12-08 PROCEDURE — 4010F ACE/ARB THERAPY RXD/TAKEN: CPT | Mod: CPTII,S$GLB,, | Performed by: INTERNAL MEDICINE

## 2022-12-08 PROCEDURE — 3075F SYST BP GE 130 - 139MM HG: CPT | Mod: CPTII,S$GLB,, | Performed by: INTERNAL MEDICINE

## 2022-12-08 PROCEDURE — 3075F PR MOST RECENT SYSTOLIC BLOOD PRESS GE 130-139MM HG: ICD-10-PCS | Mod: CPTII,S$GLB,, | Performed by: INTERNAL MEDICINE

## 2022-12-08 RX ORDER — DEXAMETHASONE 1 MG/1
1 TABLET ORAL ONCE
Qty: 1 TABLET | Refills: 0 | Status: SHIPPED | OUTPATIENT
Start: 2022-12-08 | End: 2022-12-08

## 2022-12-08 RX ORDER — BUTALBITAL, ACETAMINOPHEN AND CAFFEINE 50; 325; 40 MG/1; MG/1; MG/1
TABLET ORAL
COMMUNITY
Start: 2022-11-25

## 2022-12-08 NOTE — PATIENT INSTRUCTIONS
dexamethasone suppression test:  Please take 1 mg tablet of dexamethasone at it exactly 11:00 p.m. the night before your 8:00 a.m. blood test is scheduled.  Have blood drawn exactly at 8:00 a.m. as the timings of the medication and blood draw are very important.    I have sent a prescription for 1 mg of dexamethasone (one dose) to your pharmacy.  A normal response to taking the dexamethasone is a low cortisol level the next day. Do not be alarmed if your cortisol level is marked as abnormally low because that is a normal response.

## 2022-12-08 NOTE — PROGRESS NOTES
ENDOCRINOLOGY CLINIC FOLLOW UP  12/08/2022     The patient's last visit with me was on 4/2/2020.     CC:   Follow up post surgical hypothyroidsm, nonfunctional MNG     HPI:    Interval hx:  Underwent total thyroidectomy 6/29/22 by Dr. Garcia for MNG (benign path) with compressive symptoms and subsequently was started on levothyroxine 150 mcg daily.  No hypocalcemia.    After surgery had symptoms of hyperthyroidism with low TSH so has had gradual decrease in levothyroxine dose with resolution of symptoms.    With regards to hypothyroidism:    Etiology determine to be:  Postsurgical s/p thyroidecomy for MNG 6/2022    Current medication:  generic lt4 100 mcg daily    Takes thyroid medication properly without food first thing in the morning and is waiting at least 30 minutes to eat.      any Ca, iron, multivitamins, acid reflux medications, or supplements from thyroid medication by 4 hours.      Lab Results   Component Value Date    TSH 1.213 11/02/2022    FREET4 0.87 11/02/2022         Thyroid Symptoms  No fatigue    Weight change:  []  Gain []  Loss  [x]  Denies     Wt Readings from Last 3 Encounters:   11/22/22 134.2 kg (295 lb 13.7 oz)   08/04/22 132.9 kg (292 lb 15.5 oz)   07/14/22 133.8 kg (295 lb)        Temperature intolerance:  []  Cold []  Hot   [x]  Denies     GI:  []  Hyperdefication []  Constipation [x]  Denies      Integument:  [x]  Hair loss []  Dry skin  []  Denies    Other:  []  Palpitation []  tremor     []  Increased anxiety    [x]  Denies        Hypertension:  On 4 BP meds, no missed doses w/ pill box.   Home BP- 130-140s/70-80  Denies CP and IVAN  Denies vision changes.  + HA (unchanged)    Renin and alix normal in 4/2022  Component      Latest Ref Rng & Units 4/4/2022   Sodium      136 - 145 mmol/L 140   Potassium      3.5 - 5.1 mmol/L 4.4   Chloride      95 - 110 mmol/L 105   CO2      23 - 29 mmol/L 26   Glucose      70 - 110 mg/dL 113 (H)   BUN      6 - 20 mg/dL 14   Creatinine      0.5 -  1.4 mg/dL 1.1   Calcium      8.7 - 10.5 mg/dL 9.2   Anion Gap      8 - 16 mmol/L 9   eGFR if African American      >60 mL/min/1.73 m:2 >60.0   eGFR if non African American      >60 mL/min/1.73 m:2 54.7 (A)   Renin Activity      ng/mL/h 7.5   ALDOSTERONE      ng/dL 6.4     Cushing's syndrome:   [x]  Easy bruising []  Weight gain  []  Worse glycemic control     [x]  New/worse HTN []  Acne  [x]  Hirsutism since 46, recently worse on face    []  Striae  []  Menstrual change []  VTE     []  Fractures  []  Plethora  []  Proximal muscle weakness     []  Denies all    Foot fracture remotely after trauma    denies symptoms of adrenal insufficiency (no lightheadedness, N/V/abd pain, hypotension).       With regards to the vitamin d deficiency:  Ergocalciferol 50 k weekly for several years, was not taking due to difficulty remembering weekly medication.  Now taking OTC vitamin D 5000 IU daily since 4/2022 as this is easier for her    Lab Results   Component Value Date    [id number] 39 08/05/2022      Lab Results   Component Value Date    CALCIUM 9.9 11/22/2022    ALBUMIN 4.3 08/05/2022    ESTGFRAFRICA 56.8 (A) 07/14/2022    EGFRNONAA 49.2 (A) 07/14/2022    PHOS 3.4 07/14/2022    ALKPHOS 82 08/05/2022    [id number] 39 08/05/2022    .7 (H) 07/14/2022    TSH 1.213 11/02/2022        Current Outpatient Medications:     albuterol (PROVENTIL/VENTOLIN HFA) 90 mcg/actuation inhaler, Inhale 1-2 puffs into the lungs every 6 (six) hours as needed for Wheezing or Shortness of Breath. Rescue, Disp: 8 g, Rfl: 0    amLODIPine (NORVASC) 10 MG tablet, Take 1 tablet (10 mg total) by mouth once daily., Disp: 90 tablet, Rfl: 1    atorvastatin (LIPITOR) 40 MG tablet, Take 1 tablet (40 mg total) by mouth once daily., Disp: 90 tablet, Rfl: 1    cyclobenzaprine (FLEXERIL) 5 MG tablet, Take 1 tablet (5 mg total) by mouth 3 (three) times daily as needed for Muscle spasms., Disp: 30 tablet, Rfl: 0    gabapentin (NEURONTIN) 600 MG tablet, TAKE  1 TABLET(600 MG) BY MOUTH THREE TIMES DAILY, Disp: 90 tablet, Rfl: 4    levothyroxine (SYNTHROID) 100 MCG tablet, Take 1 tablet (100 mcg total) by mouth before breakfast., Disp: 30 tablet, Rfl: 11    lisinopriL (PRINIVIL,ZESTRIL) 40 MG tablet, Take 1 tablet (40 mg total) by mouth once daily., Disp: 90 tablet, Rfl: 1    triamterene-hydrochlorothiazide 37.5-25 mg (DYAZIDE) 37.5-25 mg per capsule, TAKE 1 CAPSULE BY MOUTH EVERY MORNING, Disp: 90 capsule, Rfl: 1      PHYSICAL EXAM  There were no vitals filed for this visit.     Constitutional:  Pleasant,  in no acute distress.   Eyes:     No scleral icterus.   Respiratory:   Effort normal   Cards:   2+ radial pulses blx, regular rhythm but tachycardic in 90s, no LE edema  Neurological:  normal speech  Psych:   Normal mood and affect.      LABORATORY REVIEW:    Chemistry        Component Value Date/Time     11/22/2022 1459    K 4.4 11/22/2022 1459     11/22/2022 1459    CO2 25 11/22/2022 1459    BUN 14 11/22/2022 1459    CREATININE 1.1 11/22/2022 1459    GLU 92 11/22/2022 1459        Component Value Date/Time    CALCIUM 9.9 11/22/2022 1459    ALKPHOS 82 08/05/2022 0803    AST 22 08/05/2022 0803    ALT 33 08/05/2022 0803    BILITOT 1.2 (H) 08/05/2022 0803    ESTGFRAFRICA 56.8 (A) 07/14/2022 1340    EGFRNONAA 49.2 (A) 07/14/2022 1340         Lab Results   Component Value Date    HGBA1C 5.7 (H) 02/28/2020        FNA:  3/10/2020 - left inferior 1.8 cm nodule benign  4/2018 - left and right dominant nodules benign  5/2014 - right dominant nodule benign  1/5/12- right dominant nodule benign    IMAGING STUDIES  See above     ASSESSMENT/PLAN    Problem List Items Addressed This Visit          1 - High    Postoperative hypothyroidism - Primary     Clinically and biochemically euthyroid on levothyroxine mcg daily so will continue with repeat labs in 6 months.         Relevant Orders    TSH    T4, Free       2     Unspecified essential hypertension     Home blood  pressure still above goal on 4 antihypertensive agents.  Previous workup with normal renin and aldosterone.  Will screen for hypercortisolism especially as she has been having worsening hirsutism.  Will also update hemoglobin A1c as she had prediabetes back in 2020.    Low-dose dexamethasone suppression test ordered  If she has inadequate cortisol suppression will need further workup.         Relevant Orders    Cortisol, 8AM    Dexamethasone     Other Visit Diagnoses       Prediabetes        Relevant Orders    Hemoglobin A1C          8 am fasting lab in the next wk for cortisol, hbA1c and dexamethasone only    RTC in 6 month(s) with TSH and fT4 pror      Yajaira Yanez MD

## 2022-12-08 NOTE — ASSESSMENT & PLAN NOTE
Home blood pressure still above goal on 4 antihypertensive agents.  Previous workup with normal renin and aldosterone.  Will screen for hypercortisolism especially as she has been having worsening hirsutism.  Will also update hemoglobin A1c as she had prediabetes back in 2020.    Low-dose dexamethasone suppression test ordered  If she has inadequate cortisol suppression will need further workup.

## 2022-12-08 NOTE — ASSESSMENT & PLAN NOTE
Clinically and biochemically euthyroid on levothyroxine mcg daily so will continue with repeat labs in 6 months.

## 2022-12-14 ENCOUNTER — LAB VISIT (OUTPATIENT)
Dept: LAB | Facility: HOSPITAL | Age: 62
End: 2022-12-14
Payer: MEDICARE

## 2022-12-14 DIAGNOSIS — I10 ESSENTIAL HYPERTENSION: ICD-10-CM

## 2022-12-14 DIAGNOSIS — R73.03 PREDIABETES: ICD-10-CM

## 2022-12-14 LAB
CORTIS SERPL-MCNC: 1 UG/DL (ref 4.3–22.4)
ESTIMATED AVG GLUCOSE: 111 MG/DL (ref 68–131)
HBA1C MFR BLD: 5.5 % (ref 4–5.6)

## 2022-12-14 PROCEDURE — 36415 COLL VENOUS BLD VENIPUNCTURE: CPT | Performed by: INTERNAL MEDICINE

## 2022-12-14 PROCEDURE — 83036 HEMOGLOBIN GLYCOSYLATED A1C: CPT | Performed by: INTERNAL MEDICINE

## 2022-12-14 PROCEDURE — 82542 COL CHROMOTOGRAPHY QUAL/QUAN: CPT | Performed by: INTERNAL MEDICINE

## 2022-12-14 PROCEDURE — 82533 TOTAL CORTISOL: CPT | Performed by: INTERNAL MEDICINE

## 2022-12-20 ENCOUNTER — PES CALL (OUTPATIENT)
Dept: ADMINISTRATIVE | Facility: CLINIC | Age: 62
End: 2022-12-20
Payer: MEDICARE

## 2022-12-21 LAB — DEXAMETHASONE SERPL-MCNC: 348 NG/DL

## 2023-01-26 ENCOUNTER — PATIENT MESSAGE (OUTPATIENT)
Dept: ADMINISTRATIVE | Facility: OTHER | Age: 63
End: 2023-01-26
Payer: MEDICARE

## 2023-02-15 ENCOUNTER — LAB VISIT (OUTPATIENT)
Dept: LAB | Facility: HOSPITAL | Age: 63
End: 2023-02-15
Payer: MEDICARE

## 2023-02-15 DIAGNOSIS — E89.0 POSTOPERATIVE HYPOTHYROIDISM: ICD-10-CM

## 2023-02-15 LAB
T4 FREE SERPL-MCNC: 0.81 NG/DL (ref 0.71–1.51)
TSH SERPL DL<=0.005 MIU/L-ACNC: 3.64 UIU/ML (ref 0.4–4)

## 2023-02-15 PROCEDURE — 84443 ASSAY THYROID STIM HORMONE: CPT | Performed by: INTERNAL MEDICINE

## 2023-02-15 PROCEDURE — 36415 COLL VENOUS BLD VENIPUNCTURE: CPT | Performed by: INTERNAL MEDICINE

## 2023-02-15 PROCEDURE — 84439 ASSAY OF FREE THYROXINE: CPT | Performed by: INTERNAL MEDICINE

## 2023-02-20 ENCOUNTER — OFFICE VISIT (OUTPATIENT)
Dept: ENDOCRINOLOGY | Facility: CLINIC | Age: 63
End: 2023-02-20
Payer: MEDICARE

## 2023-02-20 VITALS
WEIGHT: 293 LBS | HEIGHT: 67 IN | DIASTOLIC BLOOD PRESSURE: 70 MMHG | HEART RATE: 70 BPM | OXYGEN SATURATION: 98 % | SYSTOLIC BLOOD PRESSURE: 130 MMHG | BODY MASS INDEX: 45.99 KG/M2

## 2023-02-20 DIAGNOSIS — E66.01 MORBID OBESITY: ICD-10-CM

## 2023-02-20 DIAGNOSIS — E21.3 HYPERPARATHYROIDISM: ICD-10-CM

## 2023-02-20 DIAGNOSIS — E55.9 VITAMIN D DEFICIENCY DISEASE: Chronic | ICD-10-CM

## 2023-02-20 DIAGNOSIS — E66.01 MORBID OBESITY WITH BMI OF 45.0-49.9, ADULT: ICD-10-CM

## 2023-02-20 DIAGNOSIS — E89.0 POSTOPERATIVE HYPOTHYROIDISM: Primary | ICD-10-CM

## 2023-02-20 PROCEDURE — 3008F PR BODY MASS INDEX (BMI) DOCUMENTED: ICD-10-PCS | Mod: CPTII,S$GLB,, | Performed by: INTERNAL MEDICINE

## 2023-02-20 PROCEDURE — 99999 PR PBB SHADOW E&M-EST. PATIENT-LVL III: CPT | Mod: PBBFAC,,, | Performed by: INTERNAL MEDICINE

## 2023-02-20 PROCEDURE — 3078F PR MOST RECENT DIASTOLIC BLOOD PRESSURE < 80 MM HG: ICD-10-PCS | Mod: CPTII,S$GLB,, | Performed by: INTERNAL MEDICINE

## 2023-02-20 PROCEDURE — 99213 OFFICE O/P EST LOW 20 MIN: CPT | Mod: S$GLB,,, | Performed by: INTERNAL MEDICINE

## 2023-02-20 PROCEDURE — 3008F BODY MASS INDEX DOCD: CPT | Mod: CPTII,S$GLB,, | Performed by: INTERNAL MEDICINE

## 2023-02-20 PROCEDURE — 1159F PR MEDICATION LIST DOCUMENTED IN MEDICAL RECORD: ICD-10-PCS | Mod: CPTII,S$GLB,, | Performed by: INTERNAL MEDICINE

## 2023-02-20 PROCEDURE — 99499 RISK ADDL DX/OHS AUDIT: ICD-10-PCS | Mod: S$GLB,,, | Performed by: INTERNAL MEDICINE

## 2023-02-20 PROCEDURE — 3078F DIAST BP <80 MM HG: CPT | Mod: CPTII,S$GLB,, | Performed by: INTERNAL MEDICINE

## 2023-02-20 PROCEDURE — 99999 PR PBB SHADOW E&M-EST. PATIENT-LVL III: ICD-10-PCS | Mod: PBBFAC,,, | Performed by: INTERNAL MEDICINE

## 2023-02-20 PROCEDURE — 1159F MED LIST DOCD IN RCRD: CPT | Mod: CPTII,S$GLB,, | Performed by: INTERNAL MEDICINE

## 2023-02-20 PROCEDURE — 99499 UNLISTED E&M SERVICE: CPT | Mod: S$GLB,,, | Performed by: INTERNAL MEDICINE

## 2023-02-20 PROCEDURE — 3075F PR MOST RECENT SYSTOLIC BLOOD PRESS GE 130-139MM HG: ICD-10-PCS | Mod: CPTII,S$GLB,, | Performed by: INTERNAL MEDICINE

## 2023-02-20 PROCEDURE — 3075F SYST BP GE 130 - 139MM HG: CPT | Mod: CPTII,S$GLB,, | Performed by: INTERNAL MEDICINE

## 2023-02-20 PROCEDURE — 99213 PR OFFICE/OUTPT VISIT, EST, LEVL III, 20-29 MIN: ICD-10-PCS | Mod: S$GLB,,, | Performed by: INTERNAL MEDICINE

## 2023-02-20 NOTE — PROGRESS NOTES
ENDOCRINOLOGY CLINIC FOLLOW UP  02/20/2023     Last saw Dr. Yanez on 12/8/22.    CC:   Follow up post surgical hypothyroidsm, morbid obesity    HPI:    Interval hx:  Underwent total thyroidectomy 6/29/22 by Dr. Garcia for MNG (benign path) with compressive symptoms and subsequently was started on levothyroxine 150 mcg daily.  No hypocalcemia.    Feeling better on the current dose of 100 mcg daily. No complaints today.    With regards to hypothyroidism:    Etiology determine to be:  Postsurgical s/p thyroidecomy for MNG 6/2022    Current medication:  generic LT4 100 mcg daily    Takes thyroid medication properly without food first thing in the morning and is waiting at least 30 minutes to eat.      any Ca, iron, multivitamins, acid reflux medications, or supplements from thyroid medication by 4 hours.      Lab Results   Component Value Date    TSH 3.640 02/15/2023    FREET4 0.81 02/15/2023         Thyroid Symptoms  No fatigue    Weight change:  []  Gain []  Loss  [x]  Denies     Wt Readings from Last 3 Encounters:   02/20/23 (!) 137.1 kg (302 lb 5.8 oz)   12/08/22 133 kg (293 lb 3.4 oz)   11/22/22 134.2 kg (295 lb 13.7 oz)        Temperature intolerance:  []  Cold []  Hot   [x]  Denies     GI:  []  Hyperdefication []  Constipation [x]  Denies      Integument:  [x]  Hair loss []  Dry skin  []  Denies    Other:  []  Palpitation []  tremor     []  Increased anxiety    [x]  Denies        Hypertension:  On 4 BP meds, no missed doses w/ pill box.   Home BP- 130-140s/70-80  Denies CP and IVAN  Denies vision changes.  + HA (unchanged)    Renin and alix normal in 4/2022  Component      Latest Ref Rng & Units 4/4/2022   Sodium      136 - 145 mmol/L 140   Potassium      3.5 - 5.1 mmol/L 4.4   Chloride      95 - 110 mmol/L 105   CO2      23 - 29 mmol/L 26   Glucose      70 - 110 mg/dL 113 (H)   BUN      6 - 20 mg/dL 14   Creatinine      0.5 - 1.4 mg/dL 1.1   Calcium      8.7 - 10.5 mg/dL 9.2   Anion Gap      8 - 16  mmol/L 9   eGFR if African American      >60 mL/min/1.73 m:2 >60.0   eGFR if non African American      >60 mL/min/1.73 m:2 54.7 (A)   Renin Activity      ng/mL/h 7.5   ALDOSTERONE      ng/dL 6.4     Cushing's syndrome:   [x]  Easy bruising []  Weight gain  []  Worse glycemic control     [x]  New/worse HTN []  Acne  [x]  Hirsutism since 46, recently worse on face    []  Striae  []  Menstrual change []  VTE     []  Fractures  []  Plethora  []  Proximal muscle weakness     []  Denies all    Foot fracture remotely after trauma    denies symptoms of adrenal insufficiency (no lightheadedness, N/V/abd pain, hypotension).       With regards to the vitamin d deficiency:  Ergocalciferol 50 k weekly for several years, was not taking due to difficulty remembering weekly medication.  Now taking OTC vitamin D 5000 IU daily since 4/2022 as this is easier for her.    Lab Results   Component Value Date    PLMQKEDH85TO 39 08/05/2022      Lab Results   Component Value Date    CALCIUM 9.9 11/22/2022    ALBUMIN 4.3 08/05/2022    ESTGFRAFRICA 56.8 (A) 07/14/2022    EGFRNONAA 49.2 (A) 07/14/2022    PHOS 3.4 07/14/2022    ALKPHOS 82 08/05/2022    [id number] 39 08/05/2022    .7 (H) 07/14/2022    TSH 3.640 02/15/2023        Current Outpatient Medications:     albuterol (PROVENTIL/VENTOLIN HFA) 90 mcg/actuation inhaler, Inhale 1-2 puffs into the lungs every 6 (six) hours as needed for Wheezing or Shortness of Breath. Rescue, Disp: 8 g, Rfl: 0    amLODIPine (NORVASC) 10 MG tablet, Take 1 tablet (10 mg total) by mouth once daily., Disp: 90 tablet, Rfl: 1    atorvastatin (LIPITOR) 40 MG tablet, Take 1 tablet (40 mg total) by mouth once daily., Disp: 90 tablet, Rfl: 1    butalbital-acetaminophen-caffeine -40 mg (FIORICET, ESGIC) -40 mg per tablet, Take by mouth., Disp: , Rfl:     cyclobenzaprine (FLEXERIL) 5 MG tablet, Take 1 tablet (5 mg total) by mouth 3 (three) times daily as needed for Muscle spasms., Disp: 30 tablet,  "Rfl: 0    gabapentin (NEURONTIN) 600 MG tablet, TAKE 1 TABLET(600 MG) BY MOUTH THREE TIMES DAILY, Disp: 90 tablet, Rfl: 4    levothyroxine (SYNTHROID) 100 MCG tablet, Take 1 tablet (100 mcg total) by mouth before breakfast., Disp: 30 tablet, Rfl: 11    lisinopriL (PRINIVIL,ZESTRIL) 40 MG tablet, Take 1 tablet (40 mg total) by mouth once daily., Disp: 90 tablet, Rfl: 1    triamterene-hydrochlorothiazide 37.5-25 mg (DYAZIDE) 37.5-25 mg per capsule, TAKE 1 CAPSULE BY MOUTH EVERY MORNING, Disp: 90 capsule, Rfl: 1      With regards to morbid obesity:    Started seeing bariatric medicine just prior to the COVID pandemic. She's interested in seeing them again to discuss weight loss medication.      PHYSICAL EXAM  Vitals:    02/20/23 0928   BP: 130/70   Pulse: 70   SpO2: 98%   Weight: (!) 137.1 kg (302 lb 5.8 oz)   Height: 5' 7" (1.702 m)        Constitutional:  Pleasant,  in no acute distress.   Eyes:     No scleral icterus.   Respiratory:   Effort normal   Cards:   2+ radial pulses blx, regular rhythm but tachycardic in 90s, no LE edema  Neurological:  normal speech  Psych:   Normal mood and affect.      LABORATORY REVIEW:    Chemistry        Component Value Date/Time     11/22/2022 1459    K 4.4 11/22/2022 1459     11/22/2022 1459    CO2 25 11/22/2022 1459    BUN 14 11/22/2022 1459    CREATININE 1.1 11/22/2022 1459    GLU 92 11/22/2022 1459        Component Value Date/Time    CALCIUM 9.9 11/22/2022 1459    ALKPHOS 82 08/05/2022 0803    AST 22 08/05/2022 0803    ALT 33 08/05/2022 0803    BILITOT 1.2 (H) 08/05/2022 0803    ESTGFRAFRICA 56.8 (A) 07/14/2022 1340    EGFRNONAA 49.2 (A) 07/14/2022 1340         Lab Results   Component Value Date    HGBA1C 5.5 12/14/2022        FNA:  3/10/2020 - left inferior 1.8 cm nodule benign  4/2018 - left and right dominant nodules benign  5/2014 - right dominant nodule benign  1/5/12- right dominant nodule benign    IMAGING STUDIES  See above     ASSESSMENT/PLAN    Problem List " Items Addressed This Visit          1 - High    Postoperative hypothyroidism - Primary     TSH at goal on current dose of 100 mcg daily.  Recheck in 6 months.         Relevant Orders    TSH    T4, Free       2     Morbid obesity with BMI of 45.0-49.9, adult (Chronic)     Refer to bariatric medicine clinic.            3     Vitamin D deficiency disease (Chronic)     Continue cholecalciferol 5000 IU daily.            Unprioritized    Hyperparathyroidism     RTC with Dr. Yanez in 6 month(s) with labs before      John Olivas MD

## 2023-04-17 ENCOUNTER — PATIENT MESSAGE (OUTPATIENT)
Dept: INTERNAL MEDICINE | Facility: CLINIC | Age: 63
End: 2023-04-17
Payer: MEDICARE

## 2023-04-17 ENCOUNTER — PES CALL (OUTPATIENT)
Dept: ADMINISTRATIVE | Facility: CLINIC | Age: 63
End: 2023-04-17
Payer: MEDICARE

## 2023-04-17 DIAGNOSIS — Z12.31 SCREENING MAMMOGRAM FOR BREAST CANCER: Primary | ICD-10-CM

## 2023-05-23 ENCOUNTER — HOSPITAL ENCOUNTER (OUTPATIENT)
Dept: RADIOLOGY | Facility: HOSPITAL | Age: 63
Discharge: HOME | End: 2023-05-23
Attending: INTERNAL MEDICINE
Payer: MEDICARE

## 2023-05-23 DIAGNOSIS — Z12.31 SCREENING MAMMOGRAM FOR BREAST CANCER: ICD-10-CM

## 2023-05-23 PROCEDURE — 77063 MAMMO DIGITAL SCREENING BILAT WITH TOMO: ICD-10-PCS | Mod: 26,,, | Performed by: RADIOLOGY

## 2023-05-23 PROCEDURE — 77067 MAMMO DIGITAL SCREENING BILAT WITH TOMO: ICD-10-PCS | Mod: 26,,, | Performed by: RADIOLOGY

## 2023-05-23 PROCEDURE — 77067 SCR MAMMO BI INCL CAD: CPT | Mod: 26,,, | Performed by: RADIOLOGY

## 2023-05-23 PROCEDURE — 77063 BREAST TOMOSYNTHESIS BI: CPT | Mod: 26,,, | Performed by: RADIOLOGY

## 2023-05-23 PROCEDURE — 77067 SCR MAMMO BI INCL CAD: CPT | Mod: TC

## 2023-06-20 RX ORDER — AMLODIPINE BESYLATE 10 MG/1
TABLET ORAL
Qty: 90 TABLET | Refills: 1 | Status: SHIPPED | OUTPATIENT
Start: 2023-06-20 | End: 2024-01-19

## 2023-06-20 RX ORDER — LISINOPRIL 40 MG/1
TABLET ORAL
Qty: 90 TABLET | Refills: 1 | Status: SHIPPED | OUTPATIENT
Start: 2023-06-20 | End: 2024-01-02

## 2023-06-20 RX ORDER — GABAPENTIN 600 MG/1
TABLET ORAL
Qty: 90 TABLET | Refills: 3 | Status: SHIPPED | OUTPATIENT
Start: 2023-06-20 | End: 2024-01-19

## 2023-06-20 NOTE — TELEPHONE ENCOUNTER
Care Due:                  Date            Visit Type   Department     Provider  --------------------------------------------------------------------------------                                MYCHART                              FOLLOWUP/OF  [facility] INTERNAL  Last Visit: 11-      FICE VISIT   MEDICINE       Dasha Bergeron  Next Visit: None Scheduled  None         None Found                                                            Last  Test          Frequency    Reason                     Performed    Due Date  --------------------------------------------------------------------------------    CMP.........  12 months..  atorvastatin.............  08- 07-    [facility] Embedded Care Due Messages. Reference number: [id number].   6/20/2023 4:16:48 AM CDT

## 2023-08-10 ENCOUNTER — PATIENT MESSAGE (OUTPATIENT)
Dept: INTERNAL MEDICINE | Facility: CLINIC | Age: 63
End: 2023-08-10
Payer: MEDICARE

## 2023-08-10 DIAGNOSIS — M79.673 PAIN OF FOOT, UNSPECIFIED LATERALITY: Primary | ICD-10-CM

## 2023-08-11 NOTE — TELEPHONE ENCOUNTER
Podiatry order in    She is also due for a follow up appt. Please schedule EP appt with me next available

## 2023-08-14 ENCOUNTER — LAB VISIT (OUTPATIENT)
Dept: LAB | Facility: HOSPITAL | Age: 63
End: 2023-08-14
Attending: INTERNAL MEDICINE
Payer: MEDICARE

## 2023-08-14 DIAGNOSIS — E89.0 POSTOPERATIVE HYPOTHYROIDISM: ICD-10-CM

## 2023-08-14 LAB
T4 FREE SERPL-MCNC: 0.89 NG/DL (ref 0.71–1.51)
TSH SERPL DL<=0.005 MIU/L-ACNC: 2.71 UIU/ML (ref 0.4–4)

## 2023-08-14 PROCEDURE — 84439 ASSAY OF FREE THYROXINE: CPT | Performed by: INTERNAL MEDICINE

## 2023-08-14 PROCEDURE — 84443 ASSAY THYROID STIM HORMONE: CPT | Performed by: INTERNAL MEDICINE

## 2023-08-14 PROCEDURE — 36415 COLL VENOUS BLD VENIPUNCTURE: CPT | Performed by: INTERNAL MEDICINE

## 2023-08-22 DIAGNOSIS — M79.672 BILATERAL FOOT PAIN: Primary | ICD-10-CM

## 2023-08-22 DIAGNOSIS — M79.671 BILATERAL FOOT PAIN: Primary | ICD-10-CM

## 2023-08-24 ENCOUNTER — HOSPITAL ENCOUNTER (OUTPATIENT)
Dept: RADIOLOGY | Facility: HOSPITAL | Age: 63
Discharge: HOME | End: 2023-08-24
Attending: PODIATRIST
Payer: MEDICARE

## 2023-08-24 DIAGNOSIS — M79.672 BILATERAL FOOT PAIN: ICD-10-CM

## 2023-08-24 DIAGNOSIS — M79.671 BILATERAL FOOT PAIN: ICD-10-CM

## 2023-08-24 PROCEDURE — 73630 X-RAY EXAM OF FOOT: CPT | Mod: TC,50

## 2023-08-24 PROCEDURE — 73630 XR FOOT COMPLETE 3 VIEW BILATERAL: ICD-10-PCS | Mod: 26,50,, | Performed by: RADIOLOGY

## 2023-08-24 PROCEDURE — 73630 X-RAY EXAM OF FOOT: CPT | Mod: 26,50,, | Performed by: RADIOLOGY

## 2023-08-25 ENCOUNTER — OFFICE VISIT (OUTPATIENT)
Dept: PODIATRY | Facility: CLINIC | Age: 63
End: 2023-08-25
Payer: MEDICARE

## 2023-08-25 VITALS
HEIGHT: 67 IN | SYSTOLIC BLOOD PRESSURE: 167 MMHG | WEIGHT: 293 LBS | DIASTOLIC BLOOD PRESSURE: 89 MMHG | BODY MASS INDEX: 45.99 KG/M2 | HEART RATE: 71 BPM

## 2023-08-25 DIAGNOSIS — M79.89 SOFT TISSUE MASS: ICD-10-CM

## 2023-08-25 DIAGNOSIS — G57.92 NEURITIS OF LEFT FOOT: ICD-10-CM

## 2023-08-25 DIAGNOSIS — M79.672 CHRONIC HEEL PAIN, LEFT: Primary | ICD-10-CM

## 2023-08-25 DIAGNOSIS — M79.673 PAIN OF FOOT, UNSPECIFIED LATERALITY: ICD-10-CM

## 2023-08-25 DIAGNOSIS — G89.29 CHRONIC HEEL PAIN, LEFT: Primary | ICD-10-CM

## 2023-08-25 PROCEDURE — 4010F ACE/ARB THERAPY RXD/TAKEN: CPT | Mod: CPTII,S$GLB,, | Performed by: PODIATRIST

## 2023-08-25 PROCEDURE — 3079F DIAST BP 80-89 MM HG: CPT | Mod: CPTII,S$GLB,, | Performed by: PODIATRIST

## 2023-08-25 PROCEDURE — 3077F SYST BP >= 140 MM HG: CPT | Mod: CPTII,S$GLB,, | Performed by: PODIATRIST

## 2023-08-25 PROCEDURE — 3008F PR BODY MASS INDEX (BMI) DOCUMENTED: ICD-10-PCS | Mod: CPTII,S$GLB,, | Performed by: PODIATRIST

## 2023-08-25 PROCEDURE — 3008F BODY MASS INDEX DOCD: CPT | Mod: CPTII,S$GLB,, | Performed by: PODIATRIST

## 2023-08-25 PROCEDURE — 1160F PR REVIEW ALL MEDS BY PRESCRIBER/CLIN PHARMACIST DOCUMENTED: ICD-10-PCS | Mod: CPTII,S$GLB,, | Performed by: PODIATRIST

## 2023-08-25 PROCEDURE — 99203 PR OFFICE/OUTPT VISIT, NEW, LEVL III, 30-44 MIN: ICD-10-PCS | Mod: S$GLB,,, | Performed by: PODIATRIST

## 2023-08-25 PROCEDURE — 99999 PR PBB SHADOW E&M-EST. PATIENT-LVL IV: ICD-10-PCS | Mod: PBBFAC,,, | Performed by: PODIATRIST

## 2023-08-25 PROCEDURE — 99203 OFFICE O/P NEW LOW 30 MIN: CPT | Mod: S$GLB,,, | Performed by: PODIATRIST

## 2023-08-25 PROCEDURE — 99999 PR PBB SHADOW E&M-EST. PATIENT-LVL IV: CPT | Mod: PBBFAC,,, | Performed by: PODIATRIST

## 2023-08-25 PROCEDURE — 4010F PR ACE/ARB THEARPY RXD/TAKEN: ICD-10-PCS | Mod: CPTII,S$GLB,, | Performed by: PODIATRIST

## 2023-08-25 PROCEDURE — 3077F PR MOST RECENT SYSTOLIC BLOOD PRESSURE >= 140 MM HG: ICD-10-PCS | Mod: CPTII,S$GLB,, | Performed by: PODIATRIST

## 2023-08-25 PROCEDURE — 1159F MED LIST DOCD IN RCRD: CPT | Mod: CPTII,S$GLB,, | Performed by: PODIATRIST

## 2023-08-25 PROCEDURE — 1160F RVW MEDS BY RX/DR IN RCRD: CPT | Mod: CPTII,S$GLB,, | Performed by: PODIATRIST

## 2023-08-25 PROCEDURE — 1159F PR MEDICATION LIST DOCUMENTED IN MEDICAL RECORD: ICD-10-PCS | Mod: CPTII,S$GLB,, | Performed by: PODIATRIST

## 2023-08-25 PROCEDURE — 3079F PR MOST RECENT DIASTOLIC BLOOD PRESSURE 80-89 MM HG: ICD-10-PCS | Mod: CPTII,S$GLB,, | Performed by: PODIATRIST

## 2023-08-25 RX ORDER — DICLOFENAC SODIUM 10 MG/G
2 GEL TOPICAL 4 TIMES DAILY PRN
Qty: 100 G | Refills: 5 | Status: SHIPPED | OUTPATIENT
Start: 2023-08-25

## 2023-08-25 NOTE — PROGRESS NOTES
PODIATRY NOTE     PATIENT ID:  Dejah Schmidt is a 63 y.o. female.     CHIEF CONCERN:   Heel Pain (Left heel pain - burning) and Foot Pain (Knot on top of foot and swelling, ice gives relief)           MEDICAL DECISION MAKING:        ICD-10-CM ICD-9-CM    1. Chronic heel pain, left  M79.672 729.5 MRI Foot (Hindfoot) Left Without Contrast    G89.29 338.29 diclofenac sodium (VOLTAREN) 1 % Gel      2. Pain of foot, unspecified laterality  M79.673 729.5 Ambulatory referral/consult to Podiatry      MRI Foot (Hindfoot) Left Without Contrast      MRI Foot (Midfoot) Left Without Contrast      diclofenac sodium (VOLTAREN) 1 % Gel      3. Neuritis of left foot  G57.92 355.8 MRI Foot (Hindfoot) Left Without Contrast      diclofenac sodium (VOLTAREN) 1 % Gel      4. Soft tissue mass  M79.89 729.99 MRI Foot (Midfoot) Left Without Contrast    left dorsal midfoot          I counseled the patient on the patient's conditions, their implications and medical management.    I ordered xrays and reviewed the xrays with the patient.   MRI further evaluation  Meds as ordered.  She defers CAM boot today.  Pain is better barefoot than in shoes.   Icing okay.   Follow up after MRI done for review.         HISTORY OF PRESENT ILLNESS:  Dejah Schmidt is a 63 y.o. female with concerns regarding: Heel Pain (Left heel pain - burning) and Foot Pain (Knot on top of foot and swelling, ice gives relief)   About 4 months, getting worse, mainly plantar left heel.  She is on gabapentin but that does not help with heel pain.  She does sleep with compression socks on as that seems to help with the pain.  She has history of back surgery.    Also history of foot surgery when she was 13.   Trauma/injury to the area:  none recalled.       Patient Active Problem List   Diagnosis    Unspecified essential hypertension    Morbid obesity with BMI of 45.0-49.9, adult    Vitamin D deficiency disease    Chronic back pain    Lumbar radiculopathy    Facet arthropathy,  lumbar    Facet hypertrophy of lumbar region    Hand pain    Herniation of lumbar intervertebral disc with radiculopathy    Spinal stenosis of lumbar region with neurogenic claudication    Chronic pain    Chronic kidney disease, stage 3a    Trigger thumb, right thumb    Trigger finger, left middle finger    Trigger finger, left index finger    Finger stiffness, left    Pain of finger of left hand    Postoperative hypothyroidism    Hyperparathyroidism           Current Outpatient Medications on File Prior to Visit   Medication Sig Dispense Refill    albuterol (PROVENTIL/VENTOLIN HFA) 90 mcg/actuation inhaler Inhale 1-2 puffs into the lungs every 6 (six) hours as needed for Wheezing or Shortness of Breath. Rescue 8 g 0    amLODIPine (NORVASC) 10 MG tablet TAKE 1 TABLET(10 MG) BY MOUTH EVERY DAY 90 tablet 1    atorvastatin (LIPITOR) 40 MG tablet Take 1 tablet (40 mg total) by mouth once daily. 90 tablet 1    butalbital-acetaminophen-caffeine -40 mg (FIORICET, ESGIC) -40 mg per tablet Take by mouth.      cyclobenzaprine (FLEXERIL) 5 MG tablet Take 1 tablet (5 mg total) by mouth 3 (three) times daily as needed for Muscle spasms. 30 tablet 0    gabapentin (NEURONTIN) 600 MG tablet TAKE 1 TABLET(600 MG) BY MOUTH THREE TIMES DAILY 90 tablet 3    levothyroxine (SYNTHROID) 100 MCG tablet Take 1 tablet (100 mcg total) by mouth before breakfast. 30 tablet 11    lisinopriL (PRINIVIL,ZESTRIL) 40 MG tablet TAKE 1 TABLET(40 MG) BY MOUTH EVERY DAY 90 tablet 1    triamterene-hydrochlorothiazide 37.5-25 mg (DYAZIDE) 37.5-25 mg per capsule TAKE 1 CAPSULE BY MOUTH EVERY MORNING 90 capsule 1     No current facility-administered medications on file prior to visit.           Review of patient's allergies indicates:  No Known Allergies            ROS:   General ROS: negative for - chills, fever or night sweats  Respiratory ROS: no cough, shortness of breath, or wheezing  Cardiovascular ROS: no chest pain or dyspnea on  "exertion      EXAM:     Vitals:    08/25/23 1202   BP: (!) 167/89   BP Location: Right arm   Patient Position: Sitting   BP Method: Large (Automatic)   Pulse: 71   Weight: (!) 138.7 kg (305 lb 12.5 oz)   Height: 5' 7" (1.702 m)        General:  Alert and Oriented x 3;  No acute distress      Bilateral  Lower extremity exam:    Vascular:   Dorsalis Pedis:  present   Posterior Tibial:  present  Capillary refill time:  3 seconds  Temperature of toes warm to touch  Edema:  1+       Neurological:     Sharp touch:  normal  Light touch: normal  Tinels Sign:  Absent  Mulders Click:   Absent        Dermatological:   Skin: warm, moist, and appropriate for age  Wounds/Ulcers:  Absent  Bruising:  Absent  Erythema:  Absent      Musculoskeletal:   Metatarsophalangeal range of motion:   diminished range of motion  Subtalar joint range of motion: diminished range of motion  Ankle joint range of motion:  diminished range of motion  tenderness to palpation plantar left heel.   No pain with palpation dorsal soft tissue mass, near the ankle joint.         8/24/2023  bilateral Foot Xray Imaging: FINDINGS:  Right foot:     The bone mineralization is within normal limits.  There is chronic deformity involving the distal aspect of the 1st metatarsal.  There is no evidence of a fracture.     The joint spaces are stable.  There is significant osteophytosis at the 1st MTP joint.  There are bony outgrowths at the base of the 1st and 2nd metatarsals.     The soft tissues are unremarkable.  No radiopaque foreign body is identified.     There is no evidence of acute fracture or dislocation.     Left foot:     The bone mineralization is within normal limits.  There is chronic deformity of the 1st metatarsal.  There is no evidence of a fracture.     There is joint space narrowing with scattered osteophytosis.  There are no erosive changes.  There are bony outgrowths involving the proximal aspect of the 1st and 2nd metatarsals.     The soft tissues " are unremarkable.  No radiopaque foreign body is identified.     There is no evidence of a fracture or dislocation.

## 2023-09-02 ENCOUNTER — HOSPITAL ENCOUNTER (OUTPATIENT)
Dept: RADIOLOGY | Facility: HOSPITAL | Age: 63
Discharge: HOME | End: 2023-09-02
Attending: PODIATRIST
Payer: MEDICARE

## 2023-09-02 DIAGNOSIS — M79.89 SOFT TISSUE MASS: ICD-10-CM

## 2023-09-02 DIAGNOSIS — M79.673 PAIN OF FOOT, UNSPECIFIED LATERALITY: ICD-10-CM

## 2023-09-02 PROCEDURE — 73718 MRI LOWER EXTREMITY W/O DYE: CPT | Mod: TC,LT

## 2023-09-02 PROCEDURE — 73718 MRI FOOT (MIDFOOT) LEFT WITHOUT CONTRAST: ICD-10-PCS | Mod: 26,LT,, | Performed by: RADIOLOGY

## 2023-09-02 PROCEDURE — 73718 MRI LOWER EXTREMITY W/O DYE: CPT | Mod: 26,LT,, | Performed by: RADIOLOGY

## 2023-09-07 ENCOUNTER — OFFICE VISIT (OUTPATIENT)
Dept: PODIATRY | Facility: CLINIC | Age: 63
End: 2023-09-07
Payer: MEDICARE

## 2023-09-07 VITALS
SYSTOLIC BLOOD PRESSURE: 165 MMHG | HEIGHT: 67 IN | BODY MASS INDEX: 45.99 KG/M2 | HEART RATE: 81 BPM | DIASTOLIC BLOOD PRESSURE: 76 MMHG | WEIGHT: 293 LBS

## 2023-09-07 DIAGNOSIS — M72.2 PLANTAR FASCIITIS: Primary | ICD-10-CM

## 2023-09-07 PROCEDURE — 3078F PR MOST RECENT DIASTOLIC BLOOD PRESSURE < 80 MM HG: ICD-10-PCS | Mod: CPTII,S$GLB,, | Performed by: PODIATRIST

## 2023-09-07 PROCEDURE — 4010F ACE/ARB THERAPY RXD/TAKEN: CPT | Mod: CPTII,S$GLB,, | Performed by: PODIATRIST

## 2023-09-07 PROCEDURE — 1159F PR MEDICATION LIST DOCUMENTED IN MEDICAL RECORD: ICD-10-PCS | Mod: CPTII,S$GLB,, | Performed by: PODIATRIST

## 2023-09-07 PROCEDURE — 99999 PR PBB SHADOW E&M-EST. PATIENT-LVL III: CPT | Mod: PBBFAC,,, | Performed by: PODIATRIST

## 2023-09-07 PROCEDURE — 1160F PR REVIEW ALL MEDS BY PRESCRIBER/CLIN PHARMACIST DOCUMENTED: ICD-10-PCS | Mod: CPTII,S$GLB,, | Performed by: PODIATRIST

## 2023-09-07 PROCEDURE — 1160F RVW MEDS BY RX/DR IN RCRD: CPT | Mod: CPTII,S$GLB,, | Performed by: PODIATRIST

## 2023-09-07 PROCEDURE — 3008F PR BODY MASS INDEX (BMI) DOCUMENTED: ICD-10-PCS | Mod: CPTII,S$GLB,, | Performed by: PODIATRIST

## 2023-09-07 PROCEDURE — 3077F SYST BP >= 140 MM HG: CPT | Mod: CPTII,S$GLB,, | Performed by: PODIATRIST

## 2023-09-07 PROCEDURE — 4010F PR ACE/ARB THEARPY RXD/TAKEN: ICD-10-PCS | Mod: CPTII,S$GLB,, | Performed by: PODIATRIST

## 2023-09-07 PROCEDURE — 3078F DIAST BP <80 MM HG: CPT | Mod: CPTII,S$GLB,, | Performed by: PODIATRIST

## 2023-09-07 PROCEDURE — 99999 PR PBB SHADOW E&M-EST. PATIENT-LVL III: ICD-10-PCS | Mod: PBBFAC,,, | Performed by: PODIATRIST

## 2023-09-07 PROCEDURE — 99213 PR OFFICE/OUTPT VISIT, EST, LEVL III, 20-29 MIN: ICD-10-PCS | Mod: S$GLB,,, | Performed by: PODIATRIST

## 2023-09-07 PROCEDURE — 1159F MED LIST DOCD IN RCRD: CPT | Mod: CPTII,S$GLB,, | Performed by: PODIATRIST

## 2023-09-07 PROCEDURE — 3008F BODY MASS INDEX DOCD: CPT | Mod: CPTII,S$GLB,, | Performed by: PODIATRIST

## 2023-09-07 PROCEDURE — 3077F PR MOST RECENT SYSTOLIC BLOOD PRESSURE >= 140 MM HG: ICD-10-PCS | Mod: CPTII,S$GLB,, | Performed by: PODIATRIST

## 2023-09-07 PROCEDURE — 99213 OFFICE O/P EST LOW 20 MIN: CPT | Mod: S$GLB,,, | Performed by: PODIATRIST

## 2023-09-07 RX ORDER — CELECOXIB 200 MG/1
200 CAPSULE ORAL 2 TIMES DAILY PRN
Qty: 60 CAPSULE | Refills: 0 | Status: SHIPPED | OUTPATIENT
Start: 2023-09-07 | End: 2023-10-07

## 2023-09-07 NOTE — PROGRESS NOTES
PODIATRY NOTE     PATIENT ID:  Dejah Schmidt is a 63 y.o. female.     CHIEF CONCERN:   Results (Patient here to go over MRI results for left foot (heel) with pain level 5)           MEDICAL DECISION MAKING:        ICD-10-CM ICD-9-CM    1. Plantar fasciitis  M72.2 728.71 celecoxib (CELEBREX) 200 MG capsule            I counseled the patient on the patient's conditions, their implications and medical management.    MRI reviewed.     Meds as ordered.       HISTORY OF PRESENT ILLNESS:  Dejah Schmidt is a 63 y.o. female with concerns regarding: Results (Patient here to go over MRI results for left foot (heel) with pain level 5)     Pain About 4 months, getting worse, mainly plantar left heel.  She is on gabapentin but that does not help with heel pain.  She does sleep with compression socks on as that seems to help with the pain.  She has history of back surgery.    Also history of foot surgery when she was 13.   Trauma/injury to the area:  none recalled.             Patient Active Problem List   Diagnosis    Unspecified essential hypertension    Morbid obesity with BMI of 45.0-49.9, adult    Vitamin D deficiency disease    Chronic back pain    Lumbar radiculopathy    Facet arthropathy, lumbar    Facet hypertrophy of lumbar region    Hand pain    Herniation of lumbar intervertebral disc with radiculopathy    Spinal stenosis of lumbar region with neurogenic claudication    Chronic pain    Chronic kidney disease, stage 3a    Trigger thumb, right thumb    Trigger finger, left middle finger    Trigger finger, left index finger    Finger stiffness, left    Pain of finger of left hand    Postoperative hypothyroidism    Hyperparathyroidism           Current Outpatient Medications on File Prior to Visit   Medication Sig Dispense Refill    albuterol (PROVENTIL/VENTOLIN HFA) 90 mcg/actuation inhaler Inhale 1-2 puffs into the lungs every 6 (six) hours as needed for Wheezing or Shortness of Breath. Rescue 8 g 0    amLODIPine  "(NORVASC) 10 MG tablet TAKE 1 TABLET(10 MG) BY MOUTH EVERY DAY 90 tablet 1    atorvastatin (LIPITOR) 40 MG tablet Take 1 tablet (40 mg total) by mouth once daily. 90 tablet 1    butalbital-acetaminophen-caffeine -40 mg (FIORICET, ESGIC) -40 mg per tablet Take by mouth.      cyclobenzaprine (FLEXERIL) 5 MG tablet Take 1 tablet (5 mg total) by mouth 3 (three) times daily as needed for Muscle spasms. 30 tablet 0    diclofenac sodium (VOLTAREN) 1 % Gel Apply 2 g topically 4 (four) times daily as needed. To painful area on the feet. 100 g 5    gabapentin (NEURONTIN) 600 MG tablet TAKE 1 TABLET(600 MG) BY MOUTH THREE TIMES DAILY 90 tablet 3    levothyroxine (SYNTHROID) 100 MCG tablet Take 1 tablet (100 mcg total) by mouth before breakfast. 30 tablet 11    lisinopriL (PRINIVIL,ZESTRIL) 40 MG tablet TAKE 1 TABLET(40 MG) BY MOUTH EVERY DAY 90 tablet 1    triamterene-hydrochlorothiazide 37.5-25 mg (DYAZIDE) 37.5-25 mg per capsule TAKE 1 CAPSULE BY MOUTH EVERY MORNING 90 capsule 1     No current facility-administered medications on file prior to visit.           Review of patient's allergies indicates:  No Known Allergies            ROS:   General ROS: negative for - chills, fever or night sweats  Respiratory ROS: no cough, shortness of breath, or wheezing  Cardiovascular ROS: no chest pain or dyspnea on exertion      EXAM:     Vitals:    09/07/23 1416   BP: (!) 165/76   Pulse: 81   Weight: (!) 138.3 kg (305 lb)   Height: 5' 7" (1.702 m)        General:  Alert and Oriented x 3;  No acute distress      Bilateral  Lower extremity exam:    Vascular:   Dorsalis Pedis:  present   Posterior Tibial:  present  Capillary refill time:  3 seconds  Temperature of toes warm to touch  Edema:  1+       Neurological:     Sharp touch:  normal  Light touch: normal  Tinels Sign:  Absent  Mulders Click:   Absent        Dermatological:   Skin: warm, moist, and appropriate for age  Wounds/Ulcers:  Absent  Bruising:  Absent  Erythema:  " Absent      Musculoskeletal:   Metatarsophalangeal range of motion:   diminished range of motion  Subtalar joint range of motion: diminished range of motion  Ankle joint range of motion:  diminished range of motion  tenderness to palpation plantar left heel.   No pain with palpation dorsal soft tissue mass, near the ankle joint.         8/24/2023  bilateral Foot Xray Imaging: FINDINGS:  Right foot:     The bone mineralization is within normal limits.  There is chronic deformity involving the distal aspect of the 1st metatarsal.  There is no evidence of a fracture.     The joint spaces are stable.  There is significant osteophytosis at the 1st MTP joint.  There are bony outgrowths at the base of the 1st and 2nd metatarsals.     The soft tissues are unremarkable.  No radiopaque foreign body is identified.     There is no evidence of acute fracture or dislocation.           Left foot:     The bone mineralization is within normal limits.  There is chronic deformity of the 1st metatarsal.  There is no evidence of a fracture.     There is joint space narrowing with scattered osteophytosis.  There are no erosive changes.  There are bony outgrowths involving the proximal aspect of the 1st and 2nd metatarsals.     The soft tissues are unremarkable.  No radiopaque foreign body is identified.     There is no evidence of a fracture or dislocation.          9/2/2023 MRI:  Impression:     Synovial cyst 11*3 cm dorsum midfoot navicular epicenter.     Plantar fasciitis with osseous calcaneal spur.     Cystic change/osseous edema middle facet level talar neck with degenerative changes 2nd tarsometatarsal joint

## 2023-09-12 DIAGNOSIS — E89.0 POSTOPERATIVE HYPOTHYROIDISM: ICD-10-CM

## 2023-09-12 RX ORDER — LEVOTHYROXINE SODIUM 100 UG/1
100 TABLET ORAL
Qty: 90 TABLET | Refills: 1 | Status: SHIPPED | OUTPATIENT
Start: 2023-09-12 | End: 2024-03-11

## 2023-10-02 RX ORDER — ATORVASTATIN CALCIUM 40 MG/1
40 TABLET, FILM COATED ORAL DAILY
Qty: 90 TABLET | Refills: 0 | Status: SHIPPED | OUTPATIENT
Start: 2023-10-02 | End: 2023-12-31

## 2023-10-02 NOTE — TELEPHONE ENCOUNTER
Care Due:                  Date            Visit Type   Department     Provider  --------------------------------------------------------------------------------                                MYCHART                              FOLLOWUP/OF  [facility] INTERNAL  Last Visit: 11-      FICE VISIT   [facility]       Dasha Bergeron  Next Visit: None Scheduled  None         None Found                                                            Last  Test          Frequency    Reason                     Performed    Due Date  --------------------------------------------------------------------------------    Office Visit  12 months..  atorvastatin, lisinopriL,   11- 11-                             triamterene-hydrochloroth                             iazide...................    CMP.........  12 months..  atorvastatin, lisinopriL,   08- 07-                             triamterene-hydrochloroth                             iazide...................    Lipid Panel.  12 months..  atorvastatin.............  11- 11-    [facility] Embedded Care Due Messages. Reference number: [id number].   10/02/2023 10:51:23 AM CDT

## 2023-10-02 NOTE — TELEPHONE ENCOUNTER
Refill Routing Note   Medication(s) are not appropriate for processing by [facility] for the following reason(s):      Required labs outdated: CMP, Lipid panel    [facility] action(s):  Defer Care Due:  Appointment due  Labs due   Medication Therapy Plan:  Due for annual with PCP, Labs      Appointments  past 12m or future 3m with PCP    Date Provider   Last Visit   11/22/2022 Dasha Bergeron MD   Next Visit   Visit date not found Dasha Bergeron MD   ED visits in past 90 days: 0        Note composed:1:51 PM 10/02/2023

## 2023-10-02 NOTE — TELEPHONE ENCOUNTER
No care due was identified.  [facility] Embedded Care Due Messages. Reference number: [id number].   10/02/2023 6:08:08 PM CDT

## 2023-10-03 ENCOUNTER — TELEPHONE (OUTPATIENT)
Dept: INTERNAL MEDICINE | Facility: CLINIC | Age: 63
End: 2023-10-03
Payer: MEDICARE

## 2023-10-03 RX ORDER — TRIAMTERENE AND HYDROCHLOROTHIAZIDE 37.5; 25 MG/1; MG/1
CAPSULE ORAL
Qty: 90 CAPSULE | Refills: 0 | Status: SHIPPED | OUTPATIENT
Start: 2023-10-03 | End: 2024-01-03

## 2023-10-11 ENCOUNTER — OFFICE VISIT (OUTPATIENT)
Dept: ENDOCRINOLOGY | Facility: CLINIC | Age: 63
End: 2023-10-11
Payer: MEDICARE

## 2023-10-11 DIAGNOSIS — E89.0 POSTOPERATIVE HYPOTHYROIDISM: ICD-10-CM

## 2023-10-11 DIAGNOSIS — E66.01 MORBID OBESITY WITH BMI OF 45.0-49.9, ADULT: Chronic | ICD-10-CM

## 2023-10-11 DIAGNOSIS — I10 ESSENTIAL HYPERTENSION: ICD-10-CM

## 2023-10-11 PROCEDURE — 99213 PR OFFICE/OUTPT VISIT, EST, LEVL III, 20-29 MIN: ICD-10-PCS | Mod: 95,,, | Performed by: INTERNAL MEDICINE

## 2023-10-11 PROCEDURE — 99213 OFFICE O/P EST LOW 20 MIN: CPT | Mod: 95,,, | Performed by: INTERNAL MEDICINE

## 2023-10-11 PROCEDURE — 4010F PR ACE/ARB THEARPY RXD/TAKEN: ICD-10-PCS | Mod: CPTII,95,, | Performed by: INTERNAL MEDICINE

## 2023-10-11 PROCEDURE — 4010F ACE/ARB THERAPY RXD/TAKEN: CPT | Mod: CPTII,95,, | Performed by: INTERNAL MEDICINE

## 2023-10-11 NOTE — PROGRESS NOTES
ENDOCRINOLOGY CLINIC FOLLOW UP  10/11/2023     The patient location is: home     Visit type: audiovisual    Face to Face time with patient: 10  12 minutes of total time spent on the encounter, which includes face to face time and non-face to face time preparing to see the patient (eg, review of tests), Obtaining and/or reviewing separately obtained history, Documenting clinical information in the electronic or other health record, Independently interpreting results (not separately reported) and communicating results to the patient/family/caregiver, or Care coordination (not separately reported).     Each patient to whom he or she provides medical services by telemedicine is:  (1) informed of the relationship between the physician and patient and the respective role of any other health care provider with respect to management of the patient; and (2) notified that he or she may decline to receive medical services by telemedicine and may withdraw from such care at any time.    The patient's last visit with me was on 12/8/2022.   Last saw Dr. Olivas 2/2023, previously seen by me     CC:   Follow up post surgical hypothyroidsm, obesity    HPI:    Interval hx:  Underwent total thyroidectomy 6/29/22 by Dr. Garcia for MNG (benign path) with compressive symptoms and subsequently was started on levothyroxine 150 mcg daily with subsequent dose reduction.  No hypocalcemia.    Feeling well on the current dose of 100 mcg daily. No complaints today aside from leg and knee pain    With regards to hypothyroidism:    Etiology determine to be:  Postsurgical s/p thyroidecomy for MNG 6/2022    Current medication:  generic LT4 100 mcg daily    Takes thyroid medication properly without food first thing in the morning and is waiting at least 30 minutes to eat.      any Ca, iron, multivitamins, acid reflux medications, or supplements from thyroid medication by 4 hours.      Lab Results   Component Value Date    TSH 2.711  08/14/2023    FREET4 0.89 08/14/2023       Thyroid Symptoms  No fatigue    Weight change:  []  Gain []  Loss  [x]  Denies     Wt Readings from Last 3 Encounters:   09/07/23 (!) 138.3 kg (305 lb)   08/25/23 (!) 138.7 kg (305 lb 12.5 oz)   02/20/23 (!) 137.1 kg (302 lb 5.8 oz)      Lab Results   Component Value Date    HGBA1C 5.5 12/14/2022      Temperature intolerance:  []  Cold []  Hot   [x]  Denies     GI:  []  Hyperdefication []  Constipation [x]  Denies      Integument:  []  Hair loss []  Dry skin  [x]  Denies    Other:  []  Palpitation []  tremor     []  Increased anxiety    [x]  Denies        Hypertension:  On 4 BP meds, no missed doses w/ pill box.   Home BP- not checking   Denies CP and IVAN  Denies vision changes.  + HA (unchanged)    Normal dexamethasone supression test (DST)  12/2022    Renin and alix normal in 4/2022  Component      Latest Ref Rng & Units 4/4/2022   Sodium      136 - 145 mmol/L 140   Potassium      3.5 - 5.1 mmol/L 4.4   Chloride      95 - 110 mmol/L 105   CO2      23 - 29 mmol/L 26   Glucose      70 - 110 mg/dL 113 (H)   BUN      6 - 20 mg/dL 14   Creatinine      0.5 - 1.4 mg/dL 1.1   Calcium      8.7 - 10.5 mg/dL 9.2   Anion Gap      8 - 16 mmol/L 9   eGFR if African American      >60 mL/min/1.73 m:2 >60.0   eGFR if non African American      >60 mL/min/1.73 m:2 54.7 (A)   Renin Activity      ng/mL/h 7.5   ALDOSTERONE      ng/dL 6.4           Current Outpatient Medications:     albuterol (PROVENTIL/VENTOLIN HFA) 90 mcg/actuation inhaler, Inhale 1-2 puffs into the lungs every 6 (six) hours as needed for Wheezing or Shortness of Breath. Rescue, Disp: 8 g, Rfl: 0    amLODIPine (NORVASC) 10 MG tablet, TAKE 1 TABLET(10 MG) BY MOUTH EVERY DAY, Disp: 90 tablet, Rfl: 1    atorvastatin (LIPITOR) 40 MG tablet, Take 1 tablet (40 mg total) by mouth once daily. Due for annual with PCP, Labs, Disp: 90 tablet, Rfl: 0    butalbital-acetaminophen-caffeine -40 mg (FIORICET, ESGIC) -40 mg per  tablet, Take by mouth., Disp: , Rfl:     cyclobenzaprine (FLEXERIL) 5 MG tablet, Take 1 tablet (5 mg total) by mouth 3 (three) times daily as needed for Muscle spasms., Disp: 30 tablet, Rfl: 0    diclofenac sodium (VOLTAREN) 1 % Gel, Apply 2 g topically 4 (four) times daily as needed. To painful area on the feet., Disp: 100 g, Rfl: 5    gabapentin (NEURONTIN) 600 MG tablet, TAKE 1 TABLET(600 MG) BY MOUTH THREE TIMES DAILY, Disp: 90 tablet, Rfl: 3    levothyroxine (SYNTHROID) 100 MCG tablet, Take 1 tablet (100 mcg total) by mouth before breakfast., Disp: 90 tablet, Rfl: 1    lisinopriL (PRINIVIL,ZESTRIL) 40 MG tablet, TAKE 1 TABLET(40 MG) BY MOUTH EVERY DAY, Disp: 90 tablet, Rfl: 1    triamterene-hydrochlorothiazide 37.5-25 mg (DYAZIDE) 37.5-25 mg per capsule, TAKE 1 CAPSULE BY MOUTH EVERY MORNING, Disp: 90 capsule, Rfl: 0      With regards to morbid obesity:    Started seeing bariatric medicine just prior to the COVID pandemic. She's interested in seeing them again to discuss weight loss medication.      PHYSICAL EXAM  There were no vitals filed for this visit.       Constitutional:  Pleasant,  in no acute distress.   Eyes:     No scleral icterus.   Respiratory:   Effort normal   Cards:   2+ radial pulses blx, regular rhythm but tachycardic in 90s, no LE edema  Neurological:  normal speech  Psych:   Normal mood and affect.      LABORATORY REVIEW:    Chemistry        Component Value Date/Time     11/22/2022 1459    K 4.4 11/22/2022 1459     11/22/2022 1459    CO2 25 11/22/2022 1459    BUN 14 11/22/2022 1459    CREATININE 1.1 11/22/2022 1459    GLU 92 11/22/2022 1459        Component Value Date/Time    CALCIUM 9.9 11/22/2022 1459    ALKPHOS 82 08/05/2022 0803    AST 22 08/05/2022 0803    ALT 33 08/05/2022 0803    BILITOT 1.2 (H) 08/05/2022 0803    ESTGFRAFRICA 56.8 (A) 07/14/2022 1340    EGFRNONAA 49.2 (A) 07/14/2022 1340         Lab Results   Component Value Date    HGBA1C 5.5 12/14/2022         FNA:  3/10/2020 - left inferior 1.8 cm nodule benign  4/2018 - left and right dominant nodules benign  5/2014 - right dominant nodule benign  1/5/12- right dominant nodule benign    IMAGING STUDIES  See above     ASSESSMENT/PLAN    Problem List Items Addressed This Visit          1 - High    Postoperative hypothyroidism     Most recent TSH normal on levothyroxine 100 daily and she is euthyroid.  As she is on thyroxine and doing well will transition hypothyroidism back to her provider with goal of keeping normal.                2     Unspecified essential hypertension     Not recently checking home blood pressure, most recent blood pressure in clinic was elevated due to pain per patient.      Consider enrollment in digital hypertension program.  We have ruled out hypercortisolism and she has already had normal renin and aldosterone levels.            4     Morbid obesity with BMI of 45.0-49.9, adult (Chronic)     Has been referred to Bariatric Medicine Clinic but this has not been scheduled.  Recommend following up on this and considering bariatric surgery.  I think this may significantly improve her blood pressure and help with joint pain.          RTC prn, will transition care back to primary care provider       Yajaira Yanez MD

## 2023-10-11 NOTE — ASSESSMENT & PLAN NOTE
Has been referred to Bariatric Medicine Clinic but this has not been scheduled.  Recommend following up on this and considering bariatric surgery.  I think this may significantly improve her blood pressure and help with joint pain.

## 2023-10-11 NOTE — ASSESSMENT & PLAN NOTE
Not recently checking home blood pressure, most recent blood pressure in clinic was elevated due to pain per patient.      Consider enrollment in digital hypertension program.  We have ruled out hypercortisolism and she has already had normal renin and aldosterone levels.

## 2023-10-11 NOTE — ASSESSMENT & PLAN NOTE
Most recent TSH normal on levothyroxine 100 daily and she is euthyroid.  As she is on thyroxine and doing well will transition hypothyroidism back to her provider with goal of keeping normal.

## 2023-10-23 ENCOUNTER — TELEPHONE (OUTPATIENT)
Dept: SURGERY | Facility: CLINIC | Age: 63
End: 2023-10-23
Payer: MEDICARE

## 2023-11-16 ENCOUNTER — TELEPHONE (OUTPATIENT)
Dept: BARIATRICS | Facility: CLINIC | Age: 63
End: 2023-11-16
Payer: MEDICARE

## 2023-11-20 ENCOUNTER — PATIENT MESSAGE (OUTPATIENT)
Dept: INTERNAL MEDICINE | Facility: CLINIC | Age: 63
End: 2023-11-20
Payer: MEDICARE

## 2023-11-21 ENCOUNTER — HOSPITAL ENCOUNTER (OUTPATIENT)
Facility: HOSPITAL | Age: 63
Discharge: HOME | End: 2023-11-22
Attending: EMERGENCY MEDICINE | Admitting: STUDENT IN AN ORGANIZED HEALTH CARE EDUCATION/TRAINING PROGRAM
Payer: MEDICARE

## 2023-11-21 DIAGNOSIS — R07.9 CHEST PAIN: ICD-10-CM

## 2023-11-21 DIAGNOSIS — R07.9 CARDIAC CHEST PAIN: ICD-10-CM

## 2023-11-21 DIAGNOSIS — R06.02 SOB (SHORTNESS OF BREATH): ICD-10-CM

## 2023-11-21 PROBLEM — R79.89 ELEVATED TROPONIN: Status: ACTIVE | Noted: 2023-11-21

## 2023-11-21 LAB
ALBUMIN SERPL BCP-MCNC: 4.2 G/DL (ref 3.5–5.2)
ALP SERPL-CCNC: 83 U/L (ref 55–135)
ALT SERPL W/O P-5'-P-CCNC: 27 U/L (ref 10–44)
ANION GAP SERPL CALC-SCNC: 10 MMOL/L (ref 8–16)
AST SERPL-CCNC: 20 U/L (ref 10–40)
BASOPHILS # BLD AUTO: 0 K/UL (ref 0–0.2)
BASOPHILS NFR BLD: 0 % (ref 0–1.9)
BILIRUB SERPL-MCNC: 0.6 MG/DL (ref 0.1–1)
BNP SERPL-MCNC: <10 PG/ML (ref 0–99)
BUN SERPL-MCNC: 24 MG/DL (ref 8–23)
CALCIUM SERPL-MCNC: 9.5 MG/DL (ref 8.7–10.5)
CHLORIDE SERPL-SCNC: 105 MMOL/L (ref 95–110)
CO2 SERPL-SCNC: 26 MMOL/L (ref 23–29)
CREAT SERPL-MCNC: 1.2 MG/DL (ref 0.5–1.4)
D DIMER PPP IA.FEU-MCNC: 0.51 MG/L FEU
DIFFERENTIAL METHOD: ABNORMAL
EOSINOPHIL # BLD AUTO: 0 K/UL (ref 0–0.5)
EOSINOPHIL NFR BLD: 0.5 % (ref 0–8)
ERYTHROCYTE [DISTWIDTH] IN BLOOD BY AUTOMATED COUNT: 13.2 % (ref 11.5–14.5)
EST. GFR  (NO RACE VARIABLE): 51.2 ML/MIN/1.73 M^2
GLUCOSE SERPL-MCNC: 112 MG/DL (ref 70–110)
HCT VFR BLD AUTO: 46.7 % (ref 37–48.5)
HCV AB SERPL QL IA: NORMAL
HGB BLD-MCNC: 15.2 G/DL (ref 12–16)
HIV 1+2 AB+HIV1 P24 AG SERPL QL IA: NORMAL
IMM GRANULOCYTES # BLD AUTO: 0.16 K/UL (ref 0–0.04)
IMM GRANULOCYTES NFR BLD AUTO: 3.9 % (ref 0–0.5)
LYMPHOCYTES # BLD AUTO: 1.8 K/UL (ref 1–4.8)
LYMPHOCYTES NFR BLD: 42.6 % (ref 18–48)
MCH RBC QN AUTO: 30.8 PG (ref 27–31)
MCHC RBC AUTO-ENTMCNC: 32.5 G/DL (ref 32–36)
MCV RBC AUTO: 95 FL (ref 82–98)
MONOCYTES # BLD AUTO: 0.2 K/UL (ref 0.3–1)
MONOCYTES NFR BLD: 5.8 % (ref 4–15)
NEUTROPHILS # BLD AUTO: 1.9 K/UL (ref 1.8–7.7)
NEUTROPHILS NFR BLD: 47.2 % (ref 38–73)
NRBC BLD-RTO: 0 /100 WBC
PLATELET # BLD AUTO: 238 K/UL (ref 150–450)
PLATELET BLD QL SMEAR: ABNORMAL
PMV BLD AUTO: 11 FL (ref 9.2–12.9)
POC CARDIAC TROPONIN I: 0.01 NG/ML (ref 0–0.08)
POTASSIUM SERPL-SCNC: 4.8 MMOL/L (ref 3.5–5.1)
PROT SERPL-MCNC: 8.7 G/DL (ref 6–8.4)
RBC # BLD AUTO: 4.93 M/UL (ref 4–5.4)
SAMPLE: NORMAL
SODIUM SERPL-SCNC: 141 MMOL/L (ref 136–145)
TROPONIN I SERPL DL<=0.01 NG/ML-MCNC: 0.03 NG/ML (ref 0–0.03)
TROPONIN I SERPL DL<=0.01 NG/ML-MCNC: 0.04 NG/ML (ref 0–0.03)
WBC # BLD AUTO: 4.11 K/UL (ref 3.9–12.7)

## 2023-11-21 PROCEDURE — 99285 EMERGENCY DEPT VISIT HI MDM: CPT | Mod: 25

## 2023-11-21 PROCEDURE — 96372 THER/PROPH/DIAG INJ SC/IM: CPT

## 2023-11-21 PROCEDURE — 63600175 PHARM REV CODE 636 W HCPCS

## 2023-11-21 PROCEDURE — G0378 HOSPITAL OBSERVATION PER HR: HCPCS

## 2023-11-21 PROCEDURE — 84484 ASSAY OF TROPONIN QUANT: CPT

## 2023-11-21 PROCEDURE — 86803 HEPATITIS C AB TEST: CPT | Performed by: PHYSICIAN ASSISTANT

## 2023-11-21 PROCEDURE — 25000003 PHARM REV CODE 250

## 2023-11-21 PROCEDURE — 87389 HIV-1 AG W/HIV-1&-2 AB AG IA: CPT | Performed by: PHYSICIAN ASSISTANT

## 2023-11-21 PROCEDURE — 93005 ELECTROCARDIOGRAM TRACING: CPT

## 2023-11-21 PROCEDURE — 25000003 PHARM REV CODE 250: Performed by: EMERGENCY MEDICINE

## 2023-11-21 PROCEDURE — 93010 ELECTROCARDIOGRAM REPORT: CPT | Mod: ,,, | Performed by: INTERNAL MEDICINE

## 2023-11-21 PROCEDURE — 83880 ASSAY OF NATRIURETIC PEPTIDE: CPT | Performed by: EMERGENCY MEDICINE

## 2023-11-21 PROCEDURE — 84484 ASSAY OF TROPONIN QUANT: CPT | Mod: 91 | Performed by: EMERGENCY MEDICINE

## 2023-11-21 PROCEDURE — 85379 FIBRIN DEGRADATION QUANT: CPT | Performed by: EMERGENCY MEDICINE

## 2023-11-21 PROCEDURE — 93010 EKG 12-LEAD: ICD-10-PCS | Mod: ,,, | Performed by: INTERNAL MEDICINE

## 2023-11-21 PROCEDURE — 80053 COMPREHEN METABOLIC PANEL: CPT | Performed by: EMERGENCY MEDICINE

## 2023-11-21 PROCEDURE — 85025 COMPLETE CBC W/AUTO DIFF WBC: CPT | Performed by: EMERGENCY MEDICINE

## 2023-11-21 RX ORDER — GLUCAGON 1 MG
1 KIT INJECTION
Status: DISCONTINUED | OUTPATIENT
Start: 2023-11-21 | End: 2023-11-22 | Stop reason: HOSPADM

## 2023-11-21 RX ORDER — IBUPROFEN 200 MG
16 TABLET ORAL
Status: DISCONTINUED | OUTPATIENT
Start: 2023-11-21 | End: 2023-11-22 | Stop reason: HOSPADM

## 2023-11-21 RX ORDER — NITROGLYCERIN 0.4 MG/1
0.4 TABLET SUBLINGUAL EVERY 5 MIN PRN
Status: DISCONTINUED | OUTPATIENT
Start: 2023-11-21 | End: 2023-11-21

## 2023-11-21 RX ORDER — ONDANSETRON 8 MG/1
8 TABLET, ORALLY DISINTEGRATING ORAL EVERY 8 HOURS PRN
Status: DISCONTINUED | OUTPATIENT
Start: 2023-11-21 | End: 2023-11-22 | Stop reason: HOSPADM

## 2023-11-21 RX ORDER — TALC
6 POWDER (GRAM) TOPICAL NIGHTLY PRN
Status: DISCONTINUED | OUTPATIENT
Start: 2023-11-21 | End: 2023-11-22 | Stop reason: HOSPADM

## 2023-11-21 RX ORDER — ASPIRIN 325 MG
325 TABLET ORAL
Status: COMPLETED | OUTPATIENT
Start: 2023-11-21 | End: 2023-11-21

## 2023-11-21 RX ORDER — NALOXONE HCL 0.4 MG/ML
0.02 VIAL (ML) INJECTION
Status: DISCONTINUED | OUTPATIENT
Start: 2023-11-21 | End: 2023-11-22 | Stop reason: HOSPADM

## 2023-11-21 RX ORDER — ENOXAPARIN SODIUM 100 MG/ML
40 INJECTION SUBCUTANEOUS EVERY 24 HOURS
Status: DISCONTINUED | OUTPATIENT
Start: 2023-11-21 | End: 2023-11-22

## 2023-11-21 RX ORDER — ATORVASTATIN CALCIUM 40 MG/1
40 TABLET, FILM COATED ORAL DAILY
Status: DISCONTINUED | OUTPATIENT
Start: 2023-11-22 | End: 2023-11-22 | Stop reason: HOSPADM

## 2023-11-21 RX ORDER — NITROGLYCERIN 0.4 MG/1
0.4 TABLET SUBLINGUAL EVERY 5 MIN PRN
Status: DISCONTINUED | OUTPATIENT
Start: 2023-11-21 | End: 2023-11-22 | Stop reason: HOSPADM

## 2023-11-21 RX ORDER — AMLODIPINE BESYLATE 10 MG/1
10 TABLET ORAL DAILY
Status: DISCONTINUED | OUTPATIENT
Start: 2023-11-21 | End: 2023-11-22 | Stop reason: HOSPADM

## 2023-11-21 RX ORDER — ACETAMINOPHEN 325 MG/1
650 TABLET ORAL EVERY 4 HOURS PRN
Status: DISCONTINUED | OUTPATIENT
Start: 2023-11-21 | End: 2023-11-22 | Stop reason: HOSPADM

## 2023-11-21 RX ORDER — LEVOTHYROXINE SODIUM 100 UG/1
100 TABLET ORAL
Status: DISCONTINUED | OUTPATIENT
Start: 2023-11-22 | End: 2023-11-22 | Stop reason: HOSPADM

## 2023-11-21 RX ORDER — SODIUM CHLORIDE 0.9 % (FLUSH) 0.9 %
10 SYRINGE (ML) INJECTION EVERY 12 HOURS PRN
Status: DISCONTINUED | OUTPATIENT
Start: 2023-11-21 | End: 2023-11-22 | Stop reason: HOSPADM

## 2023-11-21 RX ORDER — GABAPENTIN 300 MG/1
600 CAPSULE ORAL 3 TIMES DAILY
Status: DISCONTINUED | OUTPATIENT
Start: 2023-11-21 | End: 2023-11-22 | Stop reason: HOSPADM

## 2023-11-21 RX ORDER — IBUPROFEN 200 MG
24 TABLET ORAL
Status: DISCONTINUED | OUTPATIENT
Start: 2023-11-21 | End: 2023-11-22 | Stop reason: HOSPADM

## 2023-11-21 RX ORDER — AMLODIPINE BESYLATE 10 MG/1
10 TABLET ORAL DAILY
Status: DISCONTINUED | OUTPATIENT
Start: 2023-11-22 | End: 2023-11-21

## 2023-11-21 RX ORDER — LISINOPRIL 20 MG/1
40 TABLET ORAL DAILY
Status: DISCONTINUED | OUTPATIENT
Start: 2023-11-21 | End: 2023-11-22 | Stop reason: HOSPADM

## 2023-11-21 RX ORDER — TRIAMTERENE AND HYDROCHLOROTHIAZIDE 37.5; 25 MG/1; MG/1
1 CAPSULE ORAL DAILY
Status: DISCONTINUED | OUTPATIENT
Start: 2023-11-22 | End: 2023-11-22 | Stop reason: HOSPADM

## 2023-11-21 RX ORDER — LISINOPRIL 20 MG/1
40 TABLET ORAL DAILY
Status: DISCONTINUED | OUTPATIENT
Start: 2023-11-22 | End: 2023-11-21

## 2023-11-21 RX ADMIN — LISINOPRIL 40 MG: 20 TABLET ORAL at 04:11

## 2023-11-21 RX ADMIN — ASPIRIN 325 MG ORAL TABLET 325 MG: 325 PILL ORAL at 02:11

## 2023-11-21 RX ADMIN — GABAPENTIN 600 MG: 300 CAPSULE ORAL at 09:11

## 2023-11-21 RX ADMIN — ENOXAPARIN SODIUM 40 MG: 40 INJECTION SUBCUTANEOUS at 04:11

## 2023-11-21 RX ADMIN — AMLODIPINE BESYLATE 10 MG: 10 TABLET ORAL at 04:11

## 2023-11-21 NOTE — HPI
Ms. Schmidt is a 63 year old female w/ HTN, HLD and hypothyroid disease who presents to the ED with IVAN associated w/ substernal chest pressure x 5 days. Patient states chest pressure occurs during activity and improved with rest. Symptoms are also associated w/ lightheadness and dizziness. Chest pressure has resolved at the time of my exam. Patient denies HA, vision changes, cough, fever, chills, LE edema, orthopnea, PND, congestion, sick contacts, abdominal pain, urinary sxs or stool changes. Denies pleuritic chest pain, bed rest, recent travel, known hypercoagulable disorder. Reports strong history of heart disease on her maternal side; mother had a CABG in her 50s. Patient denies prior history of CAD or MI. In the ED, patient was initially hypertensive but otherwise stable. Labs notable for Cr 1.2 (baseline ~1.0), BNP <10, trop 0.034. D-Dimer 0.51. EKG w/o ischemic changes. Patient loaded with aspirin and admitted to  for further evaluation.

## 2023-11-21 NOTE — PLAN OF CARE
[facility] Emergency Dept  Initial Discharge Assessment       Primary Care Provider: Dasha Bergeron MD    Admission Diagnosis: SOB (shortness of breath) [R06.02]    Admission Date: 11/21/2023  Expected Discharge Date:     Pt stated she is independent with her ambulation and ADL's and does not require assistance or equipment    Pt to d/c home with no needs when ready    Transition of Care Barriers: (P) None    Payor: [employer]ARE / Plan: [employer] 65 / Product Type: Medicare Advantage /     Extended Emergency Contact Information  Primary Emergency Contact: Nalini Schmidt  Address: [address] [id number] [place]  Mobile Phone: [phone]  Relation: Daughter  Secondary Emergency Contact: Stephane Veliz  Address: [address]  Home Phone: [phone]  Mobile Phone: [phone]  Relation: Relative    Discharge Plan A: (P) Home with family  Discharge Plan B: (P) Home      [facility] #[id number] - [place] - [facility] [address] [facility] [address]  [place] [address]  Phone: [phone] Fax: [phone]      Initial Assessment (most recent)       Adult Discharge Assessment - 11/21/23 1632          Discharge Assessment    Assessment Type Discharge Planning Assessment (P)      Confirmed/corrected address, phone number and insurance Yes (P)      Confirmed Demographics Correct on Facesheet (P)      Source of Information patient (P)      People in Home child(opal), adult (P)      Facility Arrived From: home (P)      Do you expect to return to your current living situation? Yes (P)      Do you have help at home or someone to help you manage your care at home? No (P)      Prior to hospitilization cognitive status: Alert/Oriented;No Deficits (P)      Current cognitive status: Alert/Oriented;No Deficits (P)      Home Accessibility wheelchair accessible (P)       Home Layout Able to live on 1st floor (P)      Equipment Currently Used at Home none (P)      Patient currently being followed by outpatient case management? No (P)      Do you currently have service(s) that help you manage your care at home? No (P)      Do you have any problems affording any of your prescribed medications? No (P)      Is the patient taking medications as prescribed? yes (P)      Who is going to help you get home at discharge? family/friends (P)      How do you get to doctors appointments? family or friend will provide (P)      Are you on dialysis? No (P)      Do you take coumadin? No (P)      DME Needed Upon Discharge  none (P)      Discharge Plan discussed with: Patient (P)      Transition of Care Barriers None (P)      Discharge Plan A Home with family (P)      Discharge Plan B Home (P)         Physical Activity    On average, how many days per week do you engage in moderate to strenuous exercise (like a brisk walk)? 0 days (P)      On average, how many minutes do you engage in exercise at this level? 0 min (P)         Financial Resource Strain    How hard is it for you to pay for the very basics like food, housing, medical care, and heating? Not very hard (P)         Housing Stability    In the last 12 months, was there a time when you were not able to pay the mortgage or rent on time? No (P)      In the last 12 months, was there a time when you did not have a steady place to sleep or slept in a shelter (including now)? No (P)         Transportation Needs    In the past 12 months, has lack of transportation kept you from medical appointments or from getting medications? No (P)      In the past 12 months, has lack of transportation kept you from meetings, work, or from getting things needed for daily living? No (P)         Food Insecurity    Within the past 12 months, you worried that your food would run out before you got the money to buy more. Never true (P)      Within the past 12 months, the  food you bought just didn't last and you didn't have money to get more. Never true (P)         Stress    Do you feel stress - tense, restless, nervous, or anxious, or unable to sleep at night because your mind is troubled all the time - these days? To some extent (P)         Social Connections    In a typical week, how many times do you talk on the phone with family, friends, or neighbors? More than three times a week (P)      How often do you get together with friends or relatives? More than three times a week (P)      How often do you attend Catholic or Orthodox services? 1 to 4 times per year (P)      Do you belong to any clubs or organizations such as Catholic groups, unions, fraternal or athletic groups, or school groups? No (P)      How often do you attend meetings of the clubs or organizations you belong to? Never (P)      Are you , , , , never , or living with a partner?  (P)         Alcohol Use    Q1: How often do you have a drink containing alcohol? Monthly or less (P)      Q2: How many drinks containing alcohol do you have on a typical day when you are drinking? 1 or 2 (P)      Q3: How often do you have six or more drinks on one occasion? Never (P)         OTHER    Name(s) of People in Home adult son Juan Diego (P)                       Palmira Franklin CD, MSW, LMSW, RSW   Case Management  [facility]  Email: [email or web address]

## 2023-11-21 NOTE — H&P
[facility] - Emergency Dept  [facility] Medicine  History & Physical    Patient Name: Dejah Schmidt  MRN: [id number]  Patient Class: OP- Observation  Admission Date: 11/21/2023  Attending Physician: Dante Webber MD   Primary Care Provider: Dasha Bergeron MD         Patient information was obtained from patient and ER records.     Subjective:     Principal Problem:Chest pain    Chief Complaint:   Chief Complaint   Patient presents with    Shortness of Breath     X5 days accompanied by chest pain when taking a deep breath. Denies fever, chills, or significant PMHx        HPI: Ms. Schmidt is a 63 year old female w/ HTN, HLD, morbid obesity, CKD3, and hypothyroid disease who presents to the ED with IVAN associated w/ substernal chest pressure x 5 days. Patient states chest pressure occurs during activity and improved with rest. Symptoms are also associated w/ lightheadness and dizziness. Chest pressure has resolved at the time of my exam. Patient denies HA, vision changes, cough, fever, chills, LE edema, orthopnea, PND, congestion, sick contacts, abdominal pain, urinary sxs or stool changes. Denies pleuritic chest pain, bed rest, recent travel, known hypercoagulable disorder. Reports strong history of heart disease on her maternal side; mother had a CABG in her 50s. Patient denies prior history of CAD or MI. In the ED, patient was initially hypertensive but otherwise stable. Labs notable for Cr 1.2 (baseline ~1.0), BNP <10, trop 0.034. D-Dimer 0.51. EKG w/o ischemic changes. Patient loaded with aspirin and admitted to  for further evaluation.     Past Medical History:   Diagnosis Date    Asthma     Colon polyp 2011    Hypercholesteremia     Hypertension     Multiple thyroid nodules     Sleep apnea        Past Surgical History:   Procedure Laterality Date    BACK SURGERY      CARPAL TUNNEL RELEASE      COLONOSCOPY N/A 6/4/2020    Procedure: COLONOSCOPY, with me, 5/20;  Surgeon: Sarah Beth Leavitt MD;  Location:  [facility] ENDO (4TH FLR);  Service: Endoscopy;  Laterality: N/A;  covid test 6/2    EPIDURAL STEROID INJECTION N/A 10/27/2021    Procedure: INJECTION, STEROID, EPIDURAL,L5/S1 DIRECT REF/NEED CONSENT;  Surgeon: Clement Aldana MD;  Location: [facility] PAIN MGT;  Service: Pain Management;  Laterality: N/A;    EPIDURAL STEROID INJECTION N/A 5/25/2022    Procedure: INJECTION, STEROID, EPIDURAL, L5-S1 CONTRAST DIRECT REF;  Surgeon: Clement Aldana MD;  Location: [facility] PAIN MGT;  Service: Pain Management;  Laterality: N/A;    FOOT SURGERY      HYSTERECTOMY      MASS EXCISION      at neck    ROTATOR CUFF REPAIR      left shoulder    THYROIDECTOMY N/A 6/29/2022    Procedure: THYROIDECTOMY, total;  Surgeon: Anai Garcia MD;  Location: [facility] OR 2ND FLR;  Service: General;  Laterality: N/A;    TRIGGER FINGER RELEASE  12/2017    right hand    TRIGGER FINGER RELEASE Right 1/26/2022    Procedure: RELEASE, TRIGGER FINGER - right thumb;  Surgeon: Cristopher Tolliver MD;  Location: [facility] OR;  Service: Orthopedics;  Laterality: Right;    TRIGGER FINGER RELEASE Left 3/9/2022    Procedure: RELEASE, TRIGGER FINGER - LEFT index and long;  Surgeon: Cristopher Tolliver MD;  Location: [facility] OR;  Service: Orthopedics;  Laterality: Left;       Review of patient's allergies indicates:  No Known Allergies    No current facility-administered medications on file prior to encounter.     Current Outpatient Medications on File Prior to Encounter   Medication Sig    albuterol (PROVENTIL/VENTOLIN HFA) 90 mcg/actuation inhaler Inhale 1-2 puffs into the lungs every 6 (six) hours as needed for Wheezing or Shortness of Breath. Rescue    amLODIPine (NORVASC) 10 MG tablet TAKE 1 TABLET(10 MG) BY MOUTH EVERY DAY    atorvastatin (LIPITOR) 40 MG tablet Take 1 tablet (40 mg total) by mouth once daily. Due for annual with PCP, Labs    butalbital-acetaminophen-caffeine -40 mg (FIORICET, ESGIC) -40 mg per tablet Take by mouth.    cyclobenzaprine (FLEXERIL) 5 MG  tablet Take 1 tablet (5 mg total) by mouth 3 (three) times daily as needed for Muscle spasms.    diclofenac sodium (VOLTAREN) 1 % Gel Apply 2 g topically 4 (four) times daily as needed. To painful area on the feet.    gabapentin (NEURONTIN) 600 MG tablet TAKE 1 TABLET(600 MG) BY MOUTH THREE TIMES DAILY    levothyroxine (SYNTHROID) 100 MCG tablet Take 1 tablet (100 mcg total) by mouth before breakfast.    lisinopriL (PRINIVIL,ZESTRIL) 40 MG tablet TAKE 1 TABLET(40 MG) BY MOUTH EVERY DAY    triamterene-hydrochlorothiazide 37.5-25 mg (DYAZIDE) 37.5-25 mg per capsule TAKE 1 CAPSULE BY MOUTH EVERY MORNING     Family History       Problem Relation (Age of Onset)    Breast cancer Maternal Grandmother    Cancer Mother, Father    Coronary artery disease Mother    Heart disease Mother, Maternal Grandmother    Hyperlipidemia Mother    Hypertension Mother, Father, Maternal Grandmother          Tobacco Use    Smoking status: Never    Smokeless tobacco: Never   Substance and Sexual Activity    Alcohol use: Yes     Alcohol/week: 0.0 standard drinks of alcohol     Comment: once a month    Drug use: No    Sexual activity: Yes     Review of Systems   Constitutional:  Negative for activity change, chills and fatigue.   HENT:  Negative for congestion.    Respiratory:  Positive for shortness of breath. Negative for cough.    Cardiovascular:  Positive for chest pain. Negative for palpitations and leg swelling.   Gastrointestinal:  Positive for abdominal pain. Negative for constipation, diarrhea, nausea and vomiting.   Genitourinary:  Negative for difficulty urinating.   Musculoskeletal:  Negative for arthralgias and gait problem.   Neurological:  Positive for dizziness and light-headedness.   Psychiatric/Behavioral:  Negative for agitation and behavioral problems.      Objective:     Vital Signs (Most Recent):  Temp: 98 °F (36.7 °C) (11/21/23 1443)  Pulse: 70 (11/21/23 1443)  Resp: 18 (11/21/23 1443)  BP: (!) 122/59 (11/21/23  1443)  SpO2: 97 % (11/21/23 1443) Vital Signs (24h Range):  Temp:  [97.6 °F (36.4 °C)-98 °F (36.7 °C)] 98 °F (36.7 °C)  Pulse:  [70-82] 70  Resp:  [18-19] 18  SpO2:  [97 %-98 %] 97 %  BP: (122-196)/(59-72) 122/59     Weight: 136.1 kg (300 lb)  Body mass index is 46.99 kg/m².     Physical Exam  Constitutional:       General: She is not in acute distress.     Appearance: Normal appearance. She is obese. She is not ill-appearing.   HENT:      Head: Normocephalic and atraumatic.      Mouth/Throat:      Mouth: Mucous membranes are moist.      Pharynx: Oropharynx is clear.   Eyes:      Extraocular Movements: Extraocular movements intact.      Conjunctiva/sclera: Conjunctivae normal.   Cardiovascular:      Rate and Rhythm: Normal rate and regular rhythm.   Pulmonary:      Effort: Pulmonary effort is normal. No respiratory distress.      Breath sounds: Normal breath sounds. No wheezing or rhonchi.   Abdominal:      General: Abdomen is flat. There is no distension.      Palpations: Abdomen is soft.   Musculoskeletal:         General: Normal range of motion.      Cervical back: Normal range of motion.      Right lower leg: No edema.      Left lower leg: No edema.   Skin:     Capillary Refill: Capillary refill takes less than 2 seconds.   Neurological:      General: No focal deficit present.      Mental Status: She is alert and oriented to person, place, and time.                Significant Labs: All pertinent labs within the past 24 hours have been reviewed.    Significant Imaging: I have reviewed all pertinent imaging results/findings within the past 24 hours.  Assessment/Plan:     Chest pain  Elevated troponin  - see chest pain   63 year old female w/ morbid obesity, CKD3, HTN, HLD and hypothyroid disease presenting w/ substernal chest pain w/ exertion associated w/ SOB, lightheadedness, dizziness. Chest pressure improves w/ rest. Strong family history of heart disease; mother had CABG in her 50s. No known cardiac history.  Heart score 4. HDS in the ED w/ trop 0.034. EKG w/o ischemic changes. Loaded w/ ASA and admitted to  for further management.     No active symptoms on my exam thus low suspicion for active ACS. High suspicion for stable angina; patient symptoms are consistent w/ cardiac chest pain (substernal pressure, worse w/ exertion and better with rest). Given history of severe obesity, HTN, HLD and family history, patient would be a good candidate for a PET stress. Discussed w/ cardiology.     - PET stress ordered; no caffeine or chocolate today & NPO at MN. If positive for ischemia, will plan to consult cardiology  - TTE ordered  - Trend troponin Q6H to peak  - EKG PRN for chest pain  - SL NG for chest pain  - Cardiac monitoring  - Lipid panel and A1C ordered       Postoperative hypothyroidism  - resume synthroid       Chronic kidney disease, stage 3a  Creatine stable for now. BMP reviewed- noted Estimated Creatinine Clearance: 70.1 mL/min (based on SCr of 1.2 mg/dL). according to latest data. Based on current GFR, CKD stage is stage 3 - GFR 30-59.  Monitor UOP and serial BMP and adjust therapy as needed. Renally dose meds. Avoid nephrotoxic medications and procedures.    Lumbar radiculopathy  - resume home gabapentin       Morbid obesity with BMI of 45.0-49.9, adult  Body mass index is 46.99 kg/m². Morbid obesity complicates all aspects of disease management from diagnostic modalities to treatment. Weight loss encouraged and health benefits explained to patient.       Unspecified essential hypertension  - resume home antihypertensives as clinically indicated       VTE Risk Mitigation (From admission, onward)           Ordered     enoxaparin injection 40 mg  Daily         11/21/23 1552     IP VTE HIGH RISK PATIENT  Once         11/21/23 1552     Place sequential compression device  Until discontinued         11/21/23 1552                     On 11/21/2023, patient should be placed in hospital observation services under my  care in collaboration with Dr. Dante Goldsmith MD  Department of Hospital Medicine  [facility] - Emergency Dept    COMPLETED  Family history is reviewed and has not changed   Pertinent information:

## 2023-11-21 NOTE — SUBJECTIVE & OBJECTIVE
Past Medical History:   Diagnosis Date    Asthma     Colon polyp 2011    Hypercholesteremia     Hypertension     Multiple thyroid nodules     Sleep apnea        Past Surgical History:   Procedure Laterality Date    BACK SURGERY      CARPAL TUNNEL RELEASE      COLONOSCOPY N/A 6/4/2020    Procedure: COLONOSCOPY, with me, 5/20;  Surgeon: Sarah Beth Leavitt MD;  Location: [facility] (4TH FLR);  Service: Endoscopy;  Laterality: N/A;  covid test 6/2    EPIDURAL STEROID INJECTION N/A 10/27/2021    Procedure: INJECTION, STEROID, EPIDURAL,L5/S1 DIRECT REF/NEED CONSENT;  Surgeon: Clement Aldana MD;  Location: [facility] PAIN MGT;  Service: Pain Management;  Laterality: N/A;    EPIDURAL STEROID INJECTION N/A 5/25/2022    Procedure: INJECTION, STEROID, EPIDURAL, L5-S1 CONTRAST DIRECT REF;  Surgeon: Clement Aldana MD;  Location: [facility] PAIN MGT;  Service: Pain Management;  Laterality: N/A;    FOOT SURGERY      HYSTERECTOMY      MASS EXCISION      at neck    ROTATOR CUFF REPAIR      left shoulder    THYROIDECTOMY N/A 6/29/2022    Procedure: THYROIDECTOMY, total;  Surgeon: Anai Garcia MD;  Location: [facility] 2ND FLR;  Service: General;  Laterality: N/A;    TRIGGER FINGER RELEASE  12/2017    right hand    TRIGGER FINGER RELEASE Right 1/26/2022    Procedure: RELEASE, TRIGGER FINGER - right thumb;  Surgeon: Cristopher Tolliver MD;  Location: [facility];  Service: Orthopedics;  Laterality: Right;    TRIGGER FINGER RELEASE Left 3/9/2022    Procedure: RELEASE, TRIGGER FINGER - LEFT index and long;  Surgeon: Cristopher Tolliver MD;  Location: [facility];  Service: Orthopedics;  Laterality: Left;       Review of patient's allergies indicates:  No Known Allergies    No current facility-administered medications on file prior to encounter.     Current Outpatient Medications on File Prior to Encounter   Medication Sig    albuterol (PROVENTIL/VENTOLIN HFA) 90 mcg/actuation inhaler Inhale 1-2 puffs into the lungs every 6 (six) hours as needed for  Wheezing or Shortness of Breath. Rescue    amLODIPine (NORVASC) 10 MG tablet TAKE 1 TABLET(10 MG) BY MOUTH EVERY DAY    atorvastatin (LIPITOR) 40 MG tablet Take 1 tablet (40 mg total) by mouth once daily. Due for annual with PCP, Labs    butalbital-acetaminophen-caffeine -40 mg (FIORICET, ESGIC) -40 mg per tablet Take by mouth.    cyclobenzaprine (FLEXERIL) 5 MG tablet Take 1 tablet (5 mg total) by mouth 3 (three) times daily as needed for Muscle spasms.    diclofenac sodium (VOLTAREN) 1 % Gel Apply 2 g topically 4 (four) times daily as needed. To painful area on the feet.    gabapentin (NEURONTIN) 600 MG tablet TAKE 1 TABLET(600 MG) BY MOUTH THREE TIMES DAILY    levothyroxine (SYNTHROID) 100 MCG tablet Take 1 tablet (100 mcg total) by mouth before breakfast.    lisinopriL (PRINIVIL,ZESTRIL) 40 MG tablet TAKE 1 TABLET(40 MG) BY MOUTH EVERY DAY    triamterene-hydrochlorothiazide 37.5-25 mg (DYAZIDE) 37.5-25 mg per capsule TAKE 1 CAPSULE BY MOUTH EVERY MORNING     Family History       Problem Relation (Age of Onset)    Breast cancer Maternal Grandmother    Cancer Mother, Father    Coronary artery disease Mother    Heart disease Mother, Maternal Grandmother    Hyperlipidemia Mother    Hypertension Mother, Father, Maternal Grandmother          Tobacco Use    Smoking status: Never    Smokeless tobacco: Never   Substance and Sexual Activity    Alcohol use: Yes     Alcohol/week: 0.0 standard drinks of alcohol     Comment: once a month    Drug use: No    Sexual activity: Yes     Review of Systems   Constitutional:  Negative for activity change, chills and fatigue.   HENT:  Negative for congestion.    Respiratory:  Positive for shortness of breath. Negative for cough.    Cardiovascular:  Positive for chest pain. Negative for palpitations and leg swelling.   Gastrointestinal:  Positive for abdominal pain. Negative for constipation, diarrhea, nausea and vomiting.   Genitourinary:  Negative for difficulty urinating.    Musculoskeletal:  Negative for arthralgias and gait problem.   Neurological:  Positive for dizziness and light-headedness.   Psychiatric/Behavioral:  Negative for agitation and behavioral problems.      Objective:     Vital Signs (Most Recent):  Temp: 98 °F (36.7 °C) (11/21/23 1443)  Pulse: 70 (11/21/23 1443)  Resp: 18 (11/21/23 1443)  BP: (!) 122/59 (11/21/23 1443)  SpO2: 97 % (11/21/23 1443) Vital Signs (24h Range):  Temp:  [97.6 °F (36.4 °C)-98 °F (36.7 °C)] 98 °F (36.7 °C)  Pulse:  [70-82] 70  Resp:  [18-19] 18  SpO2:  [97 %-98 %] 97 %  BP: (122-196)/(59-72) 122/59     Weight: 136.1 kg (300 lb)  Body mass index is 46.99 kg/m².     Physical Exam  Constitutional:       General: She is not in acute distress.     Appearance: Normal appearance. She is obese. She is not ill-appearing.   HENT:      Head: Normocephalic and atraumatic.      Mouth/Throat:      Mouth: Mucous membranes are moist.      Pharynx: Oropharynx is clear.   Eyes:      Extraocular Movements: Extraocular movements intact.      Conjunctiva/sclera: Conjunctivae normal.   Cardiovascular:      Rate and Rhythm: Normal rate and regular rhythm.   Pulmonary:      Effort: Pulmonary effort is normal. No respiratory distress.      Breath sounds: Normal breath sounds. No wheezing or rhonchi.   Abdominal:      General: Abdomen is flat. There is no distension.      Palpations: Abdomen is soft.   Musculoskeletal:         General: Normal range of motion.      Cervical back: Normal range of motion.      Right lower leg: No edema.      Left lower leg: No edema.   Skin:     Capillary Refill: Capillary refill takes less than 2 seconds.   Neurological:      General: No focal deficit present.      Mental Status: She is alert and oriented to person, place, and time.                Significant Labs: All pertinent labs within the past 24 hours have been reviewed.    Significant Imaging: I have reviewed all pertinent imaging results/findings within the past 24 hours.

## 2023-11-21 NOTE — ASSESSMENT & PLAN NOTE
63 year old female w/ severe obesity, CKD3, HTN, HLD and hypothyroid disease presenting w/ substernal chest pain w/ exertion associated w/ SOB, lightheadedness, dizziness. Chest pressure improves w/ rest. Strong family history of heart disease; mother had CABG in her 50s. No known cardiac history. Heart score 4. HDS in the ED w/ trop 0.034. EKG w/o ischemic changes. Loaded w/ ASA and admitted to  for further management.     No active symptoms on my exam thus low suspicion for active ACS. High suspicion for stable angina; patient symptoms are consistent w/ cardiac chest pain (substernal pressure, worse w/ exertion and better with rest). Given history of severe obesity, HTN, HLD and family history, patient would be a good candidate for a PET stress. Discussed w/ cardiology.     - PET stress ordered; no caffeine or chocolate today & NPO at MN. If positive for ischemia, will plan to consult cardiology  - TTE ordered  - Trend troponin Q6H to peak  - EKG PRN for chest pain  - SL NG for chest pain  - Cardiac monitoring

## 2023-11-21 NOTE — ASSESSMENT & PLAN NOTE
Body mass index is 46.99 kg/m². Morbid obesity complicates all aspects of disease management from diagnostic modalities to treatment. Weight loss encouraged and health benefits explained to patient.

## 2023-11-21 NOTE — ED NOTES
Pt ambulatory to ED for SOB and dyspnea on exertion x5 days. Pt endorses tachycardia and diaphoresis and chest pressure with activity, symptoms resolve when at rest.  Pt denies N/V, denies fever/chills.  RR even/unlabored, SPO2 98% on RA.

## 2023-11-21 NOTE — ASSESSMENT & PLAN NOTE
Creatine stable for now. BMP reviewed- noted Estimated Creatinine Clearance: 70.1 mL/min (based on SCr of 1.2 mg/dL). according to latest data. Based on current GFR, CKD stage is stage 3 - GFR 30-59.  Monitor UOP and serial BMP and adjust therapy as needed. Renally dose meds. Avoid nephrotoxic medications and procedures.

## 2023-11-21 NOTE — ED PROVIDER NOTES
Encounter Date: 11/21/2023       History     Chief Complaint   Patient presents with    Shortness of Breath     X5 days accompanied by chest pain when taking a deep breath. Denies fever, chills, or significant PMHx     63-year-old female with history of hypertension hyperlipidemia and strong cardiac family history presents with 5 days of dyspnea on exertion with associated diaphoresis and chest discomfort.  When she gets up to walk she feels short of breath and has pressure on her chest.  It was mild at 1st but yesterday it was more severe.  Currently she has no chest discomfort or shortness of breath.  She often feels like she might pass out.  The chest pain can be worse with a deep breath.  She is never had anything like this before.  She denies prior cardiac history.  She is comfortable as long as she is sitting.  She denies any infectious symptoms.  No leg swelling.      Review of patient's allergies indicates:  No Known Allergies  Past Medical History:   Diagnosis Date    Asthma     Colon polyp 2011    Hypercholesteremia     Hypertension     Multiple thyroid nodules     Sleep apnea      Past Surgical History:   Procedure Laterality Date    BACK SURGERY      CARPAL TUNNEL RELEASE      COLONOSCOPY N/A 6/4/2020    Procedure: COLONOSCOPY, with me, 5/20;  Surgeon: Sarah Beth Leavitt MD;  Location: [address]);  Service: Endoscopy;  Laterality: N/A;  covid test 6/2    EPIDURAL STEROID INJECTION N/A 10/27/2021    Procedure: INJECTION, STEROID, EPIDURAL,L5/S1 DIRECT REF/NEED CONSENT;  Surgeon: Clement Aldana MD;  Location: [facility];  Service: Pain Management;  Laterality: N/A;    EPIDURAL STEROID INJECTION N/A 5/25/2022    Procedure: INJECTION, STEROID, EPIDURAL, L5-S1 CONTRAST DIRECT REF;  Surgeon: Clement Aldana MD;  Location: [facility] PAIN T;  Service: Pain Management;  Laterality: N/A;    FOOT SURGERY      HYSTERECTOMY      MASS EXCISION      at neck    ROTATOR CUFF REPAIR      left shoulder     THYROIDECTOMY N/A 6/29/2022    Procedure: THYROIDECTOMY, total;  Surgeon: Anai Garcia MD;  [facility];  Service: General;  Laterality: N/A;    TRIGGER FINGER RELEASE  12/2017    right hand    TRIGGER FINGER RELEASE Right 1/26/2022    Procedure: RELEASE, TRIGGER FINGER - right thumb;  Surgeon: Cristopher Tolliver MD;  Location: [facility] OR;  Service: Orthopedics;  Laterality: Right;    TRIGGER FINGER RELEASE Left 3/9/2022    Procedure: RELEASE, TRIGGER FINGER - LEFT index and long;  Surgeon: Cristopher Tolliver MD;  Location: [facility] OR;  Service: Orthopedics;  Laterality: Left;     Family History   Problem Relation Age of Onset    Cancer Mother     Coronary artery disease Mother     Heart disease Mother     Hypertension Mother     Hyperlipidemia Mother     Hypertension Father     Cancer Father     Heart disease Maternal Grandmother     Hypertension Maternal Grandmother     Breast cancer Maternal Grandmother      Social History     Tobacco Use    Smoking status: Never    Smokeless tobacco: Never   Substance Use Topics    Alcohol use: Yes     Alcohol/week: 0.0 standard drinks of alcohol     Comment: once a month    Drug use: No     Review of Systems    Physical Exam     Initial Vitals [11/21/23 1301]   BP Pulse Resp Temp SpO2   (!) 196/72 82 19 97.6 °F (36.4 °C) 98 %      MAP       --         Physical Exam    Constitutional: She appears well-developed and well-nourished. She is not diaphoretic. No distress.   HENT:   Head: Normocephalic and atraumatic.   Eyes: Conjunctivae are normal.   Neck: Neck supple. No tracheal deviation present. No JVD present.   Normal range of motion.  Cardiovascular:  Normal rate, regular rhythm, normal heart sounds and intact distal pulses.           Pulmonary/Chest: Breath sounds normal. No respiratory distress. She has no wheezes. She has no rhonchi. She has no rales.   Abdominal: Abdomen is soft. Bowel sounds are normal. She exhibits no distension. There is no abdominal tenderness.  There is no rebound.   Musculoskeletal:         General: No edema.      Cervical back: Normal range of motion and neck supple.     Neurological: She is alert. She has normal strength. No sensory deficit. GCS score is 15. GCS eye subscore is 4. GCS verbal subscore is 5. GCS motor subscore is 6.   Skin: Skin is warm. No rash noted.   Psychiatric: She has a normal mood and affect.         ED Course   Procedures  Labs Reviewed   CBC W/ AUTO DIFFERENTIAL - Abnormal; Notable for the following components:       Result Value    Immature Granulocytes 3.9 (*)     Immature Grans (Abs) 0.16 (*)     Mono # 0.2 (*)     All other components within normal limits   COMPREHENSIVE METABOLIC PANEL - Abnormal; Notable for the following components:    Glucose 112 (*)     BUN 24 (*)     Total Protein 8.7 (*)     eGFR 51.2 (*)     All other components within normal limits   TROPONIN I - Abnormal; Notable for the following components:    Troponin I 0.034 (*)     All other components within normal limits   D DIMER, QUANTITATIVE - Abnormal; Notable for the following components:    D-Dimer 0.51 (*)     All other components within normal limits   HIV 1 / 2 ANTIBODY    Narrative:     Release to patient->Immediate   HEPATITIS C ANTIBODY    Narrative:     Release to patient->Immediate   B-TYPE NATRIURETIC PEPTIDE   TROPONIN ISTAT   TROPONIN I   POCT TROPONIN   POCT TROPONIN     EKG Readings: (Independently Interpreted)   Normal sinus rhythm at 77 without acute ischemic changes.     ECG Results              EKG 12-lead (Final result)  Result time 11/21/23 13:24:09      Final result by Interface, Lab In [facility] (11/21/23 13:24:09)                   Narrative:    Test Reason : R06.02,    Vent. Rate : 077 BPM     Atrial Rate : 077 BPM     P-R Int : 126 ms          QRS Dur : 096 ms      QT Int : 392 ms       P-R-T Axes : 042 -23 008 degrees     QTc Int : 443 ms    Normal sinus rhythm  Minimal voltage criteria for LVH, may be normal variant ( [employer]  product )  Borderline Abnormal ECG  When compared with ECG of 25-APR-2022 14:07,  No significant change was found  Confirmed by Gloria Otoole MD ([id number]) on 11/21/2023 1:24:03 PM    Referred By: MIKAL   SELF           Confirmed By:Gloria Otoole MD                                  Imaging Results              X-Ray Chest AP Portable (Final result)  Result time 11/21/23 14:37:48      Final result by Marcos Osborne MD (11/21/23 14:37:48)                   Impression:      See above      Electronically signed by: Marcos Osborne MD  Date:    11/21/2023  Time:    14:37               Narrative:    EXAMINATION:  XR CHEST AP PORTABLE    CLINICAL HISTORY:  Chest Pain;    TECHNIQUE:  Single frontal view of the chest was performed.    COMPARISON:  Non 06/15/2017 e    FINDINGS:  Heart size normal.  The lungs are clear.  No pleural effusion                                       Medications   sodium chloride 0.9% flush 10 mL (has no administration in time range)   naloxone 0.4 mg/mL injection 0.02 mg (has no administration in time range)   glucose chewable tablet 16 g (has no administration in time range)   glucose chewable tablet 24 g (has no administration in time range)   glucagon (human recombinant) injection 1 mg (has no administration in time range)   enoxaparin injection 40 mg (40 mg Subcutaneous Given 11/21/23 7493)   acetaminophen tablet 650 mg (has no administration in time range)   ondansetron disintegrating tablet 8 mg (has no administration in time range)   melatonin tablet 6 mg (has no administration in time range)   dextrose 10% bolus 125 mL 125 mL (has no administration in time range)   dextrose 10% bolus 250 mL 250 mL (has no administration in time range)   atorvastatin tablet 40 mg (has no administration in time range)   gabapentin capsule 600 mg (has no administration in time range)   levothyroxine tablet 100 mcg (has no administration in time range)   triamterene-hydrochlorothiazide 37.5-25 mg per capsule  1 capsule (has no administration in time range)   nitroGLYCERIN SL tablet 0.4 mg (has no administration in time range)   amLODIPine tablet 10 mg (10 mg Oral Given 11/21/23 1652)   lisinopriL tablet 40 mg (40 mg Oral Given 11/21/23 1652)   aspirin tablet 325 mg (325 mg Oral Given 11/21/23 1411)     Medical Decision Making  63-year-old female with cardiac risk factors presents with symptoms concerning for new onset angina on exertion.  Will investigate with cardiac evaluation.  She will have a high heart score.  There is a pleuritic component to her chest pain.  Will also screen with a D-dimer.    3:41 PM  Troponin is elevated 0.03.  D-dimer is 0.51.  That would be negative if 1 age adjusted.  Clinically I think pulmonary embolus is less than likely.    With positive troponin will consult Hospital Medicine.    Amount and/or Complexity of Data Reviewed  Labs: ordered.  Radiology: ordered.    Risk  OTC drugs.      Additional MDM:   Heart Score:    History:          Highly suspicious.  ECG:             Normal  Age:               45-65 years  Risk factors: >= 3 risk factors or history of atherosclerotic disease  Troponin:       Less than or equal to normal limit  Heart Score = 5                                    Clinical Impression:  Final diagnoses:  [R06.02] SOB (shortness of breath)  [R07.9] Chest pain          ED Disposition Condition    Observation                 Ajay Zelaya MD  11/21/23 3041

## 2023-11-22 VITALS
OXYGEN SATURATION: 99 % | RESPIRATION RATE: 18 BRPM | DIASTOLIC BLOOD PRESSURE: 57 MMHG | HEART RATE: 68 BPM | BODY MASS INDEX: 45.99 KG/M2 | TEMPERATURE: 98 F | SYSTOLIC BLOOD PRESSURE: 117 MMHG | HEIGHT: 67 IN | WEIGHT: 293 LBS

## 2023-11-22 LAB
ANION GAP SERPL CALC-SCNC: 10 MMOL/L (ref 8–16)
ASCENDING AORTA: 3.37 CM
AV INDEX (PROSTH): 0.91
AV MEAN GRADIENT: 4 MMHG
AV PEAK GRADIENT: 7 MMHG
AV VALVE AREA BY VELOCITY RATIO: 3.31 CM²
AV VALVE AREA: 3.76 CM²
AV VELOCITY RATIO: 0.8
BASOPHILS # BLD AUTO: NORMAL K/UL (ref 0–0.2)
BASOPHILS NFR BLD: 0 % (ref 0–1.9)
BSA FOR ECHO PROCEDURE: 2.52 M2
BUN SERPL-MCNC: 22 MG/DL (ref 8–23)
CALCIUM SERPL-MCNC: 8.7 MG/DL (ref 8.7–10.5)
CFR FLOW - ANTERIOR: 4.17
CFR FLOW - INFERIOR: 3.69
CFR FLOW - LATERAL: 3.23
CFR FLOW - MAX: 5.06
CFR FLOW - MIN: 2.86
CFR FLOW - SEPTAL: 4.18
CFR FLOW - WHOLE HEART: 3.82
CHLORIDE SERPL-SCNC: 105 MMOL/L (ref 95–110)
CHOLEST SERPL-MCNC: 158 MG/DL (ref 120–199)
CHOLEST/HDLC SERPL: 5.4 {RATIO} (ref 2–5)
CO2 SERPL-SCNC: 23 MMOL/L (ref 23–29)
CREAT SERPL-MCNC: 1 MG/DL (ref 0.5–1.4)
CV ECHO LV RWT: 0.46 CM
CV PHARM DOSE: 0.4 MG
CV STRESS BASE HR: 68 BPM
DIASTOLIC BLOOD PRESSURE: 83 MMHG
DIFFERENTIAL METHOD: NORMAL
DOP CALC AO PEAK VEL: 1.33 M/S
DOP CALC AO VTI: 24 CM
DOP CALC LVOT AREA: 4.2 CM2
DOP CALC LVOT DIAMETER: 2.3 CM
DOP CALC LVOT PEAK VEL: 1.06 M/S
DOP CALC LVOT STROKE VOLUME: 90.28 CM3
DOP CALCLVOT PEAK VEL VTI: 21.74 CM
E WAVE DECELERATION TIME: 297.22 MSEC
E/A RATIO: 0.83
E/E' RATIO: 4.3 M/S
ECHO LV POSTERIOR WALL: 1.05 CM (ref 0.6–1.1)
EJECTION FRACTION- HIGH: 59 %
END DIASTOLIC INDEX-HIGH: 155 ML/M2
END DIASTOLIC INDEX-LOW: 91 ML/M2
END SYSTOLIC INDEX-HIGH: 78 ML/M2
END SYSTOLIC INDEX-LOW: 40 ML/M2
EOSINOPHIL # BLD AUTO: NORMAL K/UL (ref 0–0.5)
EOSINOPHIL NFR BLD: 1 % (ref 0–8)
ERYTHROCYTE [DISTWIDTH] IN BLOOD BY AUTOMATED COUNT: 13.2 % (ref 11.5–14.5)
EST. GFR  (NO RACE VARIABLE): >60 ML/MIN/1.73 M^2
ESTIMATED AVG GLUCOSE: 108 MG/DL (ref 68–131)
FRACTIONAL SHORTENING: 33 % (ref 28–44)
GLUCOSE SERPL-MCNC: 86 MG/DL (ref 70–110)
HBA1C MFR BLD: 5.4 % (ref 4–5.6)
HCT VFR BLD AUTO: 42.2 % (ref 37–48.5)
HDLC SERPL-MCNC: 29 MG/DL (ref 40–75)
HDLC SERPL: 18.4 % (ref 20–50)
HGB BLD-MCNC: 13.5 G/DL (ref 12–16)
IMM GRANULOCYTES # BLD AUTO: NORMAL K/UL (ref 0–0.04)
IMM GRANULOCYTES NFR BLD AUTO: NORMAL % (ref 0–0.5)
INTERVENTRICULAR SEPTUM: 1.03 CM (ref 0.6–1.1)
IVRT: 88.49 MSEC
LA MAJOR: 5.27 CM
LA MINOR: 5 CM
LA WIDTH: 3.72 CM
LDLC SERPL CALC-MCNC: 113.6 MG/DL (ref 63–159)
LEFT ATRIUM SIZE: 3.73 CM
LEFT ATRIUM VOLUME INDEX MOD: 21.9 ML/M2
LEFT ATRIUM VOLUME INDEX: 25.3 ML/M2
LEFT ATRIUM VOLUME MOD: 52.33 CM3
LEFT ATRIUM VOLUME: 60.52 CM3
LEFT INTERNAL DIMENSION IN SYSTOLE: 3.07 CM (ref 2.1–4)
LEFT VENTRICLE DIASTOLIC VOLUME INDEX: 40.08 ML/M2
LEFT VENTRICLE DIASTOLIC VOLUME: 95.78 ML
LEFT VENTRICLE MASS INDEX: 69 G/M2
LEFT VENTRICLE SYSTOLIC VOLUME INDEX: 15.5 ML/M2
LEFT VENTRICLE SYSTOLIC VOLUME: 37.1 ML
LEFT VENTRICULAR INTERNAL DIMENSION IN DIASTOLE: 4.57 CM (ref 3.5–6)
LEFT VENTRICULAR MASS: 165.86 G
LV LATERAL E/E' RATIO: 3.58 M/S
LV SEPTAL E/E' RATIO: 5.38 M/S
LYMPHOCYTES # BLD AUTO: NORMAL K/UL (ref 1–4.8)
LYMPHOCYTES NFR BLD: 42 % (ref 18–48)
MAGNESIUM SERPL-MCNC: 1.9 MG/DL (ref 1.6–2.6)
MCH RBC QN AUTO: 30.1 PG (ref 27–31)
MCHC RBC AUTO-ENTMCNC: 32 G/DL (ref 32–36)
MCV RBC AUTO: 94 FL (ref 82–98)
MONOCYTES # BLD AUTO: NORMAL K/UL (ref 0.3–1)
MONOCYTES NFR BLD: 10 % (ref 4–15)
MV A" WAVE DURATION": 14.84 MSEC
MV PEAK A VEL: 0.52 M/S
MV PEAK E VEL: 0.43 M/S
MV STENOSIS PRESSURE HALF TIME: 86.19 MS
MV VALVE AREA P 1/2 METHOD: 2.55 CM2
NEUTROPHILS NFR BLD: 46 % (ref 38–73)
NEUTS BAND NFR BLD MANUAL: 1 %
NONHDLC SERPL-MCNC: 129 MG/DL
NRBC BLD-RTO: 0 /100 WBC
NUC REST DIASTOLIC VOLUME INDEX: 85
NUC REST EJECTION FRACTION: 64
NUC REST SYSTOLIC VOLUME INDEX: 30
NUC STRESS DIASTOLIC VOLUME INDEX: 91
NUC STRESS EJECTION FRACTION: 71 %
NUC STRESS SYSTOLIC VOLUME INDEX: 26
OHS CV CPX 85 PERCENT MAX PREDICTED HEART RATE MALE: 134
OHS CV CPX MAX PREDICTED HEART RATE: 158
OHS CV CPX PATIENT IS FEMALE: 1
OHS CV CPX PATIENT IS MALE: 0
OHS CV CPX PEAK DIASTOLIC BLOOD PRESSURE: 63 MMHG
OHS CV CPX PEAK HEAR RATE: 87 BPM
OHS CV CPX PEAK RATE PRESSURE PRODUCT: NORMAL
OHS CV CPX PEAK SYSTOLIC BLOOD PRESSURE: 149 MMHG
OHS CV CPX PERCENT MAX PREDICTED HEART RATE ACHIEVED: 57
OHS CV CPX RATE PRESSURE PRODUCT PRESENTING: NORMAL
PISA TR MAX VEL: 2.18 M/S
PLATELET # BLD AUTO: 207 K/UL (ref 150–450)
PLATELET BLD QL SMEAR: NORMAL
PMV BLD AUTO: 11.2 FL (ref 9.2–12.9)
POTASSIUM SERPL-SCNC: 4 MMOL/L (ref 3.5–5.1)
PULM VEIN S/D RATIO: 1.31
PV PEAK D VEL: 0.42 M/S
PV PEAK S VEL: 0.55 M/S
RA MAJOR: 4.86 CM
RA PRESSURE ESTIMATED: 3 MMHG
RA WIDTH: 3.44 CM
RBC # BLD AUTO: 4.48 M/UL (ref 4–5.4)
REST FLOW - ANTERIOR: 0.47 CC/MIN/G
REST FLOW - INFERIOR: 0.56 CC/MIN/G
REST FLOW - LATERAL: 0.74 CC/MIN/G
REST FLOW - MAX: 0.91 CC/MIN/G
REST FLOW - MIN: 0.24 CC/MIN/G
REST FLOW - SEPTAL: 0.44 CC/MIN/G
REST FLOW - WHOLE HEART: 0.55 CC/MIN/G
RETIRED EF AND QEF - SEE NOTES: 47 %
RIGHT VENTRICULAR END-DIASTOLIC DIMENSION: 3.03 CM
RV TB RVSP: 5 MMHG
SINUS: 3.38 CM
SODIUM SERPL-SCNC: 138 MMOL/L (ref 136–145)
STJ: 2.9 CM
STRESS FLOW - ANTERIOR: 1.96 CC/MIN/G
STRESS FLOW - INFERIOR: 2.04 CC/MIN/G
STRESS FLOW - LATERAL: 2.37 CC/MIN/G
STRESS FLOW - MAX: 2.78 CC/MIN/G
STRESS FLOW - MIN: 0.77 CC/MIN/G
STRESS FLOW - SEPTAL: 83 CC/MIN/G
STRESS FLOW - WHOLE HEART: 22.34 CC/MIN/G
SYSTOLIC BLOOD PRESSURE: 148 MMHG
TDI LATERAL: 0.12 M/S
TDI SEPTAL: 0.08 M/S
TDI: 0.1 M/S
TR MAX PG: 19 MMHG
TRICUSPID ANNULAR PLANE SYSTOLIC EXCURSION: 1.88 CM
TRIGL SERPL-MCNC: 77 MG/DL (ref 30–150)
TROPONIN I SERPL DL<=0.01 NG/ML-MCNC: 0.04 NG/ML (ref 0–0.03)
TV REST PULMONARY ARTERY PRESSURE: 22 MMHG
WBC # BLD AUTO: 4.75 K/UL (ref 3.9–12.7)
Z-SCORE OF LEFT VENTRICULAR DIMENSION IN END DIASTOLE: -8.5
Z-SCORE OF LEFT VENTRICULAR DIMENSION IN END SYSTOLE: -5.82

## 2023-11-22 PROCEDURE — 83735 ASSAY OF MAGNESIUM: CPT

## 2023-11-22 PROCEDURE — 63600175 PHARM REV CODE 636 W HCPCS: Performed by: STUDENT IN AN ORGANIZED HEALTH CARE EDUCATION/TRAINING PROGRAM

## 2023-11-22 PROCEDURE — G0378 HOSPITAL OBSERVATION PER HR: HCPCS

## 2023-11-22 PROCEDURE — 63600175 PHARM REV CODE 636 W HCPCS

## 2023-11-22 PROCEDURE — 25000003 PHARM REV CODE 250

## 2023-11-22 PROCEDURE — 80061 LIPID PANEL: CPT

## 2023-11-22 PROCEDURE — 85007 BL SMEAR W/DIFF WBC COUNT: CPT | Mod: NCS

## 2023-11-22 PROCEDURE — 96374 THER/PROPH/DIAG INJ IV PUSH: CPT | Mod: 59

## 2023-11-22 PROCEDURE — 83036 HEMOGLOBIN GLYCOSYLATED A1C: CPT

## 2023-11-22 PROCEDURE — 84484 ASSAY OF TROPONIN QUANT: CPT

## 2023-11-22 PROCEDURE — 85027 COMPLETE CBC AUTOMATED: CPT

## 2023-11-22 PROCEDURE — 96372 THER/PROPH/DIAG INJ SC/IM: CPT | Mod: 59

## 2023-11-22 PROCEDURE — 80048 BASIC METABOLIC PNL TOTAL CA: CPT

## 2023-11-22 PROCEDURE — 36415 COLL VENOUS BLD VENIPUNCTURE: CPT

## 2023-11-22 RX ORDER — REGADENOSON 0.08 MG/ML
0.4 INJECTION, SOLUTION INTRAVENOUS
Status: COMPLETED | OUTPATIENT
Start: 2023-11-22 | End: 2023-11-22

## 2023-11-22 RX ORDER — ENOXAPARIN SODIUM 100 MG/ML
40 INJECTION SUBCUTANEOUS EVERY 12 HOURS
Status: DISCONTINUED | OUTPATIENT
Start: 2023-11-22 | End: 2023-11-22 | Stop reason: HOSPADM

## 2023-11-22 RX ORDER — NITROGLYCERIN 0.4 MG/1
0.4 TABLET SUBLINGUAL EVERY 5 MIN PRN
Qty: 30 TABLET | Refills: 2 | Status: SHIPPED | OUTPATIENT
Start: 2023-11-22 | End: 2023-12-22

## 2023-11-22 RX ADMIN — REGADENOSON 0.4 MG: 0.08 INJECTION, SOLUTION INTRAVENOUS at 10:11

## 2023-11-22 RX ADMIN — ENOXAPARIN SODIUM 40 MG: 40 INJECTION SUBCUTANEOUS at 11:11

## 2023-11-22 RX ADMIN — ATORVASTATIN CALCIUM 40 MG: 40 TABLET, FILM COATED ORAL at 11:11

## 2023-11-22 RX ADMIN — GABAPENTIN 600 MG: 300 CAPSULE ORAL at 11:11

## 2023-11-22 RX ADMIN — AMLODIPINE BESYLATE 10 MG: 10 TABLET ORAL at 11:11

## 2023-11-22 RX ADMIN — LISINOPRIL 40 MG: 20 TABLET ORAL at 11:11

## 2023-11-22 RX ADMIN — TRIAMTERENE AND HYDROCHLOROTHIAZIDE 1 CAPSULE: 37.5; 25 CAPSULE ORAL at 11:11

## 2023-11-22 RX ADMIN — LEVOTHYROXINE SODIUM 100 MCG: 100 TABLET ORAL at 05:11

## 2023-11-22 NOTE — HOSPITAL COURSE
Patient admitted for concerns of stable angina given cardiac chest pain. Troponin flat: 0.034 --> 0.040 --> 0.040. Patient denies additional episodes of CP. TTE w/ EF 55-60% and no WMA. PET stress negative for ischemia. Counselled patient on importance of lifestyle changes. Has upcoming appointment w/ bariatric medicine. Will dc home w/ close PCP follow up and strict return precautions.

## 2023-11-22 NOTE — ASSESSMENT & PLAN NOTE
Creatine stable for now. BMP reviewed- noted Estimated Creatinine Clearance: 83.6 mL/min (based on SCr of 1 mg/dL). according to latest data. Based on current GFR, CKD stage is stage 3 - GFR 30-59.  Monitor UOP and serial BMP and adjust therapy as needed. Renally dose meds. Avoid nephrotoxic medications and procedures.

## 2023-11-22 NOTE — SUBJECTIVE & OBJECTIVE
Interval History: No acute events overnight. Patient remains HDS. Labs reviewed; wnl. Troponin flat.     Review of Systems   Constitutional:  Negative for activity change, chills and fatigue.   HENT:  Negative for congestion.    Respiratory:  Negative for cough and shortness of breath.    Cardiovascular:  Negative for chest pain, palpitations and leg swelling.   Gastrointestinal:  Negative for abdominal pain, constipation, diarrhea, nausea and vomiting.   Genitourinary:  Negative for difficulty urinating.   Musculoskeletal:  Negative for arthralgias and gait problem.   Neurological:  Negative for dizziness and light-headedness.   Psychiatric/Behavioral:  Negative for agitation and behavioral problems.      Objective:     Vital Signs (Most Recent):  Temp: 97.6 °F (36.4 °C) (11/22/23 0838)  Pulse: 67 (11/22/23 0838)  Resp: 18 (11/22/23 0838)  BP: (!) 112/51 (11/22/23 0838)  SpO2: 99 % (11/22/23 0838) Vital Signs (24h Range):  Temp:  [97.6 °F (36.4 °C)-98.1 °F (36.7 °C)] 97.6 °F (36.4 °C)  Pulse:  [61-82] 67  Resp:  [16-19] 18  SpO2:  [96 %-99 %] 99 %  BP: (109-196)/(51-72) 112/51     Weight: 134.6 kg (296 lb 11.8 oz)  Body mass index is 46.48 kg/m².  No intake or output data in the 24 hours ending 11/22/23 0845      Physical Exam  Constitutional:       General: She is not in acute distress.     Appearance: Normal appearance. She is obese. She is not ill-appearing.   HENT:      Head: Normocephalic and atraumatic.      Mouth/Throat:      Mouth: Mucous membranes are moist.      Pharynx: Oropharynx is clear.   Eyes:      Extraocular Movements: Extraocular movements intact.      Conjunctiva/sclera: Conjunctivae normal.   Cardiovascular:      Rate and Rhythm: Normal rate and regular rhythm.   Pulmonary:      Effort: Pulmonary effort is normal. No respiratory distress.      Breath sounds: Normal breath sounds. No wheezing or rhonchi.   Abdominal:      General: Abdomen is flat. There is no distension.      Palpations: Abdomen  is soft.   Musculoskeletal:         General: Normal range of motion.      Cervical back: Normal range of motion.      Right lower leg: No edema.      Left lower leg: No edema.   Skin:     Capillary Refill: Capillary refill takes less than 2 seconds.   Neurological:      General: No focal deficit present.      Mental Status: She is alert and oriented to person, place, and time.             Significant Labs: All pertinent labs within the past 24 hours have been reviewed.    Significant Imaging: I have reviewed all pertinent imaging results/findings within the past 24 hours.

## 2023-11-22 NOTE — ASSESSMENT & PLAN NOTE
63 year old female w/ severe obesity, CKD3, HTN, HLD and hypothyroid disease presenting w/ substernal chest pain w/ exertion associated w/ SOB, lightheadedness, dizziness. Chest pressure improves w/ rest. Strong family history of heart disease; mother had CABG in her 50s. No known cardiac history. Heart score 4. HDS in the ED w/ trop 0.034. EKG w/o ischemic changes. Loaded w/ ASA and admitted to  for further management.     No active symptoms on my exam thus low suspicion for active ACS. High suspicion for stable angina; patient symptoms are consistent w/ cardiac chest pain (substernal pressure, worse w/ exertion and better with rest). Given history of severe obesity, HTN, HLD and family history, patient would be a good candidate for a PET stress. Discussed w/ cardiology.     - PET stress scheduled for 11/22. If positive for ischemia, will plan to consult cardiology  - TTE ordered  - Troponin flat: 0.034 --> 0.040 --> 0.040  - EKG PRN for chest pain  - SL NG for chest pain  - Cardiac monitoring

## 2023-11-22 NOTE — NURSING
Nurses Note -- 4 Eyes      11/21/2023   11:48 PM      Skin assessed during: Admit      [x] No Altered Skin Integrity Present    []Prevention Measures Documented      [] Yes- Altered Skin Integrity Present or Discovered   [] LDA Added if Not in Epic (Describe Wound)   [] New Altered Skin Integrity was Present on Admit and Documented in LDA   [] Wound Image Taken    Wound Care Consulted? No    Attending Nurse:  Elaine Caicedo RN/Staff Member: Saumya

## 2023-11-22 NOTE — PROGRESS NOTES
[facility] Observation [address] [place] [address] Medicine  Progress Note    Patient Name: Dejah Schmidt  MRN: [id number]  Patient Class: OP- Observation   Admission Date: 11/21/2023  Length of Stay: 0 days  Attending Physician: Dante Webber MD  Primary Care Provider: Dasha Bergeron MD        Subjective:     Principal Problem:Chest pain    HPI:  Ms. Schmidt is a 63 year old female w/ HTN, HLD and hypothyroid disease who presents to the ED with IVAN associated w/ substernal chest pressure x 5 days. Patient states chest pressure occurs during activity and improved with rest. Symptoms are also associated w/ lightheadness and dizziness. Chest pressure has resolved at the time of my exam. Patient denies HA, vision changes, cough, fever, chills, LE edema, orthopnea, PND, congestion, sick contacts, abdominal pain, urinary sxs or stool changes. Denies pleuritic chest pain, bed rest, recent travel, known hypercoagulable disorder. Reports strong history of heart disease on her maternal side; mother had a CABG in her 50s. Patient denies prior history of CAD or MI. In the ED, patient was initially hypertensive but otherwise stable. Labs notable for Cr 1.2 (baseline ~1.0), BNP <10, trop 0.034. D-Dimer 0.51. EKG w/o ischemic changes. Patient loaded with aspirin and admitted to  for further evaluation.     Overview/Hospital Course:  Patient admitted for concerns of stable angina given cardiac chest pain. Troponin flat: 0.034 --> 0.040 --> 0.040. Patient denies additional episodes of CP. TTE and PET stress scheduled for today.     Interval History: No acute events overnight. Patient remains HDS. Labs reviewed; wnl. Troponin flat.     Review of Systems   Constitutional:  Negative for activity change, chills and fatigue.   HENT:  Negative for congestion.    Respiratory:  Negative for cough and shortness of breath.    Cardiovascular:  Negative for chest pain, palpitations and leg swelling.   Gastrointestinal:  Negative for abdominal pain,  constipation, diarrhea, nausea and vomiting.   Genitourinary:  Negative for difficulty urinating.   Musculoskeletal:  Negative for arthralgias and gait problem.   Neurological:  Negative for dizziness and light-headedness.   Psychiatric/Behavioral:  Negative for agitation and behavioral problems.      Objective:     Vital Signs (Most Recent):  Temp: 97.6 °F (36.4 °C) (11/22/23 0838)  Pulse: 67 (11/22/23 0838)  Resp: 18 (11/22/23 0838)  BP: (!) 112/51 (11/22/23 0838)  SpO2: 99 % (11/22/23 0838) Vital Signs (24h Range):  Temp:  [97.6 °F (36.4 °C)-98.1 °F (36.7 °C)] 97.6 °F (36.4 °C)  Pulse:  [61-82] 67  Resp:  [16-19] 18  SpO2:  [96 %-99 %] 99 %  BP: (109-196)/(51-72) 112/51     Weight: 134.6 kg (296 lb 11.8 oz)  Body mass index is 46.48 kg/m².  No intake or output data in the 24 hours ending 11/22/23 0845      Physical Exam  Constitutional:       General: She is not in acute distress.     Appearance: Normal appearance. She is obese. She is not ill-appearing.   HENT:      Head: Normocephalic and atraumatic.      Mouth/Throat:      Mouth: Mucous membranes are moist.      Pharynx: Oropharynx is clear.   Eyes:      Extraocular Movements: Extraocular movements intact.      Conjunctiva/sclera: Conjunctivae normal.   Cardiovascular:      Rate and Rhythm: Normal rate and regular rhythm.   Pulmonary:      Effort: Pulmonary effort is normal. No respiratory distress.      Breath sounds: Normal breath sounds. No wheezing or rhonchi.   Abdominal:      General: Abdomen is flat. There is no distension.      Palpations: Abdomen is soft.   Musculoskeletal:         General: Normal range of motion.      Cervical back: Normal range of motion.      Right lower leg: No edema.      Left lower leg: No edema.   Skin:     Capillary Refill: Capillary refill takes less than 2 seconds.   Neurological:      General: No focal deficit present.      Mental Status: She is alert and oriented to person, place, and time.             Significant Labs:  All pertinent labs within the past 24 hours have been reviewed.    Significant Imaging: I have reviewed all pertinent imaging results/findings within the past 24 hours.    Assessment/Plan:      * Chest pain  63 year old female w/ severe obesity, CKD3, HTN, HLD and hypothyroid disease presenting w/ substernal chest pain w/ exertion associated w/ SOB, lightheadedness, dizziness. Chest pressure improves w/ rest. Strong family history of heart disease; mother had CABG in her 50s. No known cardiac history. Heart score 4. HDS in the ED w/ trop 0.034. EKG w/o ischemic changes. Loaded w/ ASA and admitted to  for further management.     No active symptoms on my exam thus low suspicion for active ACS. High suspicion for stable angina; patient symptoms are consistent w/ cardiac chest pain (substernal pressure, worse w/ exertion and better with rest). Given history of severe obesity, HTN, HLD and family history, patient would be a good candidate for a PET stress. Discussed w/ cardiology.     - PET stress scheduled for 11/22. If positive for ischemia, will plan to consult cardiology  - TTE ordered  - Troponin flat: 0.034 --> 0.040 --> 0.040  - EKG PRN for chest pain  - SL NG for chest pain  - Cardiac monitoring      Elevated troponin  - see chest pain       Postoperative hypothyroidism  - resume synthroid       Chronic kidney disease, stage 3a  Creatine stable for now. BMP reviewed- noted Estimated Creatinine Clearance: 83.6 mL/min (based on SCr of 1 mg/dL). according to latest data. Based on current GFR, CKD stage is stage 3 - GFR 30-59.  Monitor UOP and serial BMP and adjust therapy as needed. Renally dose meds. Avoid nephrotoxic medications and procedures.    Lumbar radiculopathy  - resume home gabapentin       Morbid obesity with BMI of 45.0-49.9, adult  Body mass index is 46.48 kg/m². Morbid obesity complicates all aspects of disease management from diagnostic modalities to treatment. Weight loss encouraged and health  benefits explained to patient.         Unspecified essential hypertension  - resume home antihypertensives as clinically indicated       VTE Risk Mitigation (From admission, onward)           Ordered     enoxaparin injection 40 mg  Every 12 hours         11/22/23 0823     IP VTE HIGH RISK PATIENT  Once         11/21/23 1552     Place sequential compression device  Until discontinued         11/21/23 1552                    Discharge Planning   ANGEL: 11/22/2023     Code Status: Full Code   Is the patient medically ready for discharge?: No    Reason for patient still in hospital (select all that apply): Imaging  Discharge Plan A: Home with family            Dia Goldsmith MD  Department of Hospital Medicine   [facility] - Observation 11H

## 2023-11-22 NOTE — ED NOTES
Received report from PRAMOD Schuster. Pt care assumed at this time.    The patient is resting quietly in ED stretcher, and is AAOx4 at this time. Respirations are even and unlabored, with no distress noted. The patient remains on continuous cardiac monitor, automated BP cuff cycling Q30 minutes, and continuous pulse oximeter. The patient is aware of POC and all questions and concerns addressed at this time. The patient was offered restroom assistance and denies need to void. The patient denies further needs and has no complaints at this time. SR raised x2, bed locked and in low position with brake engaged. Call bell within reach and the patient verbalized she would call for assistance if needed. Personal belongings are at bedside within reach.

## 2023-11-22 NOTE — NURSING
Pt off the unit to cardiology via wheelchair with pt. Escort.  Meds held until her return back to the unit.

## 2023-11-22 NOTE — DISCHARGE SUMMARY
[facility] Observation [address] [place] [address] Medicine  Discharge Summary      Patient Name: Dejah Schmidt  MRN: [id number]  RIGO: [id number]  Patient Class: OP- Observation  Admission Date: 11/21/2023  Hospital Length of Stay: 0 days  Discharge Date and Time:  11/22/2023 2:27 PM  Attending Physician: Dante Webber MD   Discharging Provider: Dia Goldsmith MD  Primary Care Provider: Dasha Bergeron MD  [facility] Medicine Team: [facility] HOSP MED V Dia Goldsmith MD  Primary Care Team: [facility] HOSP MED V    HPI:   Ms. Schmidt is a 63 year old female w/ HTN, HLD and hypothyroid disease who presents to the ED with IVAN associated w/ substernal chest pressure x 5 days. Patient states chest pressure occurs during activity and improved with rest. Symptoms are also associated w/ lightheadness and dizziness. Chest pressure has resolved at the time of my exam. Patient denies HA, vision changes, cough, fever, chills, LE edema, orthopnea, PND, congestion, sick contacts, abdominal pain, urinary sxs or stool changes. Denies pleuritic chest pain, bed rest, recent travel, known hypercoagulable disorder. Reports strong history of heart disease on her maternal side; mother had a CABG in her 50s. Patient denies prior history of CAD or MI. In the ED, patient was initially hypertensive but otherwise stable. Labs notable for Cr 1.2 (baseline ~1.0), BNP <10, trop 0.034. D-Dimer 0.51. EKG w/o ischemic changes. Patient loaded with aspirin and admitted to  for further evaluation.     * No surgery found *      Hospital Course:   Patient admitted for concerns of stable angina given cardiac chest pain. Troponin flat: 0.034 --> 0.040 --> 0.040. Patient denies additional episodes of CP. TTE w/ EF 55-60% and no WMA. PET stress negative for ischemia. Counselled patient on importance of lifestyle changes. Has upcoming appointment w/ bariatric medicine. Will dc home w/ close PCP follow up and strict return precautions.      Goals of Care Treatment  Preferences:  Code Status: Full Code      Consults:     No new Assessment & Plan notes have been filed under this hospital service since the last note was generated.  Service: Hospital Medicine    Final Active Diagnoses:    Diagnosis Date Noted POA    PRINCIPAL PROBLEM:  Chest pain [R07.9] 11/21/2023 Unknown    Elevated troponin [R79.89] 11/21/2023 Unknown    Postoperative hypothyroidism [E89.0] 08/04/2022 Yes    Chronic kidney disease, stage 3a [N18.31] 12/06/2021 Yes    Lumbar radiculopathy [M54.16] 06/27/2014 Yes    Morbid obesity with BMI of 45.0-49.9, adult [E66.01, Z68.42] 01/30/2014 Not Applicable     Chronic    Unspecified essential hypertension [I10] 10/14/2013 Yes      Problems Resolved During this Admission:       Discharged Condition: stable    Disposition: Home or Self Care    Follow Up:   Follow-up Information       Dasha Bergeron MD Follow up in 1 week(s).    Specialty: Internal Medicine  Why: See your PCP within 5-10 days of discharge  Contact information:  [address] [place] [id number]  [phone]                           Patient Instructions:   No discharge procedures on file.    Significant Diagnostic Studies: Labs: All labs within the past 24 hours have been reviewed    Pending Diagnostic Studies:       None           Medications:  Reconciled Home Medications:      Medication List        START taking these medications      nitroGLYCERIN 0.4 MG SL tablet  Commonly known as: NITROSTAT  Place 1 tablet (0.4 mg total) under the tongue every 5 (five) minutes as needed for Chest pain.            CONTINUE taking these medications      albuterol 90 mcg/actuation inhaler  Commonly known as: PROVENTIL/VENTOLIN HFA  Inhale 1-2 puffs into the lungs every 6 (six) hours as needed for Wheezing or Shortness of Breath. Rescue     amLODIPine 10 MG tablet  Commonly known as: NORVASC  TAKE 1 TABLET(10 MG) BY MOUTH EVERY DAY     atorvastatin 40 MG tablet  Commonly known as: LIPITOR  Take 1 tablet (40  mg total) by mouth once daily. Due for annual with PCP, Labs     butalbital-acetaminophen-caffeine -40 mg -40 mg per tablet  Commonly known as: FIORICET, ESGIC  Take by mouth.     cyclobenzaprine 5 MG tablet  Commonly known as: FLEXERIL  Take 1 tablet (5 mg total) by mouth 3 (three) times daily as needed for Muscle spasms.     diclofenac sodium 1 % Gel  Commonly known as: VOLTAREN  Apply 2 g topically 4 (four) times daily as needed. To painful area on the feet.     gabapentin 600 MG tablet  Commonly known as: NEURONTIN  TAKE 1 TABLET(600 MG) BY MOUTH THREE TIMES DAILY     levothyroxine 100 MCG tablet  Commonly known as: SYNTHROID  Take 1 tablet (100 mcg total) by mouth before breakfast.     lisinopriL 40 MG tablet  Commonly known as: PRINIVILZESTRIL  TAKE 1 TABLET(40 MG) BY MOUTH EVERY DAY     triamterene-hydrochlorothiazide 37.5-25 mg 37.5-25 mg per capsule  Commonly known as: DYAZIDE  TAKE 1 CAPSULE BY MOUTH EVERY MORNING              Indwelling Lines/Drains at time of discharge:   Lines/Drains/Airways       None                   Time spent on the discharge of patient: 35 minutes         Dia Goldsmith MD  Department of Hospital Medicine  [facility] - Observation 11H

## 2023-11-30 ENCOUNTER — OFFICE VISIT (OUTPATIENT)
Dept: INTERNAL MEDICINE | Facility: CLINIC | Age: 63
End: 2023-11-30
Payer: MEDICARE

## 2023-11-30 ENCOUNTER — LAB VISIT (OUTPATIENT)
Dept: LAB | Facility: HOSPITAL | Age: 63
End: 2023-11-30
Attending: INTERNAL MEDICINE
Payer: MEDICARE

## 2023-11-30 DIAGNOSIS — E03.9 HYPOTHYROIDISM, UNSPECIFIED TYPE: ICD-10-CM

## 2023-11-30 DIAGNOSIS — R07.9 CHEST PAIN, UNSPECIFIED TYPE: Primary | ICD-10-CM

## 2023-11-30 DIAGNOSIS — Z00.00 PREVENTATIVE HEALTH CARE: ICD-10-CM

## 2023-11-30 LAB — TSH SERPL DL<=0.005 MIU/L-ACNC: 2.21 UIU/ML (ref 0.4–4)

## 2023-11-30 PROCEDURE — 3075F SYST BP GE 130 - 139MM HG: CPT | Mod: CPTII,S$GLB,, | Performed by: INTERNAL MEDICINE

## 2023-11-30 PROCEDURE — 84443 ASSAY THYROID STIM HORMONE: CPT | Performed by: INTERNAL MEDICINE

## 2023-11-30 PROCEDURE — 3078F PR MOST RECENT DIASTOLIC BLOOD PRESSURE < 80 MM HG: ICD-10-PCS | Mod: CPTII,S$GLB,, | Performed by: INTERNAL MEDICINE

## 2023-11-30 PROCEDURE — 99999 PR PBB SHADOW E&M-EST. PATIENT-LVL IV: ICD-10-PCS | Mod: PBBFAC,,, | Performed by: INTERNAL MEDICINE

## 2023-11-30 PROCEDURE — 3008F BODY MASS INDEX DOCD: CPT | Mod: CPTII,S$GLB,, | Performed by: INTERNAL MEDICINE

## 2023-11-30 PROCEDURE — 99999 PR PBB SHADOW E&M-EST. PATIENT-LVL IV: CPT | Mod: PBBFAC,,, | Performed by: INTERNAL MEDICINE

## 2023-11-30 PROCEDURE — 3044F HG A1C LEVEL LT 7.0%: CPT | Mod: CPTII,S$GLB,, | Performed by: INTERNAL MEDICINE

## 2023-11-30 PROCEDURE — 3044F PR MOST RECENT HEMOGLOBIN A1C LEVEL <7.0%: ICD-10-PCS | Mod: CPTII,S$GLB,, | Performed by: INTERNAL MEDICINE

## 2023-11-30 PROCEDURE — 1159F PR MEDICATION LIST DOCUMENTED IN MEDICAL RECORD: ICD-10-PCS | Mod: CPTII,S$GLB,, | Performed by: INTERNAL MEDICINE

## 2023-11-30 PROCEDURE — 3078F DIAST BP <80 MM HG: CPT | Mod: CPTII,S$GLB,, | Performed by: INTERNAL MEDICINE

## 2023-11-30 PROCEDURE — 4010F PR ACE/ARB THEARPY RXD/TAKEN: ICD-10-PCS | Mod: CPTII,S$GLB,, | Performed by: INTERNAL MEDICINE

## 2023-11-30 PROCEDURE — 99396 PR PREVENTIVE VISIT,EST,40-64: ICD-10-PCS | Mod: GZ,S$GLB,, | Performed by: INTERNAL MEDICINE

## 2023-11-30 PROCEDURE — 36415 COLL VENOUS BLD VENIPUNCTURE: CPT | Performed by: INTERNAL MEDICINE

## 2023-11-30 PROCEDURE — 99396 PREV VISIT EST AGE 40-64: CPT | Mod: GZ,S$GLB,, | Performed by: INTERNAL MEDICINE

## 2023-11-30 PROCEDURE — 1159F MED LIST DOCD IN RCRD: CPT | Mod: CPTII,S$GLB,, | Performed by: INTERNAL MEDICINE

## 2023-11-30 PROCEDURE — 3075F PR MOST RECENT SYSTOLIC BLOOD PRESS GE 130-139MM HG: ICD-10-PCS | Mod: CPTII,S$GLB,, | Performed by: INTERNAL MEDICINE

## 2023-11-30 PROCEDURE — 3008F PR BODY MASS INDEX (BMI) DOCUMENTED: ICD-10-PCS | Mod: CPTII,S$GLB,, | Performed by: INTERNAL MEDICINE

## 2023-11-30 PROCEDURE — 4010F ACE/ARB THERAPY RXD/TAKEN: CPT | Mod: CPTII,S$GLB,, | Performed by: INTERNAL MEDICINE

## 2023-11-30 RX ORDER — ALBUTEROL SULFATE 90 UG/1
2 AEROSOL, METERED RESPIRATORY (INHALATION) EVERY 6 HOURS PRN
Qty: 8 G | Refills: 4 | Status: SHIPPED | OUTPATIENT
Start: 2023-11-30 | End: 2024-11-29

## 2023-11-30 RX ORDER — AMOXICILLIN 875 MG/1
875 TABLET, FILM COATED ORAL 2 TIMES DAILY
Qty: 14 TABLET | Refills: 0 | Status: SHIPPED | OUTPATIENT
Start: 2023-11-30 | End: 2023-12-07

## 2023-12-03 VITALS
HEART RATE: 89 BPM | WEIGHT: 293 LBS | SYSTOLIC BLOOD PRESSURE: 136 MMHG | OXYGEN SATURATION: 99 % | TEMPERATURE: 99 F | DIASTOLIC BLOOD PRESSURE: 78 MMHG | HEIGHT: 67 IN | BODY MASS INDEX: 45.99 KG/M2

## 2023-12-03 NOTE — PROGRESS NOTES
Subjective:       Patient ID: Dejah Schmidt is a 63 y.o. female.    Chief Complaint: Annual Exam    HPI  She is here for annual exam    Past medical history: Hypertension, hyperlipidemia, asthma, lumbar spinal stenosis, status post thyroidectomy, status post hysterectomy. She had a colonoscopy June 2020     Medications: Lisinopril 40 mg daily, Dyazide 1 by mouth daily, Norvasc 10 mg daily , Synthroid 0.1 mg daily, Lipitor 40 mg daily     NO KNOWN DRUG ALLERGIES        Review of Systems   Constitutional:  Negative for chills, fatigue, fever and unexpected weight change.   Respiratory:  Negative for chest tightness and shortness of breath.    Cardiovascular:  Negative for chest pain and palpitations.   Gastrointestinal:  Negative for abdominal pain and blood in stool.   Neurological:  Negative for dizziness, syncope, numbness and headaches.       Objective:      Physical Exam  HENT:      Right Ear: External ear normal.      Left Ear: External ear normal.      Nose: Nose normal.      Mouth/Throat:      Mouth: Mucous membranes are moist.      Pharynx: Oropharynx is clear.   Eyes:      Pupils: Pupils are equal, round, and reactive to light.   Cardiovascular:      Rate and Rhythm: Normal rate and regular rhythm.      Heart sounds: No murmur heard.  Pulmonary:      Breath sounds: Normal breath sounds.   Abdominal:      General: There is no distension.      Palpations: There is no hepatomegaly or splenomegaly.      Tenderness: There is no abdominal tenderness.   Musculoskeletal:      Cervical back: Normal range of motion.   Lymphadenopathy:      Cervical: No cervical adenopathy.      Upper Body:      Right upper body: No axillary adenopathy.      Left upper body: No axillary adenopathy.   Neurological:      Cranial Nerves: No cranial nerve deficit.      Sensory: No sensory deficit.      Motor: Motor function is intact.      Deep Tendon Reflexes: Reflexes are normal and symmetric.         Assessment/Plan       Annual exam.   Check TSH.  Discussed Pap smear, pelvic exam.  She declined all

## 2023-12-13 ENCOUNTER — OFFICE VISIT (OUTPATIENT)
Dept: BARIATRICS | Facility: CLINIC | Age: 63
End: 2023-12-13
Payer: MEDICARE

## 2023-12-13 VITALS
WEIGHT: 293 LBS | HEIGHT: 67 IN | BODY MASS INDEX: 45.99 KG/M2 | HEART RATE: 76 BPM | DIASTOLIC BLOOD PRESSURE: 65 MMHG | OXYGEN SATURATION: 98 % | SYSTOLIC BLOOD PRESSURE: 153 MMHG

## 2023-12-13 DIAGNOSIS — Z71.3 ENCOUNTER FOR WEIGHT LOSS COUNSELING: ICD-10-CM

## 2023-12-13 DIAGNOSIS — I10 ESSENTIAL HYPERTENSION: ICD-10-CM

## 2023-12-13 DIAGNOSIS — E66.01 CLASS 3 SEVERE OBESITY DUE TO EXCESS CALORIES WITH SERIOUS COMORBIDITY AND BODY MASS INDEX (BMI) OF 45.0 TO 49.9 IN ADULT: Primary | ICD-10-CM

## 2023-12-13 PROCEDURE — 3008F PR BODY MASS INDEX (BMI) DOCUMENTED: ICD-10-PCS | Mod: CPTII,S$GLB,, | Performed by: STUDENT IN AN ORGANIZED HEALTH CARE EDUCATION/TRAINING PROGRAM

## 2023-12-13 PROCEDURE — 3077F SYST BP >= 140 MM HG: CPT | Mod: CPTII,S$GLB,, | Performed by: STUDENT IN AN ORGANIZED HEALTH CARE EDUCATION/TRAINING PROGRAM

## 2023-12-13 PROCEDURE — 99204 PR OFFICE/OUTPT VISIT, NEW, LEVL IV, 45-59 MIN: ICD-10-PCS | Mod: S$GLB,,, | Performed by: STUDENT IN AN ORGANIZED HEALTH CARE EDUCATION/TRAINING PROGRAM

## 2023-12-13 PROCEDURE — 1160F PR REVIEW ALL MEDS BY PRESCRIBER/CLIN PHARMACIST DOCUMENTED: ICD-10-PCS | Mod: CPTII,S$GLB,, | Performed by: STUDENT IN AN ORGANIZED HEALTH CARE EDUCATION/TRAINING PROGRAM

## 2023-12-13 PROCEDURE — 3078F PR MOST RECENT DIASTOLIC BLOOD PRESSURE < 80 MM HG: ICD-10-PCS | Mod: CPTII,S$GLB,, | Performed by: STUDENT IN AN ORGANIZED HEALTH CARE EDUCATION/TRAINING PROGRAM

## 2023-12-13 PROCEDURE — 1159F PR MEDICATION LIST DOCUMENTED IN MEDICAL RECORD: ICD-10-PCS | Mod: CPTII,S$GLB,, | Performed by: STUDENT IN AN ORGANIZED HEALTH CARE EDUCATION/TRAINING PROGRAM

## 2023-12-13 PROCEDURE — 1159F MED LIST DOCD IN RCRD: CPT | Mod: CPTII,S$GLB,, | Performed by: STUDENT IN AN ORGANIZED HEALTH CARE EDUCATION/TRAINING PROGRAM

## 2023-12-13 PROCEDURE — 3078F DIAST BP <80 MM HG: CPT | Mod: CPTII,S$GLB,, | Performed by: STUDENT IN AN ORGANIZED HEALTH CARE EDUCATION/TRAINING PROGRAM

## 2023-12-13 PROCEDURE — 4010F PR ACE/ARB THEARPY RXD/TAKEN: ICD-10-PCS | Mod: CPTII,S$GLB,, | Performed by: STUDENT IN AN ORGANIZED HEALTH CARE EDUCATION/TRAINING PROGRAM

## 2023-12-13 PROCEDURE — 3008F BODY MASS INDEX DOCD: CPT | Mod: CPTII,S$GLB,, | Performed by: STUDENT IN AN ORGANIZED HEALTH CARE EDUCATION/TRAINING PROGRAM

## 2023-12-13 PROCEDURE — 99204 OFFICE O/P NEW MOD 45 MIN: CPT | Mod: S$GLB,,, | Performed by: STUDENT IN AN ORGANIZED HEALTH CARE EDUCATION/TRAINING PROGRAM

## 2023-12-13 PROCEDURE — 99999 PR PBB SHADOW E&M-EST. PATIENT-LVL IV: ICD-10-PCS | Mod: PBBFAC,,, | Performed by: STUDENT IN AN ORGANIZED HEALTH CARE EDUCATION/TRAINING PROGRAM

## 2023-12-13 PROCEDURE — 4010F ACE/ARB THERAPY RXD/TAKEN: CPT | Mod: CPTII,S$GLB,, | Performed by: STUDENT IN AN ORGANIZED HEALTH CARE EDUCATION/TRAINING PROGRAM

## 2023-12-13 PROCEDURE — 99999 PR PBB SHADOW E&M-EST. PATIENT-LVL IV: CPT | Mod: PBBFAC,,, | Performed by: STUDENT IN AN ORGANIZED HEALTH CARE EDUCATION/TRAINING PROGRAM

## 2023-12-13 PROCEDURE — 3044F HG A1C LEVEL LT 7.0%: CPT | Mod: CPTII,S$GLB,, | Performed by: STUDENT IN AN ORGANIZED HEALTH CARE EDUCATION/TRAINING PROGRAM

## 2023-12-13 PROCEDURE — 3044F PR MOST RECENT HEMOGLOBIN A1C LEVEL <7.0%: ICD-10-PCS | Mod: CPTII,S$GLB,, | Performed by: STUDENT IN AN ORGANIZED HEALTH CARE EDUCATION/TRAINING PROGRAM

## 2023-12-13 PROCEDURE — 1160F RVW MEDS BY RX/DR IN RCRD: CPT | Mod: CPTII,S$GLB,, | Performed by: STUDENT IN AN ORGANIZED HEALTH CARE EDUCATION/TRAINING PROGRAM

## 2023-12-13 PROCEDURE — 3077F PR MOST RECENT SYSTOLIC BLOOD PRESSURE >= 140 MM HG: ICD-10-PCS | Mod: CPTII,S$GLB,, | Performed by: STUDENT IN AN ORGANIZED HEALTH CARE EDUCATION/TRAINING PROGRAM

## 2023-12-13 RX ORDER — SEMAGLUTIDE 0.68 MG/ML
0.5 INJECTION, SOLUTION SUBCUTANEOUS
Qty: 3 ML | Refills: 2 | Status: SHIPPED | OUTPATIENT
Start: 2023-12-13

## 2023-12-13 NOTE — PATIENT INSTRUCTIONS
Start Ozempic once a week. Start with 0.25mg once a week x 4 weeks, then 0.5 mg weekly.     Decrease portions as soon as you start Ozempic. Avoid fried, greasy, fatty foods.     Some nausea in the first 2 weeks is not unusual.     If you get pain across the upper abdomen and around to your back, please call the office.             Copyright © 2011, [facility]. For more information about The Healthy Eating Plate, please see The Nutrition Source, Department of Nutrition, [employer], www.thenutritionsource.org, and [employer], www.health.[place].edu.    Meal Planning & Grocery Shopping    Meal planning builds the foundation for healthy eating. When you have structured ideas for healthy meals and foods available at home to prepare those meals, weight control becomes easier.  If only healthy foods are available at home, then you will be much more likely to eat healthy foods. And you will be less likely to go to a restaurant or  a fast food meal, which tend to be unhealthy and higher in calories than meals prepared at home.      Take 5-10 minutes each week to plan meals for the next 7 days.  Make a grocery list based on the meal plan.    Grocery Shopping Tips:  Shop on a full stomach.  Schedule your shopping for times when you are most motivated and able to be disciplined about your purchases. For example, after a stressful day at work it may be difficult to make the healthiest choices. Shopping at other times, such as early in the morning or after dinner, may be easier.  Focus your shopping on the outside aisles of the store, which tend to contain more fresh foods and lower calorie foods. The inside aisles tend to have more processed foods.  Stick to your list. Avoid buying unhealthy items just because they are on sale.   Compare nutrition labels to check the number of calories and percentage of fat.      What to buy:    Vegetables  Fresh vegetables  Frozen  vegetables with no sauce or added salt  Canned vegetables with no sauce or added salt    Protein  Lean meats, such as chicken and turkey  Limit red meats, such as beef to no more than 1x/week  Limit processed meats, such as cold cuts, bolton, sausage, and hot dogs. Look for brands that have no nitrites and are minimally processed. Consider turkey sausage or turkey bolton.  Fish and Shellfish  Eggs  Dried beans  Canned beans (reduced sodium)    Fat  Use healthy oils, such as olive oil or canola oil, for cooking, salad dressings, etc.  Unflavored nuts and seeds  Nut butters (no added sugar)    Dairy  Yogurt (no sugar added)  Cheese  Low-fat milk  Unsweetened nondairy milks (almond milk, soy milk, etc)    Fruit  Fresh Fruit  Frozen fruit with no added sugar  Canned fruit with no added sugar  Dried fruit with no added sugar  100% fruit juice    Whole Grains  Single ingredient grains, such as oats, quinoa, brown rice  Whole-wheat pasta  Sprouted whole-grain bread    What to avoid:  - Avoid fried foods  - Avoid foods with added sugar  - Avoid sugar-sweetened beverages  - Avoid ultra-processed foods      SEATED RESISTANCE BAND EXERCISES    If you do not have a resistance band, or do not feel comfortable using a resistance band, these exercises can also be done holding a light hand weight or water bottle.  If you are just starting to exercise, you may want to go through the motions without any weights or resistance till you become comfortable with the movements.    Do each of the movements shown 10 times (10 repetitions). You can repeat the exercises a second or  third time as well for greater benefit. The amount of tension on the resistance bands should be adjusted so  you can complete one set of 10 repetitions with effort. Increase the tension every few weeks. Do these  exercises 2 or 3 times a week.    * If an exercise hurts your back or joints, stop doing that particular exercise, but keep doing all the others  *      CHEST EXERCISE        Start Position:   Sit tall, with feet shoulder width apart and feet in front of knees.   Belly pulled in.   Place the band around your upper back, grasp band in each hand, knuckles (rings) facing front. Arms  should be bent so that knuckles are in front of elbows   Slide shoulder blades down your back and slightly together (as if making a V).   Relax your neck.    To Perform This Exercise:   Press hands forward to lengthen arms using chest muscles (not arms!).   Dont arch your back!)   Return to start position and repeat 10 times.   Breathe!        BACK EXERCISE        Start Position:   Sit tall, with feet shoulder width apart and feet in front of knees.   Belly pulled in.   Grasp band in each hand and raise overhead. Arms should be slightly wider than shoulder width and no  slack in the band.   Slide shoulder blades down your back and slightly together (as if making a V).   Relax your neck.    To Perform This Exercise:   Open arms pulling down towards chest using upper back muscles (not arms!).   Squeeze through shoulder blades at the bottom of the movement (dont arch your back!).   Return to start position and repeat 10 times.   Breathe!        SHOULDER EXERCISE        Start Position:   Sit tall, with feet shoulder width apart and feet in front of knees.   Belly pulled in.   Sit on band so that you can grasp band in one hand with tension on the band, but not so much tension that  you cannot straighten the arm. It may take a few tries to find the right amount of tension.   Slide shoulder blades down your back and slightly together (as if making a V).   Relax your neck.    To Perform This Exercise:   Press fist to the ceiling, slightly in front of the body.   SLOWLY return to start position and repeat 10 times.   Switch sides and repeat on the other side.   Breathe!        TRICEPS EXERCISE        Start Position:   Sit tall, with feet shoulder width apart and feet in front of  knees.   Belly pulled in.   Sit on one end of the band. Grasp other end of band in one hand.   Point elbow directly toward the ceiling (if this is difficult, you may support the upper arm with the opposite  hand)   Be sure there is no slack in the band in the starting position.   Slide shoulder blades down your back and slightly together (as if making a V).   Relax your neck.    To Perform This Exercise:   Extend your arm up to the ceiling, as shown.   Squeeze through shoulder blades at the bottom of the movement (dont arch your back!).   SLOWLY return to start position and repeat 10 times.   Repeat on the other side.   Breathe!        BICEP EXERCISE        Start Position:   Sit tall, with feet shoulder width apart and feet in front of knees.   Belly pulled in.   Place center of exercise band under one foot and step the end of the band under the other foot.   Grasp each end of the band with one hand. Make sure there is no slack between your foot and the hand  that holds the band.   Hold your elbows to your sides.   Pull abdominals in, lift chest, press shoulders down and back.    To Perform This Exercise:   As you curl up, keep your wrist from changing position in relation to your forearm and your arm stable  from the shoulder to the elbow.   Bend and straighten your elbow in a slow and controlled movement. Repeat this motion 10 times.   Repeat on the other side.   Breathe!

## 2023-12-13 NOTE — PROGRESS NOTES
Subjective     Patient ID: Dejah Schmidt is a 63 y.o. female.    Chief Complaint: Obesity and Consult    Patient presents for treatment of obesity.   Seen by Dr. Rader in 2020, prescribed topiramate, says it caused kidney failure, no follow-up due to Covid  Dealing with back and knee pain  Referred by endocrinologist    Co-morbidities   HTN  HLD  CALIXTO  CKD  S/p thyroidectomy  Negative for thyroid cancer    Current Physical Activity  Limited by back and knee pain    Current Eating Habits  Breakfast -   Lunch - fast food, sandwich, fried chicken  Dinner - fast food, sandwich, fried chicken, pork chops, smothered chicken, meat sauce and spaghetti  Snacks  Beverages - soft drinks (up to 6 cans per day)    Medical Weight Loss  12/13/2023: 303.5 lbs, BMI 47.5, BFP 50.7%, .8 lbs, SMM 85.1 lbs, BMR 1837 kcal        Review of Systems   Constitutional:  Negative for chills and fever.   Respiratory:  Negative for shortness of breath.    Cardiovascular:  Negative for chest pain and palpitations.   Gastrointestinal:  Negative for abdominal pain, nausea and vomiting.   Musculoskeletal:  Positive for arthralgias and back pain.   Neurological:  Negative for dizziness and light-headedness.          Objective    Latest Reference Range & Units 11/22/23 04:40   WBC 3.90 - 12.70 K/uL 4.75   RBC 4.00 - 5.40 M/uL 4.48   Hemoglobin 12.0 - 16.0 g/dL 13.5   Hematocrit 37.0 - 48.5 % 42.2   MCV 82 - 98 fL 94   MCH 27.0 - 31.0 pg 30.1   MCHC 32.0 - 36.0 g/dL 32.0   RDW 11.5 - 14.5 % 13.2   Platelet Count 150 - 450 K/uL 207   MPV 9.2 - 12.9 fL 11.2   Platelet Estimate  Appears normal   Gran % 38.0 - 73.0 % 46.0   Lymph % 18.0 - 48.0 % 42.0   Mono % 4.0 - 15.0 % 10.0   Eosinophil % 0.0 - 8.0 % 1.0   Basophil % 0.0 - 1.9 % 0.0   Immature Granulocytes 0.0 - 0.5 % CANCELED   Lymph # 1.0 - 4.8 K/uL CANCELED   Mono # 0.3 - 1.0 K/uL CANCELED   Eos # 0.0 - 0.5 K/uL CANCELED   Baso # 0.00 - 0.20 K/uL CANCELED   Immature Grans (Abs) 0.00 - 0.04  K/uL CANCELED   Bands % 1.0   nRBC 0 /100 WBC 0   Differential Method  Manual   Sodium 136 - 145 mmol/L 138   Potassium 3.5 - 5.1 mmol/L 4.0   Chloride 95 - 110 mmol/L 105   CO2 23 - 29 mmol/L 23   Anion Gap 8 - 16 mmol/L 10   BUN 8 - 23 mg/dL 22   Creatinine 0.5 - 1.4 mg/dL 1.0   eGFR >60 mL/min/1.73 m^2 >60.0   Glucose 70 - 110 mg/dL 86   Calcium 8.7 - 10.5 mg/dL 8.7   Magnesium  1.6 - 2.6 mg/dL 1.9   Cholesterol Total 120 - 199 mg/dL 158   HDL 40 - 75 mg/dL 29 (L)   HDL/Cholesterol Ratio 20.0 - 50.0 % 18.4 (L)   Non-HDL Cholesterol mg/dL 129   Total Cholesterol/HDL Ratio 2.0 - 5.0  5.4 (H)   Triglycerides 30 - 150 mg/dL 77   LDL Cholesterol 63.0 - 159.0 mg/dL 113.6   Hemoglobin A1C External 4.0 - 5.6 % 5.4   Estimated Avg Glucose 68 - 131 mg/dL 108   (L): Data is abnormally low  (H): Data is abnormally high    Vitals:    12/13/23 1405   BP: (!) 153/65   Pulse: 76       Physical Exam  Vitals reviewed.   Constitutional:       General: She is not in acute distress.     Appearance: Normal appearance. She is obese. She is not ill-appearing, toxic-appearing or diaphoretic.   HENT:      Head: Normocephalic and atraumatic.   Cardiovascular:      Rate and Rhythm: Normal rate.   Pulmonary:      Effort: Pulmonary effort is normal. No respiratory distress.   Skin:     General: Skin is warm and dry.   Neurological:      Mental Status: She is alert and oriented to person, place, and time.            Assessment and Plan     1. Class 3 severe obesity due to excess calories with serious comorbidity and body mass index (BMI) of 45.0 to 49.9 in adult  -     semaglutide (OZEMPIC) 0.25 mg or 0.5 mg (2 mg/3 mL) pen injector; Inject 0.5 mg into the skin every 7 days. Start with 0.25 mg once a week for 4 weeks, then increase to 0.5 mg once a week  Dispense: 3 mL; Refill: 2    2. Unspecified essential hypertension  Overview:  Dx updated per 2019 IMO Load      3. Encounter for weight loss counseling        - Log all food and beverage  intake with a daily calorie goal of 1500 calories per day    - Activity as tolerated; given seated resistance band exercises

## 2023-12-30 NOTE — TELEPHONE ENCOUNTER
No care due was identified.  Health [facility] Embedded Care Due Messages. Reference number: [id number].   12/30/2023 3:35:21 AM CST

## 2023-12-30 NOTE — TELEPHONE ENCOUNTER
No care due was identified.  Health [facility] Embedded Care Due Messages. Reference number: [id number].   12/30/2023 3:34:53 AM CST

## 2023-12-31 RX ORDER — ATORVASTATIN CALCIUM 40 MG/1
40 TABLET, FILM COATED ORAL DAILY
Qty: 90 TABLET | Refills: 3 | Status: SHIPPED | OUTPATIENT
Start: 2023-12-31

## 2023-12-31 NOTE — TELEPHONE ENCOUNTER
Refill Routing Note   Medication(s) are not appropriate for processing by [facility] for the following reason(s):        Required vitals abnormal    [facility] action(s):  Defer               Appointments  past 12m or future 3m with PCP    Date Provider   Last Visit   11/30/2023 Dasha Bergeron MD   Next Visit   Visit date not found Dasha Bergeron MD   ED visits in past 90 days: 0        Note composed:3:39 PM 12/31/2023

## 2023-12-31 NOTE — TELEPHONE ENCOUNTER
Refill Decision Note   Dejah Schmidt  is requesting a refill authorization.  Brief Assessment and Rationale for Refill:  Approve     Medication Therapy Plan:         Comments:     Note composed:3:38 PM 12/31/2023

## 2024-01-02 RX ORDER — LISINOPRIL 40 MG/1
40 TABLET ORAL DAILY
Qty: 90 TABLET | Refills: 1 | Status: SHIPPED | OUTPATIENT
Start: 2024-01-02

## 2024-01-02 NOTE — TELEPHONE ENCOUNTER
No care due was identified.  Health [facility] Embedded Care Due Messages. Reference number: [id number].   1/02/2024 8:03:32 AM CST

## 2024-01-03 RX ORDER — TRIAMTERENE AND HYDROCHLOROTHIAZIDE 37.5; 25 MG/1; MG/1
CAPSULE ORAL
Qty: 90 CAPSULE | Refills: 0 | Status: SHIPPED | OUTPATIENT
Start: 2024-01-03

## 2024-01-19 RX ORDER — AMLODIPINE BESYLATE 10 MG/1
TABLET ORAL
Qty: 90 TABLET | Refills: 1 | Status: SHIPPED | OUTPATIENT
Start: 2024-01-19

## 2024-01-19 RX ORDER — GABAPENTIN 600 MG/1
TABLET ORAL
Qty: 90 TABLET | Refills: 1 | Status: SHIPPED | OUTPATIENT
Start: 2024-01-19 | End: 2024-06-17

## 2024-01-19 NOTE — TELEPHONE ENCOUNTER
No care due was identified.  Health [facility] Embedded Care Due Messages. Reference number: [id number].   1/19/2024 3:34:21 AM CST

## 2024-01-19 NOTE — TELEPHONE ENCOUNTER
Refill Routing Note   Medication(s) are not appropriate for processing by [facility] for the following reason(s):        Required vitals abnormal  Outside of protocol    [facility] action(s):  Defer  Route               Appointments  past 12m or future 3m with PCP    Date Provider   Last Visit   11/30/2023 Dasha Bergeron MD   Next Visit   Visit date not found Dasha Bergeron MD   ED visits in past 90 days: 0        Note composed:12:18 PM 01/19/2024

## 2024-01-23 ENCOUNTER — TELEPHONE (OUTPATIENT)
Dept: BARIATRICS | Facility: CLINIC | Age: 64
End: 2024-01-23
Payer: MEDICARE

## 2024-01-23 ENCOUNTER — PATIENT MESSAGE (OUTPATIENT)
Dept: BARIATRICS | Facility: CLINIC | Age: 64
End: 2024-01-23
Payer: MEDICARE

## 2024-01-23 NOTE — TELEPHONE ENCOUNTER
Patient stated that she could not receive Ozempic due to insurance. Informed patient that she would need a diagnosis of type 2 diabetes to receive medication. Message has been sent over to provider to further advise.

## 2024-01-23 NOTE — TELEPHONE ENCOUNTER
----- Message from Carmen Khan MA sent at 1/23/2024  8:21 AM CST -----  Regarding: refill  Contact: call back  [phone]  .Rx Refill/Request    Is this a Refill or New Rx:  refill     Rx Name and Strength:  semaglutide (OZEMPIC) 0.25 mg or 0.5 mg (2 mg/3 mL) pen injector    Preferred Pharmacy with phone number:  [id number] - [place]  [address] [facility] [address] [place]  [address]  [place] [address]  Phone: [phone] Fax: [phone]      Communication Preference: call back  [phone]    Additional Information pt stated that she have been trying to get her medication  since the 13th

## 2024-01-23 NOTE — TELEPHONE ENCOUNTER
Phoned pt in regard to scheduling FC phone call appt for Bariatric surgery. Pt stated she may cancel f/u with [facility] after speaking to FC but would like to keep appt for now. Pt understand after she speaks to the FC to call back to be scheduled for Bariatric surgery consult.

## 2024-01-23 NOTE — TELEPHONE ENCOUNTER
Pt on bariatric work queue list, FS phone appointment scheduled for 2/15/2024. Dashboard updated.

## 2024-01-24 ENCOUNTER — OFFICE VISIT (OUTPATIENT)
Dept: CARDIOLOGY | Facility: CLINIC | Age: 64
End: 2024-01-24
Payer: MEDICARE

## 2024-01-24 VITALS
DIASTOLIC BLOOD PRESSURE: 78 MMHG | BODY MASS INDEX: 45.99 KG/M2 | HEART RATE: 88 BPM | WEIGHT: 293 LBS | OXYGEN SATURATION: 96 % | SYSTOLIC BLOOD PRESSURE: 128 MMHG | HEIGHT: 67 IN

## 2024-01-24 DIAGNOSIS — R07.9 CHEST PAIN, UNSPECIFIED TYPE: Primary | ICD-10-CM

## 2024-01-24 DIAGNOSIS — R00.2 PALPITATIONS: ICD-10-CM

## 2024-01-24 PROCEDURE — 99999 PR PBB SHADOW E&M-EST. PATIENT-LVL IV: CPT | Mod: PBBFAC,GC,, | Performed by: INTERNAL MEDICINE

## 2024-01-24 PROCEDURE — 99204 OFFICE O/P NEW MOD 45 MIN: CPT | Mod: GC,S$GLB,, | Performed by: INTERNAL MEDICINE

## 2024-01-24 NOTE — PATIENT INSTRUCTIONS
Try taking nitroglycerin as needed for symptoms of chest tightness    Wear cardiac event monitor for 30 days     Follow up in 3 months

## 2024-01-24 NOTE — PROGRESS NOTES
PCP - Dasha Bergeron MD  Subjective:     Dejah Schmidt is a 63 y.o. female with PMH of chronic pain, HTN, CKD, obesity who presents to cardiology clinic to establish care. She was recently admitted to [facility] for chest pain with negative ACS workup. Troponin very mildly elevated and flat (0.03 - 0.04). EKG was non ischemic. Echo was unremarkable. PET stress obtained which was negative for reversible ischemia. No coronary calcifications were seen no the CT portion. Notably her presenting blood pressure was quite high at 196/72. Today she states she has been feeling well but had a single episode of chest discomfort/palpitations since discharge. She was given nitroglycerin PRN but did not take it during most recent episode. She describes her symptoms as rapid onset palpitations and chest tightness accompanied by diaphoresis. Occurs probably less than monthly. Symptoms last up to 15 minutes before subsiding. She was symptomatic during the time of her initial EKG in the ED in November. She checks her blood pressure frequently and is usually much better controlled.         ---------------  Cardiac Studies ---------------  TTE 11/2023:   Left Ventricle: The left ventricle is normal in size. Normal wall thickness. There is concentric remodeling. Normal wall motion. There is normal systolic function with a visually estimated ejection fraction of 55 - 60%. There is normal diastolic function.    Right Ventricle: Normal right ventricular cavity size. Wall thickness is normal. Right ventricle wall motion  is normal. Systolic function is normal.    Pulmonic Valve: There is mild regurgitation.    Pulmonary Artery: The estimated pulmonary artery systolic pressure is 22 mmHg.    IVC/SVC: Normal venous pressure at 3 mmHg.    PET Stress 11/2023:     The myocardial perfusion images are normal without evidence of ischemia or scar.    The whole heart absolute myocardial perfusion values averaged 0.55 cc/min/g at rest, which is  reduced; 22.34 cc/min/g at stress, which is normal; and CFR is 3.82 , which is normal.    CT attenuation images demonstrate no coronary calcifications and mild diffuse aortic calcifications in the ascending aorta and descending aorta.    The gated perfusion images showed an ejection fraction of 64% at rest and 71% during stress. A normal ejection fraction is greater than 47%.    The wall motion is normal at rest and during stress.    The LV cavity size is normal at rest and stress.    The ECG portion of the study is negative for ischemia.    There were no arrhythmias during stress.    The patient reported no chest pain during the stress test.    There are no prior studies for comparison.      History:     Social History     Tobacco Use    Smoking status: Never    Smokeless tobacco: Never   Substance Use Topics    Alcohol use: Not Currently     Alcohol/week: 2.0 standard drinks of alcohol     Types: 2 Drinks containing 0.5 oz of alcohol per week     Comment: I have no more that 2 drinks when I do drink.     Family History   Problem Relation Age of Onset    Cancer Mother     Coronary artery disease Mother     Heart disease Mother     Hypertension Mother     Hyperlipidemia Mother     Hypertension Father     Cancer Father     Hyperlipidemia Father     Sleep apnea Father     Heart disease Maternal Grandmother     Hypertension Maternal Grandmother     Breast cancer Maternal Grandmother     Kidney failure Maternal Uncle        Meds:   Review of patient's allergies indicates:  No Known Allergies    Current Outpatient Medications:     albuterol (PROVENTIL/VENTOLIN HFA) 90 mcg/actuation inhaler, Inhale 2 puffs into the lungs every 6 (six) hours as needed for Wheezing or Shortness of Breath. Rescue, Disp: 8 g, Rfl: 4    amLODIPine (NORVASC) 10 MG tablet, TAKE 1 TABLET(10 MG) BY MOUTH EVERY DAY, Disp: 90 tablet, Rfl: 1    atorvastatin (LIPITOR) 40 MG tablet, Take 1 tablet (40 mg total) by mouth once daily., Disp: 90 tablet,  "Rfl: 3    butalbital-acetaminophen-caffeine -40 mg (FIORICET, ESGIC) -40 mg per tablet, Take by mouth., Disp: , Rfl:     cyclobenzaprine (FLEXERIL) 5 MG tablet, Take 1 tablet (5 mg total) by mouth 3 (three) times daily as needed for Muscle spasms., Disp: 30 tablet, Rfl: 0    diclofenac sodium (VOLTAREN) 1 % Gel, Apply 2 g topically 4 (four) times daily as needed. To painful area on the feet., Disp: 100 g, Rfl: 5    gabapentin (NEURONTIN) 600 MG tablet, TAKE 1 TABLET(600 MG) BY MOUTH THREE TIMES DAILY, Disp: 90 tablet, Rfl: 1    levothyroxine (SYNTHROID) 100 MCG tablet, Take 1 tablet (100 mcg total) by mouth before breakfast., Disp: 90 tablet, Rfl: 1    lisinopriL (PRINIVIL,ZESTRIL) 40 MG tablet, Take 1 tablet (40 mg total) by mouth once daily., Disp: 90 tablet, Rfl: 1    triamterene-hydrochlorothiazide 37.5-25 mg (DYAZIDE) 37.5-25 mg per capsule, TAKE 1 CAPSULE BY MOUTH EVERY MORNING, Disp: 90 capsule, Rfl: 0    nitroGLYCERIN (NITROSTAT) 0.4 MG SL tablet, Place 1 tablet (0.4 mg total) under the tongue every 5 (five) minutes as needed for Chest pain., Disp: 30 tablet, Rfl: 2    semaglutide (OZEMPIC) 0.25 mg or 0.5 mg (2 mg/3 mL) pen injector, Inject 0.5 mg into the skin every 7 days. Start with 0.25 mg once a week for 4 weeks, then increase to 0.5 mg once a week (Patient not taking: Reported on 1/24/2024), Disp: 3 mL, Rfl: 2    10 point ROS performed and negative except as stated in HPI     Objective:   /78   Pulse 88   Ht 5' 7" (1.702 m)   Wt 133.3 kg (293 lb 14 oz)   SpO2 96%   BMI 46.03 kg/m²     Physical Exam:   Constitutional: no acute distress  HEENT: NCAT, EOMI, no scleral icterus  Cardiovascular: Regular rate and rhythm, no murmurs appreciated. 2+ carotid, radial, DP pulses bilaterally    Pulmonary: Clear to auscultation bilaterally   Abdomen: nontender, non-distended   Neuro: alert and oriented, no focal deficits  Extremities: warm, no edema   MSK: no deformities  Integument: intact, " no rashes       Labs:     Lab Results   Component Value Date     11/22/2023    K 4.0 11/22/2023     11/22/2023    CO2 23 11/22/2023    BUN 22 11/22/2023    CREATININE 1.0 11/22/2023    ANIONGAP 10 11/22/2023     Lab Results   Component Value Date    HGBA1C 5.4 11/22/2023     Lab Results   Component Value Date    BNP <10 11/21/2023    BNP 26 12/01/2010    BNP <10 04/19/2010       Lab Results   Component Value Date    WBC 4.75 11/22/2023    HGB 13.5 11/22/2023    HCT 42.2 11/22/2023    HCT 32 (L) 02/28/2020     11/22/2023    GRAN 46.0 11/22/2023    GRAN 1.9 11/21/2023     Lab Results   Component Value Date    CHOL 158 11/22/2023    HDL 29 (L) 11/22/2023    LDLCALC 113.6 11/22/2023    TRIG 77 11/22/2023       Lab Results   Component Value Date     11/22/2023    K 4.0 11/22/2023     11/22/2023    CO2 23 11/22/2023    BUN 22 11/22/2023    CREATININE 1.0 11/22/2023    ANIONGAP 10 11/22/2023     Lab Results   Component Value Date    HGBA1C 5.4 11/22/2023     Lab Results   Component Value Date    BNP <10 11/21/2023    BNP 26 12/01/2010    BNP <10 04/19/2010    Lab Results   Component Value Date    WBC 4.75 11/22/2023    HGB 13.5 11/22/2023    HCT 42.2 11/22/2023    HCT 32 (L) 02/28/2020     11/22/2023    GRAN 46.0 11/22/2023    GRAN 1.9 11/21/2023     Lab Results   Component Value Date    CHOL 158 11/22/2023    HDL 29 (L) 11/22/2023    LDLCALC 113.6 11/22/2023    TRIG 77 11/22/2023            Assessment     1. Chest pain, unspecified type    2. Palpitations        Plan:   63 y.o. female with PMH of chronic pain, HTN, CKD, obesity who presents to cardiology clinic to establish care.    Chest pain: During hospitalization she had a very thorough cardiac workup with no structural or ischemic heart disease found. It sounds like her blood pressure was quite elevated on presentation which may be the etiology of her symptoms, alternatively could be vasospastic angina vs arrhythmia. Would  recommend good blood pressure control. Event monitor to rule out arrhythmia. Encouraged use of nitroglycerin PRN to see if symptoms improve.   Continue BP regimen  30 day event monitor  NTG PRN     HTN: Controlled today, continue current regimen.     Follow up in 3 months.       David Mack MD  [facility]  Cardiovascular Disease, PGY-VI

## 2024-01-27 ENCOUNTER — PATIENT MESSAGE (OUTPATIENT)
Dept: ADMINISTRATIVE | Facility: OTHER | Age: 64
End: 2024-01-27
Payer: MEDICARE

## 2024-02-02 ENCOUNTER — CLINICAL SUPPORT (OUTPATIENT)
Dept: CARDIOLOGY | Facility: HOSPITAL | Age: 64
End: 2024-02-02
Attending: INTERNAL MEDICINE
Payer: MEDICARE

## 2024-02-02 DIAGNOSIS — R00.2 PALPITATIONS: ICD-10-CM

## 2024-02-02 DIAGNOSIS — R07.9 CHEST PAIN, UNSPECIFIED TYPE: ICD-10-CM

## 2024-02-02 PROCEDURE — 93272 ECG/REVIEW INTERPRET ONLY: CPT | Mod: ,,, | Performed by: INTERNAL MEDICINE

## 2024-02-02 PROCEDURE — 93271 ECG/MONITORING AND ANALYSIS: CPT

## 2024-02-15 ENCOUNTER — TELEPHONE (OUTPATIENT)
Dept: BARIATRICS | Facility: CLINIC | Age: 64
End: 2024-02-15
Payer: MEDICARE

## 2024-02-20 ENCOUNTER — TELEPHONE (OUTPATIENT)
Dept: BARIATRICS | Facility: CLINIC | Age: 64
End: 2024-02-20
Payer: MEDICARE

## 2024-02-29 ENCOUNTER — CLINICAL SUPPORT (OUTPATIENT)
Dept: BARIATRICS | Facility: CLINIC | Age: 64
End: 2024-02-29
Payer: MEDICARE

## 2024-02-29 ENCOUNTER — OFFICE VISIT (OUTPATIENT)
Dept: BARIATRICS | Facility: CLINIC | Age: 64
End: 2024-02-29
Payer: MEDICARE

## 2024-02-29 VITALS
HEART RATE: 90 BPM | DIASTOLIC BLOOD PRESSURE: 96 MMHG | BODY MASS INDEX: 45.56 KG/M2 | SYSTOLIC BLOOD PRESSURE: 186 MMHG | WEIGHT: 290.25 LBS | OXYGEN SATURATION: 96 % | HEIGHT: 67 IN

## 2024-02-29 DIAGNOSIS — N18.31 CHRONIC KIDNEY DISEASE, STAGE 3A: ICD-10-CM

## 2024-02-29 DIAGNOSIS — E21.3 HYPERPARATHYROIDISM: ICD-10-CM

## 2024-02-29 DIAGNOSIS — E89.0 POSTOPERATIVE HYPOTHYROIDISM: ICD-10-CM

## 2024-02-29 DIAGNOSIS — E55.9 VITAMIN D DEFICIENCY DISEASE: Chronic | ICD-10-CM

## 2024-02-29 DIAGNOSIS — E66.01 CLASS 3 SEVERE OBESITY DUE TO EXCESS CALORIES WITH SERIOUS COMORBIDITY AND BODY MASS INDEX (BMI) OF 45.0 TO 49.9 IN ADULT: Primary | ICD-10-CM

## 2024-02-29 DIAGNOSIS — E66.01 MORBID OBESITY WITH BMI OF 45.0-49.9, ADULT: ICD-10-CM

## 2024-02-29 DIAGNOSIS — E66.01 MORBID OBESITY WITH BMI OF 45.0-49.9, ADULT: Chronic | ICD-10-CM

## 2024-02-29 DIAGNOSIS — I10 ESSENTIAL HYPERTENSION: Primary | ICD-10-CM

## 2024-02-29 DIAGNOSIS — I10 ESSENTIAL HYPERTENSION: ICD-10-CM

## 2024-02-29 DIAGNOSIS — R79.89 ELEVATED TROPONIN: ICD-10-CM

## 2024-02-29 DIAGNOSIS — Z71.3 DIETARY COUNSELING AND SURVEILLANCE: ICD-10-CM

## 2024-02-29 PROCEDURE — 99999 PR PBB SHADOW E&M-EST. PATIENT-LVL IV: CPT | Mod: PBBFAC,,, | Performed by: PHYSICIAN ASSISTANT

## 2024-02-29 PROCEDURE — 99499 UNLISTED E&M SERVICE: CPT | Mod: S$GLB,,, | Performed by: DIETITIAN, REGISTERED

## 2024-02-29 PROCEDURE — 99215 OFFICE O/P EST HI 40 MIN: CPT | Mod: S$GLB,,, | Performed by: PHYSICIAN ASSISTANT

## 2024-02-29 NOTE — PROGRESS NOTES
BARIATRIC NEW PATIENT EVALUATION    CHIEF COMPLAINT:   Morbid obesity, body mass index is 46.14 kg/m². and inability to maintain weight lossand lose weight    HPI:  Dejah Schmidt is a 63 y.o. morbidly obese female. Her current body mass index is 46.14 kg/m². She has multiple associated comorbidities including essential hypertension and hyperlipidemia.  She has struggled with excess weight since teenage years.  Her highest adult weight was 305 lbs at age 63, and her lowest adult weight was 175lbs at age [age].  The patient has tried  b12 shots portion control calorie counting low carb ozempic .  The patient was most successful with injections at weight loss clinic over a decade ago with a weight loss of 100+ lbs.  Her current exercise includes cycling 4 times a week. She denies any history of eating disorder such as anorexia, bulimia, or taking laxatives for weight loss, and denies any addiction including illicit substances, alcohol, or gambling.  Patient states she has a excellent  support system.  She lives with with family.  She is disabled.  She  denies a history of GERD.  The patient's goal is 180 lbs.    ESS: Score of 1, reviewed 02/29/2024.  Does not need Sleep Study.    EKG 11/2023  Normal sinus rhythm   Minimal voltage criteria for LVH, may be normal variant ( [place] product )   Borderline Abnormal ECG   When compared with ECG of 25-APR-2022 14:07,   No significant change was found     Stress echo 11/2023:    The myocardial perfusion images are normal without evidence of ischemia or scar.    The whole heart absolute myocardial perfusion values averaged 0.55 cc/min/g at rest, which is reduced; 22.34 cc/min/g at stress, which is normal; and CFR is 3.82 , which is normal.    CT attenuation images demonstrate no coronary calcifications and mild diffuse aortic calcifications in the ascending aorta and descending aorta.    The gated perfusion images showed an ejection fraction of 64% at rest and 71% during  stress. A normal ejection fraction is greater than 47%.    The wall motion is normal at rest and during stress.    The LV cavity size is normal at rest and stress.    The ECG portion of the study is negative for ischemia.    There were no arrhythmias during stress.    The patient reported no chest pain during the stress test.    There are no prior studies for comparison.    Cxr 2023  FINDINGS:  Heart size normal.  The lungs are clear.  No pleural effusion     Impression:     See above  PAST MEDICAL HISTORY:  Past Medical History:   Diagnosis Date    Arthritis 2015    Asthma     Colon polyp     Hypercholesteremia     Hypertension     Multiple thyroid nodules     Neuromuscular disorder 2017    Sleep apnea        PAST SURGICAL HISTORY:  Past Surgical History:   Procedure Laterality Date    BACK SURGERY      CARPAL TUNNEL RELEASE       SECTION   &    COLONOSCOPY N/A 2020    Procedure: COLONOSCOPY, with me, ;  Surgeon: Sarah Beth Leavitt MD;  Location: [facility] (4TH FLR);  Service: Endoscopy;  Laterality: N/A;  covid test     EPIDURAL STEROID INJECTION N/A 10/27/2021    Procedure: INJECTION, STEROID, EPIDURAL,L5/S1 DIRECT REF/NEED CONSENT;  Surgeon: Clement Aldana MD;  Location: [facility] PAIN MGT;  Service: Pain Management;  Laterality: N/A;    EPIDURAL STEROID INJECTION N/A 2022    Procedure: INJECTION, STEROID, EPIDURAL, L5-S1 CONTRAST DIRECT REF;  Surgeon: Clement Aldana MD;  Location: [facility] PAIN MGT;  Service: Pain Management;  Laterality: N/A;    FOOT SURGERY      HYSTERECTOMY      MASS EXCISION      at neck    ROTATOR CUFF REPAIR      left shoulder    SPINE SURGERY  2017    THYROIDECTOMY N/A 2022    Procedure: THYROIDECTOMY, total;  Surgeon: Anai Garcia MD;  Location: [facility] 2ND FLR;  Service: General;  Laterality: N/A;    TRIGGER FINGER RELEASE  2017    right hand    TRIGGER FINGER RELEASE Right 2022    Procedure: RELEASE, TRIGGER FINGER -  right thumb;  Surgeon: Cristopher Tolliver MD;  Location: [facility] OR;  Service: Orthopedics;  Laterality: Right;    TRIGGER FINGER RELEASE Left 03/09/2022    Procedure: RELEASE, TRIGGER FINGER - LEFT index and long;  Surgeon: Cristopher Tolliver MD;  Location: [facility] OR;  Service: Orthopedics;  Laterality: Left;    TUBAL LIGATION  1998       FAMILY HISTORY:  Family History   Problem Relation Age of Onset    Cancer Mother     Coronary artery disease Mother     Heart disease Mother     Hypertension Mother     Hyperlipidemia Mother     Hypertension Father     Cancer Father     Hyperlipidemia Father     Sleep apnea Father     Heart disease Maternal Grandmother     Hypertension Maternal Grandmother     Breast cancer Maternal Grandmother     Kidney failure Maternal Uncle         SOCIAL HISTORY:  Social History     Socioeconomic History    Marital status:    Tobacco Use    Smoking status: Never    Smokeless tobacco: Never   Substance and Sexual Activity    Alcohol use: Not Currently     Alcohol/week: 2.0 standard drinks of alcohol     Types: 2 Drinks containing 0.5 oz of alcohol per week     Comment: I have no more that 2 drinks when I do drink.    Drug use: No    Sexual activity: Not Currently     Partners: Male     Birth control/protection: Abstinence, Post-menopausal   Social History Narrative    LIVIA cornelius receiving [employer]. No physical activities     Social Determinants of Health     Financial Resource Strain: Low Risk  (11/27/2023)    Overall Financial Resource Strain (CARDIA)     Difficulty of Paying Living Expenses: Not very hard   Recent Concern: Financial Resource Strain - Medium Risk (10/9/2023)    Overall Financial Resource Strain (CARDIA)     Difficulty of Paying Living Expenses: Somewhat hard   Food Insecurity: No Food Insecurity (11/27/2023)    Hunger Vital Sign     Worried About Running Out of Food in the Last Year: Never true     Ran Out of Food in the Last Year: Never true   Transportation Needs: No  Transportation Needs (11/27/2023)    PRAPARE - Transportation     Lack of Transportation (Medical): No     Lack of Transportation (Non-Medical): No   Physical Activity: Inactive (11/27/2023)    Exercise Vital Sign     Days of Exercise per Week: 0 days     Minutes of Exercise per Session: 0 min   Stress: No Stress Concern Present (11/27/2023)    [facility] of Occupational Health - Occupational Stress Questionnaire     Feeling of Stress : Only a little   Recent Concern: Stress - Stress Concern Present (11/21/2023)    [facility] of Occupational Health - Occupational Stress Questionnaire     Feeling of Stress : To some extent   Social Connections: Moderately Integrated (11/27/2023)    Social Connection and Isolation Panel [NHANES]     Frequency of Communication with Friends and Family: More than three times a week     Frequency of Social Gatherings with Friends and Family: More than three times a week     Attends Alevism Services: 1 to 4 times per year     Active Member of Clubs or Organizations: Yes     Attends Club or Organization Meetings: 1 to 4 times per year     Marital Status:    Recent Concern: Social Connections - Moderately Isolated (11/21/2023)    Social Connection and Isolation Panel [NHANES]     Frequency of Communication with Friends and Family: More than three times a week     Frequency of Social Gatherings with Friends and Family: More than three times a week     Attends Alevism Services: 1 to 4 times per year     Active Member of Clubs or Organizations: No     Attends Club or Organization Meetings: Never     Marital Status:    Housing Stability: Low Risk  (11/27/2023)    Housing Stability Vital Sign     Unable to Pay for Housing in the Last Year: No     Number of Places Lived in the Last Year: 1     Unstable Housing in the Last Year: No       MEDICATIONS:    Current Outpatient Medications:     albuterol (PROVENTIL/VENTOLIN HFA) 90 mcg/actuation inhaler, Inhale 2 puffs  into the lungs every 6 (six) hours as needed for Wheezing or Shortness of Breath. Rescue, Disp: 8 g, Rfl: 4    amLODIPine (NORVASC) 10 MG tablet, TAKE 1 TABLET(10 MG) BY MOUTH EVERY DAY, Disp: 90 tablet, Rfl: 1    atorvastatin (LIPITOR) 40 MG tablet, Take 1 tablet (40 mg total) by mouth once daily., Disp: 90 tablet, Rfl: 3    butalbital-acetaminophen-caffeine -40 mg (FIORICET, ESGIC) -40 mg per tablet, Take by mouth., Disp: , Rfl:     cyclobenzaprine (FLEXERIL) 5 MG tablet, Take 1 tablet (5 mg total) by mouth 3 (three) times daily as needed for Muscle spasms., Disp: 30 tablet, Rfl: 0    diclofenac sodium (VOLTAREN) 1 % Gel, Apply 2 g topically 4 (four) times daily as needed. To painful area on the feet., Disp: 100 g, Rfl: 5    gabapentin (NEURONTIN) 600 MG tablet, TAKE 1 TABLET(600 MG) BY MOUTH THREE TIMES DAILY, Disp: 90 tablet, Rfl: 1    levothyroxine (SYNTHROID) 100 MCG tablet, Take 1 tablet (100 mcg total) by mouth before breakfast., Disp: 90 tablet, Rfl: 1    lisinopriL (PRINIVIL,ZESTRIL) 40 MG tablet, Take 1 tablet (40 mg total) by mouth once daily., Disp: 90 tablet, Rfl: 1    triamterene-hydrochlorothiazide 37.5-25 mg (DYAZIDE) 37.5-25 mg per capsule, TAKE 1 CAPSULE BY MOUTH EVERY MORNING, Disp: 90 capsule, Rfl: 0    nitroGLYCERIN (NITROSTAT) 0.4 MG SL tablet, Place 1 tablet (0.4 mg total) under the tongue every 5 (five) minutes as needed for Chest pain., Disp: 30 tablet, Rfl: 2    semaglutide (OZEMPIC) 0.25 mg or 0.5 mg (2 mg/3 mL) pen injector, Inject 0.5 mg into the skin every 7 days. Start with 0.25 mg once a week for 4 weeks, then increase to 0.5 mg once a week (Patient not taking: Reported on 1/24/2024), Disp: 3 mL, Rfl: 2    ALLERGIES:  Review of patient's allergies indicates:  No Known Allergies    Review of Systems   Constitutional:  Negative for chills and fever.   HENT:  Negative for sore throat and tinnitus.    Eyes:  Positive for blurred vision (intermittent). Negative for double  "vision.   Respiratory:  Negative for cough and shortness of breath.    Cardiovascular:  Negative for chest pain and leg swelling.   Gastrointestinal:  Positive for constipation (colace). Negative for abdominal pain, diarrhea, heartburn, nausea and vomiting.   Genitourinary:  Negative for dysuria and hematuria.   Musculoskeletal:  Positive for back pain, joint pain and neck pain. Negative for falls and myalgias.   Skin:  Negative for rash.   Neurological:  Positive for tingling (in toes being treated due to spinal surgery). Negative for dizziness and headaches.   Psychiatric/Behavioral:  Negative for depression and suicidal ideas. The patient is not nervous/anxious.        Vitals:    02/29/24 1331   BP: (!) 186/96   Pulse: 90   SpO2: 96%   Weight: 131.7 kg (290 lb 3.8 oz)   Height: 5' 6.5" (1.689 m)   PainSc: 0-No pain       Physical Exam  Vitals reviewed.   Constitutional:       Appearance: She is obese.   HENT:      Head: Normocephalic and atraumatic.   Eyes:      Extraocular Movements: Extraocular movements intact.      Conjunctiva/sclera: Conjunctivae normal.      Pupils: Pupils are equal, round, and reactive to light.   Cardiovascular:      Rate and Rhythm: Normal rate and regular rhythm.      Pulses: Normal pulses.      Heart sounds: Normal heart sounds. No murmur heard.     Comments: Wearing holter monitor  Pulmonary:      Effort: Pulmonary effort is normal. No respiratory distress.      Breath sounds: Normal breath sounds.   Abdominal:      General: Bowel sounds are normal.      Palpations: Abdomen is soft.      Tenderness: There is no abdominal tenderness.   Musculoskeletal:      Cervical back: Normal range of motion and neck supple.   Skin:     General: Skin is warm and dry.      Capillary Refill: Capillary refill takes less than 2 seconds.   Neurological:      General: No focal deficit present.      Mental Status: She is alert and oriented to person, place, and time.   Psychiatric:         Mood and Affect: " Mood normal.         Behavior: Behavior normal.         Thought Content: Thought content normal.         Judgment: Judgment normal.          DIAGNOSIS:  1. Morbid obesity, body mass index is 46.14 kg/m². and inability to maintain weight loss and lose weight   2. Co-morbidities: essential hypertension and hyperlipidemia    PLAN:  The patient is a good candidate for Bariatric Surgery. The patient is interested in laparoscopic sleeve gastrectomy with Dr Sebastian. The surgery and post-op care was discussed in detail with the patient. All questions were answered.    The patient understands that bariatric surgery is a tool to aid in weight loss and that they need to be committed to the diet and exercise post-operatively for successful weight loss. Discussed with patient that bariatric surgery is not the easy way out and that it will take plenty of dedication on the patient's part to be successful. Also discussed the possibility of weight regain if the patient strays from the diet guidelines or exercise requirements. Patient verbalized understanding and wishes to proceed with the work-up.    Estimated Goal weight is 50%EW.    ORDERS:  1. Stress Test, Chest X-Ray, and EKG  2. Psychological Consult, Bariatric Dietician Consult, and Cardiac Clearance  3. Bariatric Labs: Per orders.  4. No NSAIDS after surgery due to increased risk of stomach ulcers. Talk to your prescribing provider about alternative options for your pain.      PCP: Dasha Bergeron MD  RTC: As scheduled.

## 2024-02-29 NOTE — PATIENT INSTRUCTIONS
Prior to surgery you will need to complete:  - Dietitian consult and follow up appointments as needed  - Labs  - Chest X-ray ( complete)  - EKG ( complete)  - Psychological evaluation, Please call psychiatry [phone] to schedule  - Stress echo  ( complete)     In preparation for bariatric surgery, please complete the following:   Discuss your current medications with your primary care provider, remember your medications will need to be crushed, chewable, or in liquid form for the first 3-6 months after a gastric bypass or sleeve.  For a gastric band, your medications will need to be crushed indefinitely.    If you take a blood thinner such as: Coumadin (warfarin), Pradaxa (dabigatran), or Plavix (clopidogrel), you will need to speak with your prescribing provider on how or if this medication can be stopped before surgery.   If you take a medication for depression or anxiety, you will need to begin crushing or opening the capsule 1-3 months prior to surgery.  Remember to discuss this with the psychologist or psychiatrist that you see.   If you take medication for arthritis on a daily basis that is considered a non-steroidal anti-inflammatory (NSAID), please discuss with your prescribing physician an alternative medication.  After having gastric bypass or gastric sleeve, this group of medications is not appropriate to take due to increased risk of bleeding stomach ulcers.      DEFINITIONS  Appointments: Pre-scheduled meetings or consultations with any physician, advanced practice provider, dietitian, or psychologist, and labs, imaging studies, sleep studies, etc.   Late cancellation: Cancelling an appointment 24-48 hours prior to scheduled time.  No-Show appointment:  is when   You do NOT arrive to your appointment at the time its scheduled.  You call to cancel or cancel via [facility] less than 24 hours in advance of your scheduled appointment  You show up 15 minutes AFTER your scheduled appointment time without  any notification of being late.     POLICY  You are allowed up to 3 cancellations for appointments.   After 3 cancellations your case will be placed on hold for 2 months and after that time you can resume the program.   You are allowed only 1 no-show for an appointment.   You will be re-scheduled one time and if there is a 2nd no-show at any point, your case will be placed on hold for 3 months.  After 3 months you can resume the program.     Upon resuming the program after being placed on hold for either above mentioned reasons, if you have a late cancel or no show for any appointment, the bariatric team will review if youre an appropriate candidate for surgery at the monthly meeting.

## 2024-02-29 NOTE — PROGRESS NOTES
"NUTRITIONAL CONSULT    Referring Physician: Soto Sebastian M.D.   Reason for MNT Referral: Initial assessment for sleeve gastrectomy work-up    PAST MEDICAL HISTORY:   63 y.o. female    Weight history includes: Highest adult weight was 305 lbs at age 63; lowest adult weight was 175 lbs at age 18-20.    Dieting attempts include: The patient has tried B12 shots, portion control, calorie counting, low carb, Ozempic. The patient was most successful with injections at weight loss clinic over a decade ago with a weight loss of 100+ lbs.       PT reports irregular sleep pattern since she stopped working about 7 years ago. PT states that she wakes up around 6 am to prepare grandchildren for school, goes back to bed around 7 am, may not fall asleep until 9 am, reawaken at 11 am-12 pm, have lunch around 3 pm, dinner around 9 pm, and stay up snacking, watching TV or coloring on a tablet until 5 am.     Past Medical History:   Diagnosis Date    Arthritis 11/2015    Asthma     Colon polyp 2011    Hypercholesteremia     Hypertension     Multiple thyroid nodules     Neuromuscular disorder 2017    Sleep apnea      CLINICAL DATA:  63 y.o.-year-old Black or  female.  Height: 5' 7"  Weight: 290 lbs  IBW: 147 lbs  BMI: 46.14  The patient's goal weight (50% EBW): 219 lbs  Personal goal weight: 180-190-210 lbs    Goal for Bariatric Surgery: to improve health, to improve quality of life, to lose weight, and improve back and knee pain    DAILY NUTRITIONAL NEEDS: pre-op nutritional bariatric guidelines to promote weight loss  2911-7747 Calories    Grams Protein    NUTRITION & HEALTH HISTORY:  Greatest challenge: snacking at night, poor sleep hygiene, irregular meal patterns  Progress: PT she used to drink 6 pack or more per day; now 4 sodas per week and trying to drink more water    Current diet recall:     B: Skips  L@3pm: Leftovers - Ground beef elisabeth with brown gravy, brown rice, and steamed broccoli   D@9pm: " Ground beef elisabeth with brown gravy, brown rice, and steamed broccoli   S: Strawberry Greek yogurt  S: Pretzels    Current Diet:  Meal pattern: 2 meals  Protein supplements: 0  Snackin / day Greek yogurt  Vegetables: Likes a few. Eats  3 x week . Broccoli, string beans, peas, salad - lettuce, tomato, cucumber  Fruits: Likes a few. Eats  1-2 x week . Strawberries, bananas, grapes  Beverages: 3-4 bottles of water per day, SF drink powders, 4 cans of Coke per week  Dining out:  1-2 x month  Mostly restaurants. Boiled seafood, fried fish   Cooking at home:  3 x week batch cooks  Mostly baked, smothered, and air fried,  meat, fish, starchy CHO, and vegetables. Chicken, beef, fish, shrimp, beans, cabbage, roasts, rice, pasta    Food Allergies:   Pecans - breaks out    Vitamins / Minerals / Herbs:   None    Labs:   No recent    Exercise:  Past exercise: None    Current exercise: Epuls bike 4 x week for 10-15 mins    Restrictions to exercise: Back and knee pain    Social:  Disabled.  Lives with adult son and special needs nephew (2 yrs). 3 grandchildren (2, 5, 12) come by in the morning  Grocery shopping and food prep PT.  Patient believes the household will be supportive after surgery.  Alcohol: Socially.  Smoking: None.    ASSESSMENT:  Patient reports attempts at weight loss, only to regain lost weight.  Patient demonstrated knowledge of healthy eating behaviors and exercise patterns  Patient demonstrates willingness to change lifestyle and make behavior modifications as evidenced by dietary changes.    Barriers to Education: none    Stage of change: action    NUTRITION DIAGNOSIS:    Morbid Obesity related to Excessive carbohydrate intake, Excessive calorie intake, and Physical inactivity as evidence by BMI.    BARIATRIC DIET DISCUSSION/PLAN:  Discussed diet after surgery and related to patient's food record.  Reviewed nutrition guidelines for before and after surgery.  Answered all questions.  Discussed  protein goals  Reviewed protein shake, powder, and water options  Work on gradually cutting back on starchy CHO in the diet.  Begin trying various protein supplements to determine preference.  Start including protein supplements in the diet plan daily.    PT Plan  Find a protein drink she likes    RECOMMENDATIONS:  Pt is a potential candidate for bariatric surgery.    Follow up in one month.  Needs additional visits with RD.    Patient verbalized understanding.    Communicated nutrition plan with bariatric team.    SESSION TIME:  60 minutes

## 2024-03-01 ENCOUNTER — PATIENT MESSAGE (OUTPATIENT)
Dept: BARIATRICS | Facility: CLINIC | Age: 64
End: 2024-03-01
Payer: MEDICARE

## 2024-03-04 ENCOUNTER — LAB VISIT (OUTPATIENT)
Dept: LAB | Facility: HOSPITAL | Age: 64
End: 2024-03-04
Payer: MEDICARE

## 2024-03-04 DIAGNOSIS — E89.0 POSTOPERATIVE HYPOTHYROIDISM: ICD-10-CM

## 2024-03-04 DIAGNOSIS — R79.89 ELEVATED TROPONIN: ICD-10-CM

## 2024-03-04 DIAGNOSIS — E21.3 HYPERPARATHYROIDISM: ICD-10-CM

## 2024-03-04 DIAGNOSIS — I10 ESSENTIAL HYPERTENSION: ICD-10-CM

## 2024-03-04 DIAGNOSIS — E66.01 CLASS 3 SEVERE OBESITY DUE TO EXCESS CALORIES WITH SERIOUS COMORBIDITY AND BODY MASS INDEX (BMI) OF 45.0 TO 49.9 IN ADULT: ICD-10-CM

## 2024-03-04 DIAGNOSIS — E55.9 VITAMIN D DEFICIENCY DISEASE: Chronic | ICD-10-CM

## 2024-03-04 DIAGNOSIS — E66.01 MORBID OBESITY WITH BMI OF 45.0-49.9, ADULT: Chronic | ICD-10-CM

## 2024-03-04 DIAGNOSIS — N18.31 CHRONIC KIDNEY DISEASE, STAGE 3A: ICD-10-CM

## 2024-03-04 LAB
25(OH)D3+25(OH)D2 SERPL-MCNC: 24 NG/ML (ref 30–96)
ALBUMIN SERPL BCP-MCNC: 3.6 G/DL (ref 3.5–5.2)
ALP SERPL-CCNC: 63 U/L (ref 55–135)
ALT SERPL W/O P-5'-P-CCNC: 19 U/L (ref 10–44)
ANION GAP SERPL CALC-SCNC: 9 MMOL/L (ref 8–16)
AST SERPL-CCNC: 21 U/L (ref 10–40)
BASOPHILS # BLD AUTO: 0.01 K/UL (ref 0–0.2)
BASOPHILS NFR BLD: 0.2 % (ref 0–1.9)
BILIRUB DIRECT SERPL-MCNC: 0.3 MG/DL (ref 0.1–0.3)
BILIRUB SERPL-MCNC: 0.6 MG/DL (ref 0.1–1)
BUN SERPL-MCNC: 19 MG/DL (ref 8–23)
CALCIUM SERPL-MCNC: 9.2 MG/DL (ref 8.7–10.5)
CHLORIDE SERPL-SCNC: 108 MMOL/L (ref 95–110)
CHOLEST SERPL-MCNC: 151 MG/DL (ref 120–199)
CHOLEST/HDLC SERPL: 4.6 {RATIO} (ref 2–5)
CO2 SERPL-SCNC: 24 MMOL/L (ref 23–29)
CREAT SERPL-MCNC: 1 MG/DL (ref 0.5–1.4)
DIFFERENTIAL METHOD BLD: ABNORMAL
EOSINOPHIL # BLD AUTO: 0 K/UL (ref 0–0.5)
EOSINOPHIL NFR BLD: 0.2 % (ref 0–8)
ERYTHROCYTE [DISTWIDTH] IN BLOOD BY AUTOMATED COUNT: 12.8 % (ref 11.5–14.5)
EST. GFR  (NO RACE VARIABLE): >60 ML/MIN/1.73 M^2
ESTIMATED AVG GLUCOSE: 105 MG/DL (ref 68–131)
FOLATE SERPL-MCNC: 4 NG/ML (ref 4–24)
GLUCOSE SERPL-MCNC: 93 MG/DL (ref 70–110)
H PYLORI IGG SERPL QL IA: NEGATIVE
HBA1C MFR BLD: 5.3 % (ref 4–5.6)
HCT VFR BLD AUTO: 41.1 % (ref 37–48.5)
HDLC SERPL-MCNC: 33 MG/DL (ref 40–75)
HDLC SERPL: 21.9 % (ref 20–50)
HGB BLD-MCNC: 13.2 G/DL (ref 12–16)
IMM GRANULOCYTES # BLD AUTO: 0.19 K/UL (ref 0–0.04)
IMM GRANULOCYTES NFR BLD AUTO: 4.1 % (ref 0–0.5)
IRON SERPL-MCNC: 78 UG/DL (ref 30–160)
LDLC SERPL CALC-MCNC: 105 MG/DL (ref 63–159)
LYMPHOCYTES # BLD AUTO: 2.1 K/UL (ref 1–4.8)
LYMPHOCYTES NFR BLD: 44.8 % (ref 18–48)
MAGNESIUM SERPL-MCNC: 2 MG/DL (ref 1.6–2.6)
MCH RBC QN AUTO: 30.9 PG (ref 27–31)
MCHC RBC AUTO-ENTMCNC: 32.1 G/DL (ref 32–36)
MCV RBC AUTO: 96 FL (ref 82–98)
MONOCYTES # BLD AUTO: 0.2 K/UL (ref 0.3–1)
MONOCYTES NFR BLD: 4.8 % (ref 4–15)
NEUTROPHILS # BLD AUTO: 2.1 K/UL (ref 1.8–7.7)
NEUTROPHILS NFR BLD: 45.9 % (ref 38–73)
NONHDLC SERPL-MCNC: 118 MG/DL
NRBC BLD-RTO: 0 /100 WBC
PHOSPHATE SERPL-MCNC: 2.9 MG/DL (ref 2.7–4.5)
PLATELET # BLD AUTO: 191 K/UL (ref 150–450)
PMV BLD AUTO: 11.4 FL (ref 9.2–12.9)
POTASSIUM SERPL-SCNC: 4.2 MMOL/L (ref 3.5–5.1)
PROT SERPL-MCNC: 7.5 G/DL (ref 6–8.4)
RBC # BLD AUTO: 4.27 M/UL (ref 4–5.4)
SATURATED IRON: 22 % (ref 20–50)
SODIUM SERPL-SCNC: 141 MMOL/L (ref 136–145)
T3 SERPL-MCNC: 86 NG/DL (ref 60–180)
T4 FREE SERPL-MCNC: 1.1 NG/DL (ref 0.71–1.51)
T4 SERPL-MCNC: 9.4 UG/DL (ref 4.5–11.5)
TOTAL IRON BINDING CAPACITY: 357 UG/DL (ref 250–450)
TRANSFERRIN SERPL-MCNC: 241 MG/DL (ref 200–375)
TRIGL SERPL-MCNC: 65 MG/DL (ref 30–150)
TSH SERPL DL<=0.005 MIU/L-ACNC: 2.37 UIU/ML (ref 0.4–4)
VIT B12 SERPL-MCNC: 686 PG/ML (ref 210–950)
WBC # BLD AUTO: 4.58 K/UL (ref 3.9–12.7)

## 2024-03-04 PROCEDURE — 84436 ASSAY OF TOTAL THYROXINE: CPT | Performed by: PHYSICIAN ASSISTANT

## 2024-03-04 PROCEDURE — 84439 ASSAY OF FREE THYROXINE: CPT | Performed by: PHYSICIAN ASSISTANT

## 2024-03-04 PROCEDURE — 84480 ASSAY TRIIODOTHYRONINE (T3): CPT | Performed by: PHYSICIAN ASSISTANT

## 2024-03-04 PROCEDURE — 86677 HELICOBACTER PYLORI ANTIBODY: CPT | Performed by: PHYSICIAN ASSISTANT

## 2024-03-04 PROCEDURE — 84443 ASSAY THYROID STIM HORMONE: CPT | Performed by: PHYSICIAN ASSISTANT

## 2024-03-04 PROCEDURE — 82607 VITAMIN B-12: CPT | Performed by: PHYSICIAN ASSISTANT

## 2024-03-04 PROCEDURE — 80061 LIPID PANEL: CPT | Performed by: PHYSICIAN ASSISTANT

## 2024-03-04 PROCEDURE — 83036 HEMOGLOBIN GLYCOSYLATED A1C: CPT | Performed by: PHYSICIAN ASSISTANT

## 2024-03-04 PROCEDURE — 83540 ASSAY OF IRON: CPT | Performed by: PHYSICIAN ASSISTANT

## 2024-03-04 PROCEDURE — 84425 ASSAY OF VITAMIN B-1: CPT | Performed by: PHYSICIAN ASSISTANT

## 2024-03-04 PROCEDURE — 82746 ASSAY OF FOLIC ACID SERUM: CPT | Performed by: PHYSICIAN ASSISTANT

## 2024-03-04 PROCEDURE — 80076 HEPATIC FUNCTION PANEL: CPT | Performed by: PHYSICIAN ASSISTANT

## 2024-03-04 PROCEDURE — 85025 COMPLETE CBC W/AUTO DIFF WBC: CPT | Performed by: PHYSICIAN ASSISTANT

## 2024-03-04 PROCEDURE — 83735 ASSAY OF MAGNESIUM: CPT | Performed by: PHYSICIAN ASSISTANT

## 2024-03-04 PROCEDURE — 80048 BASIC METABOLIC PNL TOTAL CA: CPT | Performed by: PHYSICIAN ASSISTANT

## 2024-03-04 PROCEDURE — 84100 ASSAY OF PHOSPHORUS: CPT | Performed by: PHYSICIAN ASSISTANT

## 2024-03-04 PROCEDURE — 82306 VITAMIN D 25 HYDROXY: CPT | Performed by: PHYSICIAN ASSISTANT

## 2024-03-06 ENCOUNTER — PATIENT MESSAGE (OUTPATIENT)
Dept: BARIATRICS | Facility: CLINIC | Age: 64
End: 2024-03-06
Payer: MEDICARE

## 2024-03-07 LAB — VIT B1 BLD-MCNC: 47 UG/L (ref 38–122)

## 2024-03-10 DIAGNOSIS — E89.0 POSTOPERATIVE HYPOTHYROIDISM: ICD-10-CM

## 2024-03-11 RX ORDER — LEVOTHYROXINE SODIUM 100 UG/1
100 TABLET ORAL
Qty: 90 TABLET | Refills: 1 | Status: SHIPPED | OUTPATIENT
Start: 2024-03-11

## 2024-03-18 ENCOUNTER — PATIENT MESSAGE (OUTPATIENT)
Dept: ADMINISTRATIVE | Facility: HOSPITAL | Age: 64
End: 2024-03-18
Payer: MEDICARE

## 2024-03-21 ENCOUNTER — PATIENT OUTREACH (OUTPATIENT)
Dept: ADMINISTRATIVE | Facility: HOSPITAL | Age: 64
End: 2024-03-21
Payer: MEDICARE

## 2024-03-22 ENCOUNTER — PATIENT MESSAGE (OUTPATIENT)
Dept: INTERNAL MEDICINE | Facility: CLINIC | Age: 64
End: 2024-03-22
Payer: MEDICARE

## 2024-03-22 DIAGNOSIS — L65.9 HAIR LOSS: Primary | ICD-10-CM

## 2024-03-25 ENCOUNTER — OFFICE VISIT (OUTPATIENT)
Dept: PSYCHIATRY | Facility: CLINIC | Age: 64
End: 2024-03-25
Payer: MEDICARE

## 2024-03-25 DIAGNOSIS — E66.01 CLASS 3 SEVERE OBESITY DUE TO EXCESS CALORIES WITH SERIOUS COMORBIDITY AND BODY MASS INDEX (BMI) OF 45.0 TO 49.9 IN ADULT: ICD-10-CM

## 2024-03-25 DIAGNOSIS — Z00.8 ENCOUNTER FOR PRE-SURGICAL PSYCHOLOGICAL ASSESSMENT: Primary | ICD-10-CM

## 2024-03-25 PROCEDURE — 96146 PSYCL/NRPSYC TST AUTO RESULT: CPT | Mod: 59,S$GLB,,

## 2024-03-25 PROCEDURE — 99999 PR PBB SHADOW E&M-EST. PATIENT-LVL I: CPT | Mod: PBBFAC,,,

## 2024-03-25 PROCEDURE — 96130 PSYCL TST EVAL PHYS/QHP 1ST: CPT | Mod: S$GLB,,,

## 2024-03-25 PROCEDURE — 90791 PSYCH DIAGNOSTIC EVALUATION: CPT | Mod: S$GLB,,,

## 2024-03-25 PROCEDURE — 96131 PSYCL TST EVAL PHYS/QHP EA: CPT | Mod: S$GLB,,,
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
## 2024-03-25 NOTE — LETTER
March 25, 2024      Abimael Gomez PA-C  [address]  2nd Floor, [facility] [address]       Joaquim [facility]oval - Psych [facility] [address]thfl  [address]  [facility] [address]  Phone: [phone]   Patient: Dejah Schmidt   MR Number: [id number]   YOB: 1960   Date of Visit: 3/25/2024       Dear Dr. Gomez:    Thank you for referring Dejah Schmidt to me for evaluation. Attached you will find relevant portions of my assessment and plan of care.    Ms. Schmidt is a suitable candidate from a psychological perspective. There are no absolute psychological contraindications to bariatric surgery. Overall, Ms. Schmidt is at a LOW risk for adverse postsurgical outcomes.    Ms. Schmidt currently reports no psychiatric problems, major psychosocial stressors, or other problems that would contraindicate surgery or impact her ability to engage in treatment. She has adequate and appropriate knowledge and expectations and is motivated and willing to engage in behaviors to achieve successful outcomes with bariatric surgery. She has protective factors that should help her during surgery and recovery.     There are no recommendations for psychological intervention at this time.    If you have questions, please do not hesitate to contact me.     Sincerely,      Juli Gutierres, Ph.D.  Clinical Psychologist        [facility] STAFF

## 2024-03-28 ENCOUNTER — CLINICAL SUPPORT (OUTPATIENT)
Dept: BARIATRICS | Facility: CLINIC | Age: 64
End: 2024-03-28
Payer: MEDICARE

## 2024-03-28 DIAGNOSIS — E66.01 MORBID OBESITY WITH BMI OF 45.0-49.9, ADULT: ICD-10-CM

## 2024-03-28 DIAGNOSIS — I10 ESSENTIAL HYPERTENSION: Primary | ICD-10-CM

## 2024-03-28 DIAGNOSIS — Z71.3 DIETARY COUNSELING AND SURVEILLANCE: ICD-10-CM

## 2024-03-28 PROCEDURE — 99499 UNLISTED E&M SERVICE: CPT | Mod: 95,,, | Performed by: DIETITIAN, REGISTERED

## 2024-03-28 PROCEDURE — 97803 MED NUTRITION INDIV SUBSEQ: CPT | Mod: 95,GZ,, | Performed by: DIETITIAN, REGISTERED

## 2024-03-28 NOTE — PROGRESS NOTES
The patient location is: home ([place])  The chief complaint leading to consultation is: 3 months Medically Supervised Diet pending Gastric Sleeve  Visit type: audiovisual     Face to Face time with patient: 20 min    30 minutes of total time spent on the encounter, which includes face to face time and non-face to face time preparing to see the patient (eg, review of tests), Obtaining and/or reviewing separately obtained history, Documenting clinical information in the electronic or other health record, Independently interpreting results (not separately reported) and communicating results to the patient/family/caregiver, or Care coordination (not separately reported).       Each patient to whom he or she provides medical services by telemedicine is:  (1) informed of the relationship between the physician and patient and the respective role of any other health care provider with respect to management of the patient; and (2) notified that he or she may decline to receive medical services by telemedicine and may withdraw from such care at any time.    NUTRITION NOTE    Referring Physician: Soto Sebastian M.D.   Reason for MNT Referral: 3 months Medically Supervised Diet pending Gastric Sleeve    Patient presents for follow-up visit for MSD with weight  NA  over the past month    PT states that she has been getting more sleep, going to bed a few hours earlier than before, napping during the day, averaging about 5 hours of sleep    CLINICAL DATA:  63 y.o. female.    Initial weight: 290 lbs  Current Weight: NA lbs  Weight Change Since Initial Visit: NA lbs  Ideal Body Weight: 147 lbs  BMI: NA    DAILY NUTRITIONAL NEEDS: pre-op nutritional bariatric guidelines to promote weight loss  4401-6351 Calories    Grams Protein    CURRENT DIET:  Regular diet.  Diet Recall: Food records are not present.  Greatest challenge: snacking at night, poor sleep hygiene, irregular meal patterns  Progress: Improved sleep hygiene; more  fruit in diet, still cutting back on sodas; tried protein shakes    Current diet recall:      B: Yogurt Dannon Light and Fit plus fruit  L: Salad - lettuce, tomatoes, cucumbers, eggs, sometimes with bolton bits or cubed ham  S: Skinny Pop popcorn 2 cups  D: Air fried chicken, corn and crab bisque soup     Diet Includes: 3 meals  Meal Pattern: Improved.  Protein Supplements: Has had 4 shakes in the past month. Premier, Muscle Milk, and Fairlife. Did not like Muscle Milk  Snacking: Adequate. Snacks include starchy CHO.  Vegetables: Likes a few. Eats  3 x week . Broccoli, string beans, peas, salad - lettuce, tomato, cucumber  Fruits: Likes a few. Eats  Almost daily . Strawberries, bananas, grapes, grapefruit, pears  Beverages: 3-4 bottles of water per day, SF drink powders, 3 cans of Coke per week  Dining out:  Once in last month  Mostly restaurants. Fried ribs and sweet potatoes  Cooking at home:  3 x week batch cooks  Mostly baked, smothered, and air fried, meat, fish, starchy CHO, and vegetables. Chicken, beef, fish, shrimp, beans, cabbage, roasts, rice, pasta    Food Allergies:   Pecans - breaks out     Vitamins / Minerals / Herbs:   Vit D prescribed     Labs:   Reviewed     Current Exercise:  Not as much recently due to back pain, usually stationery bike 4 x week for 10-15 mins    Restrictions to exercise: Back and knee pain. Takes muscle relaxers     Social:  Disabled.  Lives with adult son and special needs nephew (2 yrs). 3 grandchildren (2, 5, 12) come by in the morning  Grocery shopping and food prep PT.  Patient believes the household will be supportive after surgery.  Alcohol: Socially.  Smoking: None.    ASSESSMENT:  Patient demonstrates some willingness to change lifestyle habits as evidenced by increased fruits and reduced dining out.    Doing fairly well with working on greatest challenges ( snacking at night, poor sleep hygiene, irregular meal patterns ).    Barriers to Education:  none  Stage of  Change:  action    NUTRITION DIAGNOSIS:  Morbid Obesity related to Excessive carbohydrate intake, Excessive calorie intake, and Physical inactivity as evidence by BMI.   Obesity Status: Same    BARIATRIC DIET DISCUSSION/PLAN:  Discussed diet after surgery and related to patient's food record.  Reviewed nutrition guidelines for before and after surgery.  Answered all questions.  Discussed cutting out popcorn  Discussed high protein snacks - cheese, deli meat, jerkies, nuts, seeds, protein bars  Reminded PT to try protein bars with no more than 4 g of sugar    RECOMMENDATIONS:  Pt is potential candidate for surgery.    Diet: Adjust diet plan.  Keep cutting back on starchy CHO and sugary drinks    Exercise:  As tolderated .    Behavior Modification: Begin to document food & activity logs daily.    Return to clinic in one month.    Needs additional visits with RD.    Communicated nutrition plan with bariatric team.    SESSION TIME:  30 minutes

## 2024-03-30 NOTE — TELEPHONE ENCOUNTER
No care due was identified.  [facility] Embedded Care Due Messages. Reference number: [id number].   3/30/2024 10:34:09 AM CDT

## 2024-04-01 RX ORDER — TRIAMTERENE AND HYDROCHLOROTHIAZIDE 37.5; 25 MG/1; MG/1
CAPSULE ORAL
Qty: 90 CAPSULE | Refills: 1 | Status: SHIPPED | OUTPATIENT
Start: 2024-04-01

## 2024-04-01 NOTE — TELEPHONE ENCOUNTER
Refill Routing Note   Medication(s) are not appropriate for processing by [facility] for the following reason(s):        Required vitals abnormal    [facility] action(s):  Defer               Appointments  past 12m or future 3m with PCP    Date Provider   Last Visit   11/30/2023 Dasha Bergeron MD   Next Visit   Visit date not found Dasha Bergeron MD   ED visits in past 90 days: 0        Note composed:2:34 AM 04/01/2024

## 2024-04-03 ENCOUNTER — PATIENT MESSAGE (OUTPATIENT)
Dept: BARIATRICS | Facility: CLINIC | Age: 64
End: 2024-04-03
Payer: MEDICARE

## 2024-04-09 ENCOUNTER — PATIENT MESSAGE (OUTPATIENT)
Dept: BARIATRICS | Facility: CLINIC | Age: 64
End: 2024-04-09
Payer: MEDICARE

## 2024-04-24 ENCOUNTER — OFFICE VISIT (OUTPATIENT)
Dept: CARDIOLOGY | Facility: CLINIC | Age: 64
End: 2024-04-24
Payer: MEDICARE

## 2024-04-24 VITALS
BODY MASS INDEX: 47.09 KG/M2 | WEIGHT: 293 LBS | SYSTOLIC BLOOD PRESSURE: 143 MMHG | OXYGEN SATURATION: 98 % | HEART RATE: 59 BPM | DIASTOLIC BLOOD PRESSURE: 66 MMHG | HEIGHT: 66 IN

## 2024-04-24 DIAGNOSIS — I10 ESSENTIAL HYPERTENSION: ICD-10-CM

## 2024-04-24 DIAGNOSIS — R07.9 CHEST PAIN, UNSPECIFIED TYPE: Primary | ICD-10-CM

## 2024-04-24 PROCEDURE — 4010F ACE/ARB THERAPY RXD/TAKEN: CPT | Mod: CPTII,GC,S$GLB, | Performed by: INTERNAL MEDICINE

## 2024-04-24 PROCEDURE — 3077F SYST BP >= 140 MM HG: CPT | Mod: CPTII,GC,S$GLB, | Performed by: INTERNAL MEDICINE

## 2024-04-24 PROCEDURE — 3078F DIAST BP <80 MM HG: CPT | Mod: CPTII,GC,S$GLB, | Performed by: INTERNAL MEDICINE

## 2024-04-24 PROCEDURE — 99214 OFFICE O/P EST MOD 30 MIN: CPT | Mod: GC,S$GLB,, | Performed by: INTERNAL MEDICINE

## 2024-04-24 PROCEDURE — 1159F MED LIST DOCD IN RCRD: CPT | Mod: CPTII,GC,S$GLB, | Performed by: INTERNAL MEDICINE

## 2024-04-24 PROCEDURE — 3044F HG A1C LEVEL LT 7.0%: CPT | Mod: CPTII,GC,S$GLB, | Performed by: INTERNAL MEDICINE

## 2024-04-24 PROCEDURE — 99999 PR PBB SHADOW E&M-EST. PATIENT-LVL IV: CPT | Mod: PBBFAC,GC,, | Performed by: INTERNAL MEDICINE

## 2024-04-24 PROCEDURE — 3008F BODY MASS INDEX DOCD: CPT | Mod: CPTII,GC,S$GLB, | Performed by: INTERNAL MEDICINE

## 2024-04-24 NOTE — PROGRESS NOTES
PCP - Dasha Bergeron MD  Subjective:     Dejah Schmidt is a 63 y.o. female with PMH of chronic pain, HTN, CKD, obesity who presents to cardiology clinic for follow up. She was last seen by me after an hospital visit for chest pain where she underwent thorough cardiac workup with normal echo and stress testing. Event monitor was obtained to rule out arrhythmia which was completed and negative.     She reports to me that her chest pain has not returned since her last visit. She reports she is asymptomatic, no further palpitations either.         ---------------  Cardiac Studies ---------------  TTE 11/2023:   Left Ventricle: The left ventricle is normal in size. Normal wall thickness. There is concentric remodeling. Normal wall motion. There is normal systolic function with a visually estimated ejection fraction of 55 - 60%. There is normal diastolic function.    Right Ventricle: Normal right ventricular cavity size. Wall thickness is normal. Right ventricle wall motion  is normal. Systolic function is normal.    Pulmonic Valve: There is mild regurgitation.    Pulmonary Artery: The estimated pulmonary artery systolic pressure is 22 mmHg.    IVC/SVC: Normal venous pressure at 3 mmHg.    PET Stress 11/2023:     The myocardial perfusion images are normal without evidence of ischemia or scar.    The whole heart absolute myocardial perfusion values averaged 0.55 cc/min/g at rest, which is reduced; 22.34 cc/min/g at stress, which is normal; and CFR is 3.82 , which is normal.    CT attenuation images demonstrate no coronary calcifications and mild diffuse aortic calcifications in the ascending aorta and descending aorta.    The gated perfusion images showed an ejection fraction of 64% at rest and 71% during stress. A normal ejection fraction is greater than 47%.    The wall motion is normal at rest and during stress.    The LV cavity size is normal at rest and stress.    The ECG portion of the study is negative for  ischemia.    There were no arrhythmias during stress.    The patient reported no chest pain during the stress test.    There are no prior studies for comparison.    Event monitor 2/2024:       Negative event monitor with no clinical arrhythmias.       History:     Social History     Tobacco Use    Smoking status: Never    Smokeless tobacco: Never   Substance Use Topics    Alcohol use: Not Currently     Alcohol/week: 2.0 standard drinks of alcohol     Types: 2 Drinks containing 0.5 oz of alcohol per week     Comment: I have no more that 2 drinks when I do drink.     Family History   Problem Relation Name Age of Onset    Cancer Mother Audrey Benítez     Coronary artery disease Mother Audrey Benítez     Heart disease Mother Adurey Benítez     Hypertension Mother Audrey Benítez     Hyperlipidemia Mother Audrey Benítez     Hypertension Father Mike     Cancer Father Mike     Hyperlipidemia Father Mike     Sleep apnea Father Mike     No Known Problems Sister      No Known Problems Daughter      No Known Problems Son      Kidney failure Maternal Uncle Milo Carlos     Heart disease Maternal Grandmother Dejah Jose Alfredo     Hypertension Maternal Grandmother Dejah Veliz     Breast cancer Maternal Grandmother Dejah Veliz        Meds:   Review of patient's allergies indicates:  No Known Allergies    Current Outpatient Medications:     albuterol (PROVENTIL/VENTOLIN HFA) 90 mcg/actuation inhaler, Inhale 2 puffs into the lungs every 6 (six) hours as needed for Wheezing or Shortness of Breath. Rescue, Disp: 8 g, Rfl: 4    amLODIPine (NORVASC) 10 MG tablet, TAKE 1 TABLET(10 MG) BY MOUTH EVERY DAY, Disp: 90 tablet, Rfl: 1    atorvastatin (LIPITOR) 40 MG tablet, Take 1 tablet (40 mg total) by mouth once daily., Disp: 90 tablet, Rfl: 3    butalbital-acetaminophen-caffeine -40 mg (FIORICET, ESGIC) -40 mg per tablet, Take by mouth., Disp: , Rfl:     gabapentin (NEURONTIN) 600 MG tablet, TAKE 1 TABLET(600 MG) BY MOUTH THREE TIMES DAILY, Disp: 90  "tablet, Rfl: 1    levothyroxine (SYNTHROID) 100 MCG tablet, TAKE 1 TABLET(100 MCG) BY MOUTH BEFORE BREAKFAST, Disp: 90 tablet, Rfl: 1    lisinopriL (PRINIVIL,ZESTRIL) 40 MG tablet, Take 1 tablet (40 mg total) by mouth once daily., Disp: 90 tablet, Rfl: 1    triamterene-hydrochlorothiazide 37.5-25 mg (DYAZIDE) 37.5-25 mg per capsule, TAKE 1 CAPSULE BY MOUTH EVERY MORNING, Disp: 90 capsule, Rfl: 1    diclofenac sodium (VOLTAREN) 1 % Gel, Apply 2 g topically 4 (four) times daily as needed. To painful area on the feet. (Patient not taking: Reported on 2/29/2024), Disp: 100 g, Rfl: 5    nitroGLYCERIN (NITROSTAT) 0.4 MG SL tablet, Place 1 tablet (0.4 mg total) under the tongue every 5 (five) minutes as needed for Chest pain., Disp: 30 tablet, Rfl: 2    semaglutide (OZEMPIC) 0.25 mg or 0.5 mg (2 mg/3 mL) pen injector, Inject 0.5 mg into the skin every 7 days. Start with 0.25 mg once a week for 4 weeks, then increase to 0.5 mg once a week (Patient not taking: Reported on 1/24/2024), Disp: 3 mL, Rfl: 2    10 point ROS performed and negative except as stated in HPI     Objective:   BP (!) 143/66   Pulse (!) 59   Ht 5' 6" (1.676 m)   Wt 133.6 kg (294 lb 8.6 oz)   SpO2 98%   BMI 47.54 kg/m²     Physical Exam:   Constitutional: no acute distress  HEENT: NCAT, EOMI, no scleral icterus  Cardiovascular: Regular rate and rhythm, no murmurs appreciated. 2+ carotid, radial, DP pulses bilaterally    Pulmonary: Clear to auscultation bilaterally   Abdomen: nontender, non-distended   Neuro: alert and oriented, no focal deficits  Extremities: warm, no edema   MSK: no deformities  Integument: intact, no rashes       Labs:     Lab Results   Component Value Date     03/04/2024    K 4.2 03/04/2024     03/04/2024    CO2 24 03/04/2024    BUN 19 03/04/2024    CREATININE 1.0 03/04/2024    ANIONGAP 9 03/04/2024     Lab Results   Component Value Date    HGBA1C 5.3 03/04/2024     Lab Results   Component Value Date    BNP <10 " 11/21/2023    BNP 26 12/01/2010    BNP <10 04/19/2010       Lab Results   Component Value Date    WBC 4.58 03/04/2024    HGB 13.2 03/04/2024    HCT 41.1 03/04/2024    HCT 32 (L) 02/28/2020     03/04/2024    GRAN 2.1 03/04/2024    GRAN 45.9 03/04/2024     Lab Results   Component Value Date    CHOL 151 03/04/2024    HDL 33 (L) 03/04/2024    LDLCALC 105.0 03/04/2024    TRIG 65 03/04/2024       Lab Results   Component Value Date     03/04/2024    K 4.2 03/04/2024     03/04/2024    CO2 24 03/04/2024    BUN 19 03/04/2024    CREATININE 1.0 03/04/2024    ANIONGAP 9 03/04/2024     Lab Results   Component Value Date    HGBA1C 5.3 03/04/2024     Lab Results   Component Value Date    BNP <10 11/21/2023    BNP 26 12/01/2010    BNP <10 04/19/2010    Lab Results   Component Value Date    WBC 4.58 03/04/2024    HGB 13.2 03/04/2024    HCT 41.1 03/04/2024    HCT 32 (L) 02/28/2020     03/04/2024    GRAN 2.1 03/04/2024    GRAN 45.9 03/04/2024     Lab Results   Component Value Date    CHOL 151 03/04/2024    HDL 33 (L) 03/04/2024    LDLCALC 105.0 03/04/2024    TRIG 65 03/04/2024            Assessment     1. Chest pain, unspecified type    2. Unspecified essential hypertension          Plan:   63 y.o. female with PMH of chronic pain, HTN, CKD, obesity who presents to cardiology clinic to establish care.    Chest pain: During hospitalization she had a very thorough cardiac workup with no structural or ischemic heart disease found. It sounds like her blood pressure was quite elevated on presentation which may be the etiology of her symptoms. In any case her symptoms appear to have resolved. No further chest pain episodes.    Continue blood pressure regimen  No indication for further cardiac testing at this time     HTN: Slightly above goal today, continue current regimen.       Follow up as needed      David Mack MD  [facility]  Cardiovascular Disease, PGY-VI

## 2024-04-29 ENCOUNTER — CLINICAL SUPPORT (OUTPATIENT)
Dept: BARIATRICS | Facility: CLINIC | Age: 64
End: 2024-04-29
Payer: MEDICARE

## 2024-04-29 DIAGNOSIS — Z71.3 DIETARY COUNSELING AND SURVEILLANCE: ICD-10-CM

## 2024-04-29 DIAGNOSIS — E66.01 MORBID OBESITY WITH BMI OF 45.0-49.9, ADULT: ICD-10-CM

## 2024-04-29 DIAGNOSIS — I10 ESSENTIAL HYPERTENSION: Primary | ICD-10-CM

## 2024-04-29 PROCEDURE — 97803 MED NUTRITION INDIV SUBSEQ: CPT | Mod: 95,,, | Performed by: DIETITIAN, REGISTERED

## 2024-04-29 PROCEDURE — 99499 UNLISTED E&M SERVICE: CPT | Mod: 95,,, | Performed by: DIETITIAN, REGISTERED

## 2024-05-01 ENCOUNTER — PATIENT MESSAGE (OUTPATIENT)
Dept: DERMATOLOGY | Facility: CLINIC | Age: 64
End: 2024-05-01

## 2024-05-01 ENCOUNTER — OFFICE VISIT (OUTPATIENT)
Dept: DERMATOLOGY | Facility: CLINIC | Age: 64
End: 2024-05-01
Payer: MEDICARE

## 2024-05-01 DIAGNOSIS — L68.0 HIRSUTISM: ICD-10-CM

## 2024-05-01 DIAGNOSIS — L65.0 TELOGEN EFFLUVIUM: Primary | ICD-10-CM

## 2024-05-01 DIAGNOSIS — L64.9 ANDROGENETIC ALOPECIA: ICD-10-CM

## 2024-05-01 PROCEDURE — 99999 PR PBB SHADOW E&M-EST. PATIENT-LVL II: CPT | Mod: PBBFAC,,, | Performed by: STUDENT IN AN ORGANIZED HEALTH CARE EDUCATION/TRAINING PROGRAM

## 2024-05-01 PROCEDURE — 1159F MED LIST DOCD IN RCRD: CPT | Mod: CPTII,S$GLB,, | Performed by: STUDENT IN AN ORGANIZED HEALTH CARE EDUCATION/TRAINING PROGRAM

## 2024-05-01 PROCEDURE — 4010F ACE/ARB THERAPY RXD/TAKEN: CPT | Mod: CPTII,S$GLB,, | Performed by: STUDENT IN AN ORGANIZED HEALTH CARE EDUCATION/TRAINING PROGRAM

## 2024-05-01 PROCEDURE — 3044F HG A1C LEVEL LT 7.0%: CPT | Mod: CPTII,S$GLB,, | Performed by: STUDENT IN AN ORGANIZED HEALTH CARE EDUCATION/TRAINING PROGRAM

## 2024-05-01 PROCEDURE — 99203 OFFICE O/P NEW LOW 30 MIN: CPT | Mod: S$GLB,,, | Performed by: STUDENT IN AN ORGANIZED HEALTH CARE EDUCATION/TRAINING PROGRAM

## 2024-05-01 PROCEDURE — 1160F RVW MEDS BY RX/DR IN RCRD: CPT | Mod: CPTII,S$GLB,, | Performed by: STUDENT IN AN ORGANIZED HEALTH CARE EDUCATION/TRAINING PROGRAM

## 2024-05-01 RX ORDER — SPIRONOLACTONE 50 MG/1
TABLET, FILM COATED ORAL
Qty: 30 TABLET | Refills: 1 | Status: SHIPPED | OUTPATIENT
Start: 2024-05-01

## 2024-05-01 NOTE — PROGRESS NOTES
Subjective:      Patient ID:  Dejah Schmidt is a 63 y.o. female who presents for   Chief Complaint   Patient presents with    Hair Loss     63 y.o. female presents today for hair loss.    New patient    1. Hair loss  Duration: 6 months  Itching (scale 1-10): 0  Current tx: hair oil   Prior tx: none  Personal or family history of autoimmune disease (i.e. thyroid, lupus): Pt has thyroid disease  Lots of shedding shows photos on phone of clumps  Hospitalized a few months ago for heart (after hair loss)  Was on Ozempic for 1 month (after hair loss)  Vitamin D 24- taking 5000 vitamin D OTC    Pertinent labs:  Lab Results       Component                Value               Date                       TSH                      2.371               03/04/2024            Lab Results       Component                Value               Date                       WBC                      4.58                03/04/2024                 HGB                      13.2                03/04/2024                 HCT                      41.1                03/04/2024                 MCV                      96                  03/04/2024                 PLT                      191                 03/04/2024              Lab Results       Component                Value               Date                       IRON                     78                  03/04/2024                 TRANSFERRIN              241                 03/04/2024                 TIBC                     357                 03/04/2024                 FESATURATED              22                  03/04/2024             Lab Results       Component                Value               Date                       [id number]             24 (L)              03/04/2024                    Review of Systems   Skin:  Positive for dry skin.       Objective:   Physical Exam   Constitutional: She appears well-developed and well-nourished.   Neurological: She is alert and oriented  to person, place, and time.   Psychiatric: She has a normal mood and affect.   Skin:   Areas Examined (abnormalities noted in diagram):   Scalp / Hair Palpated and Inspected       Diagram Legend     Erythematous scaling macule/papule c/w actinic keratosis       Vascular papule c/w angioma      Pigmented verrucoid papule/plaque c/w seborrheic keratosis      Yellow umbilicated papule c/w sebaceous hyperplasia      Irregularly shaped tan macule c/w lentigo     1-2 mm smooth white papules consistent with Milia      Movable subcutaneous cyst with punctum c/w epidermal inclusion cyst      Subcutaneous movable cyst c/w pilar cyst      Firm pink to brown papule c/w dermatofibroma      Pedunculated fleshy papule(s) c/w skin tag(s)      Evenly pigmented macule c/w junctional nevus     Mildly variegated pigmented, slightly irregular-bordered macule c/w mildly atypical nevus      Flesh colored to evenly pigmented papule c/w intradermal nevus       Pink pearly papule/plaque c/w basal cell carcinoma      Erythematous hyperkeratotic cursted plaque c/w SCC      Surgical scar with no sign of skin cancer recurrence      Open and closed comedones      Inflammatory papules and pustules      Verrucoid papule consistent consistent with wart     Erythematous eczematous patches and plaques     Dystrophic onycholytic nail with subungual debris c/w onychomycosis     Umbilicated papule    Erythematous-base heme-crusted tan verrucoid plaque consistent with inflamed seborrheic keratosis     Erythematous Silvery Scaling Plaque c/w Psoriasis     See annotation        Assessment / Plan:      Telogen effluvium, hirsutism, androgenetic alopecia  Good hair density throughout, + hair pull test with photos she brings on phone of clumps of hair  Occipital thinning with + fringe sign ? Traction (wearing tight pony tail)  Scalp examined with dermoscopy and wnl- no erythema, scale; no pruritus or tenderness  Hirsutism of chin/neck    Discussed favor this  is telogen effluvium (increased shedding) usually resolves over 6-12 months  Normal to lose  hairs per day,  but can have more in this condition  Sometimes physiologic- she has low Vitamin D recommend supplementation 2000 IU daily. Also hx hypothyroidism which may contribute.  Recommend to consider OTC Rogaine 5% mens strength daily  To help with hirsutism and hair thinning can trial spironolactone - pt very interested in trying- start 50 mg QD and repeat bloodwork in 1 month  3-2024 BMP wnl with K+ 4.0, pt is on ACE inhibitor discussed need to monitor K+ and other potential se dizziness or lightheadedness    RTC 3 mos, sooner prn

## 2024-05-14 ENCOUNTER — PATIENT MESSAGE (OUTPATIENT)
Dept: BARIATRICS | Facility: CLINIC | Age: 64
End: 2024-05-14
Payer: MEDICARE

## 2024-05-23 ENCOUNTER — LAB VISIT (OUTPATIENT)
Dept: LAB | Facility: HOSPITAL | Age: 64
End: 2024-05-23
Attending: STUDENT IN AN ORGANIZED HEALTH CARE EDUCATION/TRAINING PROGRAM
Payer: MEDICARE

## 2024-05-23 DIAGNOSIS — L64.9 ANDROGENETIC ALOPECIA: ICD-10-CM

## 2024-05-23 LAB
ANION GAP SERPL CALC-SCNC: 12 MMOL/L (ref 8–16)
BUN SERPL-MCNC: 12 MG/DL (ref 8–23)
CALCIUM SERPL-MCNC: 9.1 MG/DL (ref 8.7–10.5)
CHLORIDE SERPL-SCNC: 108 MMOL/L (ref 95–110)
CO2 SERPL-SCNC: 22 MMOL/L (ref 23–29)
CREAT SERPL-MCNC: 1.1 MG/DL (ref 0.5–1.4)
EST. GFR  (NO RACE VARIABLE): 56.8 ML/MIN/1.73 M^2
GLUCOSE SERPL-MCNC: 89 MG/DL (ref 70–110)
POTASSIUM SERPL-SCNC: 4.2 MMOL/L (ref 3.5–5.1)
SODIUM SERPL-SCNC: 142 MMOL/L (ref 136–145)

## 2024-05-23 PROCEDURE — 36415 COLL VENOUS BLD VENIPUNCTURE: CPT | Performed by: STUDENT IN AN ORGANIZED HEALTH CARE EDUCATION/TRAINING PROGRAM

## 2024-05-23 PROCEDURE — 80048 BASIC METABOLIC PNL TOTAL CA: CPT | Performed by: STUDENT IN AN ORGANIZED HEALTH CARE EDUCATION/TRAINING PROGRAM

## 2024-05-24 ENCOUNTER — TELEPHONE (OUTPATIENT)
Dept: DERMATOLOGY | Facility: CLINIC | Age: 64
End: 2024-05-24
Payer: MEDICARE

## 2024-05-24 DIAGNOSIS — L64.9 ANDROGENETIC ALOPECIA: Primary | ICD-10-CM

## 2024-05-24 RX ORDER — SPIRONOLACTONE 50 MG/1
50 TABLET, FILM COATED ORAL DAILY
Qty: 30 TABLET | Refills: 2 | Status: SHIPPED | OUTPATIENT
Start: 2024-05-24 | End: 2025-05-24

## 2024-05-28 NOTE — PROGRESS NOTES
NUTRITION NOTE    Referring Physician: Soto Sebastian M.D.   Reason for MNT Referral: 3 months Medically Supervised Diet pending Gastric Sleeve    Patient presents for follow-up visit for MSD with weight loss over the past month; 3 lbs total weight loss     CLINICAL DATA:  63 y.o. female.    Initial weight: 290 lbs  Current Weight: 287 lbs  Weight Change Since Initial Visit: -3 lbs  Ideal Body Weight: 144 lbs  BMI: 46.40    DAILY NUTRITIONAL NEEDS: pre-op nutritional bariatric guidelines to promote weight loss  [id number] Calories    Grams Protein    CURRENT DIET:  Regular diet.  Diet Recall: Food records are not present.  Greatest challenge: snacking at night, poor sleep hygiene, irregular meal patterns  Progress: PT reports going to bed earlier, around 12:30am.     Current diet recall:      B: 2 fried eggs  L: Pot roast with mustard greens and potato salad   D: Pot roast with mustard greens and potato salad   S : 2 x day beef jerky     Diet Includes: 3 meals  Meal Pattern: Improved  Protein Supplements: not regularly in past month. Drinks Premier Protein and Fairlife Core Power  Snacking: Adequate. Snacks include healthy choices  Vegetables: Likes a few. Eats  3 x week . Broccoli, string beans, peas, salad - lettuce, tomato, cucumber, potatoes  Fruits: Likes a few. Eats  Almost daily. Strawberries, bananas, grapes, grapefruit, pears  Beverages: 3-4 bottles of water per day, SF drink powders, 3 cans of Coke per week  Dining out: Once in last month.  Mostly restaurants. Seafood split with sister  Cooking at home:  3 x week batch cooks  Mostly baked, smothered, and air fried, meat, fish, starchy CHO, and vegetables. Chicken, beef, fish, shrimp, beans, cabbage, roasts, rice, pasta     Food Allergies:   Pecans - breaks out     Vitamins / Minerals / Herbs:   Vit D prescribed     Labs:   Reviewed     Current Exercise:  Stationery bike 10-15 mins - when her back doesn't hurt     Restrictions to exercise: Back  and knee pain. Takes muscle relaxers, gabapentin daily     Social:  Disabled.  Lives with adult son and special needs nephew (2 yrs). 3 grandchildren (2, 5, 12) come by in the morning  Grocery shopping and food prep PT.  Patient believes the household will be supportive after surgery.  Alcohol: Socially.  Smoking: None.    ASSESSMENT:  Patient demonstrates some willingness/progress to change lifestyle habits as evidenced by dietary changes, including protein drinks, and improved sleep hygiene .    Doing well with working on greatest challenges (snacking at night, irregular meal patterns, and poor sleep hygiene ).    Barriers to Education:  none  Stage of Change:  action    NUTRITION DIAGNOSIS:  Morbid Obesity related to Excessive carbohydrate intake, Excessive calorie intake, and Physical inactivity as evidence by BMI.  Obesity Status: Same    BARIATRIC DIET DISCUSSION/PLAN:  Discussed diet after surgery and related to patient's food record.  Reviewed nutrition guidelines for before and after surgery.  Answered all questions.  Reviewed pre- and post-op liquid diet, vitamins, minerals - handout provided  Continue cutting back on starchy CHO in the diet.  Resume including protein supplements in the diet plan daily.  Start shopping for bariatric vitamins & minerals.    RECOMMENDATIONS:  Pt is good candidate for surgery.    Diet: Adjust diet plan.  Continue cutting back on starchy CHO in the diet.  Resume including protein supplements in the diet plan daily.  Start shopping for bariatric vitamins & minerals.    Exercise: Increase as tolerated    Behavior Modification: Begin to document food & activity logs daily.    Communicated nutrition plan with bariatric team.    SESSION TIME:  30 minutes

## 2024-05-29 ENCOUNTER — PATIENT MESSAGE (OUTPATIENT)
Dept: INTERNAL MEDICINE | Facility: CLINIC | Age: 64
End: 2024-05-29
Payer: MEDICARE

## 2024-05-29 ENCOUNTER — CLINICAL SUPPORT (OUTPATIENT)
Dept: BARIATRICS | Facility: CLINIC | Age: 64
End: 2024-05-29
Payer: MEDICARE

## 2024-05-29 VITALS — WEIGHT: 287.5 LBS | BODY MASS INDEX: 46.4 KG/M2

## 2024-05-29 DIAGNOSIS — I10 ESSENTIAL HYPERTENSION: Primary | ICD-10-CM

## 2024-05-29 DIAGNOSIS — Z71.3 DIETARY COUNSELING AND SURVEILLANCE: ICD-10-CM

## 2024-05-29 DIAGNOSIS — E66.01 MORBID OBESITY WITH BMI OF 45.0-49.9, ADULT: ICD-10-CM

## 2024-05-29 PROCEDURE — 97803 MED NUTRITION INDIV SUBSEQ: CPT | Mod: GZ,S$GLB,, | Performed by: DIETITIAN, REGISTERED

## 2024-05-29 PROCEDURE — 99499 UNLISTED E&M SERVICE: CPT | Mod: S$GLB,,, | Performed by: DIETITIAN, REGISTERED

## 2024-05-31 ENCOUNTER — TELEPHONE (OUTPATIENT)
Dept: BARIATRICS | Facility: CLINIC | Age: 64
End: 2024-05-31
Payer: MEDICARE

## 2024-05-31 ENCOUNTER — PATIENT MESSAGE (OUTPATIENT)
Dept: CARDIOLOGY | Facility: CLINIC | Age: 64
End: 2024-05-31
Payer: MEDICARE

## 2024-05-31 DIAGNOSIS — G47.33 OSA (OBSTRUCTIVE SLEEP APNEA): ICD-10-CM

## 2024-05-31 DIAGNOSIS — R79.9 ABNORMAL FINDING OF BLOOD CHEMISTRY, UNSPECIFIED: ICD-10-CM

## 2024-05-31 DIAGNOSIS — E78.5 HYPERLIPIDEMIA, UNSPECIFIED HYPERLIPIDEMIA TYPE: ICD-10-CM

## 2024-05-31 DIAGNOSIS — I10 PRIMARY HYPERTENSION: ICD-10-CM

## 2024-05-31 DIAGNOSIS — E66.01 MORBID OBESITY: ICD-10-CM

## 2024-05-31 DIAGNOSIS — Z79.899 POLYPHARMACY: ICD-10-CM

## 2024-05-31 DIAGNOSIS — Z79.899 LONG-TERM USE OF HIGH-RISK MEDICATION: Primary | ICD-10-CM

## 2024-05-31 NOTE — TELEPHONE ENCOUNTER
"Spoke with patient and confirmed the surgical procedure of Robot/Laparoscopic Gastric Sleeve with Dr Sebastian on 7/12/2024.  Scheduled preop appts/surgery date/2 week and 8 week post op appts. All dates and times agreed upon. Pt aware that if they are required to have PCP clearance, it must be within 6 months of surgery, unless their medical history has changed, it should be dated, signed and in chart for preop appointment. All medications have been reviewed regarding the necessity to be crushed or broken into pieces smaller that the tip of a pencil eraser for 2 weeks following gastric sleeve surgery and 4 weeks following gastric bypass surgery. Pt instructed to stop taking all NSAIDS 1 week before surgery and for life after surgery. Pt aware that protein liquid diet start date is 6/28/2024. Patient is doing well with their diet. Patient was instructed about the progression of the diet phases. The patient's current weight is 287lbs, height is 5'6", and BMI is 46.4. Refer to medical letter of necessity from Surgeon. Discussed the importance of increased physical activity and dieting lifestyle changes to improve weight loss and meet goals. Screened patient for history of UTI per protocol. Discussed with patient to avoid antibiotics and elective procedures involving sedation/anesthesia within 30 days of surgery unless cleared by the bariatric department. Patient instructed to call the bariatric clinic post op for any s/s of UTI. Patient's mailing address confirmed and informed to expect a manilla envelop containing bariatric surgery pre op booklet, appointment reminders, protein and fluid log sheets, and liquid diet and vitamin information sheets. Pt aware that all appts can be seen in my [facility] patient portal at this time. Confirmed email address and informed patient that they will be enrolled in the Patient Reported Outcomes program to track their progress and successes. The first email will be sent 2-3 weeks " before surgery and then every year on your surgery anniversary date. Office phone and fax number given to patient for any future questions/concerns. Discussed the pre-surgery complex carbohydrate beverage to purchase in [facility] pharmacy to drink 30 minutes before the surgery arrival time. Reviewed the policy of scheduling a covid test 72 hours prior to surgery if necessary.

## 2024-05-31 NOTE — LETTER
"  [facility] 2nd Fl  [address]  [place] [address]  Phone: [phone]  Fax: [phone] May 31, 2024       Attn: Pre-determination Dept.    RE:  Dejah Schmidt          OC #: [id number]          : 1960    To Whom It May Concern:  I am sending this letter on behalf of Dejah Schmidt (a 63 y.o. female) for pre-approval for Bariatric Surgery; specifically the Robot/laparoscopic sleeve gastrectomy. Dejah has a past medical history of Morbid Obesity, BMI 45.0-49.9, Arthritis (2015), Asthma,  Hypercholesteremia, Hypertension, and Sleep apnea.  Dejah Schmidt  has made numerous attempts at dieting and exercise programs, and has failed to maintain any sustained weight loss.  Weight/Height/BMI  Estimated body mass index is 46.4 kg/m² as calculated from the following:    Height as of 24: 5' 6" (1.676 m).    Weight as of 24: 130.4 kg (287 lb 7.7 oz).   Dejah Schmidt has been evaluated in our bariatric program by myself, the , and the program dietician and is felt to be an excellent candidate for surgery.  In addition, she has undergone a psychological evaluation, from which a letter is enclosed.  Dejah Schmidt has undergone extensive pre-operative education and understands all the risks, benefits, and possible complications of surgery.  She has also undergone dietary education and thorough nutritional evaluation via a registered dietician.  Our program provides long term nutritional counseling with unlimited consults with the dietician.    Our team is sending this letter to receive pre-approval for the indicated procedure.  Please let us know if you have any questions or require any further information.  Sincerely,    Soto Sebastian MD  Section Head - General, Laparoscopic, Bariatric,  Acute Care and Oncologic Surgery  Bariatric   [facility] - [place], [place]         "

## 2024-06-10 ENCOUNTER — PATIENT MESSAGE (OUTPATIENT)
Dept: INTERNAL MEDICINE | Facility: CLINIC | Age: 64
End: 2024-06-10
Payer: MEDICARE

## 2024-06-10 ENCOUNTER — TELEPHONE (OUTPATIENT)
Dept: BARIATRICS | Facility: CLINIC | Age: 64
End: 2024-06-10
Payer: MEDICARE

## 2024-06-10 NOTE — TELEPHONE ENCOUNTER
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  Rescheduled her preop labs and appt with Dr. Sebastian to his next available on 7/9/2024. Called pt and discussed with her.

## 2024-06-11 ENCOUNTER — PATIENT MESSAGE (OUTPATIENT)
Dept: BARIATRICS | Facility: CLINIC | Age: 64
End: 2024-06-11
Payer: MEDICARE

## 2024-06-12 ENCOUNTER — TELEPHONE (OUTPATIENT)
Dept: CARDIOLOGY | Facility: HOSPITAL | Age: 64
End: 2024-06-12
Payer: MEDICARE

## 2024-06-12 NOTE — TELEPHONE ENCOUNTER
Mrs Schmidt requesting pre operative risk stratification prior to bariatric surgery. Per most recent assessment 4/2024 she was asymptomatic and prior episode of chest pain was deemed unlikely to be cardiac in nature after extensive workup. Her RCRI is 1 simply owing to the nature of the procedure. No history of heart failure or ischemic heart disease. No further cardiac testing or intervention necessary prior to procedure. No objection to the planned procedure.       David Mack MD  [facility]  Cardiovascular Disease, PGY-VI

## 2024-06-13 ENCOUNTER — PATIENT MESSAGE (OUTPATIENT)
Dept: BARIATRICS | Facility: CLINIC | Age: 64
End: 2024-06-13
Payer: MEDICARE

## 2024-06-14 NOTE — TELEPHONE ENCOUNTER
No care due was identified.  [facility] Embedded Care Due Messages. Reference number: [id number].   6/14/2024 4:49:29 PM CDT

## 2024-06-17 RX ORDER — GABAPENTIN 600 MG/1
TABLET ORAL
Qty: 90 TABLET | Refills: 0 | Status: SHIPPED | OUTPATIENT
Start: 2024-06-17

## 2024-06-27 NOTE — PROGRESS NOTES
Audio Only Telehealth Visit     The patient location is: home ([place])  The chief complaint leading to consultation is: Pre-op liquid diet and nutrition instructions  Visit type: Virtual visit with audio only (telephone)  Total time spent with patient: 15 mins     The reason for the audio only service rather than synchronous audio and video virtual visit was related to technical difficulties or patient preference/necessity.     Each patient to whom I provide medical services by telemedicine is:  (1) informed of the relationship between the physician and patient and the respective role of any other health care provider with respect to management of the patient; and (2) notified that they may decline to receive medical services by telemedicine and may withdraw from such care at any time. Patient verbally consented to receive this service via voice-only telephone call.    This service was not originating from a related E/M service provided within the previous 7 days nor will  to an E/M service or procedure within the next 24 hours or my soonest available appointment.  Prevailing standard of care was able to be met in this audio-only visit.      NUTRITION NOTE    Bariatric Surgeon: Soto Sebastian M.D.  Reason for MNT Referral: Pre-op liquid diet and nutrition instructions  Procedure: Sleeve  Date of Surgery: 7/12/24  Pre-op date: 7/9/24    PT reports throwing up about 8 times today from about 2:00 pm-3:30 pm. PT thinks it may be from something she had eaten yesterday. Denies any blood in vomit.     Pre-op liquid diet  Pt using [employer] for preop liquid diet  Fluids include: Water with Crystal Light, broth, Powerade Zero, SF jell-o, SF popsicles    Discussion:  -  gms of protein per day  - 600-800 calories per day   - Less than 4gm sugar per shake  - Sugar-free, decaffeinated, non-carbonated fluids  - No fruits, juices, yogurt or pudding on liquid diet  - No vitamins for 1 week prior to  surgery  - No herbal supplements including green tea and fish oils for 2 weeks prior to surgery     Post-op instructions  - Reviewed nutritional guidelines for post-surgery.    - 1 ounce medicine cups and tracking sheet provided for patient to measure and track fluid intake after surgery.  - Start with 1 oz clear liquids every 15 minutes following surgery, and to increase as tolerated. Aim for 48 fl oz clear fluids daily (includes clear protein drinks and protein soups).  - May begin sipping on protein shakes, as long as maintaining 48 fl oz non-caffeinated clear liquids per day.  - Reviewed bariatric vitamin/mineral regimen, starts 1 week after surgery as tolerated.  - Reviewed common nutritional concerns and prevention tips after bariatric surgery.  - Reminded not to lift anything greater than 10 lbs for 6 week post-surgery   - Encouraged PT to walk as much as tolerable after surgery.     Pt has the following vitamins and minerals to start taking one week after being discharged from hospital:    EZ Multivitamin with iron 2, 1 x day  Barimelt B-1 2, 2 x day  Bariatric Best Calcium Citrate 600 mg with vitamin D 2, 2 x day   Hubbard Vitamin B-12 sublingual 2500 mcg 1 per week    Reviewed dosage and timing of vitamin/mineral guidelines.    Remind pt per nursing and medical team to inform our department if taking antibiotics within the 30 days post bariatric surgery or it any other surgeries/procedures are scheduled within 30 days after bariatric surgery.    Pt verbalized understanding of information provided with appropriate questions and comments.     SESSION TIME: 15 minutes

## 2024-06-28 ENCOUNTER — CLINICAL SUPPORT (OUTPATIENT)
Dept: BARIATRICS | Facility: CLINIC | Age: 64
End: 2024-06-28
Payer: MEDICARE

## 2024-06-28 DIAGNOSIS — Z71.3 DIETARY COUNSELING AND SURVEILLANCE: ICD-10-CM

## 2024-06-28 DIAGNOSIS — G47.33 OSA (OBSTRUCTIVE SLEEP APNEA): ICD-10-CM

## 2024-06-28 DIAGNOSIS — E78.5 HYPERLIPIDEMIA, UNSPECIFIED HYPERLIPIDEMIA TYPE: ICD-10-CM

## 2024-06-28 DIAGNOSIS — I10 PRIMARY HYPERTENSION: Primary | ICD-10-CM

## 2024-07-02 ENCOUNTER — LAB VISIT (OUTPATIENT)
Dept: LAB | Facility: HOSPITAL | Age: 64
End: 2024-07-02
Attending: SURGERY
Payer: MEDICARE

## 2024-07-02 ENCOUNTER — TELEPHONE (OUTPATIENT)
Dept: BARIATRICS | Facility: CLINIC | Age: 64
End: 2024-07-02
Payer: MEDICARE

## 2024-07-02 ENCOUNTER — OFFICE VISIT (OUTPATIENT)
Dept: BARIATRICS | Facility: CLINIC | Age: 64
End: 2024-07-02
Payer: MEDICARE

## 2024-07-02 VITALS
HEIGHT: 66 IN | DIASTOLIC BLOOD PRESSURE: 77 MMHG | BODY MASS INDEX: 45.81 KG/M2 | HEART RATE: 91 BPM | SYSTOLIC BLOOD PRESSURE: 164 MMHG | WEIGHT: 285.06 LBS | OXYGEN SATURATION: 94 %

## 2024-07-02 DIAGNOSIS — E66.01 MORBID OBESITY WITH BMI OF 45.0-49.9, ADULT: Primary | Chronic | ICD-10-CM

## 2024-07-02 DIAGNOSIS — Z79.899 LONG-TERM USE OF HIGH-RISK MEDICATION: ICD-10-CM

## 2024-07-02 DIAGNOSIS — E66.01 MORBID OBESITY: ICD-10-CM

## 2024-07-02 DIAGNOSIS — Z79.899 POLYPHARMACY: ICD-10-CM

## 2024-07-02 LAB
25(OH)D3+25(OH)D2 SERPL-MCNC: 33 NG/ML (ref 30–96)
ALBUMIN SERPL BCP-MCNC: 4 G/DL (ref 3.5–5.2)
ALP SERPL-CCNC: 68 U/L (ref 55–135)
ALT SERPL W/O P-5'-P-CCNC: 23 U/L (ref 10–44)
ANION GAP SERPL CALC-SCNC: 13 MMOL/L (ref 8–16)
AST SERPL-CCNC: 35 U/L (ref 10–40)
BASOPHILS NFR BLD: 0 % (ref 0–1.9)
BILIRUB SERPL-MCNC: 0.8 MG/DL (ref 0.1–1)
BUN SERPL-MCNC: 21 MG/DL (ref 8–23)
CALCIUM SERPL-MCNC: 10 MG/DL (ref 8.7–10.5)
CHLORIDE SERPL-SCNC: 104 MMOL/L (ref 95–110)
CO2 SERPL-SCNC: 20 MMOL/L (ref 23–29)
CREAT SERPL-MCNC: 1.1 MG/DL (ref 0.5–1.4)
DIFFERENTIAL METHOD BLD: NORMAL
EOSINOPHIL NFR BLD: 1 % (ref 0–8)
ERYTHROCYTE [DISTWIDTH] IN BLOOD BY AUTOMATED COUNT: 12.8 % (ref 11.5–14.5)
EST. GFR  (NO RACE VARIABLE): 56.8 ML/MIN/1.73 M^2
GLUCOSE SERPL-MCNC: 85 MG/DL (ref 70–110)
HCT VFR BLD AUTO: 41.9 % (ref 37–48.5)
HGB BLD-MCNC: 14.3 G/DL (ref 12–16)
IMM GRANULOCYTES # BLD AUTO: NORMAL K/UL (ref 0–0.04)
IMM GRANULOCYTES NFR BLD AUTO: NORMAL % (ref 0–0.5)
LYMPHOCYTES NFR BLD: 34 % (ref 18–48)
MCH RBC QN AUTO: 30.9 PG (ref 27–31)
MCHC RBC AUTO-ENTMCNC: 34.1 G/DL (ref 32–36)
MCV RBC AUTO: 91 FL (ref 82–98)
MONOCYTES NFR BLD: 6 % (ref 4–15)
NEUTROPHILS NFR BLD: 59 % (ref 38–73)
NRBC BLD-RTO: 0 /100 WBC
PLATELET # BLD AUTO: 210 K/UL (ref 150–450)
PMV BLD AUTO: 11.1 FL (ref 9.2–12.9)
POTASSIUM SERPL-SCNC: 3.8 MMOL/L (ref 3.5–5.1)
PROT SERPL-MCNC: 8.3 G/DL (ref 6–8.4)
RBC # BLD AUTO: 4.63 M/UL (ref 4–5.4)
SODIUM SERPL-SCNC: 137 MMOL/L (ref 136–145)
WBC # BLD AUTO: 4.22 K/UL (ref 3.9–12.7)

## 2024-07-02 PROCEDURE — 85027 COMPLETE CBC AUTOMATED: CPT | Performed by: SURGERY

## 2024-07-02 PROCEDURE — 4010F ACE/ARB THERAPY RXD/TAKEN: CPT | Mod: CPTII,S$GLB,, | Performed by: SURGERY

## 2024-07-02 PROCEDURE — 36415 COLL VENOUS BLD VENIPUNCTURE: CPT | Performed by: SURGERY

## 2024-07-02 PROCEDURE — 3077F SYST BP >= 140 MM HG: CPT | Mod: CPTII,S$GLB,, | Performed by: SURGERY

## 2024-07-02 PROCEDURE — 3078F DIAST BP <80 MM HG: CPT | Mod: CPTII,S$GLB,, | Performed by: SURGERY

## 2024-07-02 PROCEDURE — 3044F HG A1C LEVEL LT 7.0%: CPT | Mod: CPTII,S$GLB,, | Performed by: SURGERY

## 2024-07-02 PROCEDURE — 80053 COMPREHEN METABOLIC PANEL: CPT | Performed by: SURGERY

## 2024-07-02 PROCEDURE — 99999 PR PBB SHADOW E&M-EST. PATIENT-LVL III: CPT | Mod: PBBFAC,,, | Performed by: SURGERY

## 2024-07-02 PROCEDURE — 99214 OFFICE O/P EST MOD 30 MIN: CPT | Mod: S$GLB,,, | Performed by: SURGERY

## 2024-07-02 PROCEDURE — 1159F MED LIST DOCD IN RCRD: CPT | Mod: CPTII,S$GLB,, | Performed by: SURGERY

## 2024-07-02 PROCEDURE — 3008F BODY MASS INDEX DOCD: CPT | Mod: CPTII,S$GLB,, | Performed by: SURGERY

## 2024-07-02 PROCEDURE — 85007 BL SMEAR W/DIFF WBC COUNT: CPT | Performed by: SURGERY

## 2024-07-02 PROCEDURE — 1160F RVW MEDS BY RX/DR IN RCRD: CPT | Mod: CPTII,S$GLB,, | Performed by: SURGERY

## 2024-07-02 PROCEDURE — 82306 VITAMIN D 25 HYDROXY: CPT | Performed by: SURGERY

## 2024-07-02 RX ORDER — ENOXAPARIN SODIUM 100 MG/ML
40 INJECTION SUBCUTANEOUS EVERY 12 HOURS
OUTPATIENT
Start: 2024-07-02

## 2024-07-02 RX ORDER — ONDANSETRON 8 MG/1
8 TABLET, ORALLY DISINTEGRATING ORAL EVERY 6 HOURS PRN
Qty: 30 TABLET | Refills: 1 | Status: SHIPPED | OUTPATIENT
Start: 2024-07-02

## 2024-07-02 RX ORDER — PROCHLORPERAZINE EDISYLATE 5 MG/ML
5 INJECTION INTRAMUSCULAR; INTRAVENOUS EVERY 6 HOURS PRN
OUTPATIENT
Start: 2024-07-02

## 2024-07-02 RX ORDER — ACETAMINOPHEN 650 MG/20.3ML
1000 LIQUID ORAL EVERY 8 HOURS
OUTPATIENT
Start: 2024-07-02

## 2024-07-02 RX ORDER — POLYETHYLENE GLYCOL 3350 17 G/17G
17 POWDER, FOR SOLUTION ORAL DAILY PRN
Qty: 238 G | Refills: 0 | Status: SHIPPED | OUTPATIENT
Start: 2024-07-02

## 2024-07-02 RX ORDER — PANTOPRAZOLE SODIUM 40 MG/10ML
40 INJECTION, POWDER, LYOPHILIZED, FOR SOLUTION INTRAVENOUS 2 TIMES DAILY
OUTPATIENT
Start: 2024-07-02

## 2024-07-02 RX ORDER — SODIUM CHLORIDE, SODIUM LACTATE, POTASSIUM CHLORIDE, CALCIUM CHLORIDE 600; 310; 30; 20 MG/100ML; MG/100ML; MG/100ML; MG/100ML
INJECTION, SOLUTION INTRAVENOUS CONTINUOUS
OUTPATIENT
Start: 2024-07-02

## 2024-07-02 RX ORDER — GABAPENTIN 250 MG/5ML
300 SOLUTION ORAL 2 TIMES DAILY
OUTPATIENT
Start: 2024-07-02

## 2024-07-02 RX ORDER — KETOROLAC TROMETHAMINE 15 MG/ML
15 INJECTION, SOLUTION INTRAMUSCULAR; INTRAVENOUS ONCE
OUTPATIENT
Start: 2024-07-02 | End: 2024-07-02

## 2024-07-02 RX ORDER — OXYCODONE HCL 5 MG/5 ML
5 SOLUTION, ORAL ORAL EVERY 6 HOURS PRN
OUTPATIENT
Start: 2024-07-02

## 2024-07-02 RX ORDER — GABAPENTIN 250 MG/5ML
300 SOLUTION ORAL 2 TIMES DAILY
Qty: 60 ML | Refills: 0 | Status: SHIPPED | OUTPATIENT
Start: 2024-07-02

## 2024-07-02 RX ORDER — MUPIROCIN 20 MG/G
OINTMENT TOPICAL
OUTPATIENT
Start: 2024-07-02

## 2024-07-02 RX ORDER — SODIUM CHLORIDE 9 MG/ML
INJECTION, SOLUTION INTRAVENOUS CONTINUOUS
OUTPATIENT
Start: 2024-07-02

## 2024-07-02 RX ORDER — FAMOTIDINE 10 MG/ML
20 INJECTION INTRAVENOUS ONCE
OUTPATIENT
Start: 2024-07-02 | End: 2024-07-02

## 2024-07-02 RX ORDER — ACETAMINOPHEN 650 MG/20.3ML
500 LIQUID ORAL
OUTPATIENT
Start: 2024-07-02

## 2024-07-02 RX ORDER — DEXTROMETHORPHAN/PSEUDOEPHED 2.5-7.5/.8
40 DROPS ORAL 4 TIMES DAILY PRN
OUTPATIENT
Start: 2024-07-02

## 2024-07-02 RX ORDER — HEPARIN SODIUM 5000 [USP'U]/ML
5000 INJECTION, SOLUTION INTRAVENOUS; SUBCUTANEOUS ONCE
OUTPATIENT
Start: 2024-07-02 | End: 2024-07-02

## 2024-07-02 RX ORDER — URSODIOL 500 MG/1
TABLET, FILM COATED ORAL
Qty: 90 TABLET | Refills: 1 | Status: SHIPPED | OUTPATIENT
Start: 2024-07-02

## 2024-07-02 RX ORDER — SCOLOPAMINE TRANSDERMAL SYSTEM 1 MG/1
1 PATCH, EXTENDED RELEASE TRANSDERMAL ONCE
OUTPATIENT
Start: 2024-07-02 | End: 2024-07-02

## 2024-07-02 RX ORDER — ONDANSETRON HYDROCHLORIDE 2 MG/ML
8 INJECTION, SOLUTION INTRAVENOUS EVERY 6 HOURS
OUTPATIENT
Start: 2024-07-02

## 2024-07-02 RX ORDER — LIDOCAINE HYDROCHLORIDE 10 MG/ML
1 INJECTION, SOLUTION EPIDURAL; INFILTRATION; INTRACAUDAL; PERINEURAL ONCE
OUTPATIENT
Start: 2024-07-02 | End: 2024-07-02

## 2024-07-02 RX ORDER — ACETAMINOPHEN 10 MG/ML
1000 INJECTION, SOLUTION INTRAVENOUS ONCE
OUTPATIENT
Start: 2024-07-02 | End: 2024-07-02

## 2024-07-02 NOTE — H&P (VIEW-ONLY)
History & Physical    SUBJECTIVE:     History of Present Illness:  Dejah Schmidt is a 63 y.o. female who presents for pre-operative exam for weight loss surgery.  she has completed her pre-operative evaluation.  she has failed medical treatment for obesity.  1. Morbid obesity, body mass index is 46.14 kg/m². and inability to maintain weight loss and lose weight   2. Co-morbidities: arthritis back/spinal stenosis with neurogenic claudication, ckd3a improved,essential hypertension and hyperlipidemia  3. Vit d deficiency under treatment    Chief Complaint   Patient presents with    Pre-op Exam       Review of patient's allergies indicates:  No Known Allergies    Current Outpatient Medications   Medication Sig    albuterol (PROVENTIL/VENTOLIN HFA) 90 mcg/actuation inhaler Inhale 2 puffs into the lungs every 6 (six) hours as needed for Wheezing or Shortness of Breath. Rescue    amLODIPine (NORVASC) 10 MG tablet TAKE 1 TABLET(10 MG) BY MOUTH EVERY DAY    atorvastatin (LIPITOR) 40 MG tablet Take 1 tablet (40 mg total) by mouth once daily.    gabapentin (NEURONTIN) 600 MG tablet TAKE 1 TABLET(600 MG) BY MOUTH THREE TIMES DAILY    levothyroxine (SYNTHROID) 100 MCG tablet TAKE 1 TABLET(100 MCG) BY MOUTH BEFORE BREAKFAST    lisinopriL (PRINIVIL,ZESTRIL) 40 MG tablet Take 1 tablet (40 mg total) by mouth once daily.    spironolactone (ALDACTONE) 50 MG tablet Take 1 pill by mouth daily    spironolactone (ALDACTONE) 50 MG tablet Take 1 tablet (50 mg total) by mouth once daily.    triamterene-hydrochlorothiazide 37.5-25 mg (DYAZIDE) 37.5-25 mg per capsule TAKE 1 CAPSULE BY MOUTH EVERY MORNING    butalbital-acetaminophen-caffeine -40 mg (FIORICET, ESGIC) -40 mg per tablet Take by mouth. (Patient not taking: Reported on 7/2/2024)    diclofenac sodium (VOLTAREN) 1 % Gel Apply 2 g topically 4 (four) times daily as needed. To painful area on the feet. (Patient not taking: Reported on 7/2/2024)    nitroGLYCERIN (NITROSTAT)  0.4 MG SL tablet Place 1 tablet (0.4 mg total) under the tongue every 5 (five) minutes as needed for Chest pain. (Patient not taking: Reported on 2024)    semaglutide (OZEMPIC) 0.25 mg or 0.5 mg (2 mg/3 mL) pen injector Inject 0.5 mg into the skin every 7 days. Start with 0.25 mg once a week for 4 weeks, then increase to 0.5 mg once a week     No current facility-administered medications for this visit.       Past Medical History:   Diagnosis Date    Arthritis 2015    Asthma     Colon polyp     Hypercholesteremia     Hypertension     Multiple thyroid nodules     Neuromuscular disorder 2017    Sleep apnea        Past Surgical History:   Procedure Laterality Date    BACK SURGERY      CARPAL TUNNEL RELEASE       SECTION   &    COLONOSCOPY N/A 2020    Procedure: COLONOSCOPY, with me, ;  Surgeon: Sarah Beth Leavitt MD;  Location: [facility] (4TH FLR);  Service: Endoscopy;  Laterality: N/A;  covid test     EPIDURAL STEROID INJECTION N/A 10/27/2021    Procedure: INJECTION, STEROID, EPIDURAL,L5/S1 DIRECT REF/NEED CONSENT;  Surgeon: Clement Aldana MD;  Location: [facility] PAIN MGT;  Service: Pain Management;  Laterality: N/A;    EPIDURAL STEROID INJECTION N/A 2022    Procedure: INJECTION, STEROID, EPIDURAL, L5-S1 CONTRAST DIRECT REF;  Surgeon: Clement Aldana MD;  Location: [facility] PAIN MGT;  Service: Pain Management;  Laterality: N/A;    FOOT SURGERY      HYSTERECTOMY      MASS EXCISION      at neck    ROTATOR CUFF REPAIR      left shoulder    SPINE SURGERY      THYROIDECTOMY N/A 2022    Procedure: THYROIDECTOMY, total;  Surgeon: Anai Garcia MD;  Location: [facility] 2ND FLR;  Service: General;  Laterality: N/A;    TRIGGER FINGER RELEASE  2017    right hand    TRIGGER FINGER RELEASE Right 2022    Procedure: RELEASE, TRIGGER FINGER - right thumb;  Surgeon: Cristopher Tolliver MD;  Location: [facility];  Service: Orthopedics;  Laterality: Right;    TRIGGER FINGER  "RELEASE Left 03/09/2022    Procedure: RELEASE, TRIGGER FINGER - LEFT index and long;  Surgeon: Cristopher Tolliver MD;  Location: [facility];  Service: Orthopedics;  Laterality: Left;    TUBAL LIGATION  1998       Review of Systems   Constitutional:  Negative for fever.   Respiratory:  Negative for cough and shortness of breath.    Cardiovascular:  Negative for chest pain.   Gastrointestinal:  Positive for constipation, nausea and vomiting. Negative for abdominal pain, diarrhea and heartburn.        Had n/v on Friday after protein shakes.  This has resolved.  Uses mirilax for chronic constipation.   Genitourinary:  Negative for dysuria.   Endo/Heme/Allergies:  Does not bruise/bleed easily.          OBJECTIVE:     Vitals:    07/02/24 1354   Weight: 129.3 kg (285 lb 0.9 oz)   Height: 5' 6" (1.676 m)       Physical Exam  Vitals reviewed.   Constitutional:       Appearance: She is obese.   Abdominal:      Palpations: Abdomen is soft.      Tenderness: There is no abdominal tenderness.      Hernia: No hernia is present.   Skin:     General: Skin is warm and dry.   Neurological:      General: No focal deficit present.      Mental Status: She is alert and oriented to person, place, and time.   Psychiatric:         Mood and Affect: Mood normal.         Behavior: Behavior normal.         Thought Content: Thought content normal.         Judgment: Judgment normal.          Laboratory  CBC: Reviewed  CMP: Reviewed  Vitamins reviewed, vit d deficiency    Diagnostic Results:  Cxr reviewed, ok  Echo, event monitor and cardiac pet reviewed, pet results below    The myocardial perfusion images are normal without evidence of ischemia or scar.    The whole heart absolute myocardial perfusion values averaged 0.55 cc/min/g at rest, which is reduced; 22.34 cc/min/g at stress, which is normal; and CFR is 3.82 , which is normal.    CT attenuation images demonstrate no coronary calcifications and mild diffuse aortic calcifications in the ascending " aorta and descending aorta.    The gated perfusion images showed an ejection fraction of 64% at rest and 71% during stress. A normal ejection fraction is greater than 47%.    The wall motion is normal at rest and during stress.    The LV cavity size is normal at rest and stress.    The ECG portion of the study is negative for ischemia.    There were no arrhythmias during stress.    The patient reported no chest pain during the stress test.    There are no prior studies for comparison.    Dietitian: Patient has participated in pre-operative nutritional program with understanding of necessary lifelong dietary changes required with surgery.    Psych: No overt contraindications to bariatric surgery, patient has completed psychological evaluation and is able to give informed consent.    Clearance: pcp    ASSESSMENT/PLAN:     Morbid obesity with failure of medical conservative therapy.    Co Morbid Conditions:   Patient Active Problem List   Diagnosis    Unspecified essential hypertension    Morbid obesity with BMI of 45.0-49.9, adult    Vitamin D deficiency disease    Chronic back pain    Lumbar radiculopathy    Facet arthropathy, lumbar    Facet hypertrophy of lumbar region    Hand pain    Herniation of lumbar intervertebral disc with radiculopathy    Spinal stenosis of lumbar region with neurogenic claudication    Chronic pain    Chronic kidney disease, stage 3a    Trigger thumb, right thumb    Trigger finger, left middle finger    Trigger finger, left index finger    Finger stiffness, left    Pain of finger of left hand    Postoperative hypothyroidism    Hyperparathyroidism    Chest pain    Elevated troponin       Patient wishes to undergo  Robotic Sleeve  with egd.      The patient was informed that risks include, but are not limited to: death, leak, obstruction, bleeding, and sepsis. Any of these could require further surgery. Other risks include DVT, PE, pneumonia, wound dehiscence, hernia, wound infection, the  need for dilatations and the inability to lose appropriate weight and keep it off.     We discussed that our goal is to ameliorate her medical problems and not to obtain a specific body mass index. she understands the risks and benefits and wishes to proceed with the procedure.  she has signed a consent form.

## 2024-07-03 ENCOUNTER — PATIENT MESSAGE (OUTPATIENT)
Dept: BARIATRICS | Facility: CLINIC | Age: 64
End: 2024-07-03
Payer: MEDICARE

## 2024-07-07 ENCOUNTER — PATIENT MESSAGE (OUTPATIENT)
Dept: INTERNAL MEDICINE | Facility: CLINIC | Age: 64
End: 2024-07-07
Payer: MEDICARE

## 2024-07-08 RX ORDER — TRIAMTERENE/HYDROCHLOROTHIAZID 37.5-25 MG
1 TABLET ORAL DAILY
Qty: 30 TABLET | Refills: 2 | Status: SHIPPED | OUTPATIENT
Start: 2024-07-08 | End: 2025-07-08

## 2024-07-09 ENCOUNTER — PATIENT MESSAGE (OUTPATIENT)
Dept: BARIATRICS | Facility: CLINIC | Age: 64
End: 2024-07-09
Payer: MEDICARE

## 2024-07-09 RX ORDER — GABAPENTIN 250 MG/5ML
300 SOLUTION ORAL 2 TIMES DAILY
Qty: 175 ML | Refills: 0 | Status: ON HOLD | OUTPATIENT
Start: 2024-07-09 | End: 2024-07-13 | Stop reason: HOSPADM

## 2024-07-10 NOTE — PRE-PROCEDURE INSTRUCTIONS
PREOP INSTRUCTIONS:  No food,milk or milk products for 8 hours before surgery.  Clear liquids like water,gatorade,apple juice are allowed up until 2 hours before surgery.  Instructed to follow the surgeon's instructions if they differ from these.  Shower instructions as well as directions to the Surgery Center were given.  Encouraged to wear loose fitting,comfortable clothing.  Medication instructions for pm prior to and am of procedure reviewed.  Instructed to avoid taking vitamins,supplements,aspirin and ibuprofen the morning of surgery.    Patient denies any side effects or issues with anesthesia or sedation.     Patient does not know arrival time.Explained that this information comes from the surgeon's office and if they haven't heard from them by 2 or 3 pm 7/11/2024 to call the office.Patient stated an understanding.

## 2024-07-11 ENCOUNTER — ANESTHESIA EVENT (OUTPATIENT)
Dept: SURGERY | Facility: HOSPITAL | Age: 64
End: 2024-07-11
Payer: MEDICARE

## 2024-07-11 ENCOUNTER — TELEPHONE (OUTPATIENT)
Dept: BARIATRICS | Facility: CLINIC | Age: 64
End: 2024-07-11
Payer: MEDICARE

## 2024-07-11 NOTE — TELEPHONE ENCOUNTER
Notified patient of arrival time to the [facility] at 0730 with expected surgery start time 0925 on 4/12/2024. Instructed patient regarding pre-op instructions including no protein shakes or sugar free clear liquids after midnight but can have a rare sip of water for comfort, showering and preop medications to hold/take per anesthesia/preop. Reminded pt to drink pre-surgery beverage or 8 oz water and take one dose of Gabapentin at 0700. Instructed patient on the s/s of dehydration and for patient to call at the first sign of dehydration. Informed patient that someone from bariatrics will call them 1 week post op to review diet/fluid intake and to ensure adequate hydration. Reminded patient not to take antibiotics for 30 days following surgery or schedule elective procedures involving anesthesia/sedation for 30 days following surgery unless checking with the bariatric clinic first. Pt verbalized understanding. Pt given office phone number for any additional questions/concerns.

## 2024-07-11 NOTE — ANESTHESIA PREPROCEDURE EVALUATION
[facility]  Anesthesia Pre-Operative Evaluation        Patient Name: Dejah Schmidt  YOB: 1960  MRN: [id number]    SUBJECTIVE:     Pre-operative Evaluation for Procedure(s) (LRB):  XI ROBOTIC SLEEVE GASTRECTOMY with intraop EGD (N/A)     07/11/2024    Dejah Schmidt is a 63 y.o. female with a PMHx significant for asthma, HTN, CKD3, CALIXTO.  She now presents for robotic sleeve gastrectomy w/ EGD.    She now presents for the above procedure(s) with General Surgery - Dr. Sebastian.    Cardiac PET stress on 11/22/23 negative for ischemia.  Results for orders placed during the hospital encounter of 11/21/23    Echo    Interpretation Summary    Left Ventricle: The left ventricle is normal in size. Normal wall thickness. There is concentric remodeling. Normal wall motion. There is normal systolic function with a visually estimated ejection fraction of 55 - 60%. There is normal diastolic function.    Right Ventricle: Normal right ventricular cavity size. Wall thickness is normal. Right ventricle wall motion  is normal. Systolic function is normal.    Pulmonic Valve: There is mild regurgitation.    Pulmonary Artery: The estimated pulmonary artery systolic pressure is 22 mmHg.    IVC/SVC: Normal venous pressure at 3 mmHg.      Previous Airway:    Intubation     Date/Time: 6/29/2022 8:12 AM  Performed by: Sohail Merida MD  Authorized by: Ang Kwan MD      Intubation:     Induction:  Intravenous    Intubated:  Postinduction    Mask Ventilation:  Easy with oral airway    Attempts:  1    Attempted By:  Resident anesthesiologist    Method of Intubation:  Video laryngoscopy    Blade:  Steele 3    Laryngeal View Grade: Grade I - full view of cords      Difficult Airway Encountered?: No      Complications:  None    Airway Device:  EMG ETT (NIMS)    Airway Device Size:  7.0    Style/Cuff Inflation:  Cuffed (inflated to minimal occlusive pressure)    Tube secured:  22    Secured at:  The lips     Placement Verified By:  Capnometry    Complicating Factors:  None    Findings Post-Intubation:  BS equal bilateral and atraumatic/condition of teeth unchanged    Patient Active Problem List   Diagnosis    Unspecified essential hypertension    Morbid obesity with BMI of 45.0-49.9, adult    Vitamin D deficiency disease    Chronic back pain    Lumbar radiculopathy    Facet arthropathy, lumbar    Facet hypertrophy of lumbar region    Hand pain    Herniation of lumbar intervertebral disc with radiculopathy    Spinal stenosis of lumbar region with neurogenic claudication    Chronic pain    Chronic kidney disease, stage 3a    Trigger thumb, right thumb    Trigger finger, left middle finger    Trigger finger, left index finger    Finger stiffness, left    Pain of finger of left hand    Postoperative hypothyroidism    Hyperparathyroidism    Chest pain    Elevated troponin       Review of patient's allergies indicates:  No Known Allergies    Current Outpatient Medications   Medication Instructions    albuterol (PROVENTIL/VENTOLIN HFA) 90 mcg/actuation inhaler 2 puffs, Inhalation, Every 6 hours PRN, Rescue    amLODIPine (NORVASC) 10 MG tablet TAKE 1 TABLET(10 MG) BY MOUTH EVERY DAY    atorvastatin (LIPITOR) 40 mg, Oral, Daily    diclofenac sodium (VOLTAREN) 2 g, Topical (Top), 4 times daily PRN, To painful area on the feet.    gabapentin (NEURONTIN) 600 MG tablet TAKE 1 TABLET(600 MG) BY MOUTH THREE TIMES DAILY    gabapentin (NEURONTIN) 300 mg, Oral, 2 times daily, Take first dose 2.5 hours prior to surgery.  Start night after discharge twice a day for 3 days.    gabapentin (NEURONTIN) 300 mg, Oral, 2 times daily    levothyroxine (SYNTHROID) 100 mcg, Oral, Before breakfast    lisinopriL (PRINIVIL,ZESTRIL) 40 mg, Oral, Daily    nitroGLYCERIN (NITROSTAT) 0.4 mg, Sublingual, Every 5 min PRN    ondansetron (ZOFRAN-ODT) 8 MG TbDL Dissolve 1 tablet (8 mg total) by mouth every 6 (six) hours as needed.    polyethylene glycol  (GLYCOLAX) 17 gram/dose powder Mix 1 capful (17 g) with liquid and take by mouth daily as needed (constipation).    spironolactone (ALDACTONE) 50 mg, Oral, Daily    triamterene-hydrochlorothiazide 37.5-25 mg (MAXZIDE-25) 37.5-25 mg per tablet 1 tablet, Oral, Daily    ursodioL (PRIYA FORTE) 500 MG tablet Crush one tablet daily for gall bladder.       Past Surgical History:   Procedure Laterality Date    BACK SURGERY      CARPAL TUNNEL RELEASE       SECTION   &    COLONOSCOPY N/A 2020    Procedure: COLONOSCOPY, with me, ;  Surgeon: Sarah Beth Leavitt MD;  Location: [facility] (4TH FLR);  Service: Endoscopy;  Laterality: N/A;  covid test     EPIDURAL STEROID INJECTION N/A 10/27/2021    Procedure: INJECTION, STEROID, EPIDURAL,L5/S1 DIRECT REF/NEED CONSENT;  Surgeon: Clement Aldana MD;  Location: [facility] PAIN MGT;  Service: Pain Management;  Laterality: N/A;    EPIDURAL STEROID INJECTION N/A 2022    Procedure: INJECTION, STEROID, EPIDURAL, L5-S1 CONTRAST DIRECT REF;  Surgeon: Clement Aldana MD;  Location: [facility] PAIN MGT;  Service: Pain Management;  Laterality: N/A;    FOOT SURGERY      HYSTERECTOMY      MASS EXCISION      at neck    ROTATOR CUFF REPAIR      left shoulder    SPINE SURGERY  2017    THYROIDECTOMY N/A 2022    Procedure: THYROIDECTOMY, total;  Surgeon: Anai Garcia MD;  Location: [facility] 2ND FLR;  Service: General;  Laterality: N/A;    TRIGGER FINGER RELEASE  2017    right hand    TRIGGER FINGER RELEASE Right 2022    Procedure: RELEASE, TRIGGER FINGER - right thumb;  Surgeon: Cristopher Tolliver MD;  Location: [facility] OR;  Service: Orthopedics;  Laterality: Right;    TRIGGER FINGER RELEASE Left 2022    Procedure: RELEASE, TRIGGER FINGER - LEFT index and long;  Surgeon: Cristopher Tolliver MD;  Location: [facility] OR;  Service: Orthopedics;  Laterality: Left;    TUBAL LIGATION         Social History     Substance and Sexual Activity   Drug Use No     Alcohol  Use: Not At Risk (11/27/2023)    AUDIT-C     Frequency of Alcohol Consumption: Monthly or less     Average Number of Drinks: 1 or 2     Frequency of Binge Drinking: Never     Tobacco Use: Low Risk  (7/2/2024)    Patient History     Smoking Tobacco Use: Never     Smokeless Tobacco Use: Never     Passive Exposure: Not on file       OBJECTIVE:     Vital Signs Range (Last 24H):         Significant Labs    Heme Profile  Lab Results   Component Value Date    WBC 4.22 07/02/2024    HGB 14.3 07/02/2024    HCT 41.9 07/02/2024     07/02/2024       Coagulation Studies  Lab Results   Component Value Date    LABPROT 10.0 06/15/2017    INR 0.9 06/15/2017    APTT 26.9 06/15/2017       BMP  Lab Results   Component Value Date     07/02/2024    K 3.8 07/02/2024     07/02/2024    CO2 20 (L) 07/02/2024    BUN 21 07/02/2024    CREATININE 1.1 07/02/2024    MG 2.0 03/04/2024    PHOS 2.9 03/04/2024       Liver Function Tests  Lab Results   Component Value Date    AST 35 07/02/2024    ALT 23 07/02/2024    ALKPHOS 68 07/02/2024    BILITOT 0.8 07/02/2024    PROT 8.3 07/02/2024    ALBUMIN 4.0 07/02/2024       Lipid Profile  Lab Results   Component Value Date    CHOL 151 03/04/2024    HDL 33 (L) 03/04/2024    TRIG 65 03/04/2024       Endocrine Profile  Lab Results   Component Value Date    HGBA1C 5.3 03/04/2024    TSH 2.371 03/04/2024       Diagnostic Studies        Cardiac Studies    EKG:   Results for orders placed or performed during the hospital encounter of 11/21/23   EKG 12-lead    Collection Time: 11/21/23  1:04 PM    Narrative    Test Reason : R06.02,    Vent. Rate : 077 BPM     Atrial Rate : 077 BPM     P-R Int : 126 ms          QRS Dur : 096 ms      QT Int : 392 ms       P-R-T Axes : 042 -23 008 degrees     QTc Int : 443 ms    Normal sinus rhythm  Minimal voltage criteria for LVH, may be normal variant ( [place] product )  Borderline Abnormal ECG  When compared with ECG of 25-APR-2022 14:07,  No significant change  was found  Confirmed by Gloria Otoole MD ([id number]) on 11/21/2023 1:24:03 PM    Referred By: [email or web address]   SELF           Confirmed By:Gloria Otoole MD       Transthoracic Echo:  Results for orders placed during the hospital encounter of 11/21/23    Echo    Interpretation Summary    Left Ventricle: The left ventricle is normal in size. Normal wall thickness. There is concentric remodeling. Normal wall motion. There is normal systolic function with a visually estimated ejection fraction of 55 - 60%. There is normal diastolic function.    Right Ventricle: Normal right ventricular cavity size. Wall thickness is normal. Right ventricle wall motion  is normal. Systolic function is normal.    Pulmonic Valve: There is mild regurgitation.    Pulmonary Artery: The estimated pulmonary artery systolic pressure is 22 mmHg.    IVC/SVC: Normal venous pressure at 3 mmHg.      Transesophageal Echo:  No results found for this or any previous visit.      Nuclear Stress Test    The myocardial perfusion images are normal without evidence of ischemia or scar.    The whole heart absolute myocardial perfusion values averaged 0.55 cc/min/g at rest, which is reduced; 22.34 cc/min/g at stress, which is normal; and CFR is 3.82 , which is normal.    CT attenuation images demonstrate no coronary calcifications and mild diffuse aortic calcifications in the ascending aorta and descending aorta.    The gated perfusion images showed an ejection fraction of 64% at rest and 71% during stress. A normal ejection fraction is greater than 47%.    The wall motion is normal at rest and during stress.    The LV cavity size is normal at rest and stress.    The ECG portion of the study is negative for ischemia.    There were no arrhythmias during stress.    The patient reported no chest pain during the stress test.    There are no prior studies for comparison.       ASSESSMENT/PLAN:           Pre-op Assessment    I have reviewed the Patient Summary Reports.      I have reviewed the Nursing Notes.    I have reviewed the Medications.     Review of Systems  Anesthesia Hx:  No problems with previous Anesthesia             Denies Family Hx of Anesthesia complications.    Denies Personal Hx of Anesthesia complications.                    Hematology/Oncology:  Hematology Normal   Oncology Normal                                   Cardiovascular:     Hypertension                                        Pulmonary:    Asthma    Sleep Apnea                Renal/:  Chronic Renal Disease                Hepatic/GI:  Hepatic/GI Normal     Denies GERD. (but on ozempic)             Neurological:    Neuromuscular Disease,                                   Endocrine:  Denies Diabetes.         Morbid Obesity / BMI > 40      Physical Exam  General: Well nourished, Cooperative, Alert and Oriented    Airway:  TM Distance: Normal  Tongue: Normal  Neck ROM: Normal ROM    Chest/Lungs:  Normal Respiratory Rate        Anesthesia Plan  Type of Anesthesia, risks & benefits discussed:    Anesthesia Type: Gen ETT  Intra-op Monitoring Plan: Standard ASA Monitors  Post Op Pain Control Plan: multimodal analgesia and peripheral nerve block  Induction:  IV and rapid sequence  Airway Plan: Direct and Video, Post-Induction  Informed Consent: Informed consent signed with the Patient and all parties understand the risks and agree with anesthesia plan.  All questions answered.   ASA Score: 3  Day of Surgery Review of History & Physical: H&P Update referred to the surgeon/provider.    Ready For Surgery From Anesthesia Perspective.     .

## 2024-07-12 ENCOUNTER — ANESTHESIA (OUTPATIENT)
Dept: SURGERY | Facility: HOSPITAL | Age: 64
End: 2024-07-12
Payer: MEDICARE

## 2024-07-12 ENCOUNTER — HOSPITAL ENCOUNTER (INPATIENT)
Facility: HOSPITAL | Age: 64
LOS: 1 days | Discharge: HOME | DRG: 621 | End: 2024-07-13
Attending: SURGERY | Admitting: SURGERY
Payer: MEDICARE

## 2024-07-12 DIAGNOSIS — E55.9 VITAMIN D DEFICIENCY DISEASE: Chronic | ICD-10-CM

## 2024-07-12 DIAGNOSIS — E66.01 MORBID OBESITY WITH BMI OF 45.0-49.9, ADULT: Primary | Chronic | ICD-10-CM

## 2024-07-12 DIAGNOSIS — E66.01 MORBID OBESITY: ICD-10-CM

## 2024-07-12 PROBLEM — E66.9 OBESITY: Status: ACTIVE | Noted: 2024-07-12

## 2024-07-12 PROCEDURE — 25000003 PHARM REV CODE 250

## 2024-07-12 PROCEDURE — 94761 N-INVAS EAR/PLS OXIMETRY MLT: CPT

## 2024-07-12 PROCEDURE — 25000003 PHARM REV CODE 250: Performed by: SURGERY

## 2024-07-12 PROCEDURE — 63600175 PHARM REV CODE 636 W HCPCS

## 2024-07-12 PROCEDURE — 0DJ08ZZ INSPECTION OF UPPER INTESTINAL TRACT, VIA NATURAL OR ARTIFICIAL OPENING ENDOSCOPIC: ICD-10-PCS | Performed by: SURGERY

## 2024-07-12 PROCEDURE — 37000008 HC ANESTHESIA 1ST 15 MINUTES: Performed by: SURGERY

## 2024-07-12 PROCEDURE — 76942 ECHO GUIDE FOR BIOPSY: CPT

## 2024-07-12 PROCEDURE — 88307 TISSUE EXAM BY PATHOLOGIST: CPT | Performed by: STUDENT IN AN ORGANIZED HEALTH CARE EDUCATION/TRAINING PROGRAM

## 2024-07-12 PROCEDURE — 71000016 HC POSTOP RECOV ADDL HR: Performed by: SURGERY

## 2024-07-12 PROCEDURE — 8E0W4CZ ROBOTIC ASSISTED PROCEDURE OF TRUNK REGION, PERCUTANEOUS ENDOSCOPIC APPROACH: ICD-10-PCS | Performed by: SURGERY

## 2024-07-12 PROCEDURE — 0DB64Z3 EXCISION OF STOMACH, PERCUTANEOUS ENDOSCOPIC APPROACH, VERTICAL: ICD-10-PCS | Performed by: SURGERY

## 2024-07-12 PROCEDURE — 63600175 PHARM REV CODE 636 W HCPCS: Mod: JZ,JG | Performed by: SURGERY

## 2024-07-12 PROCEDURE — 71000015 HC POSTOP RECOV 1ST HR: Performed by: SURGERY

## 2024-07-12 PROCEDURE — 63600175 PHARM REV CODE 636 W HCPCS: Performed by: SURGERY

## 2024-07-12 PROCEDURE — 25000242 PHARM REV CODE 250 ALT 637 W/ HCPCS: Performed by: SURGERY

## 2024-07-12 PROCEDURE — 71000033 HC RECOVERY, INTIAL HOUR: Performed by: SURGERY

## 2024-07-12 PROCEDURE — 64461 PVB THORACIC SINGLE INJ SITE: CPT

## 2024-07-12 PROCEDURE — 27201423 OPTIME MED/SURG SUP & DEVICES STERILE SUPPLY: Performed by: SURGERY

## 2024-07-12 PROCEDURE — 11000001 HC ACUTE MED/SURG PRIVATE ROOM

## 2024-07-12 PROCEDURE — 37000009 HC ANESTHESIA EA ADD 15 MINS: Performed by: SURGERY

## 2024-07-12 PROCEDURE — 43775 LAP SLEEVE GASTRECTOMY: CPT | Mod: ,,, | Performed by: SURGERY

## 2024-07-12 PROCEDURE — 36000712 HC OR TIME LEV V 1ST 15 MIN: Performed by: SURGERY

## 2024-07-12 PROCEDURE — 88342 IMHCHEM/IMCYTCHM 1ST ANTB: CPT | Performed by: STUDENT IN AN ORGANIZED HEALTH CARE EDUCATION/TRAINING PROGRAM

## 2024-07-12 PROCEDURE — 36000713 HC OR TIME LEV V EA ADD 15 MIN: Performed by: SURGERY

## 2024-07-12 RX ORDER — MUPIROCIN 20 MG/G
OINTMENT TOPICAL
Status: DISCONTINUED | OUTPATIENT
Start: 2024-07-12 | End: 2024-07-12 | Stop reason: HOSPADM

## 2024-07-12 RX ORDER — DEXAMETHASONE SODIUM PHOSPHATE 4 MG/ML
INJECTION, SOLUTION INTRA-ARTICULAR; INTRALESIONAL; INTRAMUSCULAR; INTRAVENOUS; SOFT TISSUE
Status: DISCONTINUED | OUTPATIENT
Start: 2024-07-12 | End: 2024-07-12

## 2024-07-12 RX ORDER — HYDROMORPHONE HYDROCHLORIDE 1 MG/ML
0.2 INJECTION, SOLUTION INTRAMUSCULAR; INTRAVENOUS; SUBCUTANEOUS EVERY 5 MIN PRN
Status: DISCONTINUED | OUTPATIENT
Start: 2024-07-12 | End: 2024-07-12 | Stop reason: HOSPADM

## 2024-07-12 RX ORDER — SCOLOPAMINE TRANSDERMAL SYSTEM 1 MG/1
1 PATCH, EXTENDED RELEASE TRANSDERMAL ONCE
Status: DISCONTINUED | OUTPATIENT
Start: 2024-07-12 | End: 2024-07-13 | Stop reason: HOSPADM

## 2024-07-12 RX ORDER — GABAPENTIN 250 MG/5ML
300 SOLUTION ORAL 2 TIMES DAILY
Status: DISCONTINUED | OUTPATIENT
Start: 2024-07-12 | End: 2024-07-13 | Stop reason: HOSPADM

## 2024-07-12 RX ORDER — PROCHLORPERAZINE EDISYLATE 5 MG/ML
5 INJECTION INTRAMUSCULAR; INTRAVENOUS EVERY 6 HOURS PRN
Status: DISCONTINUED | OUTPATIENT
Start: 2024-07-12 | End: 2024-07-13 | Stop reason: HOSPADM

## 2024-07-12 RX ORDER — KETAMINE HCL IN 0.9 % NACL 50 MG/5 ML
SYRINGE (ML) INTRAVENOUS
Status: DISCONTINUED | OUTPATIENT
Start: 2024-07-12 | End: 2024-07-12

## 2024-07-12 RX ORDER — LIDOCAINE HYDROCHLORIDE 20 MG/ML
INJECTION, SOLUTION EPIDURAL; INFILTRATION; INTRACAUDAL; PERINEURAL
Status: DISCONTINUED | OUTPATIENT
Start: 2024-07-12 | End: 2024-07-12

## 2024-07-12 RX ORDER — ROCURONIUM BROMIDE 10 MG/ML
INJECTION, SOLUTION INTRAVENOUS
Status: DISCONTINUED | OUTPATIENT
Start: 2024-07-12 | End: 2024-07-12

## 2024-07-12 RX ORDER — OXYCODONE HCL 5 MG/5 ML
5 SOLUTION, ORAL ORAL EVERY 6 HOURS PRN
Status: DISCONTINUED | OUTPATIENT
Start: 2024-07-12 | End: 2024-07-13 | Stop reason: HOSPADM

## 2024-07-12 RX ORDER — CEFAZOLIN SODIUM 1 G/3ML
INJECTION, POWDER, FOR SOLUTION INTRAMUSCULAR; INTRAVENOUS
Status: DISCONTINUED | OUTPATIENT
Start: 2024-07-12 | End: 2024-07-12

## 2024-07-12 RX ORDER — ENOXAPARIN SODIUM 100 MG/ML
40 INJECTION SUBCUTANEOUS EVERY 12 HOURS
Status: DISCONTINUED | OUTPATIENT
Start: 2024-07-12 | End: 2024-07-13 | Stop reason: HOSPADM

## 2024-07-12 RX ORDER — ESMOLOL HYDROCHLORIDE 10 MG/ML
INJECTION INTRAVENOUS
Status: DISCONTINUED | OUTPATIENT
Start: 2024-07-12 | End: 2024-07-12

## 2024-07-12 RX ORDER — ACETAMINOPHEN 650 MG/20.3ML
1000 LIQUID ORAL EVERY 8 HOURS
Status: DISCONTINUED | OUTPATIENT
Start: 2024-07-12 | End: 2024-07-13 | Stop reason: HOSPADM

## 2024-07-12 RX ORDER — PROPOFOL 10 MG/ML
VIAL (ML) INTRAVENOUS
Status: DISCONTINUED | OUTPATIENT
Start: 2024-07-12 | End: 2024-07-12

## 2024-07-12 RX ORDER — SODIUM CHLORIDE 0.9 % (FLUSH) 0.9 %
10 SYRINGE (ML) INJECTION
Status: DISCONTINUED | OUTPATIENT
Start: 2024-07-12 | End: 2024-07-12 | Stop reason: HOSPADM

## 2024-07-12 RX ORDER — ATORVASTATIN CALCIUM 40 MG/1
40 TABLET, FILM COATED ORAL EVERY MORNING
Status: DISCONTINUED | OUTPATIENT
Start: 2024-07-12 | End: 2024-07-13 | Stop reason: HOSPADM

## 2024-07-12 RX ORDER — LIDOCAINE HYDROCHLORIDE 10 MG/ML
1 INJECTION, SOLUTION EPIDURAL; INFILTRATION; INTRACAUDAL; PERINEURAL ONCE
Status: DISCONTINUED | OUTPATIENT
Start: 2024-07-12 | End: 2024-07-12 | Stop reason: HOSPADM

## 2024-07-12 RX ORDER — KETOROLAC TROMETHAMINE 15 MG/ML
15 INJECTION, SOLUTION INTRAMUSCULAR; INTRAVENOUS ONCE
Status: COMPLETED | OUTPATIENT
Start: 2024-07-12 | End: 2024-07-12

## 2024-07-12 RX ORDER — ALBUTEROL SULFATE 90 UG/1
2 AEROSOL, METERED RESPIRATORY (INHALATION) EVERY 6 HOURS PRN
Status: DISCONTINUED | OUTPATIENT
Start: 2024-07-12 | End: 2024-07-13 | Stop reason: HOSPADM

## 2024-07-12 RX ORDER — SUCCINYLCHOLINE CHLORIDE 20 MG/ML
INJECTION INTRAMUSCULAR; INTRAVENOUS
Status: DISCONTINUED | OUTPATIENT
Start: 2024-07-12 | End: 2024-07-12

## 2024-07-12 RX ORDER — HALOPERIDOL 5 MG/ML
0.5 INJECTION INTRAMUSCULAR EVERY 10 MIN PRN
Status: COMPLETED | OUTPATIENT
Start: 2024-07-12 | End: 2024-07-12

## 2024-07-12 RX ORDER — SODIUM CHLORIDE, SODIUM LACTATE, POTASSIUM CHLORIDE, CALCIUM CHLORIDE 600; 310; 30; 20 MG/100ML; MG/100ML; MG/100ML; MG/100ML
INJECTION, SOLUTION INTRAVENOUS CONTINUOUS
Status: DISCONTINUED | OUTPATIENT
Start: 2024-07-12 | End: 2024-07-13 | Stop reason: HOSPADM

## 2024-07-12 RX ORDER — ACETAMINOPHEN 10 MG/ML
1000 INJECTION, SOLUTION INTRAVENOUS ONCE
Status: COMPLETED | OUTPATIENT
Start: 2024-07-12 | End: 2024-07-12

## 2024-07-12 RX ORDER — GLUCAGON 1 MG
1 KIT INJECTION
Status: DISCONTINUED | OUTPATIENT
Start: 2024-07-12 | End: 2024-07-12 | Stop reason: HOSPADM

## 2024-07-12 RX ORDER — MIDAZOLAM HYDROCHLORIDE 1 MG/ML
.5-4 INJECTION, SOLUTION INTRAMUSCULAR; INTRAVENOUS
Status: DISCONTINUED | OUTPATIENT
Start: 2024-07-12 | End: 2024-07-12 | Stop reason: HOSPADM

## 2024-07-12 RX ORDER — PHENYLEPHRINE HCL IN 0.9% NACL 1 MG/10 ML
SYRINGE (ML) INTRAVENOUS
Status: DISCONTINUED | OUTPATIENT
Start: 2024-07-12 | End: 2024-07-12

## 2024-07-12 RX ORDER — HEPARIN SODIUM 5000 [USP'U]/ML
5000 INJECTION, SOLUTION INTRAVENOUS; SUBCUTANEOUS ONCE
Status: COMPLETED | OUTPATIENT
Start: 2024-07-12 | End: 2024-07-12

## 2024-07-12 RX ORDER — PANTOPRAZOLE SODIUM 40 MG/10ML
40 INJECTION, POWDER, LYOPHILIZED, FOR SOLUTION INTRAVENOUS 2 TIMES DAILY
Status: DISCONTINUED | OUTPATIENT
Start: 2024-07-12 | End: 2024-07-13 | Stop reason: HOSPADM

## 2024-07-12 RX ORDER — FAMOTIDINE 10 MG/ML
20 INJECTION INTRAVENOUS ONCE
Status: COMPLETED | OUTPATIENT
Start: 2024-07-12 | End: 2024-07-12

## 2024-07-12 RX ORDER — SODIUM CHLORIDE 9 MG/ML
INJECTION, SOLUTION INTRAVENOUS CONTINUOUS
Status: DISCONTINUED | OUTPATIENT
Start: 2024-07-12 | End: 2024-07-12

## 2024-07-12 RX ORDER — BUPIVACAINE HYDROCHLORIDE 7.5 MG/ML
INJECTION, SOLUTION EPIDURAL; RETROBULBAR
Status: COMPLETED | OUTPATIENT
Start: 2024-07-12 | End: 2024-07-12

## 2024-07-12 RX ORDER — ONDANSETRON HYDROCHLORIDE 2 MG/ML
8 INJECTION, SOLUTION INTRAVENOUS EVERY 6 HOURS
Status: DISCONTINUED | OUTPATIENT
Start: 2024-07-12 | End: 2024-07-13 | Stop reason: HOSPADM

## 2024-07-12 RX ORDER — ACETAMINOPHEN 650 MG/20.3ML
500 LIQUID ORAL
Status: DISCONTINUED | OUTPATIENT
Start: 2024-07-12 | End: 2024-07-13 | Stop reason: HOSPADM

## 2024-07-12 RX ORDER — LEVOTHYROXINE SODIUM 50 UG/1
100 TABLET ORAL
Status: DISCONTINUED | OUTPATIENT
Start: 2024-07-13 | End: 2024-07-13 | Stop reason: HOSPADM

## 2024-07-12 RX ORDER — ONDANSETRON HYDROCHLORIDE 2 MG/ML
INJECTION, SOLUTION INTRAVENOUS
Status: DISCONTINUED | OUTPATIENT
Start: 2024-07-12 | End: 2024-07-12

## 2024-07-12 RX ORDER — FENTANYL CITRATE 50 UG/ML
25-200 INJECTION, SOLUTION INTRAMUSCULAR; INTRAVENOUS
Status: DISCONTINUED | OUTPATIENT
Start: 2024-07-12 | End: 2024-07-12 | Stop reason: HOSPADM

## 2024-07-12 RX ADMIN — LIDOCAINE HYDROCHLORIDE 50 MG: 20 INJECTION, SOLUTION EPIDURAL; INFILTRATION; INTRACAUDAL; PERINEURAL at 10:07

## 2024-07-12 RX ADMIN — DEXAMETHASONE SODIUM PHOSPHATE 8 MG: 4 INJECTION INTRA-ARTICULAR; INTRALESIONAL; INTRAMUSCULAR; INTRAVENOUS; SOFT TISSUE at 10:07

## 2024-07-12 RX ADMIN — ONDANSETRON 4 MG: 2 INJECTION INTRAMUSCULAR; INTRAVENOUS at 11:07

## 2024-07-12 RX ADMIN — ROCURONIUM BROMIDE 10 MG: 10 INJECTION, SOLUTION INTRAVENOUS at 10:07

## 2024-07-12 RX ADMIN — Medication 100 MCG: at 11:07

## 2024-07-12 RX ADMIN — Medication 100 MCG: at 10:07

## 2024-07-12 RX ADMIN — ESMOLOL HYDROCHLORIDE 20 MG: 10 INJECTION INTRAVENOUS at 10:07

## 2024-07-12 RX ADMIN — GABAPENTIN 300 MG: 250 SOLUTION ORAL at 12:07

## 2024-07-12 RX ADMIN — FAMOTIDINE 20 MG: 10 INJECTION, SOLUTION INTRAVENOUS at 08:07

## 2024-07-12 RX ADMIN — Medication 10 MG: at 11:07

## 2024-07-12 RX ADMIN — HALOPERIDOL LACTATE 0.5 MG: 5 INJECTION, SOLUTION INTRAMUSCULAR at 03:07

## 2024-07-12 RX ADMIN — PANTOPRAZOLE SODIUM 40 MG: 40 INJECTION, POWDER, FOR SOLUTION INTRAVENOUS at 09:07

## 2024-07-12 RX ADMIN — PROPOFOL 200 MG: 10 INJECTION, EMULSION INTRAVENOUS at 10:07

## 2024-07-12 RX ADMIN — KETOROLAC TROMETHAMINE 15 MG: 15 INJECTION, SOLUTION INTRAMUSCULAR; INTRAVENOUS at 08:07

## 2024-07-12 RX ADMIN — SCOPALAMINE 1 PATCH: 1 PATCH, EXTENDED RELEASE TRANSDERMAL at 08:07

## 2024-07-12 RX ADMIN — SODIUM CHLORIDE, POTASSIUM CHLORIDE, SODIUM LACTATE AND CALCIUM CHLORIDE: 600; 310; 30; 20 INJECTION, SOLUTION INTRAVENOUS at 01:07

## 2024-07-12 RX ADMIN — ROCURONIUM BROMIDE 20 MG: 10 INJECTION, SOLUTION INTRAVENOUS at 11:07

## 2024-07-12 RX ADMIN — SODIUM CHLORIDE, SODIUM GLUCONATE, SODIUM ACETATE, POTASSIUM CHLORIDE, MAGNESIUM CHLORIDE, SODIUM PHOSPHATE, DIBASIC, AND POTASSIUM PHOSPHATE: .53; .5; .37; .037; .03; .012; .00082 INJECTION, SOLUTION INTRAVENOUS at 10:07

## 2024-07-12 RX ADMIN — Medication 200 MCG: at 11:07

## 2024-07-12 RX ADMIN — BUPIVACAINE HYDROCHLORIDE 30 ML: 7.5 INJECTION, SOLUTION EPIDURAL; RETROBULBAR at 09:07

## 2024-07-12 RX ADMIN — Medication 10 MG: at 10:07

## 2024-07-12 RX ADMIN — ACETAMINOPHEN 999.01 MG: 650 SOLUTION ORAL at 09:07

## 2024-07-12 RX ADMIN — CEFAZOLIN 3 G: 330 INJECTION, POWDER, FOR SOLUTION INTRAMUSCULAR; INTRAVENOUS at 10:07

## 2024-07-12 RX ADMIN — SODIUM CHLORIDE: 9 INJECTION, SOLUTION INTRAVENOUS at 08:07

## 2024-07-12 RX ADMIN — ONDANSETRON 8 MG: 2 INJECTION INTRAMUSCULAR; INTRAVENOUS at 05:07

## 2024-07-12 RX ADMIN — ACETAMINOPHEN 1000 MG: 10 INJECTION, SOLUTION INTRAVENOUS at 08:07

## 2024-07-12 RX ADMIN — PANTOPRAZOLE SODIUM 40 MG: 40 INJECTION, POWDER, FOR SOLUTION INTRAVENOUS at 12:07

## 2024-07-12 RX ADMIN — SUCCINYLCHOLINE CHLORIDE 140 MG: 20 INJECTION, SOLUTION INTRAMUSCULAR; INTRAVENOUS; PARENTERAL at 10:07

## 2024-07-12 RX ADMIN — ROCURONIUM BROMIDE 60 MG: 10 INJECTION, SOLUTION INTRAVENOUS at 10:07

## 2024-07-12 RX ADMIN — SUGAMMADEX 200 MG: 100 INJECTION, SOLUTION INTRAVENOUS at 11:07

## 2024-07-12 RX ADMIN — ENOXAPARIN SODIUM 40 MG: 40 INJECTION SUBCUTANEOUS at 09:07

## 2024-07-12 RX ADMIN — Medication 200 MCG: at 10:07

## 2024-07-12 RX ADMIN — SODIUM CHLORIDE: 0.9 INJECTION, SOLUTION INTRAVENOUS at 09:07

## 2024-07-12 RX ADMIN — HALOPERIDOL LACTATE 0.5 MG: 5 INJECTION, SOLUTION INTRAMUSCULAR at 12:07

## 2024-07-12 RX ADMIN — MIDAZOLAM 2 MG: 1 INJECTION INTRAMUSCULAR; INTRAVENOUS at 08:07

## 2024-07-12 RX ADMIN — GABAPENTIN 300 MG: 250 SOLUTION ORAL at 09:07

## 2024-07-12 RX ADMIN — HEPARIN SODIUM 5000 UNITS: 5000 INJECTION INTRAVENOUS; SUBCUTANEOUS at 08:07

## 2024-07-12 RX ADMIN — MUPIROCIN: 20 OINTMENT TOPICAL at 08:07

## 2024-07-12 RX ADMIN — OXYCODONE HYDROCHLORIDE 5 MG: 5 SOLUTION ORAL at 12:07

## 2024-07-12 NOTE — NURSING
Pt arrived to floor via bed with ,transport . IVF started, SCDs applied, oriented to room, call light placed within reach, bed low and locked, and family at bedside. No complaints of pain.  Incisions noted to abdomen x 5 lap sites , CDI. No acute distress noted at this time.

## 2024-07-12 NOTE — OP NOTE
DATE OF PROCEDURE: 7/12/2024    PRE OP DIAGNOSIS: Morbid obesity [E66.01]  BMI 45.0-49.9, adult [Z68.42]  Primary hypertension [I10]  Hyperlipidemia, unspecified hyperlipidemia type [E78.5]  CALIXTO (obstructive sleep apnea) [G47.33]    POST OP DIAGNOSIS: Morbid obesity [E66.01]  BMI 45.0-49.9, adult [Z68.42]  Primary hypertension [I10]  Hyperlipidemia, unspecified hyperlipidemia type [E78.5]  CALIXTO (obstructive sleep apnea) [G47.33]    PROCEDURE: Procedure(s) (LRB):  XI ROBOTIC SLEEVE GASTRECTOMY (N/A)  EGD (ESOPHAGOGASTRODUODENOSCOPY) (N/A)    Surgeons and Role:     * Soto Sebastian MD - Primary     * Annalisa Menezes MD - Resident - Assisting    ANESTHESIA: General.     Indication:     1. Morbid obesity, body mass index is 46.14 kg/m². and inability to maintain weight loss and lose weight   2. Co-morbidities: arthritis back/spinal stenosis with neurogenic claudication, ckd3a improved,essential hypertension and hyperlipidemia  3. Vit d deficiency under treatment       Procedure:    The patient was placed under general anesthesia and the abdomen prepped and draped in usual manner.  Access to the peritoneum was gained approximately 20 cm below the xiphoid at the right anteriaxillary line with a 5 mm Optiview trocar under direct vision, pneumoperitoneum to 15 mmHg CO2 gas was obtained and the patient was placed in reverse Trendelenburg.  Four robotic trocars were placed 7 cm apart to the left of the primary trocar under direct vision.  The liver retractor was placed through the right sided port site to elevate the left lobe of the liver.  There was no hiatal hernia.  Using caudier graspers and the synchroseal the greater curve was taken down 6 cm from the pylorus and all the way to the base of the left jaime.  Posterior gastric attachments were taken down.  A 42 [place] bougie was placed through the mouth and easily traversed the stomach towards the pylorus and using the robotic stapler the stomach was divided  along the dilator making sure to come out just a little bit at the lower esophageal sphincter.  The dilator was removed and endoscopy was performed.  The endoscope easily traverse the pharynx, esophagus and the stomach.  The stomach was insufflated with gas and appeared of appropriate size and configuration and there were no air leak seen laparoscopically and no bleeding into the stomach.  Air was aspirated and the endoscope was withdrawn.  Careful inspection of the abdominal cavity was done to ensure hemostasis and then the liver retractor was removed.  The gastric resection was placed in the Endo-Catch bag and removed from the abdomen through larger trocar site. The fascia of that site was closed transfascially 0 Vicryl.  The trocars were removed under direct vision and prior removing last trocar pneumoperitoneum was allowed to escape.  The skin incisions were infiltrated with Marcaine and reapproximated with 4-0 plain catgut.  Skin glue was applied.  The patient tolerated procedure well was brought to the recovery room stable condition.  Sponge and needle counts were correct at the end of the case.  Blood loss was minimal, complications none and pathology gastric resection.

## 2024-07-12 NOTE — ANESTHESIA PROCEDURE NOTES
Intubation    Date/Time: 7/12/2024 10:06 AM    Performed by: Ryan Candelario MD  Authorized by: Fred Bettencourt MD    Intubation:     Induction:  Intravenous    Intubated:  Postinduction    Mask Ventilation:  Easy mask    Attempts:  1    Attempted By:  Resident anesthesiologist    Method of Intubation:  Video laryngoscopy    Blade:  Steele 3    Laryngeal View Grade: Grade I - full view of cords      Difficult Airway Encountered?: No      Complications:  None    Airway Device:  Oral endotracheal tube    Airway Device Size:  7.5    Style/Cuff Inflation:  Cuffed (inflated to minimal occlusive pressure)    Inflation Amount (mL):  8    Tube secured:  21    Secured at:  The teeth    Placement Verified By:  Capnometry    Complicating Factors:  None    Findings Post-Intubation:  BS equal bilateral and atraumatic/condition of teeth unchanged

## 2024-07-12 NOTE — ANESTHESIA PROCEDURE NOTES
Bilat PEGGY SS    Patient location during procedure: pre-op   Block not for primary anesthetic.  Reason for block: at surgeon's request and post-op pain management   Post-op Pain Location: Abd pain   Start time: 7/12/2024 9:05 AM  Timeout: 7/12/2024 9:05 AM   End time: 7/12/2024 9:15 AM    Staffing  Authorizing Provider: Jimmy Rangel MD  Performing Provider: Brian Pedraza MD    Staffing  Performed by: Brian Pedraza MD  Authorized by: Jimmy Rangel MD    Preanesthetic Checklist  Completed: patient identified, IV checked, site marked, risks and benefits discussed, surgical consent, monitors and equipment checked, pre-op evaluation and timeout performed  Peripheral Block  Patient position: sitting  Prep: ChloraPrep  Patient monitoring: heart rate, cardiac monitor, continuous pulse ox, continuous capnometry and frequent blood pressure checks  Block type: erector spinae plane  Laterality: bilateral  Injection technique: single shot  Interspace: T7-8    Needle  Needle type: Tuohy   Needle gauge: 17 G  Needle length: 3.5 in  Needle localization: anatomical landmarks and ultrasound guidance   -ultrasound image captured on disc.  Assessment  Injection assessment: negative aspiration, negative parasthesia and local visualized surrounding nerve  Paresthesia pain: none  Heart rate change: no  Slow fractionated injection: yes  Pain Tolerance: comfortable throughout block and no complaints  Medications:    Medications: bupivacaine (pf) (MARCAINE) injection 0.75% - Perineural   30 mL - 7/12/2024 9:15:00 AM    Additional Notes  Patient tolerated well.  [facility]

## 2024-07-12 NOTE — BRIEF OP NOTE
[facility] - Surgery ([facility])  Brief Operative Note    SUMMARY     Surgery Date: 7/12/2024     Surgeons and Role:     * Soto Sebastian MD - Primary     * Annalisa Menezes MD - Resident - Assisting    Pre-op Diagnosis:  Morbid obesity [E66.01]  BMI 45.0-49.9, adult [Z68.42]  Primary hypertension [I10]  Hyperlipidemia, unspecified hyperlipidemia type [E78.5]  CALIXTO (obstructive sleep apnea) [G47.33]    Post-op Diagnosis:  Post-Op Diagnosis Codes:     * Morbid obesity [E66.01]     * BMI 45.0-49.9, adult [Z68.42]     * Primary hypertension [I10]     * Hyperlipidemia, unspecified hyperlipidemia type [E78.5]     * CALIXTO (obstructive sleep apnea) [G47.33]    Procedure(s) (LRB):  XI ROBOTIC SLEEVE GASTRECTOMY (N/A)  EGD (ESOPHAGOGASTRODUODENOSCOPY) (N/A)    Anesthesia: General    Implants:  * No implants in log *    Operative Findings: robotic sleeve gastrectomy performed with intra-operative EGD showing no bleeding of staple line and no twisting of sleeve    Estimated Blood Loss: * No values recorded between 7/12/2024 10:28 AM and 7/12/2024 11:50 AM *    Estimated Blood Loss has not been documented. EBL = < 5 cc.         Specimens:   Specimen (24h ago, onward)       Start     Ordered    07/12/24 1034  Specimen to Pathology, Surgery General Surgery  Once        Comments: Pre-op Diagnosis: Morbid obesity [E66.01]BMI 45.0-49.9, adult [Z68.42]Primary hypertension [I10]Hyperlipidemia, unspecified hyperlipidemia type [E78.5]CALIXTO (obstructive sleep apnea) [G47.33]Procedure(s):XI ROBOTIC SLEEVE GASTRECTOMYEGD (ESOPHAGOGASTRODUODENOSCOPY) Number of specimens: 1Name of specimens: 1. Stomach     References:    Click here for ordering Quick Tip   Question Answer Comment   Procedure Type: General Surgery    Specimen Class: Routine/Screening    Release to patient Immediate        07/12/24 1033                    [id number]

## 2024-07-12 NOTE — NURSING TRANSFER
Nursing Transfer Note      7/12/2024   3:11 PM    Reason patient is being transferred: post procedure     Transfer To: 529    Transfer via bed    Transfer with n/a     Transported by transport     Patient belongings transferred with patient: Yes    Chart send with patient: Yes    Notified: daughter via text messaging system    Patient reassessed at: 7/12/2024 [id number]

## 2024-07-12 NOTE — ANESTHESIA POSTPROCEDURE EVALUATION
Anesthesia Post Evaluation    Patient: Dejah GUZMAN Brayan    Procedure(s) Performed: Procedure(s) (LRB):  XI ROBOTIC SLEEVE GASTRECTOMY (N/A)  EGD (ESOPHAGOGASTRODUODENOSCOPY) (N/A)    Final Anesthesia Type: general      Patient location during evaluation: PACU  Patient participation: Yes- Able to Participate  Level of consciousness: awake and alert and oriented  Post-procedure vital signs: reviewed and stable  Pain management: adequate  Airway patency: patent    PONV status at discharge: No PONV  Anesthetic complications: no      Cardiovascular status: hemodynamically stable  Respiratory status: unassisted, spontaneous ventilation and room air  Hydration status: euvolemic  Follow-up not needed.              Vitals Value Taken Time   /67 07/12/24 1501   Temp 36.7 °C (98.1 °F) 07/12/24 1500   Pulse 79 07/12/24 1511   Resp 16 07/12/24 1511   SpO2 97 % 07/12/24 1511   Vitals shown include unfiled device data.      Event Time   Out of Recovery 12:15:00         Pain/Dirk Score: Pain Rating Prior to Med Admin: 10 (7/12/2024 12:26 PM)  Pain Rating Post Med Admin: 0 (7/12/2024  9:45 AM)  Dirk Score: 10 (7/12/2024 12:15 PM)

## 2024-07-12 NOTE — NURSING
Nurses Note -- 4 Eyes      7/12/2024   3:50 PM      Skin assessed during: Admit      [x] No Altered Skin Integrity Present    []Prevention Measures Documented      [] Yes- Altered Skin Integrity Present or Discovered   [] LDA Added if Not in Epic (Describe Wound)   [] New Altered Skin Integrity was Present on Admit and Documented in LDA   [] Wound Image Taken    Wound Care Consulted? No    Attending Nurse:  Micheline Caicedo RN/Staff Member:   Trinh

## 2024-07-12 NOTE — PLAN OF CARE
Pre-op assessment completed. Patient verbalized understanding of plan of care. Call bell within reach. Family at the bedside.

## 2024-07-12 NOTE — INTERVAL H&P NOTE
The patient has been examined and the H&P has been reviewed:    I concur with the findings and no changes have occurred since H&P was written.    Surgery risks, benefits and alternative options discussed and understood by patient/family.    There are no hospital problems to display for this patient.    Fern Wilkinson, DO  PGY-1

## 2024-07-12 NOTE — BRIEF OP NOTE
Operative Note       Surgery Date: 7/12/2024     Surgeons and Role:     * Soto Sebastian MD - Primary     * Annalisa Menezes MD - Resident - Assisting    Pre-op Diagnosis:  Morbid obesity [E66.01]  BMI 45.0-49.9, adult [Z68.42]  Primary hypertension [I10]  Hyperlipidemia, unspecified hyperlipidemia type [E78.5]  CALIXTO (obstructive sleep apnea) [G47.33]    Post-op Diagnosis:  Morbid obesity [E66.01]  BMI 45.0-49.9, adult [Z68.42]  Primary hypertension [I10]  Hyperlipidemia, unspecified hyperlipidemia type [E78.5]  CALIXTO (obstructive sleep apnea) [G47.33]    Procedure(s) (LRB):  XI ROBOTIC SLEEVE GASTRECTOMY (N/A)  EGD (ESOPHAGOGASTRODUODENOSCOPY) (N/A)    Anesthesia: General    Procedure in Detail/Findings:  No hiatal hernia.  Sleeve without apparent complication.    Estimated Blood Loss: Minimal           Specimens (From admission, onward)       Start     Ordered    07/12/24 1034  Specimen to Pathology, Surgery General Surgery  Once        Comments: Pre-op Diagnosis: Morbid obesity [E66.01]BMI 45.0-49.9, adult [Z68.42]Primary hypertension [I10]Hyperlipidemia, unspecified hyperlipidemia type [E78.5]CALIXTO (obstructive sleep apnea) [G47.33]Procedure(s):XI ROBOTIC SLEEVE GASTRECTOMYEGD (ESOPHAGOGASTRODUODENOSCOPY) Number of specimens: 1Name of specimens: 1. Stomach     References:    Click here for ordering Quick Tip   Question Answer Comment   Procedure Type: General Surgery    Specimen Class: Routine/Screening    Release to patient Immediate        07/12/24 1033                  Implants: * No implants in log *           Disposition: PACU - hemodynamically stable.           Condition: Good    Attestation:  I was present and scrubbed for the entire procedure.

## 2024-07-13 VITALS
OXYGEN SATURATION: 100 % | TEMPERATURE: 97 F | HEIGHT: 66 IN | HEART RATE: 64 BPM | RESPIRATION RATE: 18 BRPM | SYSTOLIC BLOOD PRESSURE: 121 MMHG | WEIGHT: 277.56 LBS | BODY MASS INDEX: 44.61 KG/M2 | DIASTOLIC BLOOD PRESSURE: 55 MMHG

## 2024-07-13 LAB
ANION GAP SERPL CALC-SCNC: 7 MMOL/L (ref 8–16)
BASOPHILS # BLD AUTO: 0 K/UL (ref 0–0.2)
BASOPHILS NFR BLD: 0 % (ref 0–1.9)
BUN SERPL-MCNC: 14 MG/DL (ref 8–23)
CALCIUM SERPL-MCNC: 8.9 MG/DL (ref 8.7–10.5)
CHLORIDE SERPL-SCNC: 105 MMOL/L (ref 95–110)
CO2 SERPL-SCNC: 23 MMOL/L (ref 23–29)
CREAT SERPL-MCNC: 1 MG/DL (ref 0.5–1.4)
DIFFERENTIAL METHOD BLD: ABNORMAL
EOSINOPHIL # BLD AUTO: 0 K/UL (ref 0–0.5)
EOSINOPHIL NFR BLD: 0 % (ref 0–8)
ERYTHROCYTE [DISTWIDTH] IN BLOOD BY AUTOMATED COUNT: 13.3 % (ref 11.5–14.5)
EST. GFR  (NO RACE VARIABLE): >60 ML/MIN/1.73 M^2
GLUCOSE SERPL-MCNC: 96 MG/DL (ref 70–110)
HCT VFR BLD AUTO: 43.6 % (ref 37–48.5)
HGB BLD-MCNC: 14.3 G/DL (ref 12–16)
IMM GRANULOCYTES # BLD AUTO: 0.12 K/UL (ref 0–0.04)
IMM GRANULOCYTES NFR BLD AUTO: 2.6 % (ref 0–0.5)
LYMPHOCYTES # BLD AUTO: 0.9 K/UL (ref 1–4.8)
LYMPHOCYTES NFR BLD: 19.3 % (ref 18–48)
MAGNESIUM SERPL-MCNC: 1.8 MG/DL (ref 1.6–2.6)
MCH RBC QN AUTO: 30.8 PG (ref 27–31)
MCHC RBC AUTO-ENTMCNC: 32.8 G/DL (ref 32–36)
MCV RBC AUTO: 94 FL (ref 82–98)
MONOCYTES # BLD AUTO: 0.3 K/UL (ref 0.3–1)
MONOCYTES NFR BLD: 5.6 % (ref 4–15)
NEUTROPHILS # BLD AUTO: 3.4 K/UL (ref 1.8–7.7)
NEUTROPHILS NFR BLD: 72.5 % (ref 38–73)
NRBC BLD-RTO: 0 /100 WBC
PHOSPHATE SERPL-MCNC: 1.9 MG/DL (ref 2.7–4.5)
PLATELET # BLD AUTO: 220 K/UL (ref 150–450)
PLATELET BLD QL SMEAR: ABNORMAL
PMV BLD AUTO: 11.5 FL (ref 9.2–12.9)
POTASSIUM SERPL-SCNC: 4.6 MMOL/L (ref 3.5–5.1)
RBC # BLD AUTO: 4.65 M/UL (ref 4–5.4)
SODIUM SERPL-SCNC: 135 MMOL/L (ref 136–145)
WBC # BLD AUTO: 4.62 K/UL (ref 3.9–12.7)

## 2024-07-13 PROCEDURE — 80048 BASIC METABOLIC PNL TOTAL CA: CPT | Performed by: STUDENT IN AN ORGANIZED HEALTH CARE EDUCATION/TRAINING PROGRAM

## 2024-07-13 PROCEDURE — 25000003 PHARM REV CODE 250: Performed by: STUDENT IN AN ORGANIZED HEALTH CARE EDUCATION/TRAINING PROGRAM

## 2024-07-13 PROCEDURE — 85025 COMPLETE CBC W/AUTO DIFF WBC: CPT | Performed by: STUDENT IN AN ORGANIZED HEALTH CARE EDUCATION/TRAINING PROGRAM

## 2024-07-13 PROCEDURE — 63600175 PHARM REV CODE 636 W HCPCS: Performed by: SURGERY

## 2024-07-13 PROCEDURE — 84100 ASSAY OF PHOSPHORUS: CPT | Performed by: STUDENT IN AN ORGANIZED HEALTH CARE EDUCATION/TRAINING PROGRAM

## 2024-07-13 PROCEDURE — 83735 ASSAY OF MAGNESIUM: CPT | Performed by: STUDENT IN AN ORGANIZED HEALTH CARE EDUCATION/TRAINING PROGRAM

## 2024-07-13 PROCEDURE — 36415 COLL VENOUS BLD VENIPUNCTURE: CPT | Performed by: STUDENT IN AN ORGANIZED HEALTH CARE EDUCATION/TRAINING PROGRAM

## 2024-07-13 PROCEDURE — 25000003 PHARM REV CODE 250: Performed by: SURGERY

## 2024-07-13 RX ORDER — ACETAMINOPHEN 650 MG/20.3ML
1000 LIQUID ORAL EVERY 8 HOURS
COMMUNITY
Start: 2024-07-13

## 2024-07-13 RX ADMIN — ACETAMINOPHEN 999.01 MG: 650 SOLUTION ORAL at 03:07

## 2024-07-13 RX ADMIN — ENOXAPARIN SODIUM 40 MG: 40 INJECTION SUBCUTANEOUS at 10:07

## 2024-07-13 RX ADMIN — ONDANSETRON 8 MG: 2 INJECTION INTRAMUSCULAR; INTRAVENOUS at 10:07

## 2024-07-13 RX ADMIN — PANTOPRAZOLE SODIUM 40 MG: 40 INJECTION, POWDER, FOR SOLUTION INTRAVENOUS at 10:07

## 2024-07-13 RX ADMIN — LEVOTHYROXINE SODIUM 100 MCG: 50 TABLET ORAL at 06:07

## 2024-07-13 RX ADMIN — GABAPENTIN 300 MG: 250 SOLUTION ORAL at 10:07

## 2024-07-13 RX ADMIN — SODIUM PHOSPHATE, MONOBASIC, MONOHYDRATE AND SODIUM PHOSPHATE, DIBASIC, ANHYDROUS 30 MMOL: 142; 276 INJECTION, SOLUTION INTRAVENOUS at 12:07

## 2024-07-13 NOTE — SUBJECTIVE & OBJECTIVE
Interval History:   -NAEO  -Pain well controlled  -Tolerating water protocol without n/v  -Labs are pending    Medications:  Continuous Infusions:   lactated ringers   Intravenous Continuous 125 mL/hr at 07/12/24 1315 New Bag at 07/12/24 1315     Scheduled Meds:   acetaminophen  999.0148 mg Oral Q8H    amLODIPine benzoate  10 mg Oral Daily    atorvastatin  40 mg Oral QAM    enoxparin  40 mg Subcutaneous Q12H    gabapentin  300 mg Oral BID    levothyroxine  100 mcg Oral Before breakfast    ondansetron  8 mg Intravenous Q6H    pantoprazole  40 mg Intravenous BID    scopolamine  1 patch Transdermal Once     PRN Meds:  Current Facility-Administered Medications:     acetaminophen, 499.5074 mg, Oral, Q24H PRN    albuterol, 2 puff, Inhalation, Q6H PRN    oxyCODONE, 5 mg, Oral, Q6H PRN    prochlorperazine, 5 mg, Intravenous, Q6H PRN    simethicone, 40 mg, Oral, QID PRN     Review of patient's allergies indicates:  No Known Allergies  Objective:     Vital Signs (Most Recent):  Temp: 98.8 °F (37.1 °C) (07/13/24 0804)  Pulse: 77 (07/13/24 0804)  Resp: 16 (07/13/24 0314)  BP: 117/65 (07/13/24 0804)  SpO2: 98 % (07/13/24 0804) Vital Signs (24h Range):  Temp:  [97.1 °F (36.2 °C)-98.8 °F (37.1 °C)] 98.8 °F (37.1 °C)  Pulse:  [59-89] 77  Resp:  [12-26] 16  SpO2:  [96 %-100 %] 98 %  BP: (103-154)/(51-89) 117/65     Weight: 125.9 kg (277 lb 9 oz)  Body mass index is 44.8 kg/m².    Intake/Output - Last 3 Shifts         07/11 0700 07/12 0659 07/12 0700 07/13 0659 07/13 0700 07/14 0659    IV Piggyback  2000     Total Intake(mL/kg)  2000 (15.9)     Net  +2000                     Physical Exam  Vitals reviewed.   Constitutional:       General: She is not in acute distress.     Appearance: Normal appearance. She is obese.   HENT:      Head: Normocephalic.   Eyes:      General: No scleral icterus.     Extraocular Movements: Extraocular movements intact.   Cardiovascular:      Rate and Rhythm: Normal rate and regular rhythm.   Pulmonary:       Effort: Pulmonary effort is normal. No respiratory distress.   Abdominal:      General: Abdomen is flat.      Palpations: Abdomen is soft.      Tenderness: There is no abdominal tenderness.      Comments: Incisions c/d/I with dermabond   Musculoskeletal:      Cervical back: Normal range of motion and neck supple.      Right lower leg: No edema.      Left lower leg: No edema.   Skin:     General: Skin is warm and dry.   Neurological:      General: No focal deficit present.      Mental Status: She is alert and oriented to person, place, and time.          Significant Labs:  Labs are pending    Significant Diagnostics:  I have reviewed all pertinent imaging results/findings within the past 24 hours.

## 2024-07-13 NOTE — ASSESSMENT & PLAN NOTE
Dejah Schmidt is an 63 y.o. female that is s/p Robotic sleeve gastrectomy on 7/12/24. Doing well postoperatively.    - F/u labs  - Diet: Bariatric clear liquid  - Pain: multimodal  - OK for pills < pencil eraser, liquids, or crushed  - Nausea control w/ PRN antiemetics   - Replete lytes PRN  - Daily labs  - OOB to chair and ambulate in halls   - Encourage IS  - DVT ppx     Dispo: Likely home this afternoon if labs are OK and she is tolerating clears

## 2024-07-13 NOTE — PROGRESS NOTES
[facility]  General Surgery  Progress Note    Subjective:     History of Present Illness:  No notes on file    Post-Op Info:  Procedure(s) (LRB):  XI ROBOTIC SLEEVE GASTRECTOMY (N/A)  EGD (ESOPHAGOGASTRODUODENOSCOPY) (N/A)   1 Day Post-Op     Interval History:   -NAEO  -Pain well controlled  -Tolerating water protocol without n/v  -Labs are pending    Medications:  Continuous Infusions:   lactated ringers   Intravenous Continuous 125 mL/hr at 07/12/24 1315 New Bag at 07/12/24 1315     Scheduled Meds:   acetaminophen  999.0148 mg Oral Q8H    amLODIPine benzoate  10 mg Oral Daily    atorvastatin  40 mg Oral QAM    enoxparin  40 mg Subcutaneous Q12H    gabapentin  300 mg Oral BID    levothyroxine  100 mcg Oral Before breakfast    ondansetron  8 mg Intravenous Q6H    pantoprazole  40 mg Intravenous BID    scopolamine  1 patch Transdermal Once     PRN Meds:  Current Facility-Administered Medications:     acetaminophen, 499.5074 mg, Oral, Q24H PRN    albuterol, 2 puff, Inhalation, Q6H PRN    oxyCODONE, 5 mg, Oral, Q6H PRN    prochlorperazine, 5 mg, Intravenous, Q6H PRN    simethicone, 40 mg, Oral, QID PRN     Review of patient's allergies indicates:  No Known Allergies  Objective:     Vital Signs (Most Recent):  Temp: 98.8 °F (37.1 °C) (07/13/24 0804)  Pulse: 77 (07/13/24 0804)  Resp: 16 (07/13/24 0314)  BP: 117/65 (07/13/24 0804)  SpO2: 98 % (07/13/24 0804) Vital Signs (24h Range):  Temp:  [97.1 °F (36.2 °C)-98.8 °F (37.1 °C)] 98.8 °F (37.1 °C)  Pulse:  [59-89] 77  Resp:  [12-26] 16  SpO2:  [96 %-100 %] 98 %  BP: (103-154)/(51-89) 117/65     Weight: 125.9 kg (277 lb 9 oz)  Body mass index is 44.8 kg/m².    Intake/Output - Last 3 Shifts         07/11 0700 07/12 0659 07/12 0700 07/13 0659 07/13 0700 07/14 0659    IV Piggyback  2000     Total Intake(mL/kg)  2000 (15.9)     Net  +2000                     Physical Exam  Vitals reviewed.   Constitutional:       General: She is not in acute distress.     Appearance:  Normal appearance. She is obese.   HENT:      Head: Normocephalic.   Eyes:      General: No scleral icterus.     Extraocular Movements: Extraocular movements intact.   Cardiovascular:      Rate and Rhythm: Normal rate and regular rhythm.   Pulmonary:      Effort: Pulmonary effort is normal. No respiratory distress.   Abdominal:      General: Abdomen is flat.      Palpations: Abdomen is soft.      Tenderness: There is no abdominal tenderness.      Comments: Incisions c/d/I with dermabond   Musculoskeletal:      Cervical back: Normal range of motion and neck supple.      Right lower leg: No edema.      Left lower leg: No edema.   Skin:     General: Skin is warm and dry.   Neurological:      General: No focal deficit present.      Mental Status: She is alert and oriented to person, place, and time.          Significant Labs:  Labs are pending    Significant Diagnostics:  I have reviewed all pertinent imaging results/findings within the past 24 hours.  Assessment/Plan:     * Obesity  Dejah Schmidt is an 63 y.o. female that is s/p Robotic sleeve gastrectomy on 7/12/24. Doing well postoperatively.    - F/u labs  - Diet: Bariatric clear liquid  - Pain: multimodal  - OK for pills < pencil eraser, liquids, or crushed  - Nausea control w/ PRN antiemetics   - Replete lytes PRN  - Daily labs  - OOB to chair and ambulate in halls   - Encourage IS  - DVT ppx     Dispo: Likely home this afternoon if labs are OK and she is tolerating clears          Shadi Hull MD  General Surgery  [facility] - Surgery

## 2024-07-13 NOTE — NURSING
Nurses Note -- 4 Eyes      7/13/2024   4:54 AM      Skin assessed during: Daily Assessment      [x] No Altered Skin Integrity Present    []Prevention Measures Documented      [] Yes- Altered Skin Integrity Present or Discovered   [] LDA Added if Not in Epic (Describe Wound)   [] New Altered Skin Integrity was Present on Admit and Documented in LDA   [] Wound Image Taken    Wound Care Consulted? No    Attending Nurse:  Mikki EL RN    Second RN/Staff Member:   АННА Andino

## 2024-07-13 NOTE — PLAN OF CARE
[facility]  Discharge Final Note    Primary Care Provider: Dasha Bergeron MD    Expected Discharge Date: 7/13/2024    Final Discharge Note (most recent)       Final Note - 07/13/24 1351          Final Note    Assessment Type Final Discharge Note (P)      Anticipated Discharge Disposition Home or Self Care (P)      What phone number can be called within the next 1-3 days to see how you are doing after discharge? [phone] (P)      Hospital Resources/Appts/Education Provided Provided patient/caregiver with written discharge plan information;Provided education on problems/symptoms using teachback;Appointments scheduled and added to AVS (P)         Post-Acute Status    Post-Acute Authorization Other (P)      Other Status No Post-Acute Service Needs (P)      Discharge Delays None known at this time (P)                      Important Message from Medicare           SW noting pt's discharge orders. After review of pt's medical record, no discharge needs identified at this time.     Merlyn Hewitt LMSW

## 2024-07-14 NOTE — NURSING
Pt tolerated water protocol and clear liquid diet well. No c/o of pain or n/v. Pt ambulated 2x with no issues. Pt received bedside meds. Pt received discharged instructions and verbalized understanding. All questions answered. Pt stated she has the necessary vitamins and clear liquids at home. PIVs removed, catheters intact. NADN. Pt left unit via wheelchair with all belongings.

## 2024-07-16 ENCOUNTER — PATIENT OUTREACH (OUTPATIENT)
Dept: ADMINISTRATIVE | Facility: CLINIC | Age: 64
End: 2024-07-16
Payer: MEDICARE

## 2024-07-17 NOTE — DISCHARGE SUMMARY
[facility]  General Surgery  Discharge Summary      Patient Name: Dejah Schmidt  MRN: [id number]  Admission Date: 7/12/2024  Hospital Length of Stay: 1 days  Discharge Date and Time: 7/13/2024  5:12 PM  Attending Physician: No att. providers found   Discharging Provider: Shadi Hull MD  Primary Care Provider: Dasha Bergeron MD    HPI:   Dejah Schmidt is a 63 y.o. female who presents for pre-operative exam for weight loss surgery.  she has completed her pre-operative evaluation.  she has failed medical treatment for obesity.  1. Morbid obesity, body mass index is 46.14 kg/m². and inability to maintain weight loss and lose weight   2. Co-morbidities: arthritis back/spinal stenosis with neurogenic claudication, ckd3a improved,essential hypertension and hyperlipidemia  3. Vit d deficiency under treatment    Procedure(s) (LRB):  XI ROBOTIC SLEEVE GASTRECTOMY (N/A)  EGD (ESOPHAGOGASTRODUODENOSCOPY) (N/A)      Indwelling Lines/Drains at time of discharge:   Lines/Drains/Airways       None                 Hospital Course: Dejah Schmidt presented as an outpatient on 7/12/24 for sleeve gastrectomy. She tolerated the procedure well and was admitted to the floor postoperatively. She was started on the water protocol the evening of POD0. On POD1, her pain was well controlled with minimal narcotic use, she was tolerating clears, and she was meeting all discharge milestones. She was able to be discharged home in good condition with follow up in clinic.    Goals of Care Treatment Preferences:  Code Status: Full Code      Consults:     Significant Diagnostic Studies: Labs: All labs within the past 24 hours have been reviewed    Pending Diagnostic Studies:       Procedure Component Value Units Date/Time    Specimen to Pathology, Surgery General Surgery [[id number]] Collected: 07/12/24 1132    Order Status: Sent Lab Status: In process Updated: 07/12/24 2140    Specimen: Tissue           Final Active Diagnoses:     Diagnosis Date Noted POA    PRINCIPAL PROBLEM:  Obesity [E66.9] 07/12/2024 Yes      Problems Resolved During this Admission:      Discharged Condition: good    Disposition: Home or Self Care    Follow Up:    Patient Instructions:      Diet Bariatric High Protein Clear Liquid   Order Comments: No soft drinks     Lifting restrictions (nothing greater than 10lbs)   Scheduling Instructions: Lifting restrictions:  nothing greater than 10lbs     No driving until:   Order Comments: No driving until off narcotic pain medication.  Can turn around without pain off narcotics.  At least 1 week.     Notify your health care provider if you experience any of the following:   Order Comments: temperature > 100.4, persistent nausea and vomiting or diarrhea, severe uncontrolled pain, redness, tenderness, or signs of infection (pain, swelling, redness, odor or green/yellow discharge around incision site), difficulty breathing or increased cough, severe persistent headache, worsening rash, persistent dizziness, light-headedness, or visual disturbances, increased confusion or weakness     Activity as tolerated     Shower on day two and no bath     Medications:  Reconciled Home Medications:      Medication List        START taking these medications      acetaminophen 650 mg/20.3 mL Soln  Commonly known as: TYLENOL  Take 31.2 mLs (999.0148 mg total) by mouth every 8 (eight) hours.            CHANGE how you take these medications      amLODIPine 10 MG tablet  Commonly known as: NORVASC  TAKE 1 TABLET(10 MG) BY MOUTH EVERY DAY  What changed:   how much to take  when to take this     atorvastatin 40 MG tablet  Commonly known as: LIPITOR  Take 1 tablet (40 mg total) by mouth once daily.  What changed: when to take this     gabapentin 250 mg/5 mL solution  Commonly known as: NEURONTIN  Take 6 mLs (300 mg total) by mouth 2 (two) times a day. Take first dose 2.5 hours prior to surgery.  Start night after discharge twice a day for 3 days.  What  changed: Another medication with the same name was removed. Continue taking this medication, and follow the directions you see here.     lisinopriL 40 MG tablet  Commonly known as: PRINIVIL,ZESTRIL  Take 1 tablet (40 mg total) by mouth once daily.  What changed: when to take this     spironolactone 50 MG tablet  Commonly known as: ALDACTONE  Take 1 tablet (50 mg total) by mouth once daily.  What changed: when to take this     triamterene-hydrochlorothiazide 37.5-25 mg 37.5-25 mg per tablet  Commonly known as: MAXZIDE-25  Take 1 tablet by mouth once daily.  What changed: when to take this            CONTINUE taking these medications      albuterol 90 mcg/actuation inhaler  Commonly known as: PROVENTIL/VENTOLIN HFA  Inhale 2 puffs into the lungs every 6 (six) hours as needed for Wheezing or Shortness of Breath. Rescue     diclofenac sodium 1 % Gel  Commonly known as: VOLTAREN  Apply 2 g topically 4 (four) times daily as needed. To painful area on the feet.     levothyroxine 100 MCG tablet  Commonly known as: SYNTHROID  TAKE 1 TABLET(100 MCG) BY MOUTH BEFORE BREAKFAST     nitroGLYCERIN 0.4 MG SL tablet  Commonly known as: NITROSTAT  Place 1 tablet (0.4 mg total) under the tongue every 5 (five) minutes as needed for Chest pain.     ondansetron 8 MG Tbdl  Commonly known as: ZOFRAN-ODT  Dissolve 1 tablet (8 mg total) by mouth every 6 (six) hours as needed.     polyethylene glycol 17 gram/dose powder  Commonly known as: GLYCOLAX  Mix 1 capful (17 g) with liquid and take by mouth daily as needed (constipation).     ursodioL 500 MG tablet  Commonly known as: PRIYA FORTE  Crush one tablet daily for gall bladder.            Time spent on the discharge of patient: 10 minutes    Shadi Hull MD  General Surgery  [facility] - Surgery

## 2024-07-17 NOTE — HOSPITAL COURSE
Dejah Schmidt presented as an outpatient on 7/12/24 for sleeve gastrectomy. She tolerated the procedure well and was admitted to the floor postoperatively. She was started on the water protocol the evening of POD0. On POD1, her pain was well controlled with minimal narcotic use, she was tolerating clears, and she was meeting all discharge milestones. She was able to be discharged home in good condition with follow up in clinic.

## 2024-07-18 NOTE — PROGRESS NOTES
Audio Only Telehealth Visit     The patient location is: daughter's house ([place])  The chief complaint leading to consultation is: Follow-up 1 Week s/p Gastric Sleeve  Visit type: Virtual visit with audio only (telephone)  Total time spent with patient: 15 mins     The reason for the audio only service rather than synchronous audio and video virtual visit was related to technical difficulties or patient preference/necessity.     Each patient to whom I provide medical services by telemedicine is:  (1) informed of the relationship between the physician and patient and the respective role of any other health care provider with respect to management of the patient; and (2) notified that they may decline to receive medical services by telemedicine and may withdraw from such care at any time. Patient verbally consented to receive this service via voice-only telephone call.    This service was not originating from a related E/M service provided within the previous 7 days nor will  to an E/M service or procedure within the next 24 hours or my soonest available appointment.  Prevailing standard of care was able to be met in this audio-only visit.      NUTRITION NOTE    Referring Physician: Soto Sebastian M.D.   Reason for MNT Referral: Follow-up 1 Week s/p Gastric Sleeve    CURRENT DIET:  Bariatric Liquid Diet    Dehydration assessment:  Urine output/color: yellow  Chest pain: N  Persistent increased heart rate: N  Fatigue: N  Dizzy/weak: N   N/V: N  BM: Y, twice,with Miralax    Protein and fluid intake assessment: (food diary)    Fluids include: Water with Crystal Light, broth, Gatorade Zero, Powerade Zero, SF jell-o, SF popsicles  Fluid intake yesterday: 52 oz  Protein supplements: [employer]  Protein intake yesterday: 26 g    Vitamins  EZ Melt multivitamin with iron 2, 1 x day  Barimelt B-1 2, 2 x day  Bariatric Best Calcium Citrate melts 600 mg with vitamin D 2, 2 x day   [place] Vitamin B-12  sublingual 2500 mcg 1 per week    Medications  Omeprazole: Did not receive. Purchased OTC capsules  How are you tolerating pain at this time? (rate on a scale from 1 to 10; >7 notify PA/MD): 0  Did you take your acetaminophen and gabapentin for 3 days? Y  Did you have to take additional acetaminophen for break through pain (pain scale 4-6)?   Did you have any severe pain that required oxycodone? If yes, how much did you use and do you have left? N    How is the support system at home? Good  Exercise reminder (light exercise at this time, no lifting above 10 lbs)     Questions for nurse/MA/PA:     BARIATRIC DIET DISCUSSION:  Reinforced post-op nutrition guidelines.  Continue to work on fluid and protein intake.    PLAN/RECOMMONDATIONS:  Continue bariatric high protein liquid diet.  Maintain protein intake or increase gradually to goal of 60 g prot/day.  Maintain fluid intake or increase gradually to goal of 64 floz/day.  Begin appropriate vitamins & minerals.  Begin or continue light exercise.     Confirmed date and time for 2 week po labs and clinic visit     SESSION TIME: 15 minutes

## 2024-07-19 ENCOUNTER — CLINICAL SUPPORT (OUTPATIENT)
Dept: BARIATRICS | Facility: CLINIC | Age: 64
End: 2024-07-19
Payer: MEDICARE

## 2024-07-19 DIAGNOSIS — G47.33 OSA (OBSTRUCTIVE SLEEP APNEA): ICD-10-CM

## 2024-07-19 DIAGNOSIS — Z71.3 DIETARY COUNSELING AND SURVEILLANCE: Primary | ICD-10-CM

## 2024-07-19 DIAGNOSIS — E66.01 MORBID OBESITY WITH BMI OF 40.0-44.9, ADULT: ICD-10-CM

## 2024-07-19 DIAGNOSIS — E78.5 HYPERLIPIDEMIA, UNSPECIFIED HYPERLIPIDEMIA TYPE: ICD-10-CM

## 2024-07-19 DIAGNOSIS — I10 PRIMARY HYPERTENSION: ICD-10-CM

## 2024-07-19 DIAGNOSIS — Z98.84 S/P BARIATRIC SURGERY: ICD-10-CM

## 2024-07-19 PROCEDURE — 99499 UNLISTED E&M SERVICE: CPT | Mod: 95,,, | Performed by: DIETITIAN, REGISTERED

## 2024-07-25 LAB
FINAL PATHOLOGIC DIAGNOSIS: NORMAL
GROSS: NORMAL
Lab: NORMAL
MICROSCOPIC EXAM: NORMAL

## 2024-07-26 ENCOUNTER — CLINICAL SUPPORT (OUTPATIENT)
Dept: BARIATRICS | Facility: CLINIC | Age: 64
End: 2024-07-26
Payer: MEDICARE

## 2024-07-26 ENCOUNTER — LAB VISIT (OUTPATIENT)
Dept: LAB | Facility: HOSPITAL | Age: 64
End: 2024-07-26
Payer: MEDICARE

## 2024-07-26 ENCOUNTER — OFFICE VISIT (OUTPATIENT)
Dept: BARIATRICS | Facility: CLINIC | Age: 64
End: 2024-07-26
Payer: MEDICARE

## 2024-07-26 VITALS
WEIGHT: 269.94 LBS | TEMPERATURE: 99 F | HEART RATE: 86 BPM | SYSTOLIC BLOOD PRESSURE: 125 MMHG | BODY MASS INDEX: 43.57 KG/M2 | OXYGEN SATURATION: 95 % | DIASTOLIC BLOOD PRESSURE: 57 MMHG

## 2024-07-26 DIAGNOSIS — Z71.3 DIETARY COUNSELING AND SURVEILLANCE: Primary | ICD-10-CM

## 2024-07-26 DIAGNOSIS — Z79.899 LONG-TERM USE OF HIGH-RISK MEDICATION: ICD-10-CM

## 2024-07-26 DIAGNOSIS — Z79.899 POLYPHARMACY: ICD-10-CM

## 2024-07-26 DIAGNOSIS — I10 PRIMARY HYPERTENSION: ICD-10-CM

## 2024-07-26 DIAGNOSIS — E78.5 HYPERLIPIDEMIA, UNSPECIFIED HYPERLIPIDEMIA TYPE: ICD-10-CM

## 2024-07-26 DIAGNOSIS — G47.33 OSA (OBSTRUCTIVE SLEEP APNEA): ICD-10-CM

## 2024-07-26 DIAGNOSIS — Z98.84 S/P BARIATRIC SURGERY: Primary | ICD-10-CM

## 2024-07-26 DIAGNOSIS — Z98.84 S/P BARIATRIC SURGERY: ICD-10-CM

## 2024-07-26 DIAGNOSIS — E66.01 MORBID OBESITY WITH BMI OF 40.0-44.9, ADULT: ICD-10-CM

## 2024-07-26 LAB
ALBUMIN SERPL BCP-MCNC: 4.3 G/DL (ref 3.5–5.2)
ALP SERPL-CCNC: 65 U/L (ref 55–135)
ALT SERPL W/O P-5'-P-CCNC: 23 U/L (ref 10–44)
ANION GAP SERPL CALC-SCNC: 9 MMOL/L (ref 8–16)
ANISOCYTOSIS BLD QL SMEAR: SLIGHT
AST SERPL-CCNC: 24 U/L (ref 10–40)
BASOPHILS NFR BLD: 0 % (ref 0–1.9)
BILIRUB SERPL-MCNC: 0.8 MG/DL (ref 0.1–1)
BUN SERPL-MCNC: 18 MG/DL (ref 8–23)
BURR CELLS BLD QL SMEAR: NORMAL
CALCIUM SERPL-MCNC: 10.2 MG/DL (ref 8.7–10.5)
CHLORIDE SERPL-SCNC: 103 MMOL/L (ref 95–110)
CO2 SERPL-SCNC: 26 MMOL/L (ref 23–29)
CREAT SERPL-MCNC: 1.3 MG/DL (ref 0.5–1.4)
DIFFERENTIAL METHOD BLD: NORMAL
EOSINOPHIL NFR BLD: 1 % (ref 0–8)
ERYTHROCYTE [DISTWIDTH] IN BLOOD BY AUTOMATED COUNT: 13.7 % (ref 11.5–14.5)
EST. GFR  (NO RACE VARIABLE): 46.5 ML/MIN/1.73 M^2
GIANT PLATELETS BLD QL SMEAR: PRESENT
GLUCOSE SERPL-MCNC: 103 MG/DL (ref 70–110)
HCT VFR BLD AUTO: 45.2 % (ref 37–48.5)
HGB BLD-MCNC: 14.9 G/DL (ref 12–16)
IMM GRANULOCYTES # BLD AUTO: NORMAL K/UL (ref 0–0.04)
IMM GRANULOCYTES NFR BLD AUTO: NORMAL % (ref 0–0.5)
LYMPHOCYTES NFR BLD: 36 % (ref 18–48)
MCH RBC QN AUTO: 31 PG (ref 27–31)
MCHC RBC AUTO-ENTMCNC: 33 G/DL (ref 32–36)
MCV RBC AUTO: 94 FL (ref 82–98)
MONOCYTES NFR BLD: 9 % (ref 4–15)
NEUTROPHILS NFR BLD: 51 % (ref 38–73)
NEUTS BAND NFR BLD MANUAL: 3 %
NRBC BLD-RTO: 0 /100 WBC
OVALOCYTES BLD QL SMEAR: NORMAL
PLATELET # BLD AUTO: 190 K/UL (ref 150–450)
PLATELET BLD QL SMEAR: NORMAL
PMV BLD AUTO: 11.9 FL (ref 9.2–12.9)
POIKILOCYTOSIS BLD QL SMEAR: SLIGHT
POTASSIUM SERPL-SCNC: 4.1 MMOL/L (ref 3.5–5.1)
PROT SERPL-MCNC: 8.3 G/DL (ref 6–8.4)
RBC # BLD AUTO: 4.81 M/UL (ref 4–5.4)
SODIUM SERPL-SCNC: 138 MMOL/L (ref 136–145)
SPHEROCYTES BLD QL SMEAR: NORMAL
VIT B12 SERPL-MCNC: 1247 PG/ML (ref 210–950)
WBC # BLD AUTO: 4.19 K/UL (ref 3.9–12.7)

## 2024-07-26 PROCEDURE — 99999 PR PBB SHADOW E&M-EST. PATIENT-LVL IV: CPT | Mod: PBBFAC,,, | Performed by: NURSE PRACTITIONER

## 2024-07-26 PROCEDURE — 36415 COLL VENOUS BLD VENIPUNCTURE: CPT | Performed by: NURSE PRACTITIONER

## 2024-07-26 PROCEDURE — 84425 ASSAY OF VITAMIN B-1: CPT | Performed by: NURSE PRACTITIONER

## 2024-07-26 PROCEDURE — 80053 COMPREHEN METABOLIC PANEL: CPT | Performed by: NURSE PRACTITIONER

## 2024-07-26 PROCEDURE — 82607 VITAMIN B-12: CPT | Performed by: NURSE PRACTITIONER

## 2024-07-26 PROCEDURE — 85007 BL SMEAR W/DIFF WBC COUNT: CPT | Performed by: NURSE PRACTITIONER

## 2024-07-26 PROCEDURE — 85027 COMPLETE CBC AUTOMATED: CPT | Performed by: NURSE PRACTITIONER

## 2024-07-26 RX ORDER — OMEPRAZOLE 40 MG/1
40 CAPSULE, DELAYED RELEASE ORAL DAILY
Qty: 90 CAPSULE | Refills: 3 | Status: SHIPPED | OUTPATIENT
Start: 2024-07-26 | End: 2025-07-26

## 2024-07-26 NOTE — PATIENT INSTRUCTIONS
High Protein Pureed Diet    2 weeks after gastric bypass and sleeve you may be ready to add pureed food to your diet.  All food should be the consistency of baby food, or thinner.  Follow pureed diet for the next 2 weeks.    Protein - It is very important to pay attention to protein intake during this time.      Inadequate protein intake can cause:  Delayed Wound Healing  Hair Loss  Muscle Breakdown    Meal Plan - Eat 3-4 meals per day (2-4 tbsp each), with protein supplements in between to meet protein needs.  Meeting protein needs daily will help increase healing, decrease muscle loss, and increase weight loss.  Your goal is  grams of protein a day.    Protein First - Always eat the foods with the highest protein first.  Foods high in protein include milk, yogurt, cheese, egg whites, and blenderized meat, seafood, and beans.    Fluids - Keep track in your journal of how much you are drinking; you should try to drink at least 64oz of fluids every day.      Foods allowed: Portion size Protein (g)   Sugar-free clear liquids As desired 0   Skim or 1% milk ½ cup 4   Sugar free pudding, light yogurt, custard (use skim or 1% milk in preparation) 3 oz 2.5   Strained baby food meats, or home-made pureed lean meats and shrimp 1 oz 7   Beans (red, white, black, lima, patten, fat free refried, hummus) and lentils ¼ cup 4   Low-fat/fat free cheese.(cottage cheese, mozzarella string cheese, ricotta cheese, Laughing Cow, Baby Bell, cheddar, etc) ¼ cup 7-8   Scrambled eggs or Egg Beaters 1 or ¼ cup 6   Edamame or Tofu, mashed ¼ cup 5   Unflavored protein powder (add to 1 scoop to  98% fat free soups or SF pudding) 3 Tbsp 9   *PB2: peanut powder (45 calories) 2 Tbsp 5     *PB2 powdered peanut butter: 45 calories vs. 190 calories in 2 tbsp of regular peanut butter. Purchase online at [employer], or  at various [employer], [employer], [employer], [employer] and [employer].        Bariatric Liquid/Pureed Sample Menu    3-4 small meals  plus 2-3 protein drinks per day.    8am 1 egg or ¼ cup Egg Beaters   9am 1 cup water, or decaf coffee or tea   10am Protein drink, 30g protein   11am 2 tbsp low-fat cottage cheese, and 1 tbsp pureed peaches   12pm 1 cup water, or sugar-free lemonade    1pm 2 tbsp pureed chicken, and 1 tbsp pureed carrots    2pm 1 cup water, or sugar-free lemonade   3pm Protein drink, 30g protein   5pm 1 cup water    6pm 1 cup hi-protein creamy chicken soup 14g protein (see Recipe below)   7pm 1 cup water, or sugar-free fruit punch    8pm 1 cup water     This sample menu provides approx. 80g protein and 64oz fluids.  Liquid protein supplements should contain 20-30g protein and less than 4 grams of sugar each.    Sip fluids continuously in between meals.  Drink at least ¼ cup every 15 minutes.  For fluids: ¼ cup = 2 oz = 4 tbsp       RECIPE IDEAS for Bariatric Pureed Diet:    Hi-Protein Creamy Chicken Soup: (10g protein per 1 cup serving)  Empty 1 can of 98% fat free cream of chicken soup into saucepan. Then  blend 1 scoop of unflavored protein powder with 1 can of skim milk until smooth.  Add protein milk to saucepan and heat to warm. (Note: Do NOT boil. Protein powder may clump if heated too hot).     Hi-Protein Pudding: (14g protein per ½ cup serving)  Add 2 scoops protein powder to 2 cups cold skim milk and mix well.  Stir in dry Jell-O Sugar-Free Instant Pudding mix.  Chill and Enjoy!    Tuna Mousse (12g protein per ¼ cup serving) Page 135 in book Eating Well After Weight Loss Surgery.  In a  or , combine all ingredients and pulse until smooth.  2 6-ounce cans tuna packed in water, drained  2 tbsp low-fat mayonnaise  2 tbsp fat-free sour cream  2 tbsp fat-free cream cheese, softened  ½ cup shallots, finely chopped  1 tbsp lemon juice  ¼ tsp ground pepper  ½ tsp celery seed    Chocolate Peanut Butter Mousse  (28g protein total)  6oz plain Greek yogurt  4 tbsp chocolate PB2

## 2024-07-26 NOTE — PROGRESS NOTES
NUTRITION NOTE    Referring Physician: Soto Sebastian M.D.   Reason for MNT Referral: Follow-up 2 Weeks s/p laparoscopic sleeve gastrectomy    PAST MEDICAL HISTORY:  Denies nausea, vomiting, constipation, and diarrhea.  Reports doing well.    Past Medical History:   Diagnosis Date    Arthritis 11/2015    Asthma     Colon polyp 2011    Hypercholesteremia     Hypertension     Multiple thyroid nodules     Neuromuscular disorder 2017    Sleep apnea      CLINICAL DATA:  63 y.o.-year-old Black or  female.    Current Weight: 269 lbs  BMI: 43.57  Total Weight Loss: 16 lbs    Excess Weight Loss: 11%      CURRENT DIET:  Bariatric Liquid Diet    Diet Recall: 60 grams of protein/day; 52 oz of fluids/day    Diet Includes:  Protein Supplements: Pure Protein  Fluids: Water with Crystal Light, Gatorade/Powerade Zero    EXERCISE:  Bike 10 mins 3 days/week     LABS:  None available at time of visit    VITAMINS/MINERALS:  EZ Multivitamin with iron 2 per day  Barimelt B-1 2 tablets, 2 x day  Bariatric Best Calcium Citrate 600 mg with vitamin D 2 pills, 2 x day   [place] Vitamin B-12 sublingual 2500 mcg 1 per week    ASSESSMENT:  Doing fairly well overall.  Adequate protein intake.  Inadequate fluid intake.    BARIATRIC DIET DISCUSSION:  Instructed and provided written materials on bariatric puree diet plan   Bariatric soft diet plan to start in 2 weeks as svetlana  Pt may swallow tablets and pills at 2 week post op for sleeve surgery  Reinforced post-op nutrition guidelines.    PLAN/RECOMMONDATIONS:  Advance to bariatric puree diet  Increase protein intake.  Increase fluid intake.  Continue light exercise.  Continue appropriate vitamins & minerals.    Return to clinic in 6 weeks.    SESSION TIME: 30 minutes

## 2024-07-30 LAB — VIT B1 BLD-MCNC: 74 UG/L (ref 38–122)

## 2024-08-06 ENCOUNTER — HOSPITAL ENCOUNTER (OUTPATIENT)
Dept: RADIOLOGY | Facility: HOSPITAL | Age: 64
Discharge: HOME | End: 2024-08-06
Attending: INTERNAL MEDICINE
Payer: MEDICARE

## 2024-08-06 DIAGNOSIS — Z12.31 SCREENING MAMMOGRAM, ENCOUNTER FOR: ICD-10-CM

## 2024-08-06 PROCEDURE — 77063 BREAST TOMOSYNTHESIS BI: CPT | Mod: 26,,, | Performed by: RADIOLOGY

## 2024-08-06 PROCEDURE — 77067 SCR MAMMO BI INCL CAD: CPT | Mod: TC

## 2024-08-06 PROCEDURE — 77067 SCR MAMMO BI INCL CAD: CPT | Mod: 26,,, | Performed by: RADIOLOGY

## 2024-08-07 ENCOUNTER — TELEPHONE (OUTPATIENT)
Dept: INTERNAL MEDICINE | Facility: CLINIC | Age: 64
End: 2024-08-07
Payer: MEDICARE

## 2024-08-07 RX ORDER — LISINOPRIL 40 MG/1
40 TABLET ORAL
Qty: 90 TABLET | Refills: 0 | Status: SHIPPED | OUTPATIENT
Start: 2024-08-07

## 2024-08-07 RX ORDER — AMLODIPINE BESYLATE 10 MG/1
TABLET ORAL
Qty: 90 TABLET | Refills: 0 | Status: SHIPPED | OUTPATIENT
Start: 2024-08-07

## 2024-08-12 ENCOUNTER — PATIENT MESSAGE (OUTPATIENT)
Dept: BARIATRICS | Facility: CLINIC | Age: 64
End: 2024-08-12
Payer: MEDICARE

## 2024-08-31 DIAGNOSIS — E89.0 POSTOPERATIVE HYPOTHYROIDISM: ICD-10-CM

## 2024-09-03 ENCOUNTER — PATIENT MESSAGE (OUTPATIENT)
Dept: BARIATRICS | Facility: CLINIC | Age: 64
End: 2024-09-03
Payer: MEDICARE

## 2024-09-03 RX ORDER — LEVOTHYROXINE SODIUM 100 UG/1
100 TABLET ORAL
Qty: 90 TABLET | Refills: 1 | Status: SHIPPED | OUTPATIENT
Start: 2024-09-03

## 2024-09-06 ENCOUNTER — CLINICAL SUPPORT (OUTPATIENT)
Dept: BARIATRICS | Facility: CLINIC | Age: 64
End: 2024-09-06
Payer: MEDICARE

## 2024-09-06 ENCOUNTER — OFFICE VISIT (OUTPATIENT)
Dept: BARIATRICS | Facility: CLINIC | Age: 64
End: 2024-09-06
Payer: MEDICARE

## 2024-09-06 ENCOUNTER — LAB VISIT (OUTPATIENT)
Dept: LAB | Facility: HOSPITAL | Age: 64
End: 2024-09-06
Payer: MEDICARE

## 2024-09-06 VITALS
HEART RATE: 82 BPM | SYSTOLIC BLOOD PRESSURE: 127 MMHG | OXYGEN SATURATION: 95 % | BODY MASS INDEX: 40.92 KG/M2 | WEIGHT: 253.5 LBS | DIASTOLIC BLOOD PRESSURE: 67 MMHG

## 2024-09-06 DIAGNOSIS — E66.01 MORBID OBESITY WITH BMI OF 40.0-44.9, ADULT: ICD-10-CM

## 2024-09-06 DIAGNOSIS — Z71.3 DIETARY COUNSELING AND SURVEILLANCE: Primary | ICD-10-CM

## 2024-09-06 DIAGNOSIS — R79.9 ABNORMAL FINDING OF BLOOD CHEMISTRY, UNSPECIFIED: ICD-10-CM

## 2024-09-06 DIAGNOSIS — Z79.899 LONG-TERM USE OF HIGH-RISK MEDICATION: ICD-10-CM

## 2024-09-06 DIAGNOSIS — Z98.84 S/P BARIATRIC SURGERY: Primary | ICD-10-CM

## 2024-09-06 DIAGNOSIS — I10 PRIMARY HYPERTENSION: ICD-10-CM

## 2024-09-06 DIAGNOSIS — Z79.899 POLYPHARMACY: ICD-10-CM

## 2024-09-06 DIAGNOSIS — G47.33 OSA (OBSTRUCTIVE SLEEP APNEA): ICD-10-CM

## 2024-09-06 DIAGNOSIS — Z98.84 S/P BARIATRIC SURGERY: ICD-10-CM

## 2024-09-06 DIAGNOSIS — E78.5 HYPERLIPIDEMIA, UNSPECIFIED HYPERLIPIDEMIA TYPE: ICD-10-CM

## 2024-09-06 LAB
25(OH)D3+25(OH)D2 SERPL-MCNC: 46 NG/ML (ref 30–96)
ALBUMIN SERPL BCP-MCNC: 4.3 G/DL (ref 3.5–5.2)
ALP SERPL-CCNC: 68 U/L (ref 55–135)
ALT SERPL W/O P-5'-P-CCNC: 22 U/L (ref 10–44)
ANION GAP SERPL CALC-SCNC: 10 MMOL/L (ref 8–16)
AST SERPL-CCNC: 20 U/L (ref 10–40)
BASOPHILS # BLD AUTO: ABNORMAL K/UL (ref 0–0.2)
BASOPHILS NFR BLD: 0 % (ref 0–1.9)
BILIRUB SERPL-MCNC: 0.9 MG/DL (ref 0.1–1)
BUN SERPL-MCNC: 20 MG/DL (ref 8–23)
CALCIUM SERPL-MCNC: 10.2 MG/DL (ref 8.7–10.5)
CHLORIDE SERPL-SCNC: 108 MMOL/L (ref 95–110)
CHOLEST SERPL-MCNC: 186 MG/DL (ref 120–199)
CHOLEST/HDLC SERPL: 5.5 {RATIO} (ref 2–5)
CO2 SERPL-SCNC: 25 MMOL/L (ref 23–29)
CREAT SERPL-MCNC: 1.2 MG/DL (ref 0.5–1.4)
DIFFERENTIAL METHOD BLD: ABNORMAL
EOSINOPHIL # BLD AUTO: ABNORMAL K/UL (ref 0–0.5)
EOSINOPHIL NFR BLD: 0 % (ref 0–8)
ERYTHROCYTE [DISTWIDTH] IN BLOOD BY AUTOMATED COUNT: 13.1 % (ref 11.5–14.5)
EST. GFR  (NO RACE VARIABLE): 50.9 ML/MIN/1.73 M^2
GLUCOSE SERPL-MCNC: 107 MG/DL (ref 70–110)
HCT VFR BLD AUTO: 45.5 % (ref 37–48.5)
HDLC SERPL-MCNC: 34 MG/DL (ref 40–75)
HDLC SERPL: 18.3 % (ref 20–50)
HGB BLD-MCNC: 14.6 G/DL (ref 12–16)
IMM GRANULOCYTES # BLD AUTO: ABNORMAL K/UL (ref 0–0.04)
IMM GRANULOCYTES NFR BLD AUTO: ABNORMAL % (ref 0–0.5)
IRON SERPL-MCNC: 85 UG/DL (ref 30–160)
LDLC SERPL CALC-MCNC: 135.6 MG/DL (ref 63–159)
LYMPHOCYTES # BLD AUTO: ABNORMAL K/UL (ref 1–4.8)
LYMPHOCYTES NFR BLD: 48 % (ref 18–48)
MCH RBC QN AUTO: 30.1 PG (ref 27–31)
MCHC RBC AUTO-ENTMCNC: 32.1 G/DL (ref 32–36)
MCV RBC AUTO: 94 FL (ref 82–98)
MONOCYTES # BLD AUTO: ABNORMAL K/UL (ref 0.3–1)
MONOCYTES NFR BLD: 4 % (ref 4–15)
NEUTROPHILS NFR BLD: 48 % (ref 38–73)
NONHDLC SERPL-MCNC: 152 MG/DL
NRBC BLD-RTO: 0 /100 WBC
PLATELET # BLD AUTO: 210 K/UL (ref 150–450)
PLATELET BLD QL SMEAR: ABNORMAL
PMV BLD AUTO: 11.3 FL (ref 9.2–12.9)
POTASSIUM SERPL-SCNC: 4.2 MMOL/L (ref 3.5–5.1)
PROT SERPL-MCNC: 8.3 G/DL (ref 6–8.4)
RBC # BLD AUTO: 4.85 M/UL (ref 4–5.4)
SATURATED IRON: 22 % (ref 20–50)
SODIUM SERPL-SCNC: 143 MMOL/L (ref 136–145)
TOTAL IRON BINDING CAPACITY: 380 UG/DL (ref 250–450)
TRANSFERRIN SERPL-MCNC: 257 MG/DL (ref 200–375)
TRIGL SERPL-MCNC: 82 MG/DL (ref 30–150)
VIT B12 SERPL-MCNC: 1138 PG/ML (ref 210–950)
WBC # BLD AUTO: 3.86 K/UL (ref 3.9–12.7)

## 2024-09-06 PROCEDURE — 85027 COMPLETE CBC AUTOMATED: CPT | Performed by: NURSE PRACTITIONER

## 2024-09-06 PROCEDURE — 85007 BL SMEAR W/DIFF WBC COUNT: CPT | Performed by: NURSE PRACTITIONER

## 2024-09-06 PROCEDURE — 99999 PR PBB SHADOW E&M-EST. PATIENT-LVL IV: CPT | Mod: PBBFAC,,, | Performed by: NURSE PRACTITIONER

## 2024-09-06 PROCEDURE — 80053 COMPREHEN METABOLIC PANEL: CPT | Performed by: NURSE PRACTITIONER

## 2024-09-06 PROCEDURE — 84425 ASSAY OF VITAMIN B-1: CPT | Performed by: NURSE PRACTITIONER

## 2024-09-06 PROCEDURE — 82607 VITAMIN B-12: CPT | Performed by: NURSE PRACTITIONER

## 2024-09-06 PROCEDURE — 36415 COLL VENOUS BLD VENIPUNCTURE: CPT | Performed by: NURSE PRACTITIONER

## 2024-09-06 PROCEDURE — 83540 ASSAY OF IRON: CPT | Performed by: NURSE PRACTITIONER

## 2024-09-06 PROCEDURE — 82306 VITAMIN D 25 HYDROXY: CPT | Performed by: NURSE PRACTITIONER

## 2024-09-06 PROCEDURE — 80061 LIPID PANEL: CPT | Performed by: NURSE PRACTITIONER

## 2024-09-06 NOTE — PATIENT INSTRUCTIONS
Meal Ideas for Regular Bariatric Diet  *Recipes and products available at [email or web address].com      Breakfast: (15-20g protein)    - Egg white omelet: 2 egg whites or ½ cup Egg Beaters. (Optional proteins: cheese, shrimp, black beans, chicken, sliced turkey) (Optional veggies: tomatoes, salsa, spinach, mushrooms, onions, green peppers, or small slice avocado)     - Egg and sausage: 1 egg or ¼ cup Egg Beaters (any variety), with 1 elisabeth or 2 links of Turkey sausage or Veggie breakfast sausage (In[employer] or [employer])    - Crust-less breakfast quiche: To make a glass pie dish, mix 4oz part skim Ricotta, 1 cup skim milk, and 2 eggs as your base. Add protein: shredded cheese, sliced lean ham or turkey, turkey bolton/sausage. Add veggies: tomato, onion, green onion, mushroom, green pepper, spinach, etc.    - Yogurt parfait: Mix 1 - 6oz container Dannon Light N Fit vanilla yogurt, with ¼ cup crushed unsalted nuts    - Cottage cheese and fruit: ½ cup part-skim cottage cheese or ricotta cheese topped with fresh fruit or sugar free preserves     - Rosanna Yeh's Vanilla Egg custard* (add 2 Tbsp instant coffee granules to make Cappuccino Custard*)    - Hi-Protein café latte (skim milk, decaf coffee, 1 scoop protein powder). Optional to add Sugar free syrup or extract flavoring.    - Breakfast Lox: spread fat free cream cheese on slices of smoked salmon. Serve over scrambled or egg over easy (sauteed with nonstick cookspray) OR on a cucumber slice    - Eggwhich: Scramble or cook 1 large egg over easy using nonstick cookspray. Place between 2 slices of Brazilian bolton and low fat cheese.     Lunch: (20-30g protein)    - ½ cup Black bean soup (Homemade or Progresso), with ¼ cup shredded low-fat cheese. Top with chopped tomato or fresh salsa.     - Lean deli turkey breast and low-fat sliced cheese, mustard or light dumas to moisten, rolled up together, or wrapped in a Miquel lettuce leaf    - Chicken salad made from dinner  leftovers, moisten with low-fat salad dressing or light dumas. Also try leftover salmon, shrimp, tuna or boiled eggs. Serve ½ cup over dark green salad    - Fat-free canned refried beans, topped with ¼ cup shredded low-fat cheese. Top with chopped tomato or fresh salsa.     - Greek salad: Top mixed greens with 1-2oz grilled chicken, tomatoes, red onions, 2-3 kalamata olives, and sprinkle lightly with feta cheese. Spritz with Balsamic vinegar to taste.     - Crust-less lunch quiche: To make a glass pie dish, mix 4oz part skim Ricotta, 1 cup skim milk, and 2 eggs as your base. Add protein: shredded cheese, sliced lean ham or turkey, shrimp, chicken. Add veggies: tomato, onion, green onion, mushroom, green pepper, spinach, artichoke, broccoli, etc.    - Pizza bake: spread a  susan johan mushroom with tomato sauce, low-fat shredded mozzarella and turkey pepperoni or [place] bolton. Add any veggies. Roast for 10-15 minutes, until cheese melted.     - Cucumber crab bites: Spread ¼ cup crab dip (lump crabmeat + light cream cheese and green onions) over sliced cucumber.     - Chicken with light spinach and artichoke dip*: Puree in : 6oz cooked and drained spinach, 2 cloves garlic, 1 can cannelloni beans, ½ cup chopped green onions, 1 can drained artichoke hearts (not marinated in oil), lemon juice and basil. Mix in 2oz chopped up chicken.    Supper: (20-30g protein)    - Serve grilled fish over dark green salad tossed with low-fat dressing, served with grilled asparagus haley     - Rotisserie chicken salad: served with sliced strawberries, walnuts, fat-free feta cheese crumbles and 1 tbsp Adlers Own Light Raspberry [place] Vinaigrette    - Shrimp cocktail: Dip cold boiled shrimp in homemade low-sugar cocktail sauce (1/2 cup Marylin One Carb ketchup, 2 tbsp horseradish, 1/4 tsp hot sauce, 1 tsp Worcestershire sauce, 1 tbsp freshly-squeezed lemon juice). Serve with dark green salad, walnuts, and crumbled blue  cheese drizzled with olive oil and Balsamic vinegar    - Tuna Melt: Spread tuna salad onto 2 thick slices of tomato. Top with low-fat cheese and broil until cheese is melted. May also be made with chicken salad of shrimp salad. Lowgap with different types of cheeses.    - Chicken or beef fajitas (no tortilla, rice, beans, chips). Top meat and veggies w/ fresh salsa, fat free sour cream.     - Homemade low-fat Chili using extra lean ground beef or ground turkey. Top with shredded cheese and salsa as desired. May add dollop fat-free sour cream if desired    - Chicken parmesan: Top chicken breast w/ low sugar marinara sauce, mozzarella cheese and bake until chicken reaches 165*.  Serve w/ spaghetti SQUASH or Indian cut green beans    - Dinner Omelet with shrimp or chicken and onion, green peppers and chives.    - No noodle lasagna: Use sliced zucchini or eggplant in place of noodles.  Layer with part skim ricotta cheese and low sugar meat sauce (use very lean ground beef or ground turkey).    - Mexican chicken bake: Bake chunks of chicken breast or thigh with taco seasoning, Pace brand enchilada sauce, green onions and low-fat cheese. Serve with ¼ cup black beans or fat free refried beans topped with chopped tomatoes or salsa.    - Yulissa frozen meatballs, simmered in Classico Marinara sauce. Different flavors of salsa or spaghetti sauce create different dishes! Sprinkle with parmesan cheese. Serve with grilled or steamed veggies, or a dark green salad.    - Simmer boneless skinless chicken thigh chunks in Classico Marinara sauce or roasted salsa until tender with chopped onion, bell pepper, garlic, mushrooms, spinach, etc.     - Hamburger or veggie burger, without the bun, dressed the way you like. Served with grilled or steamed veggies.    - Eggplant parmesan: Bake slices of eggplant at 350 degrees for 15 minutes. Layer tomato sauce, sliced eggplant and low-fat mozzarella cheese in a baking dish and cover with  foil. Bake 30-40 more minutes or until bubbly. Uncover and bake at 400 degrees for about 15 more minutes, or until top is slightly crisp.    - Fish tacos: grilled/baked white fish, wrapped in Miquel lettuce leaf, topped with salsa, shredded low-fat cheese, and light coleslaw.    - Chicken alex: Sprinkle chicken w/ 1 tsp of hidden [place] ranch dip mix. Then grill chicken and top with black beans, salsa and 1 tsp fat free sour cream.     - Cauliflower pizza crust: Use cauliflower as crust (see recipe on pinterest, no flour!). Top w/ low fat cheese, turkey pepperoni and veggies and bake again    - chicken or turkey crust pizza: use ground chicken or turkey instead of cauliflower, spread in [place] and bake at 350 for about 20-30 minutes(may want to add garlic, black pepper, oregano and other herbs to ground meat mixture).  Remove and top w/ low fat cheese, turkey pepperoni and veggies and bake again for another 10 minutes or until cheese is browned.     Snacks: (100-200 calories; >5g protein)    - 1 low-fat cheese stick with 8 cherry tomatoes or 1 serving fresh fruit  - 4 thin slices fat-free turkey breast and 1 slice low-fat cheese  - 4 thin slices fat-free honey ham with wedge of melon  - 6-8 edamame pods (equivalent to about 1/4 cup edamame without pods).   - 1/4 cup unsalted nuts with ½ cup fruit  - 6-oz container Dannon Light n Fit vanilla yogurt, topped with 1oz unsalted nuts         - apple, celery or baby carrots spread with 2 Tbsp PB2  - apple slices with 1 oz slice low-fat cheese  - Apple slices dipped in 2 Tbsp of PB2  - celery, cucumber, bell pepper or baby carrots dipped in ¼ cup hummus bean spread or light spinach and artichoke dip (*recipe in lunch section)  - celery, cucumber, baby carrots dipped in high protein greek yogurt (Mix 16 oz plain greek yogurt + 1 packet of hidden valley ranch dip mix)  - Kasi Links Beef Steak - 14g protein! (similar to beef jerky)  - 2 wedges Laughing Cow - Light Herb  & Garlic Cheese with sliced cucumber or green bell pepper  - 1/2 cup low-fat cottage cheese with ¼ cup fruit or ¼ cup salsa  - RTD Protein drinks: Atkins, Low Carb Slim Fast, EAS light, Muscle Milk Light, etc.  - Homemade Protein drinks: GNC Soy95, Isopure, Nectar, UNJURY, Whey Gourmet, etc. Mix 1 scoop powder with 8oz skim/1% milk or light soymilk.  - Protein bars: Atkins, EAS, Pure Protein, Think Thin, Detour, etc. Must have 0-4 grams sugar - Read the label.    Takeout Options: No more than twice/week  Deli - Salads (no pasta or rice), meats, cheeses. Roasted chicken. Lox (salmon)    Mexican - Platters which don't include tortillas, chips, or rice. Go easy on the beans. Example: Fajitas without the tortillas. Ask the  not to bring chips to the table if they are too tempting.    Greek - Meat or fish and vegetable, but no bread or rice. Including hummus, baba ganoush, etc, is OK. Most sit-down Greek restaurants can provide you with cucumber slices for dipping instead of vanessa bread.    Fast Food (Avoid as much as possible) - Salads (no croutons and limit salad dressing to 2 tbsp), grilled chicken sandwich without the bun and ask for no dumas. Shilpas low fat chili or Taco Bell pintos and cheese.    BBQ - The meats are fine if you ask for sauces on the side, but most of the traditional side dishes are loaded with carbs. Italo slaw, baked beans and BBQ sauce are typically made with sugar.    Chinese - Nothing deep-fried, no rice or noodles. Many Chinese sauces have starch and sugar in them, so you'll have to use your judgement. If you find that these sauces trigger cravings, or cause Dumping, you can ask for the sauce to be made without sugar or just use soy sauce.    How to taper off of Omeprazole:  - Take 1 Tablet every other day for 2 weeks.  If you do not experience any heartburn, indigestion, nausea symptoms, the following week, take 1 tablet every 3 days.  Again if you remain without the above symptoms, you  may completely discontinue the medication.  If symptoms return at any point, please restart the medication.

## 2024-09-06 NOTE — PROGRESS NOTES
NUTRITION NOTE  Referring Physician: Soto Sebastian M.D.   Reason for MNT Referral: Follow-up 8 Weeks s/p laparoscopic sleeve gastrectomy    PAST MEDICAL HISTORY:  Denies nausea, vomiting, constipation, and diarrhea.  Reports doing well.    Past Medical History:   Diagnosis Date    Arthritis 11/2015    Asthma     Colon polyp 2011    Hypercholesteremia     Hypertension     Multiple thyroid nodules     Neuromuscular disorder 2017    Sleep apnea        CLINICAL DATA:  64 y.o.-year-old Black or  female.      Current Weight: 253 lbs  BMI: 40.92  Total Weight Loss: 32 lbs    Excess Weight Loss: 23%      PT reports having a hard time getting chicken or pork down. Feels heavy. Beef is ok.    CURRENT DIET:  Bariatric Soft Diet    Diet Recall: 80 grams of protein/day; 42 oz of fluids/day    B: Egg or Greek yogurt  Protein shake  L: Baked fish or boiled crab  S: P3 snack - turkey or ham, cheese, and nuts  D: Hamburger elisabeth or 3 air fried meatballs with string beans or cauliflower or okra    Diet Includes:   Meal Pattern: 3 meal(s) + 1 snack(s) + 1 protein supplement(s)  Adequate protein supplement intake. Pure Protein powder mixed with decaf  Adequate dairy intake. Greek yogurt and string cheese  Adequate vegetable intake.  Adequate fruit intake. Watermelon, strawberries, grapes, oranges  Starchy CHO: none  Beverages Water with Crystal Light, Gatorade/Powerade Zero     EXERCISE:  Arm weights  Peddler     Restrictions to Exercise: None.    LABS:  Reviewed.    VITAMINS/MINERALS:  EZ Multivitamin with iron 2 per day; will switch to adult multi pill  Barimelt B-1 2 tablets, 2 x day will switch to B-1 250 mg pill  Bariatric Best Calcium Citrate 600 mg with vitamin D 2 pills, 2 x day   [place] Vitamin B-12 sublingual 2500 mcg 1 per week    ASSESSMENT:  Doing well overall.  Adequate protein intake.  Inadequate fluid intake.  Advancing diet appropriately.  Exercising.  Adequate vitamins &  minerals.    BARIATRIC DIET DISCUSSION:  Instructed and provided written materials on bariatric regular diet plan.  Reinforced post-op nutrition guidelines.  Reminded PT no starchy CHO until goal weight  Discussed tracking intake to ensure protein goals and calorie range are met    PLAN / RECOMMENDATIONS:  Advance to bariatric regular diet.  Increase protein intake.  Increase fluid intake.  Continue light exercise.  Continue appropriate vitamins & minerals.    Return to clinic in 4 months.    SESSION TIME: 20 minutes

## 2024-09-06 NOTE — PROGRESS NOTES
BARIATRIC POST-OPERATIVE VISIT:    HPI:  Dejah Schmidt is a 64 y.o. year old female presents for 8 week post op visit following sleeve.  she is doing well and tolerating the diet without difficulty.  she has no complaints.    Denies: nausea, vomiting, abdominal pain, changes in bowel movement pattern, fever, chills, dysphagia, chest pain, and shortness of breath.      Review of Systems   Constitutional:  Negative for activity change and fatigue.   Respiratory:  Negative for cough and shortness of breath.    Cardiovascular:  Negative for chest pain, palpitations and leg swelling.   Gastrointestinal:  Negative for abdominal pain, nausea and vomiting.   Endocrine: Negative for polydipsia, polyphagia and polyuria.   Genitourinary:  Negative for dysuria.   Musculoskeletal:  Negative for gait problem.   Skin:  Negative for rash.   Allergic/Immunologic: Negative for immunocompromised state.   Neurological:  Negative for dizziness, syncope and weakness.   Hematological:  Does not bruise/bleed easily.   Psychiatric/Behavioral:  Negative for behavioral problems.        EXERCISE & VITAMINS:  See Bariatric Assessment    MEDICATIONS/ALLERGIES:  Have been reviewed.    DIET: Soft/regular Bariatric Diet. 1  protein shakes daily, ~80 grams protein.  32  fl oz SF clear beverage.      Tolerating eggs, fish,chicken, beans, cheese, yogurt    See Dietician note from today for a more detailed assessment.      Physical Exam  Vitals and nursing note reviewed.   Constitutional:       Appearance: She is well-developed. She is morbidly obese.   HENT:      Head: Normocephalic.      Nose: Nose normal.      Mouth/Throat:      Mouth: Mucous membranes are moist.   Eyes:      Extraocular Movements: Extraocular movements intact.   Cardiovascular:      Rate and Rhythm: Normal rate and regular rhythm.      Heart sounds: Normal heart sounds.   Pulmonary:      Effort: Pulmonary effort is normal.      Breath sounds: Normal breath sounds.   Abdominal:       General: Bowel sounds are normal.      Palpations: Abdomen is soft.   Musculoskeletal:         General: Normal range of motion.      Cervical back: Normal range of motion.   Skin:     General: Skin is warm and dry.      Capillary Refill: Capillary refill takes less than 2 seconds.   Neurological:      Mental Status: She is alert and oriented to person, place, and time.   Psychiatric:         Mood and Affect: Mood normal.         ASSESSMENT:  - Morbid obesity s/p sleeve gastrectomy on 7/12/24.  - Co-morbidities: hypertension and CKD 3  -  Weight loss, 32#'s and 23% EWL  -  Exercise routine moving some biking   - Good Diet   - Good Vitamin regimen     PLAN:  - Ursodiol 500 mg daily for 6 months  - Anti-Acid medication,  daily for 3 months, slow taper  - Miralax daily for constipation  - Emphasized the importance of regular exercise and adherence to bariatric diet to achieve maximum weight loss.  - Encouraged patient to start regular exercise.  - Follow-up with dietician to advance diet.  - Continue daily vitamins and medications.  - RTC in 4 months or sooner if needed.  - Call the office for any issues.  - Check labs today.  - PCP follow up HTN management october    Post Discharge     8 Weeks     Total Opioids Used (Outpatient): 0 tabsml: mLs  Unused Opioids Returned: No Educated on safe opioid disposal location at [facility].   Additional Opioids Provided: no

## 2024-09-10 ENCOUNTER — PATIENT MESSAGE (OUTPATIENT)
Dept: BARIATRICS | Facility: CLINIC | Age: 64
End: 2024-09-10
Payer: MEDICARE

## 2024-09-10 LAB — VIT B1 BLD-MCNC: 88 UG/L (ref 38–122)

## 2024-10-01 ENCOUNTER — PATIENT MESSAGE (OUTPATIENT)
Dept: BARIATRICS | Facility: CLINIC | Age: 64
End: 2024-10-01
Payer: MEDICARE

## 2024-10-08 ENCOUNTER — PATIENT MESSAGE (OUTPATIENT)
Dept: BARIATRICS | Facility: CLINIC | Age: 64
End: 2024-10-08
Payer: MEDICARE

## 2024-10-08 ENCOUNTER — OFFICE VISIT (OUTPATIENT)
Dept: INTERNAL MEDICINE | Facility: CLINIC | Age: 64
End: 2024-10-08
Payer: MEDICARE

## 2024-10-08 DIAGNOSIS — Z00.00 PREVENTATIVE HEALTH CARE: Primary | ICD-10-CM

## 2024-10-08 PROCEDURE — 1159F MED LIST DOCD IN RCRD: CPT | Mod: CPTII,S$GLB,, | Performed by: INTERNAL MEDICINE

## 2024-10-08 PROCEDURE — 3075F SYST BP GE 130 - 139MM HG: CPT | Mod: CPTII,S$GLB,, | Performed by: INTERNAL MEDICINE

## 2024-10-08 PROCEDURE — 3044F HG A1C LEVEL LT 7.0%: CPT | Mod: CPTII,S$GLB,, | Performed by: INTERNAL MEDICINE

## 2024-10-08 PROCEDURE — 3008F BODY MASS INDEX DOCD: CPT | Mod: CPTII,S$GLB,, | Performed by: INTERNAL MEDICINE

## 2024-10-08 PROCEDURE — 3078F DIAST BP <80 MM HG: CPT | Mod: CPTII,S$GLB,, | Performed by: INTERNAL MEDICINE

## 2024-10-08 PROCEDURE — 4010F ACE/ARB THERAPY RXD/TAKEN: CPT | Mod: CPTII,S$GLB,, | Performed by: INTERNAL MEDICINE

## 2024-10-08 PROCEDURE — 99396 PREV VISIT EST AGE 40-64: CPT | Mod: S$GLB,,, | Performed by: INTERNAL MEDICINE

## 2024-10-08 PROCEDURE — 99999 PR PBB SHADOW E&M-EST. PATIENT-LVL IV: CPT | Mod: PBBFAC,,, | Performed by: INTERNAL MEDICINE

## 2024-10-08 RX ORDER — GABAPENTIN 600 MG/1
600 TABLET ORAL 3 TIMES DAILY
Qty: 270 TABLET | Refills: 1 | Status: SHIPPED | OUTPATIENT
Start: 2024-10-08

## 2024-10-12 VITALS
WEIGHT: 250 LBS | DIASTOLIC BLOOD PRESSURE: 70 MMHG | HEART RATE: 62 BPM | HEIGHT: 66 IN | OXYGEN SATURATION: 100 % | TEMPERATURE: 99 F | SYSTOLIC BLOOD PRESSURE: 130 MMHG | BODY MASS INDEX: 40.18 KG/M2

## 2024-10-13 NOTE — PROGRESS NOTES
Subjective:       Patient ID: Dejah Schmidt is a 64 y.o. female.    Chief Complaint: Annual Exam    HPI  She is here for annual exam    Past medical history: Hypertension, hyperlipidemia, asthma, lumbar spinal stenosis, status post sleeve gastrectomy, status post thyroidectomy, status post hysterectomy. She had a colonoscopy June 2020     Medications: Lisinopril 40 mg daily, Dyazide 1 by mouth daily, Norvasc 10 mg daily , Synthroid 0.1 mg daily, Lipitor 40 mg daily     NO KNOWN DRUG ALLERGIES        Review of Systems   Constitutional:  Negative for chills, fatigue, fever and unexpected weight change.   Respiratory:  Negative for chest tightness and shortness of breath.    Cardiovascular:  Negative for chest pain and palpitations.   Gastrointestinal:  Negative for abdominal pain and blood in stool.   Neurological:  Negative for dizziness, syncope, numbness and headaches.       Objective:      Physical Exam  HENT:      Right Ear: External ear normal.      Left Ear: External ear normal.      Nose: Nose normal.      Mouth/Throat:      Mouth: Mucous membranes are moist.      Pharynx: Oropharynx is clear.   Eyes:      Pupils: Pupils are equal, round, and reactive to light.   Cardiovascular:      Rate and Rhythm: Normal rate and regular rhythm.      Heart sounds: No murmur heard.  Pulmonary:      Breath sounds: Normal breath sounds.   Abdominal:      General: There is no distension.      Palpations: There is no hepatomegaly or splenomegaly.      Tenderness: There is no abdominal tenderness.   Musculoskeletal:      Cervical back: Normal range of motion.   Lymphadenopathy:      Cervical: No cervical adenopathy.      Upper Body:      Right upper body: No axillary adenopathy.      Left upper body: No axillary adenopathy.   Neurological:      Cranial Nerves: No cranial nerve deficit.      Sensory: No sensory deficit.      Motor: Motor function is intact.      Deep Tendon Reflexes: Reflexes are normal and symmetric.          Assessment/Plan       Annual exam:  Continue lisinopril

## 2024-10-30 RX ORDER — LISINOPRIL 40 MG/1
40 TABLET ORAL
Qty: 90 TABLET | Refills: 3 | Status: SHIPPED | OUTPATIENT
Start: 2024-10-30

## 2024-10-30 RX ORDER — AMLODIPINE BESYLATE 10 MG/1
TABLET ORAL
Qty: 90 TABLET | Refills: 3 | Status: SHIPPED | OUTPATIENT
Start: 2024-10-30

## 2024-11-02 ENCOUNTER — PATIENT MESSAGE (OUTPATIENT)
Dept: BARIATRICS | Facility: CLINIC | Age: 64
End: 2024-11-02
Payer: MEDICARE

## 2024-11-12 ENCOUNTER — PATIENT MESSAGE (OUTPATIENT)
Dept: BARIATRICS | Facility: CLINIC | Age: 64
End: 2024-11-12
Payer: MEDICARE

## 2024-11-18 NOTE — ED NOTES
Patient placed on continuous cardiac monitor, automatic blood pressure cuff and continuous pulse oximeter.   [facility] provides services at a reduced cost to those who are determined to be eligible through [facility]’s financial assistance program. Information regarding [facility]’s financial assistance program can be found by going to [email or web address] or by calling [phone].

## 2024-12-03 ENCOUNTER — PATIENT MESSAGE (OUTPATIENT)
Dept: BARIATRICS | Facility: CLINIC | Age: 64
End: 2024-12-03
Payer: MEDICARE

## 2024-12-10 ENCOUNTER — PATIENT MESSAGE (OUTPATIENT)
Dept: BARIATRICS | Facility: CLINIC | Age: 64
End: 2024-12-10
Payer: MEDICARE

## 2025-01-06 ENCOUNTER — PATIENT MESSAGE (OUTPATIENT)
Dept: BARIATRICS | Facility: CLINIC | Age: 65
End: 2025-01-06
Payer: MEDICARE

## 2025-01-14 ENCOUNTER — PATIENT MESSAGE (OUTPATIENT)
Dept: BARIATRICS | Facility: CLINIC | Age: 65
End: 2025-01-14
Payer: MEDICARE

## 2025-02-05 RX ORDER — ATORVASTATIN CALCIUM 40 MG/1
40 TABLET, FILM COATED ORAL
Qty: 90 TABLET | Refills: 2 | Status: SHIPPED | OUTPATIENT
Start: 2025-02-05

## 2025-02-05 NOTE — TELEPHONE ENCOUNTER
No care due was identified.  [facility] Embedded Care Due Messages. Reference number: [id number].   2/05/2025 8:58:48 AM CST

## 2025-02-05 NOTE — TELEPHONE ENCOUNTER
Refill Decision Note   Dejah Schmidt  is requesting a refill authorization.  Brief Assessment and Rationale for Refill:  Approve     Medication Therapy Plan:         Comments:     Note composed:10:29 AM 02/05/2025

## 2025-02-10 ENCOUNTER — PATIENT MESSAGE (OUTPATIENT)
Dept: BARIATRICS | Facility: CLINIC | Age: 65
End: 2025-02-10
Payer: MEDICARE

## 2025-03-10 ENCOUNTER — PATIENT MESSAGE (OUTPATIENT)
Dept: BARIATRICS | Facility: CLINIC | Age: 65
End: 2025-03-10
Payer: MEDICARE

## 2025-03-24 ENCOUNTER — PATIENT MESSAGE (OUTPATIENT)
Dept: INTERNAL MEDICINE | Facility: CLINIC | Age: 65
End: 2025-03-24
Payer: MEDICARE

## 2025-03-24 DIAGNOSIS — E89.0 POSTOPERATIVE HYPOTHYROIDISM: ICD-10-CM

## 2025-03-24 DIAGNOSIS — Z00.00 ENCOUNTER FOR MEDICARE ANNUAL WELLNESS EXAM: ICD-10-CM

## 2025-03-24 RX ORDER — LEVOTHYROXINE SODIUM 100 UG/1
100 TABLET ORAL
Qty: 90 TABLET | Refills: 1 | Status: SHIPPED | OUTPATIENT
Start: 2025-03-24

## 2025-03-24 NOTE — TELEPHONE ENCOUNTER
No care due was identified.  [facility] Embedded Care Due Messages. Reference number: [id number].   3/24/2025 12:58:49 PM CDT

## 2025-04-08 ENCOUNTER — PATIENT MESSAGE (OUTPATIENT)
Dept: BARIATRICS | Facility: CLINIC | Age: 65
End: 2025-04-08
Payer: MEDICARE

## 2025-04-08 ENCOUNTER — OFFICE VISIT (OUTPATIENT)
Dept: INTERNAL MEDICINE | Facility: CLINIC | Age: 65
End: 2025-04-08
Payer: MEDICARE

## 2025-04-08 ENCOUNTER — LAB VISIT (OUTPATIENT)
Dept: LAB | Facility: HOSPITAL | Age: 65
End: 2025-04-08
Attending: INTERNAL MEDICINE
Payer: MEDICARE

## 2025-04-08 DIAGNOSIS — E03.9 HYPOTHYROIDISM, UNSPECIFIED TYPE: ICD-10-CM

## 2025-04-08 DIAGNOSIS — N18.31 CHRONIC KIDNEY DISEASE, STAGE 3A: ICD-10-CM

## 2025-04-08 DIAGNOSIS — I10 HYPERTENSION, UNSPECIFIED TYPE: ICD-10-CM

## 2025-04-08 DIAGNOSIS — I10 HYPERTENSION, UNSPECIFIED TYPE: Primary | ICD-10-CM

## 2025-04-08 DIAGNOSIS — E66.01 MORBID OBESITY: ICD-10-CM

## 2025-04-08 DIAGNOSIS — K63.5 POLYP OF COLON, UNSPECIFIED PART OF COLON, UNSPECIFIED TYPE: ICD-10-CM

## 2025-04-08 LAB
ANION GAP (OHS): 8 MMOL/L (ref 8–16)
BUN SERPL-MCNC: 20 MG/DL (ref 8–23)
CALCIUM SERPL-MCNC: 9.5 MG/DL (ref 8.7–10.5)
CHLORIDE SERPL-SCNC: 106 MMOL/L (ref 95–110)
CO2 SERPL-SCNC: 27 MMOL/L (ref 23–29)
CREAT SERPL-MCNC: 1.2 MG/DL (ref 0.5–1.4)
GFR SERPLBLD CREATININE-BSD FMLA CKD-EPI: 51 ML/MIN/1.73/M2
GLUCOSE SERPL-MCNC: 98 MG/DL (ref 70–110)
POTASSIUM SERPL-SCNC: 4.7 MMOL/L (ref 3.5–5.1)
SODIUM SERPL-SCNC: 141 MMOL/L (ref 136–145)
T4 FREE SERPL-MCNC: 1.29 NG/DL (ref 0.71–1.51)
TSH SERPL-ACNC: 0.08 UIU/ML (ref 0.4–4)

## 2025-04-08 PROCEDURE — 4010F ACE/ARB THERAPY RXD/TAKEN: CPT | Mod: CPTII,S$GLB,, | Performed by: INTERNAL MEDICINE

## 2025-04-08 PROCEDURE — 3075F SYST BP GE 130 - 139MM HG: CPT | Mod: CPTII,S$GLB,, | Performed by: INTERNAL MEDICINE

## 2025-04-08 PROCEDURE — 3079F DIAST BP 80-89 MM HG: CPT | Mod: CPTII,S$GLB,, | Performed by: INTERNAL MEDICINE

## 2025-04-08 PROCEDURE — 99214 OFFICE O/P EST MOD 30 MIN: CPT | Mod: S$GLB,,, | Performed by: INTERNAL MEDICINE

## 2025-04-08 PROCEDURE — 84439 ASSAY OF FREE THYROXINE: CPT

## 2025-04-08 PROCEDURE — G2211 COMPLEX E/M VISIT ADD ON: HCPCS | Mod: S$GLB,,, | Performed by: INTERNAL MEDICINE

## 2025-04-08 PROCEDURE — 84443 ASSAY THYROID STIM HORMONE: CPT

## 2025-04-08 PROCEDURE — 36415 COLL VENOUS BLD VENIPUNCTURE: CPT

## 2025-04-08 PROCEDURE — 82374 ASSAY BLOOD CARBON DIOXIDE: CPT

## 2025-04-08 PROCEDURE — 99999 PR PBB SHADOW E&M-EST. PATIENT-LVL IV: CPT | Mod: PBBFAC,,, | Performed by: INTERNAL MEDICINE

## 2025-04-08 PROCEDURE — 3008F BODY MASS INDEX DOCD: CPT | Mod: CPTII,S$GLB,, | Performed by: INTERNAL MEDICINE

## 2025-04-09 ENCOUNTER — PATIENT MESSAGE (OUTPATIENT)
Dept: BARIATRICS | Facility: CLINIC | Age: 65
End: 2025-04-09
Payer: MEDICARE

## 2025-04-10 ENCOUNTER — TELEPHONE (OUTPATIENT)
Dept: ENDOSCOPY | Facility: HOSPITAL | Age: 65
End: 2025-04-10

## 2025-04-10 ENCOUNTER — CLINICAL SUPPORT (OUTPATIENT)
Dept: ENDOSCOPY | Facility: HOSPITAL | Age: 65
End: 2025-04-10
Attending: INTERNAL MEDICINE
Payer: MEDICARE

## 2025-04-10 VITALS — HEIGHT: 66 IN | WEIGHT: 242 LBS | BODY MASS INDEX: 38.89 KG/M2

## 2025-04-10 DIAGNOSIS — Z12.11 SCREENING FOR COLON CANCER: Primary | ICD-10-CM

## 2025-04-10 DIAGNOSIS — K63.5 POLYP OF COLON, UNSPECIFIED PART OF COLON, UNSPECIFIED TYPE: ICD-10-CM

## 2025-04-10 RX ORDER — POLYETHYLENE GLYCOL 3350, SODIUM SULFATE ANHYDROUS, SODIUM BICARBONATE, SODIUM CHLORIDE, POTASSIUM CHLORIDE 236; 22.74; 6.74; 5.86; 2.97 G/4L; G/4L; G/4L; G/4L; G/4L
4 POWDER, FOR SOLUTION ORAL ONCE
Qty: 4000 ML | Refills: 0 | Status: SHIPPED | OUTPATIENT
Start: 2025-04-10 | End: 2025-04-10

## 2025-04-12 VITALS
HEART RATE: 78 BPM | BODY MASS INDEX: 38.2 KG/M2 | OXYGEN SATURATION: 95 % | DIASTOLIC BLOOD PRESSURE: 86 MMHG | HEIGHT: 67 IN | TEMPERATURE: 99 F | SYSTOLIC BLOOD PRESSURE: 138 MMHG | WEIGHT: 243.38 LBS

## 2025-04-13 NOTE — PROGRESS NOTES
Subjective:       Patient ID: Dejah Schmidt is a 64 y.o. female.    Chief Complaint: Hypertension    HPI  She returns for management of hypertension.  She has had hypertension for over a year.  Current treatment has included medications outlined in medication list.  She denies chest pain or shortness of breath.  No palpitations.  Denies left arm or neck pain.  She has hypothyroidism.  Denies fatigue     Medications: See medication list     Social history: Does not smoke, does not drink alcohol       Review of Systems   Constitutional:  Negative for chills, fatigue, fever and unexpected weight change.   Respiratory:  Negative for chest tightness and shortness of breath.    Cardiovascular:  Negative for chest pain and palpitations.   Gastrointestinal:  Negative for abdominal pain and blood in stool.   Neurological:  Negative for dizziness, syncope, numbness and headaches.       Objective:      Physical Exam  HENT:      Right Ear: External ear normal.      Left Ear: External ear normal.      Nose: Nose normal.      Mouth/Throat:      Mouth: Mucous membranes are moist.      Pharynx: Oropharynx is clear.   Eyes:      Pupils: Pupils are equal, round, and reactive to light.   Cardiovascular:      Rate and Rhythm: Normal rate and regular rhythm.      Heart sounds: No murmur heard.  Pulmonary:      Breath sounds: Normal breath sounds.   Abdominal:      General: There is no distension.      Palpations: There is no hepatomegaly or splenomegaly.      Tenderness: There is no abdominal tenderness.   Musculoskeletal:      Cervical back: Normal range of motion.   Lymphadenopathy:      Cervical: No cervical adenopathy.      Upper Body:      Right upper body: No axillary adenopathy.      Left upper body: No axillary adenopathy.   Neurological:      Cranial Nerves: No cranial nerve deficit.      Sensory: No sensory deficit.      Motor: Motor function is intact.      Deep Tendon Reflexes: Reflexes are normal and symmetric.          Assessment/Plan       Assessment and plan: 1.  Hypertension: Controlled.  Continue lisinopril.  Check BMP  2. Hypothyroidism:  Check TSH   3.  Schedule colonoscopy.  Discussed Pap smear, pelvic exam.  She declined all    Visit today included increased complexity associated with the care of the episodic problem hypertension addressed and managing the longitudinal care of the patient due to the serious and/or complex managed problem(s) hypertension, hypothyroidism.

## 2025-05-13 ENCOUNTER — PATIENT MESSAGE (OUTPATIENT)
Dept: BARIATRICS | Facility: CLINIC | Age: 65
End: 2025-05-13
Payer: MEDICARE

## 2025-05-29 ENCOUNTER — HOSPITAL ENCOUNTER (OUTPATIENT)
Facility: HOSPITAL | Age: 65
Discharge: HOME | End: 2025-05-29
Attending: COLON & RECTAL SURGERY | Admitting: COLON & RECTAL SURGERY
Payer: MEDICARE

## 2025-05-29 ENCOUNTER — ANESTHESIA (OUTPATIENT)
Dept: ENDOSCOPY | Facility: HOSPITAL | Age: 65
End: 2025-05-29
Payer: MEDICARE

## 2025-05-29 ENCOUNTER — ANESTHESIA EVENT (OUTPATIENT)
Dept: ENDOSCOPY | Facility: HOSPITAL | Age: 65
End: 2025-05-29
Payer: MEDICARE

## 2025-05-29 VITALS
OXYGEN SATURATION: 100 % | HEART RATE: 60 BPM | BODY MASS INDEX: 39.03 KG/M2 | WEIGHT: 248.69 LBS | RESPIRATION RATE: 16 BRPM | SYSTOLIC BLOOD PRESSURE: 119 MMHG | HEIGHT: 67 IN | DIASTOLIC BLOOD PRESSURE: 67 MMHG | TEMPERATURE: 98 F

## 2025-05-29 DIAGNOSIS — Z86.0100 HISTORY OF COLON POLYPS: Primary | ICD-10-CM

## 2025-05-29 DIAGNOSIS — K63.5 POLYP OF COLON, UNSPECIFIED PART OF COLON, UNSPECIFIED TYPE: ICD-10-CM

## 2025-05-29 PROCEDURE — 45380 COLONOSCOPY AND BIOPSY: CPT | Mod: PT,XS | Performed by: COLON & RECTAL SURGERY

## 2025-05-29 PROCEDURE — 37000009 HC ANESTHESIA EA ADD 15 MINS: Performed by: COLON & RECTAL SURGERY

## 2025-05-29 PROCEDURE — 63600175 PHARM REV CODE 636 W HCPCS: Performed by: STUDENT IN AN ORGANIZED HEALTH CARE EDUCATION/TRAINING PROGRAM

## 2025-05-29 PROCEDURE — 45381 COLONOSCOPY SUBMUCOUS NJX: CPT | Mod: PT,,, | Performed by: COLON & RECTAL SURGERY

## 2025-05-29 PROCEDURE — 27201012 HC FORCEPS, HOT/COLD, DISP: Performed by: COLON & RECTAL SURGERY

## 2025-05-29 PROCEDURE — 27201089 HC SNARE, DISP (ANY): Performed by: COLON & RECTAL SURGERY

## 2025-05-29 PROCEDURE — 45385 COLONOSCOPY W/LESION REMOVAL: CPT | Mod: PT | Performed by: COLON & RECTAL SURGERY

## 2025-05-29 PROCEDURE — 45381 COLONOSCOPY SUBMUCOUS NJX: CPT | Mod: PT | Performed by: COLON & RECTAL SURGERY

## 2025-05-29 PROCEDURE — 88341 IMHCHEM/IMCYTCHM EA ADD ANTB: CPT | Mod: TC,91 | Performed by: COLON & RECTAL SURGERY

## 2025-05-29 PROCEDURE — 37000008 HC ANESTHESIA 1ST 15 MINUTES: Performed by: COLON & RECTAL SURGERY

## 2025-05-29 PROCEDURE — 27201028 HC NEEDLE, SCLERO: Performed by: COLON & RECTAL SURGERY

## 2025-05-29 PROCEDURE — 45380 COLONOSCOPY AND BIOPSY: CPT | Mod: PT,XS,, | Performed by: COLON & RECTAL SURGERY

## 2025-05-29 PROCEDURE — 45385 COLONOSCOPY W/LESION REMOVAL: CPT | Mod: PT,,, | Performed by: COLON & RECTAL SURGERY

## 2025-05-29 PROCEDURE — 25000003 PHARM REV CODE 250: Performed by: STUDENT IN AN ORGANIZED HEALTH CARE EDUCATION/TRAINING PROGRAM

## 2025-05-29 RX ORDER — PHENYLEPHRINE HYDROCHLORIDE 10 MG/ML
INJECTION INTRAVENOUS
Status: DISCONTINUED | OUTPATIENT
Start: 2025-05-29 | End: 2025-05-29

## 2025-05-29 RX ORDER — PROPOFOL 10 MG/ML
VIAL (ML) INTRAVENOUS
Status: DISCONTINUED | OUTPATIENT
Start: 2025-05-29 | End: 2025-05-29

## 2025-05-29 RX ORDER — SODIUM CHLORIDE 9 MG/ML
INJECTION, SOLUTION INTRAVENOUS CONTINUOUS
Status: DISCONTINUED | OUTPATIENT
Start: 2025-05-29 | End: 2025-05-29 | Stop reason: HOSPADM

## 2025-05-29 RX ORDER — PROPOFOL 10 MG/ML
VIAL (ML) INTRAVENOUS CONTINUOUS PRN
Status: DISCONTINUED | OUTPATIENT
Start: 2025-05-29 | End: 2025-05-29

## 2025-05-29 RX ORDER — LIDOCAINE HYDROCHLORIDE 20 MG/ML
INJECTION INTRAVENOUS
Status: DISCONTINUED | OUTPATIENT
Start: 2025-05-29 | End: 2025-05-29

## 2025-05-29 RX ADMIN — PHENYLEPHRINE HYDROCHLORIDE 100 MCG: 10 INJECTION INTRAVENOUS at 10:05

## 2025-05-29 RX ADMIN — LIDOCAINE HYDROCHLORIDE 50 MG: 20 INJECTION INTRAVENOUS at 09:05

## 2025-05-29 RX ADMIN — PROPOFOL 20 MG: 10 INJECTION, EMULSION INTRAVENOUS at 09:05

## 2025-05-29 RX ADMIN — SODIUM CHLORIDE: 9 INJECTION, SOLUTION INTRAVENOUS at 09:05

## 2025-05-29 RX ADMIN — PROPOFOL 60 MG: 10 INJECTION, EMULSION INTRAVENOUS at 09:05

## 2025-05-29 RX ADMIN — PROPOFOL 150 MCG/KG/MIN: 10 INJECTION, EMULSION INTRAVENOUS at 09:05

## 2025-05-29 NOTE — ANESTHESIA POSTPROCEDURE EVALUATION
Anesthesia Post Evaluation    Patient: Ada THOMAS Schmidt    Procedure(s) Performed: Procedure(s) (LRB):  COLONOSCOPY, SCREENING, HIGH RISK PATIENT (N/A)    Final Anesthesia Type: general      Patient location during evaluation: PACU  Patient participation: Yes- Able to Participate  Level of consciousness: awake and alert and oriented  Post-procedure vital signs: reviewed and stable  Pain management: adequate  Airway patency: patent    PONV status at discharge: No PONV  Anesthetic complications: no      Cardiovascular status: blood pressure returned to baseline  Respiratory status: unassisted, spontaneous ventilation and room air  Hydration status: euvolemic  Follow-up not needed.              Vitals Value Taken Time   /67 05/29/25 10:50   Temp 36.4 °C (97.5 °F) 05/29/25 10:20   Pulse 60 05/29/25 10:50   Resp 16 05/29/25 10:50   SpO2 100 % 05/29/25 10:50         Event Time   Out of Recovery 10:55:28         Pain/Dirk Score: Dirk Score: 9 (5/29/2025 10:50 AM)

## 2025-05-29 NOTE — ANESTHESIA PREPROCEDURE EVALUATION
05/29/2025  Dejah Schmidt is a 64 y.o., female.      Pre-op Assessment    I have reviewed the Patient Summary Reports.    I have reviewed the NPO Status.   I have reviewed the Medications.     Review of Systems  Anesthesia Hx:  No problems with previous Anesthesia                Social:  Social Alcohol Use, Non-Smoker       Hematology/Oncology:  Hematology Normal   Oncology Normal                                   EENT/Dental:  EENT/Dental Normal           Cardiovascular:     Hypertension                                          Pulmonary:    Asthma    Sleep Apnea                Renal/:  Chronic Renal Disease                Hepatic/GI:  Bowel Prep.                   Musculoskeletal:         Spine Disorders: lumbar Chronic Pain           Neurological:    Neuromuscular Disease,                                   Endocrine:   Hypothyroidism          Dermatological:  Skin Normal    Psych:  Psychiatric Normal                    Physical Exam  General: Cooperative, Alert and Oriented    Airway:  Mallampati: I   Mouth Opening: Normal  TM Distance: Normal  Tongue: Large  Neck ROM: Normal ROM    Dental:  Dentures  Upper dentures      Anesthesia Plan  Type of Anesthesia, risks & benefits discussed:    Anesthesia Type: Gen Supraglottic Airway  Intra-op Monitoring Plan: Standard ASA Monitors  Post Op Pain Control Plan: multimodal analgesia  Induction:  IV  Informed Consent: Informed consent signed with the Patient and all parties understand the risks and agree with anesthesia plan.  All questions answered. Patient consented to blood products? No  ASA Score: 2  Day of Surgery Review of History & Physical: H&P Update referred to the surgeon/provider.    Ready For Surgery From Anesthesia Perspective.     .

## 2025-05-29 NOTE — TRANSFER OF CARE
"Anesthesia Transfer of Care Note    Patient: Dejah Schmidt    Procedure(s) Performed: Procedure(s) (LRB):  COLONOSCOPY, SCREENING, HIGH RISK PATIENT (N/A)    Patient location: GI    Anesthesia Type: general    Transport from OR: Transported from OR on room air with adequate spontaneous ventilation    Post pain: adequate analgesia    Post assessment: no apparent anesthetic complications and tolerated procedure well    Post vital signs: stable    Level of consciousness: awake and alert    Nausea/Vomiting: no nausea/vomiting    Complications: none    Transfer of care protocol was followed      Last vitals: Visit Vitals  BP (!) 106/55   Pulse 71   Temp 36.4 °C (97.5 °F)   Resp 16   Ht 5' 6.5" (1.689 m)   Wt 112.8 kg (248 lb 10.9 oz)   SpO2 100%   Breastfeeding No   BMI 39.54 kg/m²     "

## 2025-06-02 ENCOUNTER — TELEPHONE (OUTPATIENT)
Dept: SURGERY | Facility: CLINIC | Age: 65
End: 2025-06-02
Payer: MEDICARE

## 2025-06-02 ENCOUNTER — RESULTS FOLLOW-UP (OUTPATIENT)
Dept: SURGERY | Facility: CLINIC | Age: 65
End: 2025-06-02

## 2025-06-02 DIAGNOSIS — R19.00 INTRA-ABDOMINAL AND PELVIC SWELLING, MASS AND LUMP, UNSPECIFIED SITE: Primary | ICD-10-CM

## 2025-06-02 LAB
ESTROGEN SERPL-MCNC: ABNORMAL PG/ML
INSULIN SERPL-ACNC: ABNORMAL U[IU]/ML
LAB AP CLINICAL INFORMATION: ABNORMAL
LAB AP DIAGNOSIS CATEGORY: ABNORMAL
LAB AP GROSS DESCRIPTION: ABNORMAL
LAB AP PERFORMING LOCATION(S): ABNORMAL
LAB AP REPORT FOOTNOTES: ABNORMAL
LAB AP SYNOPTIC CHECKLIST: ABNORMAL
T3RU NFR SERPL: ABNORMAL %

## 2025-06-03 ENCOUNTER — PATIENT MESSAGE (OUTPATIENT)
Dept: BARIATRICS | Facility: CLINIC | Age: 65
End: 2025-06-03
Payer: MEDICARE

## 2025-06-09 NOTE — PROGRESS NOTES
CRS Office Visit History and Physical      SUBJECTIVE:     Chief Complaint: Colonic GIST    History of Present Illness:  The patient is known patient to this practice.   Course is as follows:  Patient is a 64 y.o. female presents to discuss treatment for colon GIST. She does not have any symptoms from this.  Prior: Robotic sleeve gastrectomy (July 2024), hysterectomy, C-sections.    Last Colonoscopy: (5/29/2025)  The perianal and digital rectal examinations were normal.   Two sessile polyps were found in the transverse colon. The polyps were 3 to 4 mm in size. These polyps were removed with a cold snare. Resection and retrieval were complete. Verification of patient identification for the specimen was done. Estimated blood loss was minimal.   A 15 mm polypoid lesion was found in the transverse colon. The lesion was infiltrative, submucosal and flat. No bleeding was present. This was biopsied with a cold forceps for histology. Area was tattooed with an injection of Spot (carbon black).   The exam was otherwise without abnormality on direct and retroflexion views.   Path:  1. Transverse colon, polyps x2 (polypectomy):  Tubular adenoma, multiple fragments  2. Transverse colon (biopsy):  Gastrointestinal stromal tumor, spindle cell type.   SPECIMEN   Procedure  Endoscopic biopsy   TUMOR   Tumor Site  Transverse colon   Histologic Type  Gastrointestinal stromal tumor, spindle cell type   Tumor Size  Greatest Dimension (Centimeters): 1.5  cm   Mitotic Rate  less than 1 mitoses per 5 mm2   Histologic Grade  G1, low grade   Treatment Effect  No known prebiopsy therapy   Risk Assessment  Cannot be determined: Likely very low risk.     SPECIAL STUDIES   Immunohistochemical Studies     KIT ()  Positive       Review of patient's allergies indicates:  No Known Allergies    Past Medical History:   Diagnosis Date    Arthritis 11/2015    Asthma     Colon polyp 2011    Hypercholesteremia     Hypertension     Multiple thyroid  nodules     Neuromuscular disorder 2017    Sleep apnea      Past Surgical History:   Procedure Laterality Date    BACK SURGERY      CARPAL TUNNEL RELEASE       SECTION   &    COLONOSCOPY N/A 2020    Procedure: COLONOSCOPY, with me, ;  Surgeon: Sarah Beth Leavitt MD;  Location: [facility] ENDO (4TH FLR);  Service: Endoscopy;  Laterality: N/A;  covid test     COLONOSCOPY, SCREENING, HIGH RISK PATIENT N/A 2025    Procedure: COLONOSCOPY, SCREENING, HIGH RISK PATIENT;  Surgeon: Sarah Beth Leavitt MD;  Location: [facility] (4TH FLR);  Service: Endoscopy;  Laterality: N/A;  requesting Dr Leavitt performed last colonoscopy  referral Dr SandersRsebcccl-efam-kygqr portal-GT   precall complete/mleone    EPIDURAL STEROID INJECTION N/A 10/27/2021    Procedure: INJECTION, STEROID, EPIDURAL,L5/S1 DIRECT REF/NEED CONSENT;  Surgeon: Clement Aldana MD;  Location: [facility] PAIN MGT;  Service: Pain Management;  Laterality: N/A;    EPIDURAL STEROID INJECTION N/A 2022    Procedure: INJECTION, STEROID, EPIDURAL, L5-S1 CONTRAST DIRECT REF;  Surgeon: Clement Aldana MD;  Location: [facility] PAIN MGT;  Service: Pain Management;  Laterality: N/A;    ESOPHAGOGASTRODUODENOSCOPY N/A 2024    Procedure: EGD (ESOPHAGOGASTRODUODENOSCOPY);  Surgeon: Soto Sebastian MD;  [facility];  Service: General;  Laterality: N/A;    FOOT SURGERY      HYSTERECTOMY      MASS EXCISION      at neck    ROBOT-ASSISTED LAPAROSCOPIC SLEEVE GASTRECTOMY USING DA PHILL XI N/A 2024    Procedure: XI ROBOTIC SLEEVE GASTRECTOMY;  Surgeon: Soot Sebastian MD;  Location: [facility] OR 2ND FLR;  Service: General;  Laterality: N/A;    ROTATOR CUFF REPAIR      left shoulder    SPINE SURGERY  2017    THYROIDECTOMY N/A 2022    Procedure: THYROIDECTOMY, total;  Surgeon: Anai Garcia MD;  Location: [facility] OR 2ND FLR;  Service: General;  Laterality: N/A;    TRIGGER FINGER RELEASE  2017    right hand    TRIGGER  "FINGER RELEASE Right 01/26/2022    Procedure: RELEASE, TRIGGER FINGER - right thumb;  Surgeon: Cristopher Tolliver MD;  Location: [facility] OR;  Service: Orthopedics;  Laterality: Right;    TRIGGER FINGER RELEASE Left 03/09/2022    Procedure: RELEASE, TRIGGER FINGER - LEFT index and long;  Surgeon: Cristopher Tolliver MD;  Location: [facility] OR;  Service: Orthopedics;  Laterality: Left;    TUBAL LIGATION  1998     Family History   Problem Relation Name Age of Onset    Cancer Mother Audrey Benítez     Coronary artery disease Mother Audrey Benítez     Heart disease Mother Audrey Benítez     Hypertension Mother Audrey Benítez     Hyperlipidemia Mother Audrey Benítez     Hypertension Father Mike     Cancer Father Mike     Hyperlipidemia Father Mike     Sleep apnea Father Mike     No Known Problems Sister      No Known Problems Daughter      No Known Problems Son      Kidney failure Maternal Uncle Milo Gonzalez     Heart disease Maternal Grandmother Dejah Jose Alfredo     Hypertension Maternal Grandmother Dejah Veliz     Breast cancer Maternal Grandmother Dejah Veliz      Social History[1]     Review of Systems:  ROS    OBJECTIVE:     Vital Signs (Most Recent)  BP (!) 153/80 (BP Location: Left arm, Patient Position: Sitting)   Pulse 67   Resp 16   Ht 5' 6" (1.676 m)   Wt 117.6 kg (259 lb 4.2 oz)   BMI 41.85 kg/m²     Physical Exam:  General: Black or  female in no distress   Neuro: Alert and oriented x 4.  Moves all extremities.     HEENT: No icterus.  Trachea midline  Respiratory: Respirations are even and unlabored  Cardiac: Regular rate  Abdomen: Soft, well-healed port incisions, non-tender  Extremities: Warm dry and intact  Skin: No rashes    Imaging:   MRI Abdomen (6/18/2025)  Liver: Normal homogeneous background signal.  No focal lesions.  Hepatic and portal veins are patent.  Biliary: Gallbladder is unremarkable.  No intrahepatic or extrahepatic biliary dilatation.  Pancreas: Unremarkable.  No pancreatic ductal dilatation.  Spleen: " Normal size.  No focal lesions.  Adrenal glands: Unremarkable.  Kidneys: Bilateral subcentimeter renal cysts.  No hydronephrosis.  Miscellaneous: 1.2 cm nodular enhancing focus along inner wall of transverse colon (series 14, image 48).  Otherwise, visualized bowel loops are unremarkable.  Few prominent periportal lymph nodes, nonspecific.  No concerning lymphadenopathy.  No aneurysm.  Dextrocurvature of lumbar spine.  Impression:  Enhancing focus within transverse colon, may represent polypoid lesion described on colonoscopy.  No concerning lymphadenopathy or lesion elsewhere.     ** I have reviewed these images **      ASSESSMENT/PLAN:     63yo F w/ 1.2cm transverse colon GIST (G1; < 1 mitoses per 5 mm2)    I have recommended a laparoscopic right colectomy, which would include removal of the draining lymph nodes. We discussed that we will not know the pathological staging, or whether additional treatment would be beneficial until after surgery when we have the final pathology report.    We reviewed the specific risks of the surgery.  We reviewed the risks associated with surgery including: anastomotic leak, abscess, surgical site infection, hernia, damage to other structures including bowel or ureter, need for return to the operating room, changes in bowel function, blood, clot, and death. We discussed the anticipated time for recovery after surgery, including time in hospital, criteria for discharge, and time off of work.     Surgical consents were signed today in clinic, asked if any additional questions, none at this time.    Sarah Beth Leavitt MD  Staff Surgeon, Colon and Rectal Surgery  [facility]           [1]   Social History  Tobacco Use    Smoking status: Never    Smokeless tobacco: Never   Substance Use Topics    Alcohol use: Not Currently     Alcohol/week: 2.0 standard drinks of alcohol     Types: 2 Drinks containing 0.5 oz of alcohol per week     Comment: herbert    Drug use: No

## 2025-06-18 ENCOUNTER — HOSPITAL ENCOUNTER (OUTPATIENT)
Dept: RADIOLOGY | Facility: HOSPITAL | Age: 65
Discharge: HOME | End: 2025-06-18
Attending: COLON & RECTAL SURGERY
Payer: MEDICARE

## 2025-06-18 DIAGNOSIS — R19.00 INTRA-ABDOMINAL AND PELVIC SWELLING, MASS AND LUMP, UNSPECIFIED SITE: ICD-10-CM

## 2025-06-18 PROCEDURE — 74183 MRI ABD W/O CNTR FLWD CNTR: CPT | Mod: TC

## 2025-06-18 PROCEDURE — A9585 GADOBUTROL INJECTION: HCPCS | Performed by: COLON & RECTAL SURGERY

## 2025-06-18 PROCEDURE — 74183 MRI ABD W/O CNTR FLWD CNTR: CPT | Mod: 26,,, | Performed by: STUDENT IN AN ORGANIZED HEALTH CARE EDUCATION/TRAINING PROGRAM

## 2025-06-18 PROCEDURE — 25500020 PHARM REV CODE 255: Performed by: COLON & RECTAL SURGERY

## 2025-06-18 RX ORDER — GADOBUTROL 604.72 MG/ML
10 INJECTION INTRAVENOUS
Status: COMPLETED | OUTPATIENT
Start: 2025-06-18 | End: 2025-06-18

## 2025-06-18 RX ADMIN — GADOBUTROL 10 ML: 604.72 INJECTION INTRAVENOUS at 01:06

## 2025-06-23 DIAGNOSIS — E89.0 POSTOPERATIVE HYPOTHYROIDISM: ICD-10-CM

## 2025-06-23 RX ORDER — LEVOTHYROXINE SODIUM 100 UG/1
100 TABLET ORAL
Qty: 90 TABLET | Refills: 1 | Status: SHIPPED | OUTPATIENT
Start: 2025-06-23

## 2025-06-23 NOTE — TELEPHONE ENCOUNTER
Refill Routing Note   Medication(s) are not appropriate for processing by [facility] for the following reason(s):        Required labs abnormal    [facility] action(s):  Defer     Requires labs : Yes             Appointments  past 12m or future 3m with PCP    Date Provider   Last Visit   4/8/2025 Dasha Bregeron MD   Next Visit   10/8/2025 Dasha Bergeron MD   ED visits in past 90 days: 0        Note composed:2:33 PM 06/23/2025

## 2025-06-23 NOTE — TELEPHONE ENCOUNTER
Care Due:                  Date            Visit Type   Department     Provider  --------------------------------------------------------------------------------                                EP -                              PRIMARY      [facility] INTERNAL  Last Visit: 04-      CARE ([facility])   MEDICINE       Dasha Bergeron                              [facility]                              PRIMARY      [facility] INTERNAL  Next Visit: 10-      CARE ([facility])   MEDICINE       Dasha Bergeron                                                            Last  Test          Frequency    Reason                     Performed    Due Date  --------------------------------------------------------------------------------    CMP.........  12 months..  atorvastatin.............  09- 09-    Lipid Panel.  12 months..  atorvastatin.............  09- 09-    [facility] Embedded Care Due Messages. Reference number: [id number].   6/23/2025 8:07:46 AM CDT

## 2025-06-24 ENCOUNTER — TELEPHONE (OUTPATIENT)
Dept: SURGERY | Facility: CLINIC | Age: 65
End: 2025-06-24
Payer: MEDICARE

## 2025-06-26 ENCOUNTER — HOSPITAL ENCOUNTER (OUTPATIENT)
Dept: CARDIOLOGY | Facility: CLINIC | Age: 65
Discharge: HOME | End: 2025-06-26
Attending: COLON & RECTAL SURGERY
Payer: MEDICARE

## 2025-06-26 ENCOUNTER — LAB VISIT (OUTPATIENT)
Dept: LAB | Facility: HOSPITAL | Age: 65
End: 2025-06-26
Attending: COLON & RECTAL SURGERY
Payer: MEDICARE

## 2025-06-26 ENCOUNTER — OFFICE VISIT (OUTPATIENT)
Dept: SURGERY | Facility: CLINIC | Age: 65
End: 2025-06-26
Attending: COLON & RECTAL SURGERY
Payer: MEDICARE

## 2025-06-26 VITALS
SYSTOLIC BLOOD PRESSURE: 153 MMHG | DIASTOLIC BLOOD PRESSURE: 80 MMHG | RESPIRATION RATE: 16 BRPM | HEART RATE: 67 BPM | HEIGHT: 66 IN | BODY MASS INDEX: 41.66 KG/M2 | WEIGHT: 259.25 LBS

## 2025-06-26 DIAGNOSIS — C49.A4 GIST (GASTROINTESTINAL STROMA TUMOR), MALIGNANT, COLON: ICD-10-CM

## 2025-06-26 DIAGNOSIS — Z01.818 PREOPERATIVE TESTING: ICD-10-CM

## 2025-06-26 DIAGNOSIS — R19.00 INTRA-ABDOMINAL AND PELVIC SWELLING, MASS AND LUMP, UNSPECIFIED SITE: Primary | ICD-10-CM

## 2025-06-26 LAB
ABSOLUTE EOSINOPHIL (OHS): 0.02 K/UL
ABSOLUTE MONOCYTE (OHS): 0.3 K/UL (ref 0.3–1)
ABSOLUTE NEUTROPHIL COUNT (OHS): 1.32 K/UL (ref 1.8–7.7)
ALBUMIN SERPL BCP-MCNC: 4.1 G/DL (ref 3.5–5.2)
ALP SERPL-CCNC: 67 UNIT/L (ref 40–150)
ALT SERPL W/O P-5'-P-CCNC: 18 UNIT/L (ref 10–44)
ANION GAP (OHS): 6 MMOL/L (ref 8–16)
AST SERPL-CCNC: 20 UNIT/L (ref 11–45)
BASOPHILS # BLD AUTO: 0.02 K/UL
BASOPHILS NFR BLD AUTO: 0.6 %
BILIRUB SERPL-MCNC: 0.6 MG/DL (ref 0.1–1)
BUN SERPL-MCNC: 17 MG/DL (ref 8–23)
CALCIUM SERPL-MCNC: 9.1 MG/DL (ref 8.7–10.5)
CHLORIDE SERPL-SCNC: 108 MMOL/L (ref 95–110)
CO2 SERPL-SCNC: 28 MMOL/L (ref 23–29)
CREAT SERPL-MCNC: 0.9 MG/DL (ref 0.5–1.4)
ERYTHROCYTE [DISTWIDTH] IN BLOOD BY AUTOMATED COUNT: 13.2 % (ref 11.5–14.5)
GFR SERPLBLD CREATININE-BSD FMLA CKD-EPI: >60 ML/MIN/1.73/M2
GLUCOSE SERPL-MCNC: 81 MG/DL (ref 70–110)
HCT VFR BLD AUTO: 44 % (ref 37–48.5)
HGB BLD-MCNC: 14.1 GM/DL (ref 12–16)
IMM GRANULOCYTES # BLD AUTO: 0.04 K/UL (ref 0–0.04)
IMM GRANULOCYTES NFR BLD AUTO: 1.1 % (ref 0–0.5)
LYMPHOCYTES # BLD AUTO: 1.87 K/UL (ref 1–4.8)
MCH RBC QN AUTO: 30.1 PG (ref 27–31)
MCHC RBC AUTO-ENTMCNC: 32 G/DL (ref 32–36)
MCV RBC AUTO: 94 FL (ref 82–98)
NUCLEATED RBC (/100WBC) (OHS): 0 /100 WBC
OHS QRS DURATION: 98 MS
OHS QTC CALCULATION: 437 MS
PLATELET # BLD AUTO: 172 K/UL (ref 150–450)
PMV BLD AUTO: 11.7 FL (ref 9.2–12.9)
POTASSIUM SERPL-SCNC: 4.7 MMOL/L (ref 3.5–5.1)
PROT SERPL-MCNC: 7.9 GM/DL (ref 6–8.4)
RBC # BLD AUTO: 4.69 M/UL (ref 4–5.4)
RELATIVE EOSINOPHIL (OHS): 0.6 %
RELATIVE LYMPHOCYTE (OHS): 52.4 % (ref 18–48)
RELATIVE MONOCYTE (OHS): 8.4 % (ref 4–15)
RELATIVE NEUTROPHIL (OHS): 36.9 % (ref 38–73)
SODIUM SERPL-SCNC: 142 MMOL/L (ref 136–145)
WBC # BLD AUTO: 3.57 K/UL (ref 3.9–12.7)

## 2025-06-26 PROCEDURE — 4010F ACE/ARB THERAPY RXD/TAKEN: CPT | Mod: CPTII,S$GLB,, | Performed by: COLON & RECTAL SURGERY

## 2025-06-26 PROCEDURE — 36415 COLL VENOUS BLD VENIPUNCTURE: CPT

## 2025-06-26 PROCEDURE — 93010 ELECTROCARDIOGRAM REPORT: CPT | Mod: S$GLB,,, | Performed by: INTERNAL MEDICINE

## 2025-06-26 PROCEDURE — 1160F RVW MEDS BY RX/DR IN RCRD: CPT | Mod: CPTII,S$GLB,, | Performed by: COLON & RECTAL SURGERY

## 2025-06-26 PROCEDURE — 99214 OFFICE O/P EST MOD 30 MIN: CPT | Mod: S$GLB,,, | Performed by: COLON & RECTAL SURGERY

## 2025-06-26 PROCEDURE — 3079F DIAST BP 80-89 MM HG: CPT | Mod: CPTII,S$GLB,, | Performed by: COLON & RECTAL SURGERY

## 2025-06-26 PROCEDURE — 3008F BODY MASS INDEX DOCD: CPT | Mod: CPTII,S$GLB,, | Performed by: COLON & RECTAL SURGERY

## 2025-06-26 PROCEDURE — 99999 PR PBB SHADOW E&M-EST. PATIENT-LVL IV: CPT | Mod: PBBFAC,,, | Performed by: COLON & RECTAL SURGERY

## 2025-06-26 PROCEDURE — 85025 COMPLETE CBC W/AUTO DIFF WBC: CPT

## 2025-06-26 PROCEDURE — 1159F MED LIST DOCD IN RCRD: CPT | Mod: CPTII,S$GLB,, | Performed by: COLON & RECTAL SURGERY

## 2025-06-26 PROCEDURE — 82247 BILIRUBIN TOTAL: CPT

## 2025-06-26 PROCEDURE — 3077F SYST BP >= 140 MM HG: CPT | Mod: CPTII,S$GLB,, | Performed by: COLON & RECTAL SURGERY

## 2025-06-26 RX ORDER — ONDANSETRON 8 MG/1
TABLET, ORALLY DISINTEGRATING ORAL
Qty: 2 TABLET | Refills: 0 | Status: SHIPPED | OUTPATIENT
Start: 2025-06-26

## 2025-06-26 RX ORDER — METRONIDAZOLE 500 MG/1
TABLET ORAL
Qty: 2 TABLET | Refills: 0 | Status: SHIPPED | OUTPATIENT
Start: 2025-06-26

## 2025-06-26 RX ORDER — CIPROFLOXACIN 500 MG/1
TABLET, FILM COATED ORAL
Qty: 2 TABLET | Refills: 0 | Status: SHIPPED | OUTPATIENT
Start: 2025-06-26

## 2025-07-07 ENCOUNTER — PATIENT MESSAGE (OUTPATIENT)
Dept: BARIATRICS | Facility: CLINIC | Age: 65
End: 2025-07-07
Payer: MEDICARE

## 2025-07-08 ENCOUNTER — PATIENT MESSAGE (OUTPATIENT)
Dept: BARIATRICS | Facility: CLINIC | Age: 65
End: 2025-07-08
Payer: MEDICARE

## 2025-07-31 ENCOUNTER — PATIENT MESSAGE (OUTPATIENT)
Dept: SURGERY | Facility: CLINIC | Age: 65
End: 2025-07-31
Payer: MEDICARE

## 2025-08-01 ENCOUNTER — TELEPHONE (OUTPATIENT)
Dept: SURGERY | Facility: CLINIC | Age: 65
End: 2025-08-01
Payer: MEDICARE

## 2025-08-05 ENCOUNTER — ANESTHESIA EVENT (OUTPATIENT)
Dept: SURGERY | Facility: HOSPITAL | Age: 65
End: 2025-08-05
Payer: MEDICARE

## 2025-08-05 ENCOUNTER — HOSPITAL ENCOUNTER (INPATIENT)
Facility: HOSPITAL | Age: 65
LOS: 3 days | Discharge: HOME | DRG: 330 | End: 2025-08-08
Attending: COLON & RECTAL SURGERY | Admitting: COLON & RECTAL SURGERY
Payer: MEDICARE

## 2025-08-05 ENCOUNTER — ANESTHESIA (OUTPATIENT)
Dept: SURGERY | Facility: HOSPITAL | Age: 65
End: 2025-08-05
Payer: MEDICARE

## 2025-08-05 DIAGNOSIS — C49.A4 GIST (GASTROINTESTINAL STROMA TUMOR), MALIGNANT, COLON: Primary | ICD-10-CM

## 2025-08-05 LAB
ABORH RETYPE: NORMAL
CREAT SERPL-MCNC: 2.2 MG/DL (ref 0.5–1.4)
GFR SERPLBLD CREATININE-BSD FMLA CKD-EPI: 25 ML/MIN/1.73/M2
INDIRECT COOMBS: NORMAL
RH BLD: NORMAL
SPECIMEN OUTDATE: NORMAL

## 2025-08-05 PROCEDURE — 44205 LAP COLECTOMY PART W/ILEUM: CPT | Mod: ,,, | Performed by: COLON & RECTAL SURGERY

## 2025-08-05 PROCEDURE — 37000009 HC ANESTHESIA EA ADD 15 MINS: Performed by: COLON & RECTAL SURGERY

## 2025-08-05 PROCEDURE — 37000008 HC ANESTHESIA 1ST 15 MINUTES: Performed by: COLON & RECTAL SURGERY

## 2025-08-05 PROCEDURE — 71000016 HC POSTOP RECOV ADDL HR: Performed by: COLON & RECTAL SURGERY

## 2025-08-05 PROCEDURE — 63600175 PHARM REV CODE 636 W HCPCS

## 2025-08-05 PROCEDURE — 20600001 HC STEP DOWN PRIVATE ROOM

## 2025-08-05 PROCEDURE — 71000033 HC RECOVERY, INTIAL HOUR: Performed by: COLON & RECTAL SURGERY

## 2025-08-05 PROCEDURE — 86900 BLOOD TYPING SEROLOGIC ABO: CPT | Performed by: NURSE PRACTITIONER

## 2025-08-05 PROCEDURE — 25000003 PHARM REV CODE 250: Performed by: NURSE PRACTITIONER

## 2025-08-05 PROCEDURE — 0DTF4ZZ RESECTION OF RIGHT LARGE INTESTINE, PERCUTANEOUS ENDOSCOPIC APPROACH: ICD-10-PCS | Performed by: COLON & RECTAL SURGERY

## 2025-08-05 PROCEDURE — 63600175 PHARM REV CODE 636 W HCPCS: Performed by: NURSE ANESTHETIST, CERTIFIED REGISTERED

## 2025-08-05 PROCEDURE — 25000003 PHARM REV CODE 250

## 2025-08-05 PROCEDURE — 36415 COLL VENOUS BLD VENIPUNCTURE: CPT

## 2025-08-05 PROCEDURE — 63600175 PHARM REV CODE 636 W HCPCS: Performed by: STUDENT IN AN ORGANIZED HEALTH CARE EDUCATION/TRAINING PROGRAM

## 2025-08-05 PROCEDURE — 99900035 HC TECH TIME PER 15 MIN (STAT)

## 2025-08-05 PROCEDURE — 63600175 PHARM REV CODE 636 W HCPCS: Performed by: NURSE PRACTITIONER

## 2025-08-05 PROCEDURE — 27201423 OPTIME MED/SURG SUP & DEVICES STERILE SUPPLY: Performed by: COLON & RECTAL SURGERY

## 2025-08-05 PROCEDURE — 94761 N-INVAS EAR/PLS OXIMETRY MLT: CPT

## 2025-08-05 PROCEDURE — 71000015 HC POSTOP RECOV 1ST HR: Performed by: COLON & RECTAL SURGERY

## 2025-08-05 PROCEDURE — 82565 ASSAY OF CREATININE: CPT

## 2025-08-05 PROCEDURE — 88309 TISSUE EXAM BY PATHOLOGIST: CPT | Mod: TC | Performed by: COLON & RECTAL SURGERY

## 2025-08-05 PROCEDURE — 25000003 PHARM REV CODE 250: Performed by: NURSE ANESTHETIST, CERTIFIED REGISTERED

## 2025-08-05 PROCEDURE — 36000711: Performed by: COLON & RECTAL SURGERY

## 2025-08-05 PROCEDURE — 27000221 HC OXYGEN, UP TO 24 HOURS

## 2025-08-05 PROCEDURE — 36000710: Performed by: COLON & RECTAL SURGERY

## 2025-08-05 RX ORDER — HALOPERIDOL LACTATE 5 MG/ML
0.5 INJECTION, SOLUTION INTRAMUSCULAR EVERY 10 MIN PRN
Status: DISCONTINUED | OUTPATIENT
Start: 2025-08-05 | End: 2025-08-05 | Stop reason: HOSPADM

## 2025-08-05 RX ORDER — HYDROMORPHONE HYDROCHLORIDE 1 MG/ML
0.2 INJECTION, SOLUTION INTRAMUSCULAR; INTRAVENOUS; SUBCUTANEOUS EVERY 5 MIN PRN
Status: DISCONTINUED | OUTPATIENT
Start: 2025-08-05 | End: 2025-08-05 | Stop reason: HOSPADM

## 2025-08-05 RX ORDER — MUPIROCIN 20 MG/G
1 OINTMENT TOPICAL
Status: COMPLETED | OUTPATIENT
Start: 2025-08-05 | End: 2025-08-05

## 2025-08-05 RX ORDER — OXYCODONE HYDROCHLORIDE 10 MG/1
10 TABLET ORAL EVERY 4 HOURS PRN
Status: DISCONTINUED | OUTPATIENT
Start: 2025-08-05 | End: 2025-08-08 | Stop reason: HOSPADM

## 2025-08-05 RX ORDER — KETAMINE HCL IN 0.9 % NACL 50 MG/5 ML
SYRINGE (ML) INTRAVENOUS
Status: DISCONTINUED | OUTPATIENT
Start: 2025-08-05 | End: 2025-08-05

## 2025-08-05 RX ORDER — ALBUTEROL SULFATE 90 UG/1
2 INHALANT RESPIRATORY (INHALATION) EVERY 6 HOURS PRN
Status: DISCONTINUED | OUTPATIENT
Start: 2025-08-05 | End: 2025-08-08 | Stop reason: HOSPADM

## 2025-08-05 RX ORDER — CEFTRIAXONE 1 G/1
INJECTION, POWDER, FOR SOLUTION INTRAMUSCULAR; INTRAVENOUS
Status: DISCONTINUED | OUTPATIENT
Start: 2025-08-05 | End: 2025-08-05

## 2025-08-05 RX ORDER — GABAPENTIN 300 MG/1
300 CAPSULE ORAL 3 TIMES DAILY
Status: DISCONTINUED | OUTPATIENT
Start: 2025-08-05 | End: 2025-08-06

## 2025-08-05 RX ORDER — PROPOFOL 10 MG/ML
VIAL (ML) INTRAVENOUS
Status: DISCONTINUED | OUTPATIENT
Start: 2025-08-05 | End: 2025-08-05

## 2025-08-05 RX ORDER — ONDANSETRON HYDROCHLORIDE 2 MG/ML
INJECTION, SOLUTION INTRAVENOUS
Status: DISCONTINUED | OUTPATIENT
Start: 2025-08-05 | End: 2025-08-05

## 2025-08-05 RX ORDER — ACETAMINOPHEN 500 MG
1000 TABLET ORAL EVERY 8 HOURS
Status: DISCONTINUED | OUTPATIENT
Start: 2025-08-06 | End: 2025-08-08 | Stop reason: HOSPADM

## 2025-08-05 RX ORDER — ALVIMOPAN 12 MG/1
12 CAPSULE ORAL ONCE
Status: COMPLETED | OUTPATIENT
Start: 2025-08-05 | End: 2025-08-05

## 2025-08-05 RX ORDER — ALVIMOPAN 12 MG/1
12 CAPSULE ORAL 2 TIMES DAILY
Status: DISCONTINUED | OUTPATIENT
Start: 2025-08-06 | End: 2025-08-07

## 2025-08-05 RX ORDER — OXYCODONE HYDROCHLORIDE 5 MG/1
5 TABLET ORAL EVERY 6 HOURS PRN
Status: DISCONTINUED | OUTPATIENT
Start: 2025-08-05 | End: 2025-08-05

## 2025-08-05 RX ORDER — SODIUM CHLORIDE 9 MG/ML
INJECTION, SOLUTION INTRAVENOUS
Status: DISCONTINUED | OUTPATIENT
Start: 2025-08-05 | End: 2025-08-05 | Stop reason: HOSPADM

## 2025-08-05 RX ORDER — CEFTRIAXONE 2 G/1
2 INJECTION, POWDER, FOR SOLUTION INTRAMUSCULAR; INTRAVENOUS
Status: DISCONTINUED | OUTPATIENT
Start: 2025-08-05 | End: 2025-08-05 | Stop reason: HOSPADM

## 2025-08-05 RX ORDER — DEXAMETHASONE SODIUM PHOSPHATE 4 MG/ML
INJECTION, SOLUTION INTRA-ARTICULAR; INTRALESIONAL; INTRAMUSCULAR; INTRAVENOUS; SOFT TISSUE
Status: DISCONTINUED | OUTPATIENT
Start: 2025-08-05 | End: 2025-08-05

## 2025-08-05 RX ORDER — HEPARIN SODIUM 5000 [USP'U]/ML
5000 INJECTION, SOLUTION INTRAVENOUS; SUBCUTANEOUS ONCE
Status: COMPLETED | OUTPATIENT
Start: 2025-08-05 | End: 2025-08-05

## 2025-08-05 RX ORDER — METRONIDAZOLE 500 MG/100ML
500 INJECTION, SOLUTION INTRAVENOUS
Status: COMPLETED | OUTPATIENT
Start: 2025-08-05 | End: 2025-08-05

## 2025-08-05 RX ORDER — SODIUM CHLORIDE 0.9 % (FLUSH) 0.9 %
10 SYRINGE (ML) INJECTION
Status: DISCONTINUED | OUTPATIENT
Start: 2025-08-05 | End: 2025-08-08 | Stop reason: HOSPADM

## 2025-08-05 RX ORDER — GABAPENTIN 300 MG/1
300 CAPSULE ORAL
Status: COMPLETED | OUTPATIENT
Start: 2025-08-05 | End: 2025-08-05

## 2025-08-05 RX ORDER — TRAMADOL HYDROCHLORIDE 50 MG/1
50 TABLET, FILM COATED ORAL EVERY 6 HOURS PRN
Status: DISCONTINUED | OUTPATIENT
Start: 2025-08-05 | End: 2025-08-08 | Stop reason: HOSPADM

## 2025-08-05 RX ORDER — GABAPENTIN 300 MG/1
300 CAPSULE ORAL 3 TIMES DAILY
Status: DISCONTINUED | OUTPATIENT
Start: 2025-08-05 | End: 2025-08-05

## 2025-08-05 RX ORDER — PROMETHAZINE HYDROCHLORIDE 12.5 MG/1
25 TABLET ORAL EVERY 6 HOURS PRN
Status: DISCONTINUED | OUTPATIENT
Start: 2025-08-05 | End: 2025-08-08 | Stop reason: HOSPADM

## 2025-08-05 RX ORDER — ONDANSETRON 8 MG/1
8 TABLET, ORALLY DISINTEGRATING ORAL EVERY 8 HOURS PRN
Status: DISCONTINUED | OUTPATIENT
Start: 2025-08-05 | End: 2025-08-08 | Stop reason: HOSPADM

## 2025-08-05 RX ORDER — OXYCODONE HYDROCHLORIDE 5 MG/1
5 TABLET ORAL EVERY 4 HOURS PRN
Refills: 0 | Status: DISCONTINUED | OUTPATIENT
Start: 2025-08-05 | End: 2025-08-08 | Stop reason: HOSPADM

## 2025-08-05 RX ORDER — HYDRALAZINE HYDROCHLORIDE 20 MG/ML
10 INJECTION INTRAMUSCULAR; INTRAVENOUS EVERY 6 HOURS PRN
Status: DISCONTINUED | OUTPATIENT
Start: 2025-08-05 | End: 2025-08-08 | Stop reason: HOSPADM

## 2025-08-05 RX ORDER — FENTANYL CITRATE 50 UG/ML
25 INJECTION, SOLUTION INTRAMUSCULAR; INTRAVENOUS EVERY 5 MIN PRN
Status: DISCONTINUED | OUTPATIENT
Start: 2025-08-05 | End: 2025-08-05 | Stop reason: HOSPADM

## 2025-08-05 RX ORDER — ACETAMINOPHEN 10 MG/ML
1000 INJECTION, SOLUTION INTRAVENOUS EVERY 8 HOURS
Status: DISPENSED | OUTPATIENT
Start: 2025-08-05 | End: 2025-08-06

## 2025-08-05 RX ORDER — LIDOCAINE HYDROCHLORIDE 10 MG/ML
1 INJECTION, SOLUTION EPIDURAL; INFILTRATION; INTRACAUDAL; PERINEURAL
Status: DISCONTINUED | OUTPATIENT
Start: 2025-08-05 | End: 2025-08-05 | Stop reason: HOSPADM

## 2025-08-05 RX ORDER — TRIPROLIDINE/PSEUDOEPHEDRINE 2.5MG-60MG
600 TABLET ORAL
Status: COMPLETED | OUTPATIENT
Start: 2025-08-05 | End: 2025-08-05

## 2025-08-05 RX ORDER — FENTANYL CITRATE 50 UG/ML
INJECTION, SOLUTION INTRAMUSCULAR; INTRAVENOUS
Status: DISCONTINUED | OUTPATIENT
Start: 2025-08-05 | End: 2025-08-05

## 2025-08-05 RX ORDER — MUPIROCIN 20 MG/G
OINTMENT TOPICAL 2 TIMES DAILY
Status: DISCONTINUED | OUTPATIENT
Start: 2025-08-05 | End: 2025-08-08 | Stop reason: HOSPADM

## 2025-08-05 RX ORDER — LIDOCAINE HYDROCHLORIDE 20 MG/ML
INJECTION, SOLUTION EPIDURAL; INFILTRATION; INTRACAUDAL; PERINEURAL
Status: DISCONTINUED | OUTPATIENT
Start: 2025-08-05 | End: 2025-08-05

## 2025-08-05 RX ORDER — LEVOTHYROXINE SODIUM 50 UG/1
100 TABLET ORAL
Status: DISCONTINUED | OUTPATIENT
Start: 2025-08-06 | End: 2025-08-08 | Stop reason: HOSPADM

## 2025-08-05 RX ORDER — ROCURONIUM BROMIDE 10 MG/ML
INJECTION, SOLUTION INTRAVENOUS
Status: DISCONTINUED | OUTPATIENT
Start: 2025-08-05 | End: 2025-08-05

## 2025-08-05 RX ORDER — PHENYLEPHRINE HCL IN 0.9% NACL 1 MG/10 ML
SYRINGE (ML) INTRAVENOUS
Status: DISCONTINUED | OUTPATIENT
Start: 2025-08-05 | End: 2025-08-05

## 2025-08-05 RX ORDER — ACETAMINOPHEN 650 MG/20.3ML
975 LIQUID ORAL
Status: COMPLETED | OUTPATIENT
Start: 2025-08-05 | End: 2025-08-05

## 2025-08-05 RX ORDER — LIDOCAINE HYDROCHLORIDE ANHYDROUS AND DEXTROSE MONOHYDRATE .8; 5 G/100ML; G/100ML
INJECTION, SOLUTION INTRAVENOUS CONTINUOUS PRN
Status: DISCONTINUED | OUTPATIENT
Start: 2025-08-05 | End: 2025-08-05

## 2025-08-05 RX ORDER — ENOXAPARIN SODIUM 100 MG/ML
40 INJECTION SUBCUTANEOUS EVERY 12 HOURS
Status: DISCONTINUED | OUTPATIENT
Start: 2025-08-06 | End: 2025-08-06

## 2025-08-05 RX ORDER — MIDAZOLAM HYDROCHLORIDE 1 MG/ML
INJECTION INTRAMUSCULAR; INTRAVENOUS
Status: DISCONTINUED | OUTPATIENT
Start: 2025-08-05 | End: 2025-08-05

## 2025-08-05 RX ORDER — SODIUM CHLORIDE 9 MG/ML
INJECTION, SOLUTION INTRAVENOUS CONTINUOUS
Status: DISCONTINUED | OUTPATIENT
Start: 2025-08-05 | End: 2025-08-05

## 2025-08-05 RX ORDER — GLUCAGON 1 MG
1 KIT INJECTION
Status: DISCONTINUED | OUTPATIENT
Start: 2025-08-05 | End: 2025-08-05 | Stop reason: HOSPADM

## 2025-08-05 RX ORDER — IBUPROFEN 400 MG/1
800 TABLET, FILM COATED ORAL EVERY 8 HOURS
Status: DISCONTINUED | OUTPATIENT
Start: 2025-08-06 | End: 2025-08-06

## 2025-08-05 RX ADMIN — HYDROMORPHONE HYDROCHLORIDE 0.2 MG: 1 INJECTION, SOLUTION INTRAMUSCULAR; INTRAVENOUS; SUBCUTANEOUS at 03:08

## 2025-08-05 RX ADMIN — MUPIROCIN: 20 OINTMENT TOPICAL at 08:08

## 2025-08-05 RX ADMIN — DEXAMETHASONE SODIUM PHOSPHATE 8 MG: 4 INJECTION INTRA-ARTICULAR; INTRALESIONAL; INTRAMUSCULAR; INTRAVENOUS; SOFT TISSUE at 12:08

## 2025-08-05 RX ADMIN — ACETAMINOPHEN 1000 MG: 10 INJECTION INTRAVENOUS at 08:08

## 2025-08-05 RX ADMIN — SODIUM CHLORIDE, SODIUM GLUCONATE, SODIUM ACETATE, POTASSIUM CHLORIDE, MAGNESIUM CHLORIDE, SODIUM PHOSPHATE, DIBASIC, AND POTASSIUM PHOSPHATE: .53; .5; .37; .037; .03; .012; .00082 INJECTION, SOLUTION INTRAVENOUS at 12:08

## 2025-08-05 RX ADMIN — GABAPENTIN 300 MG: 300 CAPSULE ORAL at 04:08

## 2025-08-05 RX ADMIN — SODIUM CHLORIDE, SODIUM GLUCONATE, SODIUM ACETATE, POTASSIUM CHLORIDE, MAGNESIUM CHLORIDE, SODIUM PHOSPHATE, DIBASIC, AND POTASSIUM PHOSPHATE: .53; .5; .37; .037; .03; .012; .00082 INJECTION, SOLUTION INTRAVENOUS at 02:08

## 2025-08-05 RX ADMIN — MUPIROCIN 1 G: 20 OINTMENT TOPICAL at 10:08

## 2025-08-05 RX ADMIN — SODIUM CHLORIDE: 9 INJECTION, SOLUTION INTRAVENOUS at 03:08

## 2025-08-05 RX ADMIN — LIDOCAINE HYDROCHLORIDE 0.02 MG/KG/MIN: 8 INJECTION, SOLUTION INTRAVENOUS at 12:08

## 2025-08-05 RX ADMIN — Medication 10 MG: at 03:08

## 2025-08-05 RX ADMIN — ALVIMOPAN 12 MG: 12 CAPSULE ORAL at 10:08

## 2025-08-05 RX ADMIN — IBUPROFEN 800 MG: 800 INJECTION INTRAVENOUS at 08:08

## 2025-08-05 RX ADMIN — GABAPENTIN 300 MG: 300 CAPSULE ORAL at 08:08

## 2025-08-05 RX ADMIN — FENTANYL CITRATE 100 MCG: 50 INJECTION INTRAMUSCULAR; INTRAVENOUS at 12:08

## 2025-08-05 RX ADMIN — Medication 100 MCG: at 12:08

## 2025-08-05 RX ADMIN — IBUPROFEN 600 MG: 100 SUSPENSION ORAL at 10:08

## 2025-08-05 RX ADMIN — ROCURONIUM BROMIDE 50 MG: 10 INJECTION, SOLUTION INTRAVENOUS at 12:08

## 2025-08-05 RX ADMIN — ROCURONIUM BROMIDE 20 MG: 10 INJECTION, SOLUTION INTRAVENOUS at 01:08

## 2025-08-05 RX ADMIN — SODIUM CHLORIDE: 0.9 INJECTION, SOLUTION INTRAVENOUS at 12:08

## 2025-08-05 RX ADMIN — METRONIDAZOLE 500 MG: 5 INJECTION, SOLUTION INTRAVENOUS at 12:08

## 2025-08-05 RX ADMIN — MIDAZOLAM 2 MG: 1 INJECTION INTRAMUSCULAR; INTRAVENOUS at 12:08

## 2025-08-05 RX ADMIN — LIDOCAINE HYDROCHLORIDE 100 MG: 20 INJECTION, SOLUTION EPIDURAL; INFILTRATION; INTRACAUDAL at 12:08

## 2025-08-05 RX ADMIN — PROPOFOL 130 MG: 10 INJECTION, EMULSION INTRAVENOUS at 12:08

## 2025-08-05 RX ADMIN — ACETAMINOPHEN 976.6 MG: 650 SOLUTION ORAL at 10:08

## 2025-08-05 RX ADMIN — Medication 15 MG: at 01:08

## 2025-08-05 RX ADMIN — GABAPENTIN 300 MG: 300 CAPSULE ORAL at 10:08

## 2025-08-05 RX ADMIN — HEPARIN SODIUM 5000 UNITS: 5000 INJECTION, SOLUTION INTRAVENOUS; SUBCUTANEOUS at 10:08

## 2025-08-05 RX ADMIN — CEFTRIAXONE 2 G: 1 INJECTION, POWDER, FOR SOLUTION INTRAMUSCULAR; INTRAVENOUS at 12:08

## 2025-08-05 RX ADMIN — Medication 200 MCG: at 01:08

## 2025-08-05 RX ADMIN — OXYCODONE HYDROCHLORIDE 10 MG: 10 TABLET ORAL at 06:08

## 2025-08-05 RX ADMIN — Medication 25 MG: at 12:08

## 2025-08-05 RX ADMIN — ONDANSETRON 4 MG: 2 INJECTION INTRAMUSCULAR; INTRAVENOUS at 03:08

## 2025-08-05 NOTE — ANESTHESIA PREPROCEDURE EVALUATION
[facility]  Anesthesia Pre-Operative Evaluation         Patient Name: Dejah Schmidt  YOB: 1960  MRN: [id number]    SUBJECTIVE:     Pre-operative evaluation for Procedure(s) (LRB):  COLECTOMY, RIGHT, LAPAROSCOPIC (N/A)     08/05/2025    Dejah Schmidt is a 64 y.o. female w/ a significant PMHx of HTN, CKD3, obesity s/p sleeve gastrectomy in 2024. Now with GIST.     Wt Readings from Last 20 Encounters:   06/26/25 117.6 kg (259 lb 4.2 oz)   05/29/25 112.8 kg (248 lb 10.9 oz)   04/10/25 109.8 kg (242 lb)   04/08/25 110.4 kg (243 lb 6.2 oz)   10/08/24 113.4 kg (250 lb)   09/06/24 115 kg (253 lb 8.5 oz)   07/26/24 122.4 kg (269 lb 15.3 oz)   07/12/24 125.9 kg (277 lb 9 oz)   07/02/24 129.3 kg (285 lb 0.9 oz)   05/29/24 130.4 kg (287 lb 7.7 oz)   04/24/24 133.6 kg (294 lb 8.6 oz)   02/29/24 131.7 kg (290 lb 3.8 oz)   01/24/24 133.3 kg (293 lb 14 oz)   12/13/23 (!) 137.7 kg (303 lb 8 oz)   11/30/23 (!) 136.2 kg (300 lb 4.3 oz)   11/22/23 134.3 kg (296 lb)   09/07/23 (!) 138.3 kg (305 lb)   08/25/23 (!) 138.7 kg (305 lb 12.5 oz)   02/20/23 (!) 137.1 kg (302 lb 5.8 oz)   12/08/22 133 kg (293 lb 3.4 oz)       Airway:   Intubation  Mask Difficulty: easy mask   Airway Type: oral endotracheal tube   ETT Size(mm): 7.5   Attempts: 1     2D ECHO:  TTE:  Results for orders placed during the hospital encounter of 11/21/23  Echo  Interpretation Summary    Left Ventricle: The left ventricle is normal in size. Normal wall thickness. There is concentric remodeling. Normal wall motion. There is normal systolic function with a visually estimated ejection fraction of 55 - 60%. There is normal diastolic function.    Right Ventricle: Normal right ventricular cavity size. Wall thickness is normal. Right ventricle wall motion  is normal. Systolic function is normal.    Pulmonic Valve: There is mild regurgitation.    Pulmonary Artery: The estimated pulmonary artery systolic pressure is 22 mmHg.    IVC/SVC:  Normal venous pressure at 3 mmHg.        Problem List[1]    Review of patient's allergies indicates:  No Known Allergies    Current Medications:  Current Outpatient Medications   Medication Instructions    albuterol (PROVENTIL/VENTOLIN HFA) 90 mcg/actuation inhaler 2 puffs, Inhalation, Every 6 hours PRN, Rescue    amLODIPine (NORVASC) 10 MG tablet TAKE 1 TABLET(10 MG) BY MOUTH EVERY DAY    atorvastatin (LIPITOR) 40 mg, Oral    ciprofloxacin HCl (CIPRO) 500 MG tablet Take 1 tablet at 9 PM and 1 tablet at 11 PM the night before surgery.    diclofenac sodium (VOLTAREN) 2 g, Topical (Top), 4 times daily PRN, To painful area on the feet.    gabapentin (NEURONTIN) 600 mg, Oral, 3 times daily    levothyroxine (SYNTHROID) 100 mcg, Oral, Before breakfast    lisinopriL (PRINIVIL,ZESTRIL) 40 mg, Oral    metroNIDAZOLE (FLAGYL) 500 MG tablet Take 1 tablet at 9 PM and 1 tablet at 11 PM the night before surgery.    nitroGLYCERIN (NITROSTAT) 0.4 mg, Sublingual, Every 5 min PRN    ondansetron (ZOFRAN-ODT) 8 MG TbDL Take every 8 hours as needed for nausea during procedural prep.    triamterene-hydrochlorothiazide 37.5-25 mg (MAXZIDE-25) 37.5-25 mg per tablet 1 tablet, Oral, Daily       Past Surgical History:   Procedure Laterality Date    BACK SURGERY      CARPAL TUNNEL RELEASE       SECTION   &    COLONOSCOPY N/A 2020    Procedure: COLONOSCOPY, with me, ;  Surgeon: Sarah Beth Leavitt MD;  Location: [facility] ([address] [place] [address]);  Service: Endoscopy;  Laterality: N/A;  covid test     COLONOSCOPY, SCREENING, HIGH RISK PATIENT N/A 2025    Procedure: COLONOSCOPY, SCREENING, HIGH RISK PATIENT;  Surgeon: Sarah Beth Leavitt MD;  Location: [facility] ([address] [place] [address]);  Service: Endoscopy;  Laterality: N/A;  requesting Dr Leavitt performed last colonoscopy  referral Dr SandersJxclydcs-vssw-cutwq portal-GT   precall complete/mleone    EPIDURAL STEROID INJECTION N/A 10/27/2021    Procedure: INJECTION, STEROID,  EPIDURAL,L5/S1 DIRECT REF/NEED CONSENT;  Surgeon: Clement Aldana MD;  Location: [facility] PAIN MGT;  Service: Pain Management;  Laterality: N/A;    EPIDURAL STEROID INJECTION N/A 05/25/2022    Procedure: INJECTION, STEROID, EPIDURAL, L5-S1 CONTRAST DIRECT REF;  Surgeon: Clement Aldana MD;  Location: [facility] PAIN MGT;  Service: Pain Management;  Laterality: N/A;    ESOPHAGOGASTRODUODENOSCOPY N/A 7/12/2024    Procedure: EGD (ESOPHAGOGASTRODUODENOSCOPY);  Surgeon: Soto Sebastian MD;  Location: [facility] OR 2ND FLR;  Service: General;  Laterality: N/A;    FOOT SURGERY      HYSTERECTOMY      MASS EXCISION      at neck    ROBOT-ASSISTED LAPAROSCOPIC SLEEVE GASTRECTOMY USING [facility] XI N/A 7/12/2024    Procedure: XI ROBOTIC SLEEVE GASTRECTOMY;  Surgeon: Soto Sebastian MD;  Location: [facility] OR 2ND FLR;  Service: General;  Laterality: N/A;    ROTATOR CUFF REPAIR      left shoulder    SPINE SURGERY  2017    THYROIDECTOMY N/A 06/29/2022    Procedure: THYROIDECTOMY, total;  Surgeon: Anai Garcia MD;  Location: [facility] OR 2ND FLR;  Service: General;  Laterality: N/A;    TRIGGER FINGER RELEASE  12/2017    right hand    TRIGGER FINGER RELEASE Right 01/26/2022    Procedure: RELEASE, TRIGGER FINGER - right thumb;  Surgeon: Cristopher Tolliver MD;  Location: [facility];  Service: Orthopedics;  Laterality: Right;    TRIGGER FINGER RELEASE Left 03/09/2022    Procedure: RELEASE, TRIGGER FINGER - LEFT index and long;  Surgeon: Cristopher Tolliver MD;  Location: [facility] OR;  Service: Orthopedics;  Laterality: Left;    TUBAL LIGATION  1998         OBJECTIVE:     Vital Signs Range (Last 24H):         Significant Labs:  Lab Results   Component Value Date    WBC 3.57 (L) 06/26/2025    HGB 14.1 06/26/2025    HCT 44.0 06/26/2025     06/26/2025    INR 0.9 06/15/2017       Lab Results   Component Value Date     06/26/2025    K 4.7 06/26/2025     06/26/2025    CREATININE 0.9 06/26/2025    BUN 17 06/26/2025    CO2 28  06/26/2025       Lab Results   Component Value Date    TSH 0.082 (L) 04/08/2025    HGBA1C 5.3 03/04/2024       EKG:   Results for orders placed or performed during the hospital encounter of 06/26/25   SCHEDULED EKG 12-LEAD (to Muse)    Collection Time: 06/26/25  2:35 PM   Result Value Ref Range    QRS Duration 98 ms    [facility] QTC Calculation 437 ms    Narrative    Test Reason : Z01.818,    Vent. Rate :  59 BPM     Atrial Rate :  59 BPM     P-R Int : 142 ms          QRS Dur :  98 ms      QT Int : 442 ms       P-R-T Axes :  41 -20  32 degrees    QTcB Int : 437 ms    Sinus bradycardia  Minimal voltage criteria for LVH, may be normal variant ( Davis product )  Borderline Abnormal ECG  When compared with ECG of 22-Nov-2023 10:04,  Nonspecific T wave abnormality no longer evident in Anterior leads  Confirmed by Dante Ruff ([id number]) on 6/26/2025 3:49:35 PM    Referred By: ADRIÁN WING           Confirmed By: Dante Ruff       ASSESSMENT/PLAN:         Pre-op Assessment    I have reviewed the Patient Summary Reports.     I have reviewed the Nursing Notes. I have reviewed the NPO Status.   I have reviewed the Medications.     Review of Systems      Physical Exam  General: Well nourished, Cooperative, Alert and Oriented    Airway:  Mallampati: II   Neck ROM: Normal ROM    Dental:  Intact        Anesthesia Plan  Type of Anesthesia, risks & benefits discussed:    Anesthesia Type: Gen ETT  Intra-op Monitoring Plan: Standard ASA Monitors  Post Op Pain Control Plan: multimodal analgesia and IV/PO Opioids PRN  Induction:  IV  Airway Plan: Video and Direct  ASA Score: 3  Day of Surgery Review of History & Physical: H&P Update referred to the surgeon/provider.    Ready For Surgery From Anesthesia Perspective.     .             [1]  Patient Active Problem List  Diagnosis    Unspecified essential hypertension    Morbid obesity with BMI of 45.0-49.9, adult    Vitamin D deficiency disease    Chronic back pain    Lumbar  radiculopathy    Facet arthropathy, lumbar    Facet hypertrophy of lumbar region    Hand pain    Herniation of lumbar intervertebral disc with radiculopathy    Spinal stenosis of lumbar region with neurogenic claudication    Chronic pain    Chronic kidney disease, stage 3a    Trigger thumb, right thumb    Trigger finger, left middle finger    Trigger finger, left index finger    Finger stiffness, left    Pain of finger of left hand    Postoperative hypothyroidism    Hyperparathyroidism    Chest pain    Elevated troponin    Obesity

## 2025-08-05 NOTE — ANESTHESIA PROCEDURE NOTES
Intubation    Date/Time: 8/5/2025 12:42 PM    Performed by: Paty Rangel CRNA  Authorized by: Jose Hurst DO    Intubation:     Induction:  Intravenous    Intubated:  Postinduction    Mask Ventilation:  Easy mask    Attempted By:  CRNA    Method of Intubation:  Video laryngoscopy    Blade:  Steele 3    Laryngeal View Grade: Grade I - full view of cords      Difficult Airway Encountered?: No      Complications:  None    Airway Device:  Oral endotracheal tube    Airway Device Size:  7.0    Style/Cuff Inflation:  Cuffed (inflated to minimal occlusive pressure)    Tube secured:  22    Secured at:  The lips    Placement Verified By:  Capnometry    Complicating Factors:  None    Findings Post-Intubation:  BS equal bilateral and atraumatic/condition of teeth unchanged

## 2025-08-05 NOTE — TRANSFER OF CARE
"Anesthesia Transfer of Care Note    Patient: Dejah Schmidt    Procedure(s) Performed: Procedure(s) (LRB):  COLECTOMY, RIGHT, LAPAROSCOPIC (N/A)    Patient location: PACU    Anesthesia Type: general    Transport from OR: Transported from OR on 6-10 L/min O2 by face mask with adequate spontaneous ventilation    Post pain: adequate analgesia    Post assessment: no apparent anesthetic complications and tolerated procedure well    Post vital signs: stable    Level of consciousness: awake, alert and oriented    Nausea/Vomiting: no nausea/vomiting    Complications: none    Transfer of care protocol was followed      Last vitals: Visit Vitals  BP (!) 140/73   Pulse 82   Temp 36.4 °C (97.5 °F)   Resp 20   Ht 5' 6" (1.676 m)   Wt 116.8 kg (257 lb 8 oz)   SpO2 96%   Breastfeeding No   BMI 41.56 kg/m²     "

## 2025-08-06 PROBLEM — Z90.49 S/P RIGHT COLECTOMY: Status: ACTIVE | Noted: 2025-08-06

## 2025-08-06 PROBLEM — C49.A4 GIST (GASTROINTESTINAL STROMA TUMOR), MALIGNANT, COLON: Status: ACTIVE | Noted: 2025-08-06

## 2025-08-06 PROBLEM — E66.01 MORBID OBESITY: Status: ACTIVE | Noted: 2025-08-06

## 2025-08-06 LAB
ABSOLUTE EOSINOPHIL (OHS): 0 K/UL
ABSOLUTE MONOCYTE (OHS): 0.28 K/UL (ref 0.3–1)
ABSOLUTE NEUTROPHIL COUNT (OHS): 4.48 K/UL (ref 1.8–7.7)
ANION GAP (OHS): 10 MMOL/L (ref 8–16)
ANION GAP (OHS): 12 MMOL/L (ref 8–16)
BASOPHILS # BLD AUTO: 0.01 K/UL
BASOPHILS NFR BLD AUTO: 0.2 %
BUN SERPL-MCNC: 24 MG/DL (ref 8–23)
BUN SERPL-MCNC: 24 MG/DL (ref 8–23)
CALCIUM SERPL-MCNC: 8.7 MG/DL (ref 8.7–10.5)
CALCIUM SERPL-MCNC: 8.7 MG/DL (ref 8.7–10.5)
CHLORIDE SERPL-SCNC: 105 MMOL/L (ref 95–110)
CHLORIDE SERPL-SCNC: 106 MMOL/L (ref 95–110)
CO2 SERPL-SCNC: 20 MMOL/L (ref 23–29)
CO2 SERPL-SCNC: 22 MMOL/L (ref 23–29)
CREAT SERPL-MCNC: 1.8 MG/DL (ref 0.5–1.4)
CREAT SERPL-MCNC: 3 MG/DL (ref 0.5–1.4)
ERYTHROCYTE [DISTWIDTH] IN BLOOD BY AUTOMATED COUNT: 12.7 % (ref 11.5–14.5)
GFR SERPLBLD CREATININE-BSD FMLA CKD-EPI: 17 ML/MIN/1.73/M2
GFR SERPLBLD CREATININE-BSD FMLA CKD-EPI: 31 ML/MIN/1.73/M2
GLUCOSE SERPL-MCNC: 119 MG/DL (ref 70–110)
GLUCOSE SERPL-MCNC: 90 MG/DL (ref 70–110)
HCT VFR BLD AUTO: 43.1 % (ref 37–48.5)
HGB BLD-MCNC: 14.1 GM/DL (ref 12–16)
IMM GRANULOCYTES # BLD AUTO: 0.19 K/UL (ref 0–0.04)
IMM GRANULOCYTES NFR BLD AUTO: 3.2 % (ref 0–0.5)
LYMPHOCYTES # BLD AUTO: 1.02 K/UL (ref 1–4.8)
MAGNESIUM SERPL-MCNC: 2 MG/DL (ref 1.6–2.6)
MCH RBC QN AUTO: 30.3 PG (ref 27–31)
MCHC RBC AUTO-ENTMCNC: 32.7 G/DL (ref 32–36)
MCV RBC AUTO: 93 FL (ref 82–98)
NUCLEATED RBC (/100WBC) (OHS): 0 /100 WBC
PHOSPHATE SERPL-MCNC: 3.8 MG/DL (ref 2.7–4.5)
PLATELET # BLD AUTO: 177 K/UL (ref 150–450)
PMV BLD AUTO: 11.5 FL (ref 9.2–12.9)
POTASSIUM SERPL-SCNC: 4.5 MMOL/L (ref 3.5–5.1)
POTASSIUM SERPL-SCNC: 4.9 MMOL/L (ref 3.5–5.1)
RBC # BLD AUTO: 4.65 M/UL (ref 4–5.4)
RELATIVE EOSINOPHIL (OHS): 0 %
RELATIVE LYMPHOCYTE (OHS): 17.1 % (ref 18–48)
RELATIVE MONOCYTE (OHS): 4.7 % (ref 4–15)
RELATIVE NEUTROPHIL (OHS): 74.8 % (ref 38–73)
SODIUM SERPL-SCNC: 137 MMOL/L (ref 136–145)
SODIUM SERPL-SCNC: 138 MMOL/L (ref 136–145)
WBC # BLD AUTO: 5.98 K/UL (ref 3.9–12.7)

## 2025-08-06 PROCEDURE — 63600175 PHARM REV CODE 636 W HCPCS

## 2025-08-06 PROCEDURE — 97165 OT EVAL LOW COMPLEX 30 MIN: CPT

## 2025-08-06 PROCEDURE — 84100 ASSAY OF PHOSPHORUS: CPT

## 2025-08-06 PROCEDURE — 80048 BASIC METABOLIC PNL TOTAL CA: CPT

## 2025-08-06 PROCEDURE — 20600001 HC STEP DOWN PRIVATE ROOM

## 2025-08-06 PROCEDURE — 36415 COLL VENOUS BLD VENIPUNCTURE: CPT

## 2025-08-06 PROCEDURE — 97161 PT EVAL LOW COMPLEX 20 MIN: CPT

## 2025-08-06 PROCEDURE — 25000003 PHARM REV CODE 250

## 2025-08-06 PROCEDURE — 83735 ASSAY OF MAGNESIUM: CPT

## 2025-08-06 PROCEDURE — 85025 COMPLETE CBC W/AUTO DIFF WBC: CPT

## 2025-08-06 RX ORDER — SCOPOLAMINE 1 MG/3D
1 PATCH, EXTENDED RELEASE TRANSDERMAL
Status: DISCONTINUED | OUTPATIENT
Start: 2025-08-06 | End: 2025-08-08 | Stop reason: HOSPADM

## 2025-08-06 RX ORDER — SODIUM CHLORIDE, SODIUM LACTATE, POTASSIUM CHLORIDE, CALCIUM CHLORIDE 600; 310; 30; 20 MG/100ML; MG/100ML; MG/100ML; MG/100ML
INJECTION, SOLUTION INTRAVENOUS CONTINUOUS
Status: DISCONTINUED | OUTPATIENT
Start: 2025-08-06 | End: 2025-08-08

## 2025-08-06 RX ORDER — HEPARIN SODIUM 5000 [USP'U]/ML
70 INJECTION, SOLUTION INTRAVENOUS; SUBCUTANEOUS EVERY 8 HOURS
Status: DISCONTINUED | OUTPATIENT
Start: 2025-08-06 | End: 2025-08-06

## 2025-08-06 RX ORDER — HEPARIN SODIUM 5000 [USP'U]/ML
7500 INJECTION, SOLUTION INTRAVENOUS; SUBCUTANEOUS EVERY 8 HOURS
Status: DISCONTINUED | OUTPATIENT
Start: 2025-08-06 | End: 2025-08-08

## 2025-08-06 RX ADMIN — MUPIROCIN: 20 OINTMENT TOPICAL at 08:08

## 2025-08-06 RX ADMIN — ACETAMINOPHEN 1000 MG: 10 INJECTION INTRAVENOUS at 05:08

## 2025-08-06 RX ADMIN — MUPIROCIN: 20 OINTMENT TOPICAL at 09:08

## 2025-08-06 RX ADMIN — PROMETHAZINE HYDROCHLORIDE 25 MG: 12.5 TABLET ORAL at 10:08

## 2025-08-06 RX ADMIN — SODIUM CHLORIDE, POTASSIUM CHLORIDE, SODIUM LACTATE AND CALCIUM CHLORIDE: 600; 310; 30; 20 INJECTION, SOLUTION INTRAVENOUS at 07:08

## 2025-08-06 RX ADMIN — SCOPOLAMINE 1 PATCH: 1.5 PATCH, EXTENDED RELEASE TRANSDERMAL at 11:08

## 2025-08-06 RX ADMIN — HEPARIN SODIUM 7500 UNITS: 5000 INJECTION INTRAVENOUS; SUBCUTANEOUS at 10:08

## 2025-08-06 RX ADMIN — SODIUM CHLORIDE, POTASSIUM CHLORIDE, SODIUM LACTATE AND CALCIUM CHLORIDE 1000 ML: 600; 310; 30; 20 INJECTION, SOLUTION INTRAVENOUS at 09:08

## 2025-08-06 RX ADMIN — IBUPROFEN 800 MG: 800 INJECTION INTRAVENOUS at 05:08

## 2025-08-06 RX ADMIN — ONDANSETRON 8 MG: 8 TABLET, ORALLY DISINTEGRATING ORAL at 07:08

## 2025-08-06 RX ADMIN — ALVIMOPAN 12 MG: 12 CAPSULE ORAL at 09:08

## 2025-08-06 RX ADMIN — HEPARIN SODIUM 7500 UNITS: 5000 INJECTION INTRAVENOUS; SUBCUTANEOUS at 01:08

## 2025-08-06 RX ADMIN — LEVOTHYROXINE SODIUM 100 MCG: 0.05 TABLET ORAL at 05:08

## 2025-08-06 RX ADMIN — OXYCODONE HYDROCHLORIDE 10 MG: 10 TABLET ORAL at 07:08

## 2025-08-06 RX ADMIN — OXYCODONE HYDROCHLORIDE 10 MG: 10 TABLET ORAL at 06:08

## 2025-08-06 RX ADMIN — SODIUM CHLORIDE, POTASSIUM CHLORIDE, SODIUM LACTATE AND CALCIUM CHLORIDE: 600; 310; 30; 20 INJECTION, SOLUTION INTRAVENOUS at 11:08

## 2025-08-06 RX ADMIN — ACETAMINOPHEN 1000 MG: 500 TABLET ORAL at 10:08

## 2025-08-06 RX ADMIN — ALVIMOPAN 12 MG: 12 CAPSULE ORAL at 08:08

## 2025-08-06 NOTE — ANESTHESIA POSTPROCEDURE EVALUATION
Anesthesia Post Evaluation    Patient: Ada THOMAS Schmidt    Procedure(s) Performed: Procedure(s) (LRB):  COLECTOMY, RIGHT, LAPAROSCOPIC (N/A)    Final Anesthesia Type: general      Patient location during evaluation: PACU  Patient participation: Yes- Able to Participate  Level of consciousness: awake and alert  Post-procedure vital signs: reviewed and stable  Pain management: adequate  Airway patency: patent    PONV status at discharge: No PONV  Anesthetic complications: no      Cardiovascular status: blood pressure returned to baseline and hemodynamically stable  Respiratory status: unassisted  Hydration status: euvolemic  Follow-up not needed.              Vitals Value Taken Time   /75 08/05/25 17:44   Temp 36.4 °C (97.5 °F) 08/05/25 17:44   Pulse 77 08/05/25 17:44   Resp 16 08/05/25 18:04   SpO2 98 % 08/05/25 17:44         Event Time   Out of Recovery 16:00:00         Pain/Dirk Score: Pain Rating Prior to Med Admin: 8 (8/5/2025  6:04 PM)  Pain Rating Post Med Admin: 5 (8/5/2025  4:18 PM)  Dirk Score: 9 (8/5/2025  4:00 PM)

## 2025-08-07 LAB
ANION GAP (OHS): 8 MMOL/L (ref 8–16)
BUN SERPL-MCNC: 23 MG/DL (ref 8–23)
CALCIUM SERPL-MCNC: 8.5 MG/DL (ref 8.7–10.5)
CHLORIDE SERPL-SCNC: 107 MMOL/L (ref 95–110)
CO2 SERPL-SCNC: 26 MMOL/L (ref 23–29)
CREAT SERPL-MCNC: 1.4 MG/DL (ref 0.5–1.4)
GFR SERPLBLD CREATININE-BSD FMLA CKD-EPI: 42 ML/MIN/1.73/M2
GLUCOSE SERPL-MCNC: 84 MG/DL (ref 70–110)
POTASSIUM SERPL-SCNC: 4.6 MMOL/L (ref 3.5–5.1)
SODIUM SERPL-SCNC: 141 MMOL/L (ref 136–145)

## 2025-08-07 PROCEDURE — 25000003 PHARM REV CODE 250

## 2025-08-07 PROCEDURE — 20600001 HC STEP DOWN PRIVATE ROOM

## 2025-08-07 PROCEDURE — 27000207 HC ISOLATION

## 2025-08-07 PROCEDURE — 87449 NOS EACH ORGANISM AG IA: CPT

## 2025-08-07 PROCEDURE — 80048 BASIC METABOLIC PNL TOTAL CA: CPT

## 2025-08-07 PROCEDURE — 94761 N-INVAS EAR/PLS OXIMETRY MLT: CPT

## 2025-08-07 PROCEDURE — 36415 COLL VENOUS BLD VENIPUNCTURE: CPT

## 2025-08-07 PROCEDURE — 94799 UNLISTED PULMONARY SVC/PX: CPT

## 2025-08-07 PROCEDURE — 63600175 PHARM REV CODE 636 W HCPCS

## 2025-08-07 RX ADMIN — OXYCODONE HYDROCHLORIDE 10 MG: 10 TABLET ORAL at 09:08

## 2025-08-07 RX ADMIN — HEPARIN SODIUM 7500 UNITS: 5000 INJECTION INTRAVENOUS; SUBCUTANEOUS at 09:08

## 2025-08-07 RX ADMIN — HEPARIN SODIUM 7500 UNITS: 5000 INJECTION INTRAVENOUS; SUBCUTANEOUS at 06:08

## 2025-08-07 RX ADMIN — ACETAMINOPHEN 1000 MG: 500 TABLET ORAL at 12:08

## 2025-08-07 RX ADMIN — MUPIROCIN: 20 OINTMENT TOPICAL at 09:08

## 2025-08-07 RX ADMIN — OXYCODONE HYDROCHLORIDE 10 MG: 10 TABLET ORAL at 01:08

## 2025-08-07 RX ADMIN — ACETAMINOPHEN 1000 MG: 500 TABLET ORAL at 09:08

## 2025-08-07 RX ADMIN — LEVOTHYROXINE SODIUM 100 MCG: 0.05 TABLET ORAL at 06:08

## 2025-08-07 RX ADMIN — ACETAMINOPHEN 1000 MG: 500 TABLET ORAL at 06:08

## 2025-08-07 RX ADMIN — HEPARIN SODIUM 7500 UNITS: 5000 INJECTION INTRAVENOUS; SUBCUTANEOUS at 12:08

## 2025-08-08 VITALS
SYSTOLIC BLOOD PRESSURE: 147 MMHG | OXYGEN SATURATION: 99 % | RESPIRATION RATE: 18 BRPM | WEIGHT: 257.5 LBS | HEIGHT: 66 IN | TEMPERATURE: 98 F | DIASTOLIC BLOOD PRESSURE: 65 MMHG | HEART RATE: 60 BPM | BODY MASS INDEX: 41.38 KG/M2

## 2025-08-08 LAB
ANION GAP (OHS): 9 MMOL/L (ref 8–16)
BUN SERPL-MCNC: 14 MG/DL (ref 8–23)
C DIFF GDH STL QL: NEGATIVE
C DIFF TOX A+B STL QL IA: NEGATIVE
CALCIUM SERPL-MCNC: 8.6 MG/DL (ref 8.7–10.5)
CHLORIDE SERPL-SCNC: 107 MMOL/L (ref 95–110)
CO2 SERPL-SCNC: 26 MMOL/L (ref 23–29)
CREAT SERPL-MCNC: 1 MG/DL (ref 0.5–1.4)
CRP SERPL-MCNC: 29.6 MG/L
DHEA SERPL-MCNC: NORMAL
ESTROGEN SERPL-MCNC: NORMAL PG/ML
GFR SERPLBLD CREATININE-BSD FMLA CKD-EPI: >60 ML/MIN/1.73/M2
GLUCOSE SERPL-MCNC: 79 MG/DL (ref 70–110)
INSULIN SERPL-ACNC: NORMAL U[IU]/ML
LAB AP CLINICAL INFORMATION: NORMAL
LAB AP GROSS DESCRIPTION: NORMAL
LAB AP PERFORMING LOCATION(S): NORMAL
LAB AP REPORT FOOTNOTES: NORMAL
LAB AP SYNOPTIC CHECKLIST: NORMAL
POTASSIUM SERPL-SCNC: 3.9 MMOL/L (ref 3.5–5.1)
SODIUM SERPL-SCNC: 142 MMOL/L (ref 136–145)

## 2025-08-08 PROCEDURE — 82374 ASSAY BLOOD CARBON DIOXIDE: CPT

## 2025-08-08 PROCEDURE — 25000003 PHARM REV CODE 250

## 2025-08-08 PROCEDURE — 36415 COLL VENOUS BLD VENIPUNCTURE: CPT

## 2025-08-08 PROCEDURE — 63600175 PHARM REV CODE 636 W HCPCS

## 2025-08-08 PROCEDURE — 25000242 PHARM REV CODE 250 ALT 637 W/ HCPCS

## 2025-08-08 PROCEDURE — 86140 C-REACTIVE PROTEIN: CPT

## 2025-08-08 RX ORDER — ENOXAPARIN SODIUM 100 MG/ML
40 INJECTION SUBCUTANEOUS EVERY 12 HOURS
Status: DISCONTINUED | OUTPATIENT
Start: 2025-08-08 | End: 2025-08-08 | Stop reason: HOSPADM

## 2025-08-08 RX ORDER — OXYCODONE HYDROCHLORIDE 5 MG/1
5 TABLET ORAL EVERY 6 HOURS PRN
Qty: 20 TABLET | Refills: 0 | Status: SHIPPED | OUTPATIENT
Start: 2025-08-08

## 2025-08-08 RX ADMIN — ACETAMINOPHEN 1000 MG: 500 TABLET ORAL at 01:08

## 2025-08-08 RX ADMIN — LEVOTHYROXINE SODIUM 100 MCG: 0.05 TABLET ORAL at 05:08

## 2025-08-08 RX ADMIN — HEPARIN SODIUM 7500 UNITS: 5000 INJECTION INTRAVENOUS; SUBCUTANEOUS at 05:08

## 2025-08-08 RX ADMIN — MUPIROCIN: 20 OINTMENT TOPICAL at 08:08

## 2025-08-08 RX ADMIN — ACETAMINOPHEN 1000 MG: 500 TABLET ORAL at 05:08

## 2025-08-08 RX ADMIN — PSYLLIUM HUSK 1 PACKET: 3.4 POWDER ORAL at 08:08

## 2025-08-11 ENCOUNTER — PATIENT OUTREACH (OUTPATIENT)
Dept: ADMINISTRATIVE | Facility: CLINIC | Age: 65
End: 2025-08-11
Payer: MEDICARE

## 2025-08-11 DIAGNOSIS — C49.A4 GIST (GASTROINTESTINAL STROMA TUMOR), MALIGNANT, COLON: Primary | ICD-10-CM

## 2025-08-12 ENCOUNTER — PATIENT MESSAGE (OUTPATIENT)
Dept: BARIATRICS | Facility: CLINIC | Age: 65
End: 2025-08-12
Payer: MEDICARE

## 2025-08-14 ENCOUNTER — OFFICE VISIT (OUTPATIENT)
Dept: HOME HEALTH SERVICES | Facility: CLINIC | Age: 65
End: 2025-08-14
Payer: MEDICARE

## 2025-08-14 VITALS — SYSTOLIC BLOOD PRESSURE: 104 MMHG | DIASTOLIC BLOOD PRESSURE: 58 MMHG | HEART RATE: 80 BPM | OXYGEN SATURATION: 97 %

## 2025-08-14 DIAGNOSIS — C49.A4 GIST (GASTROINTESTINAL STROMA TUMOR), MALIGNANT, COLON: Primary | ICD-10-CM

## 2025-08-14 DIAGNOSIS — G47.00 INSOMNIA, UNSPECIFIED TYPE: ICD-10-CM

## 2025-08-14 DIAGNOSIS — I10 ESSENTIAL HYPERTENSION: ICD-10-CM

## 2025-08-14 DIAGNOSIS — B37.31 VAGINAL CANDIDIASIS: ICD-10-CM

## 2025-08-14 RX ORDER — FLUCONAZOLE 150 MG/1
150 TABLET ORAL DAILY
Qty: 1 TABLET | Refills: 0 | Status: SHIPPED | OUTPATIENT
Start: 2025-08-14 | End: 2025-08-15

## 2025-08-15 PROBLEM — G47.00 INSOMNIA: Status: ACTIVE | Noted: 2025-08-15

## 2025-08-19 ENCOUNTER — TELEPHONE (OUTPATIENT)
Dept: SURGERY | Facility: CLINIC | Age: 65
End: 2025-08-19
Payer: MEDICARE

## 2025-08-20 ENCOUNTER — OFFICE VISIT (OUTPATIENT)
Dept: SURGERY | Facility: CLINIC | Age: 65
End: 2025-08-20
Attending: COLON & RECTAL SURGERY
Payer: MEDICARE

## 2025-08-20 VITALS
HEIGHT: 67 IN | DIASTOLIC BLOOD PRESSURE: 57 MMHG | WEIGHT: 256.19 LBS | BODY MASS INDEX: 40.21 KG/M2 | SYSTOLIC BLOOD PRESSURE: 107 MMHG | HEART RATE: 82 BPM

## 2025-08-20 DIAGNOSIS — Z98.890 POST-OPERATIVE STATE: Primary | ICD-10-CM

## 2025-08-20 PROCEDURE — 99999 PR PBB SHADOW E&M-EST. PATIENT-LVL III: CPT | Mod: PBBFAC,,, | Performed by: COLON & RECTAL SURGERY

## 2025-08-26 ENCOUNTER — PATIENT MESSAGE (OUTPATIENT)
Dept: SURGERY | Facility: CLINIC | Age: 65
End: 2025-08-26
Payer: MEDICARE

## 2025-08-26 DIAGNOSIS — C49.A4 GIST (GASTROINTESTINAL STROMA TUMOR), MALIGNANT, COLON: ICD-10-CM

## 2025-08-26 DIAGNOSIS — Z98.890 POST-OPERATIVE STATE: Primary | ICD-10-CM

## 2025-08-27 ENCOUNTER — TELEPHONE (OUTPATIENT)
Dept: SURGERY | Facility: CLINIC | Age: 65
End: 2025-08-27
Payer: MEDICARE

## 2025-08-27 ENCOUNTER — LAB VISIT (OUTPATIENT)
Dept: LAB | Facility: HOSPITAL | Age: 65
End: 2025-08-27
Attending: COLON & RECTAL SURGERY
Payer: MEDICARE

## 2025-08-27 DIAGNOSIS — Z98.890 POST-OPERATIVE STATE: ICD-10-CM

## 2025-08-27 DIAGNOSIS — Z98.890 POST-OPERATIVE STATE: Primary | ICD-10-CM

## 2025-08-27 DIAGNOSIS — C49.A4 GIST (GASTROINTESTINAL STROMA TUMOR), MALIGNANT, COLON: ICD-10-CM

## 2025-08-27 LAB
ABSOLUTE EOSINOPHIL (OHS): 0.02 K/UL
ABSOLUTE MONOCYTE (OHS): 0.37 K/UL (ref 0.3–1)
ABSOLUTE NEUTROPHIL COUNT (OHS): 2.2 K/UL (ref 1.8–7.7)
ALBUMIN SERPL BCP-MCNC: 3.6 G/DL (ref 3.5–5.2)
ALP SERPL-CCNC: 63 UNIT/L (ref 40–150)
ALT SERPL W/O P-5'-P-CCNC: 16 UNIT/L (ref 0–55)
ANION GAP (OHS): 9 MMOL/L (ref 8–16)
AST SERPL-CCNC: 19 UNIT/L (ref 0–50)
BASOPHILS # BLD AUTO: 0.01 K/UL
BASOPHILS NFR BLD AUTO: 0.2 %
BILIRUB SERPL-MCNC: 0.8 MG/DL (ref 0.1–1)
BUN SERPL-MCNC: 14 MG/DL (ref 8–23)
CALCIUM SERPL-MCNC: 9.1 MG/DL (ref 8.7–10.5)
CHLORIDE SERPL-SCNC: 107 MMOL/L (ref 95–110)
CO2 SERPL-SCNC: 25 MMOL/L (ref 23–29)
CREAT SERPL-MCNC: 0.9 MG/DL (ref 0.5–1.4)
CRP SERPL-MCNC: 45.8 MG/L
ERYTHROCYTE [DISTWIDTH] IN BLOOD BY AUTOMATED COUNT: 12.7 % (ref 11.5–14.5)
GFR SERPLBLD CREATININE-BSD FMLA CKD-EPI: >60 ML/MIN/1.73/M2
GLUCOSE SERPL-MCNC: 95 MG/DL (ref 70–110)
HCT VFR BLD AUTO: 36.3 % (ref 37–48.5)
HGB BLD-MCNC: 11.5 GM/DL (ref 12–16)
IMM GRANULOCYTES # BLD AUTO: 0.14 K/UL (ref 0–0.04)
IMM GRANULOCYTES NFR BLD AUTO: 3.3 % (ref 0–0.5)
LYMPHOCYTES # BLD AUTO: 1.48 K/UL (ref 1–4.8)
MCH RBC QN AUTO: 30.3 PG (ref 27–31)
MCHC RBC AUTO-ENTMCNC: 31.7 G/DL (ref 32–36)
MCV RBC AUTO: 96 FL (ref 82–98)
NUCLEATED RBC (/100WBC) (OHS): 0 /100 WBC
PLATELET # BLD AUTO: 221 K/UL (ref 150–450)
PMV BLD AUTO: 11.6 FL (ref 9.2–12.9)
POTASSIUM SERPL-SCNC: 5 MMOL/L (ref 3.5–5.1)
PROT SERPL-MCNC: 7.4 GM/DL (ref 6–8.4)
RBC # BLD AUTO: 3.8 M/UL (ref 4–5.4)
RELATIVE EOSINOPHIL (OHS): 0.5 %
RELATIVE LYMPHOCYTE (OHS): 35.1 % (ref 18–48)
RELATIVE MONOCYTE (OHS): 8.8 % (ref 4–15)
RELATIVE NEUTROPHIL (OHS): 52.1 % (ref 38–73)
SODIUM SERPL-SCNC: 141 MMOL/L (ref 136–145)
WBC # BLD AUTO: 4.22 K/UL (ref 3.9–12.7)

## 2025-08-27 PROCEDURE — 80053 COMPREHEN METABOLIC PANEL: CPT

## 2025-08-27 PROCEDURE — 36415 COLL VENOUS BLD VENIPUNCTURE: CPT

## 2025-08-27 PROCEDURE — 85025 COMPLETE CBC W/AUTO DIFF WBC: CPT

## 2025-08-27 PROCEDURE — 86140 C-REACTIVE PROTEIN: CPT

## 2025-08-29 ENCOUNTER — HOSPITAL ENCOUNTER (OUTPATIENT)
Dept: RADIOLOGY | Facility: HOSPITAL | Age: 65
Discharge: HOME | End: 2025-08-29
Attending: COLON & RECTAL SURGERY
Payer: MEDICARE

## (undated) DEVICE — CATH SUCTION 10FR

## (undated) DEVICE — DRESSING AQUACEL SACRAL 9 X 9

## (undated) DEVICE — SUT MONOCRYL 4-0 PS-2

## (undated) DEVICE — BLADE SURG CARBON STEEL SZ11

## (undated) DEVICE — ELECTRODE REM PLYHSV RETURN 9

## (undated) DEVICE — CANNULA ENDOPATH XCEL 5X100MM

## (undated) DEVICE — DRAPE STERI-DRAPE 1000 17X11IN

## (undated) DEVICE — SEE MEDLINE ITEM 157131

## (undated) DEVICE — SYR 10CC LUER LOCK

## (undated) DEVICE — NDL 22GA X1 1/2 REG BEVEL

## (undated) DEVICE — DRAPE COLUMN DAVINCI XI

## (undated) DEVICE — DRAPE C-ARMOR EQUIPMENT COVER

## (undated) DEVICE — DRESSING TELFA STRL 4X3 LF

## (undated) DEVICE — SUT MCRYL PLUS 4-0 PS2 27IN

## (undated) DEVICE — KIT VUETIP TROCAR SWAB

## (undated) DEVICE — IRRIGATOR ENDOSCOPY DISP.

## (undated) DEVICE — DRESSING SURGICAL 1/2X1/2

## (undated) DEVICE — STAPLER SKIN PROXIMATE WIDE

## (undated) DEVICE — DRAPE OPMI STERILE

## (undated) DEVICE — NDL BOX COUNTER

## (undated) DEVICE — DRUG BACITRACIN UNGT OINTMENT

## (undated) DEVICE — TRAY CATH UM FOLEY SIL W 16FR

## (undated) DEVICE — STAPER TA MED BLUE 90MM

## (undated) DEVICE — DRAPE STERI INSTRUMENT 1018

## (undated) DEVICE — ADHESIVE DERMABOND ADVANCED

## (undated) DEVICE — Device

## (undated) DEVICE — TRAY SKIN SCRUB WET PREMIUM

## (undated) DEVICE — MARKER SKIN STND TIP BLUE BARR

## (undated) DEVICE — SEE MEDLINE ITEM 157194

## (undated) DEVICE — SEALER LIGASURE IMPACT 18CM

## (undated) DEVICE — CARTRIDGE OIL

## (undated) DEVICE — DURAPREP SURG SCRUB 26ML

## (undated) DEVICE — COVER MAYO STND XL 30X57IN

## (undated) DEVICE — BOWL STERILE LARGE 32OZ

## (undated) DEVICE — DRAPE INCISE IOBAN 2 23X17IN

## (undated) DEVICE — SPONGE NEURO 1/4X1/4

## (undated) DEVICE — DRAPE PLASTIC U 60X72

## (undated) DEVICE — SUT 4-0 ETHILON 18 PS-2

## (undated) DEVICE — TROCAR ENDOPATH XCEL 5X75MM

## (undated) DEVICE — SUT VICRYL 3-0 27 SH

## (undated) DEVICE — DRAPE C ARM 42 X 120 10/BX

## (undated) DEVICE — CLIPPER BLADE MOD 4406 (CAREF)

## (undated) DEVICE — KIT SURGIFLO HEMOSTATIC MATRIX

## (undated) DEVICE — PAD PINK TRENDELENBURG POS XL

## (undated) DEVICE — DIFFUSER

## (undated) DEVICE — DISSECTOR LIGASURE EXACT 21CM

## (undated) DEVICE — DRESSING LEUKOPLAST FLEX 1X3IN

## (undated) DEVICE — DRESSING MEPILEX BORDER 4 X 4

## (undated) DEVICE — TROCAR ENDOPATH XCEL 5MM 7.5CM

## (undated) DEVICE — DRESSING TRANS 4X4 TEGADERM

## (undated) DEVICE — RELOAD SUREFORM 60 3.5 BLU 6R

## (undated) DEVICE — DRAPE ABDOMINAL TIBURON 14X11

## (undated) DEVICE — DRAPE ARM DAVINCI XI

## (undated) DEVICE — TUBE FRAZIER 5MM 2FT SOFT TIP

## (undated) DEVICE — NDL HYPO REG 25G X 1 1/2

## (undated) DEVICE — BUR BONE CUT MICRO TPS 3X3.8MM

## (undated) DEVICE — CORD BIPOLAR 12 FOOT

## (undated) DEVICE — CANNULA REDUCER 12-8MM

## (undated) DEVICE — TROCAR KII FIOS 5MM X 100MM

## (undated) DEVICE — TOURNIQUET SB QC DP 18X4IN

## (undated) DEVICE — SUT 0 VICRYL / UR6 (J603)

## (undated) DEVICE — PROBE SIMULATOR KRAFF

## (undated) DEVICE — PENCIL ROCKER SWITCH 10FT CORD

## (undated) DEVICE — NDL SPINAL 18GX3.5 SPINOCAN

## (undated) DEVICE — CLIP MED TICALL

## (undated) DEVICE — SEAL UNIVERSAL 5MM-8MM XI

## (undated) DEVICE — SUT VICRYL 6-0 P1 18IN UD

## (undated) DEVICE — KIT ANTIFOG W/SPONG & FLUID

## (undated) DEVICE — SUT 3-0 12-18IN SILK

## (undated) DEVICE — TIP YANKAUERS BULB NO VENT

## (undated) DEVICE — DRESSING XEROFORM FOIL PK 1X8

## (undated) DEVICE — FORCEP STRAIGHT DISP

## (undated) DEVICE — CHLORAPREP 10.5 ML APPLICATOR

## (undated) DEVICE — GLOVE PI ULTRA TOUCH G SURGEON

## (undated) DEVICE — STOCKINETTE DBL PLY ST 4X

## (undated) DEVICE — TOWEL OR DISP STRL BLUE 4/PK

## (undated) DEVICE — TUBE SET SINGLE LUMEN FILTERED

## (undated) DEVICE — GAUZE SPONGE 4X4 12PLY

## (undated) DEVICE — SUT LIGACLIP SMALL XTRA

## (undated) DEVICE — TUBE EMG NIM 7.0MM TRIVANTAGE

## (undated) DEVICE — HEMOSTAT SURGICEL FIBRLR 1X2IN

## (undated) DEVICE — ELECTRODE BLADE INSULATED 1 IN

## (undated) DEVICE — SHEET EENT SPLIT

## (undated) DEVICE — ELECTRODE MEGADYNE RETURN DUAL

## (undated) DEVICE — TROCAR ENDOPATH XCEL 5X100MM

## (undated) DEVICE — RELOAD SUREFORM 60 2.5 WHT 6R

## (undated) DEVICE — BAG INZII TISS RETRV 12/15MM

## (undated) DEVICE — APPLICATOR CHLORAPREP ORN 26ML

## (undated) DEVICE — SUT 4-0 12-18IN SILK BLACK

## (undated) DEVICE — SUT 2/0 30IN SILK BLK BRAI

## (undated) DEVICE — DRAPE CORETEMP FLD WRM 56X62IN

## (undated) DEVICE — NDL INSUF ULTRA VERESS 120MM

## (undated) DEVICE — BNDG COFLEX FOAM LF2 ST 3X5YD

## (undated) DEVICE — TRAY MINOR GEN SURG OMC

## (undated) DEVICE — TRAY CATH 1-LYR URIMTR 16FR

## (undated) DEVICE — SEE MEDLINE ITEM 157150

## (undated) DEVICE — OBTURATOR BLADELESS 8MM XI CLR

## (undated) DEVICE — BLADE ELECTRO EDGE INSULATED

## (undated) DEVICE — DRAPE SCOPE PILLOW WARMER

## (undated) DEVICE — TRAY MINOR GEN SURG

## (undated) DEVICE — CUTTER PROXIMATE BLUE 75MM

## (undated) DEVICE — DRESSING AQUACEL FOAM 5 X 5

## (undated) DEVICE — SEE MEDLINE ITEM 156905

## (undated) DEVICE — SUT GUT PL. 4-0 27 FS-2

## (undated) DEVICE — UNDERGLOVES BIOGEL PI SIZE 8

## (undated) DEVICE — SEALER LIGASURE LAP 37CM 5MM

## (undated) DEVICE — SUT D SPECIAL VICRYL 2-0

## (undated) DEVICE — MARKER SKIN RULER STERILE

## (undated) DEVICE — GAUZE SPONGE PEANUT STRL

## (undated) DEVICE — COVER LIGHT HANDLE 80/CA

## (undated) DEVICE — TUBING HF INSUFFLATION W/ FLTR

## (undated) DEVICE — SUT 3/0 27IN PDS II VIO MO

## (undated) DEVICE — CUTTER PROXIMATE BLUE 100MM

## (undated) DEVICE — SUT 1 36IN PDS II

## (undated) DEVICE — DRESSING ABSRBNT ISLAND 3.6X8

## (undated) DEVICE — DEVICE SYNCHROSEAL DA VINCI

## (undated) DEVICE — CONTAINER SPECIMEN OR STER 4OZ

## (undated) DEVICE — APPLIER CLIP ENDO LIGAMAX 5MM

## (undated) DEVICE — TRAY GENERAL SURGERY OMC

## (undated) DEVICE — STAPLER SUREFORM 60 SPU